# Patient Record
Sex: FEMALE | Race: WHITE | NOT HISPANIC OR LATINO | Employment: FULL TIME | URBAN - METROPOLITAN AREA
[De-identification: names, ages, dates, MRNs, and addresses within clinical notes are randomized per-mention and may not be internally consistent; named-entity substitution may affect disease eponyms.]

---

## 2017-11-22 PROBLEM — N93.0 POSTCOITAL BLEEDING: Status: ACTIVE | Noted: 2017-11-22

## 2017-11-22 PROCEDURE — 88175 CYTOPATH C/V AUTO FLUID REDO: CPT | Performed by: OBSTETRICS & GYNECOLOGY

## 2017-11-22 PROCEDURE — 87624 HPV HI-RISK TYP POOLED RSLT: CPT | Performed by: OBSTETRICS & GYNECOLOGY

## 2021-04-26 PROBLEM — Z91.89 AT HIGH RISK FOR BREAST CANCER: Status: ACTIVE | Noted: 2021-04-26

## 2021-05-21 PROCEDURE — 88305 TISSUE EXAM BY PATHOLOGIST: CPT | Performed by: RADIOLOGY

## 2024-05-07 ENCOUNTER — HOSPITAL ENCOUNTER (EMERGENCY)
Age: 54
Discharge: HOME | End: 2024-05-08
Attending: EMERGENCY MEDICINE

## 2024-05-07 ENCOUNTER — APPOINTMENT (OUTPATIENT)
Dept: CT IMAGING | Age: 54
End: 2024-05-07
Attending: EMERGENCY MEDICINE

## 2024-05-07 ENCOUNTER — APPOINTMENT (OUTPATIENT)
Dept: GENERAL RADIOLOGY | Age: 54
End: 2024-05-07
Attending: EMERGENCY MEDICINE

## 2024-05-07 VITALS
WEIGHT: 205.03 LBS | BODY MASS INDEX: 31.07 KG/M2 | HEART RATE: 107 BPM | RESPIRATION RATE: 20 BRPM | TEMPERATURE: 100.1 F | DIASTOLIC BLOOD PRESSURE: 80 MMHG | OXYGEN SATURATION: 95 % | SYSTOLIC BLOOD PRESSURE: 133 MMHG | HEIGHT: 68 IN

## 2024-05-07 DIAGNOSIS — R50.9 ACUTE FEBRILE ILLNESS: Primary | ICD-10-CM

## 2024-05-07 DIAGNOSIS — F41.1 GENERALIZED ANXIETY DISORDER: ICD-10-CM

## 2024-05-07 DIAGNOSIS — R11.2 NAUSEA AND VOMITING IN ADULT: ICD-10-CM

## 2024-05-07 DIAGNOSIS — R74.01 TRANSAMINITIS: ICD-10-CM

## 2024-05-07 DIAGNOSIS — Z79.899 IMMUNOCOMPROMISED STATE DUE TO DRUG THERAPY  (CMD): ICD-10-CM

## 2024-05-07 DIAGNOSIS — D84.821 IMMUNOCOMPROMISED STATE DUE TO DRUG THERAPY  (CMD): ICD-10-CM

## 2024-05-07 PROBLEM — E78.5 DYSLIPIDEMIA: Status: ACTIVE | Noted: 2022-11-01

## 2024-05-07 PROBLEM — E66.9 OBESITY (BMI 30-39.9): Status: ACTIVE | Noted: 2022-11-01

## 2024-05-07 PROBLEM — R73.03 PREDIABETES: Status: ACTIVE | Noted: 2022-11-01

## 2024-05-07 PROBLEM — N93.0 POSTCOITAL BLEEDING: Status: ACTIVE | Noted: 2017-11-22

## 2024-05-07 PROBLEM — E55.9 VITAMIN D DEFICIENCY: Status: ACTIVE | Noted: 2022-11-05

## 2024-05-07 PROBLEM — Z91.89 AT HIGH RISK FOR BREAST CANCER: Status: ACTIVE | Noted: 2021-04-26

## 2024-05-07 PROBLEM — C56.2 MALIGNANT NEOPLASM OF LEFT OVARY  (CMD): Status: ACTIVE | Noted: 2024-04-12

## 2024-05-07 PROBLEM — R18.0 MALIGNANT ASCITES (CMD): Status: ACTIVE | Noted: 2024-04-12

## 2024-05-07 PROBLEM — C78.6 PERITONEAL CARCINOMATOSIS  (CMD): Status: ACTIVE | Noted: 2024-04-12

## 2024-05-07 LAB
ALBUMIN SERPL-MCNC: 3.2 G/DL (ref 3.6–5.1)
ALBUMIN/GLOB SERPL: 0.6 {RATIO} (ref 1–2.4)
ALP SERPL-CCNC: 246 UNITS/L (ref 45–117)
ALT SERPL-CCNC: 212 UNITS/L
ANION GAP SERPL CALC-SCNC: 13 MMOL/L (ref 7–19)
APPEARANCE UR: CLEAR
APTT PPP: 35 SEC (ref 22–32)
AST SERPL-CCNC: 134 UNITS/L
B PARAPERT DNA SPEC QL NAA+PROBE: NOT DETECTED
B PERT.PT PRMT NPH QL NAA+NON-PROBE: NOT DETECTED
BACTERIA #/AREA URNS HPF: ABNORMAL /HPF
BASOPHILS # BLD: 0.1 K/MCL (ref 0–0.3)
BASOPHILS NFR BLD: 0 %
BILIRUB SERPL-MCNC: 0.3 MG/DL (ref 0.2–1)
BILIRUB UR QL STRIP: NEGATIVE
BUN SERPL-MCNC: 20 MG/DL (ref 6–20)
BUN/CREAT SERPL: 29 (ref 7–25)
C PNEUM DNA NPH QL NAA+NON-PROBE: NOT DETECTED
CALCIUM SERPL-MCNC: 9.3 MG/DL (ref 8.4–10.2)
CHLORIDE SERPL-SCNC: 102 MMOL/L (ref 97–110)
CO2 SERPL-SCNC: 23 MMOL/L (ref 21–32)
COLOR UR: ABNORMAL
CREAT SERPL-MCNC: 0.7 MG/DL (ref 0.51–0.95)
DEPRECATED RDW RBC: 43 FL (ref 39–50)
EGFRCR SERPLBLD CKD-EPI 2021: >90 ML/MIN/{1.73_M2}
EOSINOPHIL # BLD: 0 K/MCL (ref 0–0.5)
EOSINOPHIL NFR BLD: 0 %
ERYTHROCYTE [DISTWIDTH] IN BLOOD: 14.7 % (ref 11–15)
FASTING DURATION TIME PATIENT: ABNORMAL H
FLUAV RNA NPH QL NAA+NON-PROBE: NOT DETECTED
FLUBV RNA NPH QL NAA+NON-PROBE: NOT DETECTED
GLOBULIN SER-MCNC: 5.1 G/DL (ref 2–4)
GLUCOSE SERPL-MCNC: 154 MG/DL (ref 70–99)
GLUCOSE UR STRIP-MCNC: NEGATIVE MG/DL
HADV DNA NPH QL NAA+NON-PROBE: NOT DETECTED
HCOV 229E RNA NPH QL NAA+NON-PROBE: NOT DETECTED
HCOV HKU1 RNA NPH QL NAA+NON-PROBE: NOT DETECTED
HCOV NL63 RNA NPH QL NAA+NON-PROBE: NOT DETECTED
HCOV OC43 RNA NPH QL NAA+NON-PROBE: NOT DETECTED
HCT VFR BLD CALC: 36.1 % (ref 36–46.5)
HGB BLD-MCNC: 11.4 G/DL (ref 12–15.5)
HGB UR QL STRIP: ABNORMAL
HMPV RNA NPH QL NAA+NON-PROBE: NOT DETECTED
HPIV1 RNA NPH QL NAA+NON-PROBE: NOT DETECTED
HPIV2 RNA NPH QL NAA+NON-PROBE: NOT DETECTED
HPIV3 RNA NPH QL NAA+NON-PROBE: NOT DETECTED
HPIV4 RNA NPH QL NAA+NON-PROBE: NOT DETECTED
HYALINE CASTS #/AREA URNS LPF: ABNORMAL /LPF
IMM GRANULOCYTES # BLD AUTO: 0.3 K/MCL (ref 0–0.2)
IMM GRANULOCYTES # BLD: 2 %
INR PPP: 1.1
KETONES UR STRIP-MCNC: NEGATIVE MG/DL
LACTATE BLDV-SCNC: 1 MMOL/L (ref 0–2)
LEUKOCYTE ESTERASE UR QL STRIP: NEGATIVE
LYMPHOCYTES # BLD: 1.4 K/MCL (ref 1–4)
LYMPHOCYTES NFR BLD: 10 %
M PNEUMO DNA NPH QL NAA+NON-PROBE: NOT DETECTED
MAGNESIUM SERPL-MCNC: 1.9 MG/DL (ref 1.7–2.4)
MCH RBC QN AUTO: 26.5 PG (ref 26–34)
MCHC RBC AUTO-ENTMCNC: 31.6 G/DL (ref 32–36.5)
MCV RBC AUTO: 83.8 FL (ref 78–100)
MONOCYTES # BLD: 0.9 K/MCL (ref 0.3–0.9)
MONOCYTES NFR BLD: 6 %
MUCOUS THREADS URNS QL MICRO: PRESENT
NEUTROPHILS # BLD: 12.2 K/MCL (ref 1.8–7.7)
NEUTROPHILS NFR BLD: 82 %
NITRITE UR QL STRIP: NEGATIVE
NRBC BLD MANUAL-RTO: 0 /100 WBC
PH UR STRIP: 6 [PH] (ref 5–7)
PLATELET # BLD AUTO: 176 K/MCL (ref 140–450)
POTASSIUM SERPL-SCNC: 3.7 MMOL/L (ref 3.4–5.1)
PROCALCITONIN SERPL IA-MCNC: 0.3 NG/ML
PROT SERPL-MCNC: 8.3 G/DL (ref 6.4–8.2)
PROT UR STRIP-MCNC: 30 MG/DL
PROTHROMBIN TIME: 11.2 SEC (ref 9.7–11.8)
RBC # BLD: 4.31 MIL/MCL (ref 4–5.2)
RBC #/AREA URNS HPF: ABNORMAL /HPF
RSV RNA NPH QL NAA+NON-PROBE: NOT DETECTED
RV+EV RNA NPH QL NAA+NON-PROBE: NOT DETECTED
SARS-COV-2 RNA RESP QL NAA+PROBE: NOT DETECTED
SODIUM SERPL-SCNC: 134 MMOL/L (ref 135–145)
SP GR UR STRIP: 1.02 (ref 1–1.03)
SQUAMOUS #/AREA URNS HPF: ABNORMAL /HPF
UROBILINOGEN UR STRIP-MCNC: 0.2 MG/DL
WBC # BLD: 14.9 K/MCL (ref 4.2–11)
WBC #/AREA URNS HPF: ABNORMAL /HPF

## 2024-05-07 PROCEDURE — 0202U NFCT DS 22 TRGT SARS-COV-2: CPT | Performed by: EMERGENCY MEDICINE

## 2024-05-07 PROCEDURE — 10002803 HB RX 637: Performed by: EMERGENCY MEDICINE

## 2024-05-07 PROCEDURE — 81001 URINALYSIS AUTO W/SCOPE: CPT | Performed by: EMERGENCY MEDICINE

## 2024-05-07 PROCEDURE — 85730 THROMBOPLASTIN TIME PARTIAL: CPT | Performed by: EMERGENCY MEDICINE

## 2024-05-07 PROCEDURE — 84145 PROCALCITONIN (PCT): CPT | Performed by: EMERGENCY MEDICINE

## 2024-05-07 PROCEDURE — 96365 THER/PROPH/DIAG IV INF INIT: CPT

## 2024-05-07 PROCEDURE — 83605 ASSAY OF LACTIC ACID: CPT | Performed by: EMERGENCY MEDICINE

## 2024-05-07 PROCEDURE — 10002807 HB RX 258: Performed by: EMERGENCY MEDICINE

## 2024-05-07 PROCEDURE — 83735 ASSAY OF MAGNESIUM: CPT | Performed by: EMERGENCY MEDICINE

## 2024-05-07 PROCEDURE — 36415 COLL VENOUS BLD VENIPUNCTURE: CPT

## 2024-05-07 PROCEDURE — 10004651 HB RX, NO CHARGE ITEM: Performed by: EMERGENCY MEDICINE

## 2024-05-07 PROCEDURE — 85025 COMPLETE CBC W/AUTO DIFF WBC: CPT | Performed by: EMERGENCY MEDICINE

## 2024-05-07 PROCEDURE — 85610 PROTHROMBIN TIME: CPT | Performed by: EMERGENCY MEDICINE

## 2024-05-07 PROCEDURE — 99285 EMERGENCY DEPT VISIT HI MDM: CPT

## 2024-05-07 PROCEDURE — 10002800 HB RX 250 W HCPCS: Performed by: EMERGENCY MEDICINE

## 2024-05-07 PROCEDURE — 87040 BLOOD CULTURE FOR BACTERIA: CPT | Performed by: EMERGENCY MEDICINE

## 2024-05-07 PROCEDURE — 93005 ELECTROCARDIOGRAM TRACING: CPT | Performed by: EMERGENCY MEDICINE

## 2024-05-07 PROCEDURE — 80053 COMPREHEN METABOLIC PANEL: CPT | Performed by: EMERGENCY MEDICINE

## 2024-05-07 PROCEDURE — 74177 CT ABD & PELVIS W/CONTRAST: CPT

## 2024-05-07 PROCEDURE — 71046 X-RAY EXAM CHEST 2 VIEWS: CPT

## 2024-05-07 RX ORDER — ALPRAZOLAM 1 MG/1
2 TABLET ORAL ONCE
Qty: 2 TABLET | Refills: 0 | Status: COMPLETED | OUTPATIENT
Start: 2024-05-07 | End: 2024-05-07

## 2024-05-07 RX ORDER — PROPRANOLOL HYDROCHLORIDE 10 MG/1
TABLET ORAL
COMMUNITY
Start: 2023-11-10

## 2024-05-07 RX ORDER — ONDANSETRON HYDROCHLORIDE 8 MG/1
8 TABLET, FILM COATED ORAL
COMMUNITY
Start: 2024-04-12

## 2024-05-07 RX ORDER — DEXAMETHASONE 4 MG/1
TABLET ORAL
COMMUNITY
Start: 2024-04-12

## 2024-05-07 RX ORDER — IBUPROFEN 400 MG/1
400 TABLET ORAL ONCE
Status: COMPLETED | OUTPATIENT
Start: 2024-05-07 | End: 2024-05-07

## 2024-05-07 RX ORDER — ACETAMINOPHEN 500 MG
1000 TABLET ORAL ONCE
Status: COMPLETED | OUTPATIENT
Start: 2024-05-07 | End: 2024-05-07

## 2024-05-07 RX ORDER — BENZONATATE 100 MG/1
100 CAPSULE ORAL
COMMUNITY
Start: 2024-04-02

## 2024-05-07 RX ORDER — LORAZEPAM 0.5 MG/1
TABLET ORAL
COMMUNITY
Start: 2024-04-05

## 2024-05-07 RX ORDER — PROCHLORPERAZINE MALEATE 10 MG
TABLET ORAL
COMMUNITY

## 2024-05-07 RX ADMIN — IBUPROFEN 400 MG: 400 TABLET, FILM COATED ORAL at 22:52

## 2024-05-07 RX ADMIN — ACETAMINOPHEN 1000 MG: 500 TABLET ORAL at 22:00

## 2024-05-07 RX ADMIN — SODIUM CHLORIDE, POTASSIUM CHLORIDE, SODIUM LACTATE AND CALCIUM CHLORIDE 500 ML: 600; 310; 30; 20 INJECTION, SOLUTION INTRAVENOUS at 22:07

## 2024-05-07 RX ADMIN — CEFEPIME 1000 MG: 1 INJECTION, POWDER, FOR SOLUTION INTRAMUSCULAR; INTRAVENOUS at 22:01

## 2024-05-07 RX ADMIN — ALPRAZOLAM 2 MG: 1 TABLET ORAL at 22:51

## 2024-05-07 ASSESSMENT — PAIN SCALES - GENERAL: PAINLEVEL_OUTOF10: 0

## 2024-05-08 LAB
ATRIAL RATE (BPM): 122
P AXIS (DEGREES): 57
PR-INTERVAL (MSEC): 104
QRS-INTERVAL (MSEC): 70
QT-INTERVAL (MSEC): 372
QTC: 530
R AXIS (DEGREES): 44
RAINBOW EXTRA TUBES HOLD SPECIMEN: NORMAL
REPORT TEXT: NORMAL
T AXIS (DEGREES): 43
VENTRICULAR RATE EKG/MIN (BPM): 122

## 2024-05-08 PROCEDURE — 10002805 HB CONTRAST AGENT: Performed by: EMERGENCY MEDICINE

## 2024-05-08 RX ADMIN — IOHEXOL 86 ML: 350 INJECTION, SOLUTION INTRAVENOUS at 00:13

## 2024-05-11 LAB
BACTERIA BLD CULT: NORMAL
BACTERIA BLD CULT: NORMAL

## 2024-05-12 LAB
BACTERIA BLD CULT: NORMAL
BACTERIA BLD CULT: NORMAL

## 2024-08-02 ENCOUNTER — LAB ENCOUNTER (OUTPATIENT)
Dept: LAB | Facility: HOSPITAL | Age: 54
End: 2024-08-02
Attending: OBSTETRICS & GYNECOLOGY
Payer: COMMERCIAL

## 2024-08-02 DIAGNOSIS — Z01.818 PREOP TESTING: ICD-10-CM

## 2024-08-02 LAB
ANTIBODY SCREEN: NEGATIVE
RH BLOOD TYPE: NEGATIVE

## 2024-08-02 PROCEDURE — 36415 COLL VENOUS BLD VENIPUNCTURE: CPT

## 2024-08-02 PROCEDURE — 86850 RBC ANTIBODY SCREEN: CPT

## 2024-08-02 PROCEDURE — 86900 BLOOD TYPING SEROLOGIC ABO: CPT

## 2024-08-02 PROCEDURE — 86901 BLOOD TYPING SEROLOGIC RH(D): CPT

## 2024-08-02 RX ORDER — ACETAMINOPHEN 325 MG/1
325 TABLET ORAL EVERY 6 HOURS PRN
COMMUNITY

## 2024-08-02 RX ORDER — LORAZEPAM 0.5 MG/1
1 TABLET ORAL 4 TIMES DAILY PRN
COMMUNITY
Start: 2024-04-05

## 2024-08-13 ENCOUNTER — ANESTHESIA EVENT (OUTPATIENT)
Dept: SURGERY | Facility: HOSPITAL | Age: 54
End: 2024-08-13
Payer: COMMERCIAL

## 2024-08-13 ENCOUNTER — ANESTHESIA (OUTPATIENT)
Dept: SURGERY | Facility: HOSPITAL | Age: 54
End: 2024-08-13
Payer: COMMERCIAL

## 2024-08-13 ENCOUNTER — HOSPITAL ENCOUNTER (INPATIENT)
Facility: HOSPITAL | Age: 54
LOS: 11 days | Discharge: HOME | End: 2024-08-24
Attending: OBSTETRICS & GYNECOLOGY | Admitting: OBSTETRICS & GYNECOLOGY
Payer: COMMERCIAL

## 2024-08-13 DIAGNOSIS — Z01.818 PREOP TESTING: Primary | ICD-10-CM

## 2024-08-13 DIAGNOSIS — C56.9 MALIGNANT NEOPLASM OF OVARY, UNSPECIFIED LATERALITY (HCC): ICD-10-CM

## 2024-08-13 LAB
ANION GAP SERPL CALC-SCNC: 6 MMOL/L (ref 0–18)
BASOPHILS # BLD AUTO: 0.01 X10(3) UL (ref 0–0.2)
BASOPHILS NFR BLD AUTO: 0.1 %
BUN BLD-MCNC: 12 MG/DL (ref 9–23)
BUN/CREAT SERPL: 16.2 (ref 10–20)
CALCIUM BLD-MCNC: 8.5 MG/DL (ref 8.7–10.4)
CHLORIDE SERPL-SCNC: 110 MMOL/L (ref 98–112)
CO2 SERPL-SCNC: 26 MMOL/L (ref 21–32)
CREAT BLD-MCNC: 0.74 MG/DL
DEPRECATED RDW RBC AUTO: 57.5 FL (ref 35.1–46.3)
EGFRCR SERPLBLD CKD-EPI 2021: 95 ML/MIN/1.73M2 (ref 60–?)
EOSINOPHIL # BLD AUTO: 0 X10(3) UL (ref 0–0.7)
EOSINOPHIL NFR BLD AUTO: 0 %
ERYTHROCYTE [DISTWIDTH] IN BLOOD BY AUTOMATED COUNT: 15.7 % (ref 11–15)
GLUCOSE BLD-MCNC: 166 MG/DL (ref 70–99)
HCT VFR BLD AUTO: 34.8 %
HGB BLD-MCNC: 11.3 G/DL
IMM GRANULOCYTES # BLD AUTO: 0.03 X10(3) UL (ref 0–1)
IMM GRANULOCYTES NFR BLD: 0.3 %
LYMPHOCYTES # BLD AUTO: 0.3 X10(3) UL (ref 1–4)
LYMPHOCYTES NFR BLD AUTO: 2.8 %
MCH RBC QN AUTO: 33.4 PG (ref 26–34)
MCHC RBC AUTO-ENTMCNC: 32.5 G/DL (ref 31–37)
MCV RBC AUTO: 103 FL
MONOCYTES # BLD AUTO: 0.41 X10(3) UL (ref 0.1–1)
MONOCYTES NFR BLD AUTO: 3.8 %
NEUTROPHILS # BLD AUTO: 10.03 X10 (3) UL (ref 1.5–7.7)
NEUTROPHILS # BLD AUTO: 10.03 X10(3) UL (ref 1.5–7.7)
NEUTROPHILS NFR BLD AUTO: 93 %
OSMOLALITY SERPL CALC.SUM OF ELEC: 298 MOSM/KG (ref 275–295)
PLATELET # BLD AUTO: 205 10(3)UL (ref 150–450)
POCT HEMOCUE: 10.6 G/DL (ref 12–16)
POTASSIUM SERPL-SCNC: 4.4 MMOL/L (ref 3.5–5.1)
RBC # BLD AUTO: 3.38 X10(6)UL
RH BLOOD TYPE: NEGATIVE
SODIUM SERPL-SCNC: 142 MMOL/L (ref 136–145)
WBC # BLD AUTO: 10.8 X10(3) UL (ref 4–11)

## 2024-08-13 PROCEDURE — 76942 ECHO GUIDE FOR BIOPSY: CPT | Performed by: ANESTHESIOLOGY

## 2024-08-13 PROCEDURE — 0DBN0ZZ EXCISION OF SIGMOID COLON, OPEN APPROACH: ICD-10-PCS | Performed by: OBSTETRICS & GYNECOLOGY

## 2024-08-13 PROCEDURE — 3E0T3BZ INTRODUCTION OF ANESTHETIC AGENT INTO PERIPHERAL NERVES AND PLEXI, PERCUTANEOUS APPROACH: ICD-10-PCS | Performed by: OBSTETRICS & GYNECOLOGY

## 2024-08-13 PROCEDURE — 85025 COMPLETE CBC W/AUTO DIFF WBC: CPT | Performed by: PHYSICIAN ASSISTANT

## 2024-08-13 PROCEDURE — 80048 BASIC METABOLIC PNL TOTAL CA: CPT | Performed by: PHYSICIAN ASSISTANT

## 2024-08-13 PROCEDURE — 0D5W0ZZ DESTRUCTION OF PERITONEUM, OPEN APPROACH: ICD-10-PCS | Performed by: OBSTETRICS & GYNECOLOGY

## 2024-08-13 PROCEDURE — 07BC0ZZ EXCISION OF PELVIS LYMPHATIC, OPEN APPROACH: ICD-10-PCS | Performed by: OBSTETRICS & GYNECOLOGY

## 2024-08-13 PROCEDURE — 0TN70ZZ RELEASE LEFT URETER, OPEN APPROACH: ICD-10-PCS | Performed by: OBSTETRICS & GYNECOLOGY

## 2024-08-13 PROCEDURE — 88342 IMHCHEM/IMCYTCHM 1ST ANTB: CPT | Performed by: OBSTETRICS & GYNECOLOGY

## 2024-08-13 PROCEDURE — 0TN60ZZ RELEASE RIGHT URETER, OPEN APPROACH: ICD-10-PCS | Performed by: OBSTETRICS & GYNECOLOGY

## 2024-08-13 PROCEDURE — 88341 IMHCHEM/IMCYTCHM EA ADD ANTB: CPT | Performed by: OBSTETRICS & GYNECOLOGY

## 2024-08-13 PROCEDURE — 0D580ZZ DESTRUCTION OF SMALL INTESTINE, OPEN APPROACH: ICD-10-PCS | Performed by: OBSTETRICS & GYNECOLOGY

## 2024-08-13 PROCEDURE — 0UT20ZZ RESECTION OF BILATERAL OVARIES, OPEN APPROACH: ICD-10-PCS | Performed by: OBSTETRICS & GYNECOLOGY

## 2024-08-13 PROCEDURE — 88309 TISSUE EXAM BY PATHOLOGIST: CPT | Performed by: OBSTETRICS & GYNECOLOGY

## 2024-08-13 PROCEDURE — 0UT70ZZ RESECTION OF BILATERAL FALLOPIAN TUBES, OPEN APPROACH: ICD-10-PCS | Performed by: OBSTETRICS & GYNECOLOGY

## 2024-08-13 PROCEDURE — 0DBH0ZZ EXCISION OF CECUM, OPEN APPROACH: ICD-10-PCS | Performed by: OBSTETRICS & GYNECOLOGY

## 2024-08-13 PROCEDURE — 0DB80ZZ EXCISION OF SMALL INTESTINE, OPEN APPROACH: ICD-10-PCS | Performed by: OBSTETRICS & GYNECOLOGY

## 2024-08-13 PROCEDURE — 0DBW0ZZ EXCISION OF PERITONEUM, OPEN APPROACH: ICD-10-PCS | Performed by: OBSTETRICS & GYNECOLOGY

## 2024-08-13 PROCEDURE — 88307 TISSUE EXAM BY PATHOLOGIST: CPT | Performed by: OBSTETRICS & GYNECOLOGY

## 2024-08-13 PROCEDURE — 88305 TISSUE EXAM BY PATHOLOGIST: CPT | Performed by: OBSTETRICS & GYNECOLOGY

## 2024-08-13 PROCEDURE — 0DBU0ZZ EXCISION OF OMENTUM, OPEN APPROACH: ICD-10-PCS | Performed by: OBSTETRICS & GYNECOLOGY

## 2024-08-13 PROCEDURE — 0UB40ZZ EXCISION OF UTERINE SUPPORTING STRUCTURE, OPEN APPROACH: ICD-10-PCS | Performed by: OBSTETRICS & GYNECOLOGY

## 2024-08-13 PROCEDURE — 0DBP0ZZ EXCISION OF RECTUM, OPEN APPROACH: ICD-10-PCS | Performed by: OBSTETRICS & GYNECOLOGY

## 2024-08-13 PROCEDURE — 0UT90ZZ RESECTION OF UTERUS, OPEN APPROACH: ICD-10-PCS | Performed by: OBSTETRICS & GYNECOLOGY

## 2024-08-13 RX ORDER — EPHEDRINE SULFATE 50 MG/ML
INJECTION, SOLUTION INTRAVENOUS AS NEEDED
Status: DISCONTINUED | OUTPATIENT
Start: 2024-08-13 | End: 2024-08-13 | Stop reason: SURG

## 2024-08-13 RX ORDER — MORPHINE SULFATE 4 MG/ML
4 INJECTION, SOLUTION INTRAMUSCULAR; INTRAVENOUS EVERY 10 MIN PRN
Status: DISCONTINUED | OUTPATIENT
Start: 2024-08-13 | End: 2024-08-13 | Stop reason: HOSPADM

## 2024-08-13 RX ORDER — SCOLOPAMINE TRANSDERMAL SYSTEM 1 MG/1
PATCH, EXTENDED RELEASE TRANSDERMAL AS NEEDED
Status: DISCONTINUED | OUTPATIENT
Start: 2024-08-13 | End: 2024-08-13 | Stop reason: SURG

## 2024-08-13 RX ORDER — DIPHENHYDRAMINE HYDROCHLORIDE 50 MG/ML
12.5 INJECTION INTRAMUSCULAR; INTRAVENOUS EVERY 4 HOURS PRN
Status: DISCONTINUED | OUTPATIENT
Start: 2024-08-13 | End: 2024-08-15

## 2024-08-13 RX ORDER — ROCURONIUM BROMIDE 10 MG/ML
INJECTION, SOLUTION INTRAVENOUS AS NEEDED
Status: DISCONTINUED | OUTPATIENT
Start: 2024-08-13 | End: 2024-08-13 | Stop reason: SURG

## 2024-08-13 RX ORDER — ONDANSETRON 2 MG/ML
INJECTION INTRAMUSCULAR; INTRAVENOUS AS NEEDED
Status: DISCONTINUED | OUTPATIENT
Start: 2024-08-13 | End: 2024-08-13 | Stop reason: SURG

## 2024-08-13 RX ORDER — HYDROMORPHONE HYDROCHLORIDE 1 MG/ML
0.4 INJECTION, SOLUTION INTRAMUSCULAR; INTRAVENOUS; SUBCUTANEOUS EVERY 5 MIN PRN
Status: DISCONTINUED | OUTPATIENT
Start: 2024-08-13 | End: 2024-08-13 | Stop reason: HOSPADM

## 2024-08-13 RX ORDER — HYDROMORPHONE HYDROCHLORIDE 1 MG/ML
0.2 INJECTION, SOLUTION INTRAMUSCULAR; INTRAVENOUS; SUBCUTANEOUS EVERY 5 MIN PRN
Status: DISCONTINUED | OUTPATIENT
Start: 2024-08-13 | End: 2024-08-13 | Stop reason: HOSPADM

## 2024-08-13 RX ORDER — SODIUM CHLORIDE, SODIUM LACTATE, POTASSIUM CHLORIDE, CALCIUM CHLORIDE 600; 310; 30; 20 MG/100ML; MG/100ML; MG/100ML; MG/100ML
INJECTION, SOLUTION INTRAVENOUS CONTINUOUS
Status: DISCONTINUED | OUTPATIENT
Start: 2024-08-13 | End: 2024-08-13 | Stop reason: HOSPADM

## 2024-08-13 RX ORDER — MIDAZOLAM HYDROCHLORIDE 1 MG/ML
INJECTION INTRAMUSCULAR; INTRAVENOUS AS NEEDED
Status: DISCONTINUED | OUTPATIENT
Start: 2024-08-13 | End: 2024-08-13 | Stop reason: SURG

## 2024-08-13 RX ORDER — DEXAMETHASONE SODIUM PHOSPHATE 10 MG/ML
INJECTION, SOLUTION INTRAMUSCULAR; INTRAVENOUS
Status: COMPLETED | OUTPATIENT
Start: 2024-08-13 | End: 2024-08-13

## 2024-08-13 RX ORDER — ONDANSETRON 4 MG/1
4 TABLET, FILM COATED ORAL EVERY 8 HOURS PRN
Status: DISCONTINUED | OUTPATIENT
Start: 2024-08-13 | End: 2024-08-24

## 2024-08-13 RX ORDER — ONDANSETRON 2 MG/ML
4 INJECTION INTRAMUSCULAR; INTRAVENOUS EVERY 6 HOURS PRN
Status: DISCONTINUED | OUTPATIENT
Start: 2024-08-13 | End: 2024-08-15

## 2024-08-13 RX ORDER — SODIUM CHLORIDE, SODIUM LACTATE, POTASSIUM CHLORIDE, CALCIUM CHLORIDE 600; 310; 30; 20 MG/100ML; MG/100ML; MG/100ML; MG/100ML
INJECTION, SOLUTION INTRAVENOUS CONTINUOUS
Status: DISCONTINUED | OUTPATIENT
Start: 2024-08-13 | End: 2024-08-15

## 2024-08-13 RX ORDER — MORPHINE SULFATE 10 MG/ML
6 INJECTION, SOLUTION INTRAMUSCULAR; INTRAVENOUS EVERY 10 MIN PRN
Status: DISCONTINUED | OUTPATIENT
Start: 2024-08-13 | End: 2024-08-13 | Stop reason: HOSPADM

## 2024-08-13 RX ORDER — ENOXAPARIN SODIUM 100 MG/ML
40 INJECTION SUBCUTANEOUS DAILY
Status: DISCONTINUED | OUTPATIENT
Start: 2024-08-14 | End: 2024-08-24

## 2024-08-13 RX ORDER — LIDOCAINE HYDROCHLORIDE 10 MG/ML
INJECTION, SOLUTION EPIDURAL; INFILTRATION; INTRACAUDAL; PERINEURAL AS NEEDED
Status: DISCONTINUED | OUTPATIENT
Start: 2024-08-13 | End: 2024-08-13 | Stop reason: SURG

## 2024-08-13 RX ORDER — DIPHENHYDRAMINE HYDROCHLORIDE 50 MG/ML
12.5 INJECTION INTRAMUSCULAR; INTRAVENOUS EVERY 4 HOURS PRN
Status: DISCONTINUED | OUTPATIENT
Start: 2024-08-13 | End: 2024-08-24

## 2024-08-13 RX ORDER — KETOROLAC TROMETHAMINE 15 MG/ML
15 INJECTION, SOLUTION INTRAMUSCULAR; INTRAVENOUS EVERY 6 HOURS PRN
Status: DISCONTINUED | OUTPATIENT
Start: 2024-08-13 | End: 2024-08-15

## 2024-08-13 RX ORDER — SODIUM CHLORIDE 9 MG/ML
INJECTION, SOLUTION INTRAVENOUS CONTINUOUS
Status: DISCONTINUED | OUTPATIENT
Start: 2024-08-13 | End: 2024-08-15

## 2024-08-13 RX ORDER — METRONIDAZOLE 500 MG/100ML
500 INJECTION, SOLUTION INTRAVENOUS ONCE
Status: COMPLETED | OUTPATIENT
Start: 2024-08-13 | End: 2024-08-13

## 2024-08-13 RX ORDER — SODIUM CHLORIDE, SODIUM LACTATE, POTASSIUM CHLORIDE, CALCIUM CHLORIDE 600; 310; 30; 20 MG/100ML; MG/100ML; MG/100ML; MG/100ML
INJECTION, SOLUTION INTRAVENOUS CONTINUOUS
Status: DISCONTINUED | OUTPATIENT
Start: 2024-08-13 | End: 2024-08-16

## 2024-08-13 RX ORDER — SODIUM CHLORIDE 9 MG/ML
INJECTION, SOLUTION INTRAVENOUS CONTINUOUS PRN
Status: DISCONTINUED | OUTPATIENT
Start: 2024-08-13 | End: 2024-08-13 | Stop reason: SURG

## 2024-08-13 RX ORDER — HYDROMORPHONE HYDROCHLORIDE 1 MG/ML
0.6 INJECTION, SOLUTION INTRAMUSCULAR; INTRAVENOUS; SUBCUTANEOUS EVERY 5 MIN PRN
Status: DISCONTINUED | OUTPATIENT
Start: 2024-08-13 | End: 2024-08-13 | Stop reason: HOSPADM

## 2024-08-13 RX ORDER — MORPHINE SULFATE 4 MG/ML
2 INJECTION, SOLUTION INTRAMUSCULAR; INTRAVENOUS EVERY 10 MIN PRN
Status: DISCONTINUED | OUTPATIENT
Start: 2024-08-13 | End: 2024-08-13 | Stop reason: HOSPADM

## 2024-08-13 RX ORDER — NALOXONE HYDROCHLORIDE 0.4 MG/ML
0.08 INJECTION, SOLUTION INTRAMUSCULAR; INTRAVENOUS; SUBCUTANEOUS
Status: DISCONTINUED | OUTPATIENT
Start: 2024-08-13 | End: 2024-08-15

## 2024-08-13 RX ORDER — DEXAMETHASONE SODIUM PHOSPHATE 4 MG/ML
VIAL (ML) INJECTION AS NEEDED
Status: DISCONTINUED | OUTPATIENT
Start: 2024-08-13 | End: 2024-08-13 | Stop reason: SURG

## 2024-08-13 RX ORDER — NALOXONE HYDROCHLORIDE 0.4 MG/ML
0.08 INJECTION, SOLUTION INTRAMUSCULAR; INTRAVENOUS; SUBCUTANEOUS AS NEEDED
Status: DISCONTINUED | OUTPATIENT
Start: 2024-08-13 | End: 2024-08-13 | Stop reason: HOSPADM

## 2024-08-13 RX ORDER — PANTOPRAZOLE SODIUM 40 MG/1
40 TABLET, DELAYED RELEASE ORAL
Status: DISCONTINUED | OUTPATIENT
Start: 2024-08-14 | End: 2024-08-24

## 2024-08-13 RX ORDER — METRONIDAZOLE 500 MG/100ML
500 INJECTION, SOLUTION INTRAVENOUS EVERY 12 HOURS
Status: COMPLETED | OUTPATIENT
Start: 2024-08-13 | End: 2024-08-15

## 2024-08-13 RX ORDER — ROPIVACAINE HYDROCHLORIDE 5 MG/ML
INJECTION, SOLUTION EPIDURAL; INFILTRATION; PERINEURAL
Status: COMPLETED | OUTPATIENT
Start: 2024-08-13 | End: 2024-08-13

## 2024-08-13 RX ORDER — PHENYLEPHRINE HCL 10 MG/ML
VIAL (ML) INJECTION AS NEEDED
Status: DISCONTINUED | OUTPATIENT
Start: 2024-08-13 | End: 2024-08-13 | Stop reason: SURG

## 2024-08-13 RX ORDER — GLYCOPYRROLATE 0.2 MG/ML
INJECTION, SOLUTION INTRAMUSCULAR; INTRAVENOUS AS NEEDED
Status: DISCONTINUED | OUTPATIENT
Start: 2024-08-13 | End: 2024-08-13 | Stop reason: SURG

## 2024-08-13 RX ORDER — ONDANSETRON 2 MG/ML
4 INJECTION INTRAMUSCULAR; INTRAVENOUS EVERY 8 HOURS PRN
Status: DISCONTINUED | OUTPATIENT
Start: 2024-08-13 | End: 2024-08-24

## 2024-08-13 RX ORDER — FAMOTIDINE 20 MG/1
20 TABLET, FILM COATED ORAL ONCE
Status: COMPLETED | OUTPATIENT
Start: 2024-08-13 | End: 2024-08-13

## 2024-08-13 RX ORDER — ACETAMINOPHEN 500 MG
1000 TABLET ORAL ONCE
Status: COMPLETED | OUTPATIENT
Start: 2024-08-13 | End: 2024-08-13

## 2024-08-13 RX ORDER — ACETAMINOPHEN 10 MG/ML
1000 INJECTION, SOLUTION INTRAVENOUS ONCE
Status: COMPLETED | OUTPATIENT
Start: 2024-08-13 | End: 2024-08-13

## 2024-08-13 RX ADMIN — PHENYLEPHRINE HCL 100 MCG: 10 MG/ML VIAL (ML) INJECTION at 12:25:00

## 2024-08-13 RX ADMIN — SODIUM CHLORIDE: 9 INJECTION, SOLUTION INTRAVENOUS at 12:50:00

## 2024-08-13 RX ADMIN — PHENYLEPHRINE HCL 100 MCG: 10 MG/ML VIAL (ML) INJECTION at 09:45:00

## 2024-08-13 RX ADMIN — SODIUM CHLORIDE, SODIUM LACTATE, POTASSIUM CHLORIDE, CALCIUM CHLORIDE: 600; 310; 30; 20 INJECTION, SOLUTION INTRAVENOUS at 12:07:00

## 2024-08-13 RX ADMIN — ROCURONIUM BROMIDE 20 MG: 10 INJECTION, SOLUTION INTRAVENOUS at 10:58:00

## 2024-08-13 RX ADMIN — ROCURONIUM BROMIDE 40 MG: 10 INJECTION, SOLUTION INTRAVENOUS at 07:40:00

## 2024-08-13 RX ADMIN — PHENYLEPHRINE HCL 100 MCG: 10 MG/ML VIAL (ML) INJECTION at 09:39:00

## 2024-08-13 RX ADMIN — ONDANSETRON 4 MG: 2 INJECTION INTRAMUSCULAR; INTRAVENOUS at 07:50:00

## 2024-08-13 RX ADMIN — EPHEDRINE SULFATE 10 MG: 50 INJECTION, SOLUTION INTRAVENOUS at 10:47:00

## 2024-08-13 RX ADMIN — ROCURONIUM BROMIDE 10 MG: 10 INJECTION, SOLUTION INTRAVENOUS at 11:31:00

## 2024-08-13 RX ADMIN — MIDAZOLAM HYDROCHLORIDE 2 MG: 1 INJECTION INTRAMUSCULAR; INTRAVENOUS at 07:34:00

## 2024-08-13 RX ADMIN — PHENYLEPHRINE HCL 100 MCG: 10 MG/ML VIAL (ML) INJECTION at 11:42:00

## 2024-08-13 RX ADMIN — LIDOCAINE HYDROCHLORIDE 50 MG: 10 INJECTION, SOLUTION EPIDURAL; INFILTRATION; INTRACAUDAL; PERINEURAL at 07:40:00

## 2024-08-13 RX ADMIN — ROCURONIUM BROMIDE 10 MG: 10 INJECTION, SOLUTION INTRAVENOUS at 09:19:00

## 2024-08-13 RX ADMIN — SODIUM CHLORIDE, SODIUM LACTATE, POTASSIUM CHLORIDE, CALCIUM CHLORIDE: 600; 310; 30; 20 INJECTION, SOLUTION INTRAVENOUS at 07:34:00

## 2024-08-13 RX ADMIN — ROCURONIUM BROMIDE 10 MG: 10 INJECTION, SOLUTION INTRAVENOUS at 10:24:00

## 2024-08-13 RX ADMIN — PHENYLEPHRINE HCL 100 MCG: 10 MG/ML VIAL (ML) INJECTION at 11:22:00

## 2024-08-13 RX ADMIN — ROCURONIUM BROMIDE 10 MG: 10 INJECTION, SOLUTION INTRAVENOUS at 08:18:00

## 2024-08-13 RX ADMIN — PHENYLEPHRINE HCL 100 MCG: 10 MG/ML VIAL (ML) INJECTION at 08:27:00

## 2024-08-13 RX ADMIN — ONDANSETRON 4 MG: 2 INJECTION INTRAMUSCULAR; INTRAVENOUS at 13:17:00

## 2024-08-13 RX ADMIN — PHENYLEPHRINE HCL 100 MCG: 10 MG/ML VIAL (ML) INJECTION at 12:17:00

## 2024-08-13 RX ADMIN — ROCURONIUM BROMIDE 10 MG: 10 INJECTION, SOLUTION INTRAVENOUS at 09:45:00

## 2024-08-13 RX ADMIN — ROPIVACAINE HYDROCHLORIDE 40 ML: 5 INJECTION, SOLUTION EPIDURAL; INFILTRATION; PERINEURAL at 13:13:00

## 2024-08-13 RX ADMIN — SODIUM CHLORIDE: 9 INJECTION, SOLUTION INTRAVENOUS at 07:47:00

## 2024-08-13 RX ADMIN — SODIUM CHLORIDE: 9 INJECTION, SOLUTION INTRAVENOUS at 12:07:00

## 2024-08-13 RX ADMIN — GLYCOPYRROLATE 0.1 MG: 0.2 INJECTION, SOLUTION INTRAMUSCULAR; INTRAVENOUS at 07:50:00

## 2024-08-13 RX ADMIN — EPHEDRINE SULFATE 10 MG: 50 INJECTION, SOLUTION INTRAVENOUS at 08:26:00

## 2024-08-13 RX ADMIN — DEXAMETHASONE SODIUM PHOSPHATE 10 MG: 10 INJECTION, SOLUTION INTRAMUSCULAR; INTRAVENOUS at 13:13:00

## 2024-08-13 RX ADMIN — DEXAMETHASONE SODIUM PHOSPHATE 4 MG: 4 MG/ML VIAL (ML) INJECTION at 07:50:00

## 2024-08-13 RX ADMIN — METRONIDAZOLE 500 MG: 500 INJECTION, SOLUTION INTRAVENOUS at 07:54:00

## 2024-08-13 RX ADMIN — ROCURONIUM BROMIDE 20 MG: 10 INJECTION, SOLUTION INTRAVENOUS at 08:46:00

## 2024-08-13 RX ADMIN — SCOLOPAMINE TRANSDERMAL SYSTEM 1 PATCH: 1 PATCH, EXTENDED RELEASE TRANSDERMAL at 07:31:00

## 2024-08-13 RX ADMIN — PHENYLEPHRINE HCL 100 MCG: 10 MG/ML VIAL (ML) INJECTION at 11:31:00

## 2024-08-13 RX ADMIN — SODIUM CHLORIDE: 9 INJECTION, SOLUTION INTRAVENOUS at 09:45:00

## 2024-08-13 NOTE — ANESTHESIA PROCEDURE NOTES
Peripheral Block    Date/Time: 8/13/2024 1:13 PM    Performed by: Jaret Ferguson MD  Authorized by: Jaret Ferguson MD      General Information and Staff    Start Time:  8/13/2024 1:13 PM  End Time:  8/13/2024 1:20 PM  Anesthesiologist:  Jaret Ferguson MD  Performed by:  Anesthesiologist  Patient Location:  OR    Block Placement: Post Induction  Site Identification: real time ultrasound guided, surface landmarks and image stored and retrievable      Reason for Block: at surgeon's request and post-op pain management    Preanesthetic Checklist: 2 patient identifers, IV checked, risks and benefits discussed, monitors and equipment checked, pre-op evaluation, timeout performed, anesthesia consent, sterile technique used, no prohibitive neurological deficits and no local skin infection at insertion site      Procedure Details    Patient Position:   Block Type:  TAP  Laterality:  Bilateral  Injection Technique:  Single-shot    Needle    Needle Type:  Echogenic  Needle Gauge:  22 G  Needle Length:  90 mm  Needle Localization:  Ultrasound guidance  Reason for Ultrasound Use: appropriate spread of the medication was noted in real time and no ultrasound evidence of intravascular and/or intraneural injection            Assessment    Injection Assessment:  Good spread noted, incremental injection, local visualized surrounding nerve on ultrasound, low pressure, negative aspiration for heme and negative resistance  Heart Rate Change: No        Medications  8/13/2024 1:13 PM  ropivacaine (NAROPIN) injection 0.5% - Infiltration   40 mL - 8/13/2024 1:13:00 PM  dexamethasone (DECADRON) PF injection 10 mg/ml - Injection   10 mg - 8/13/2024 1:13:00 PM    Additional Comments    Sterily, 40 ml total of local anesthetic for both sides

## 2024-08-13 NOTE — ANESTHESIA PROCEDURE NOTES
Peripheral IV  Date/Time: 8/13/2024 7:47 AM  Inserted by: Tootie Peace CRNA    Placement  Needle size: 18 G  Laterality: right  Location: hand  Local anesthetic: none  Site prep: alcohol  Technique: anatomical landmarks  Attempts: 1

## 2024-08-13 NOTE — BRIEF OP NOTE
Pre-Operative Diagnosis: Adenocarcinoma     Post-Operative Diagnosis: Adenocarcinoma      Procedure Performed:   Exploratory laparotomy, total abdominal hysterectomy, bilateral salpingo-oophorectomy, bilateral ureterolysis, bilateral pelvic lymph node dissection, tumor debulking, Argon Beam coagulation of peritoneal nodules, mobilization of splenic flexure,repair of transverse colon mesentery, infracolic, supracolic and splenocolic ligament resection with omentectomy, rectosigmoid colon resection with end to end anastomosis    Surgeons and Role:  Panel 1:     * Binh Choudhary MD - Primary  Panel 2:     * Binh Choudhary MD - Primary    Assistant(s):  Surgical Assistant.: Moreno Posey CSA  PA: Rehana Ortega PA     Surgical Findings: tumor involving sigmoid colon, see full op report     Specimen: sent to pathology     Estimated Blood Loss: Blood Output: 100 mL (8/13/2024 12:30 PM)      Dictation Number:  TBD    KOREY Roberto  8/13/2024  1:10 PM

## 2024-08-13 NOTE — H&P
[facility]   INTERNAL MEDICINE HISTORY & PHYSICAL      CHIEF COMPLAINT   Gynecologic cancer s/p hysterectomy    HISTORY OF THE PRESENT ILLNESS    Yue Malin - 54 year old female presenting with above CC.    History obtained from patient w/ additional history from review of outside records in care everywhere.  Abd pain & bloating - had CT in march which showed ascites & abnormal L ovary. Paracentesis c/f carcinoma. Subsequent diagnostics w high-grade adenocarcinoma c/w gynecologic primary.   Sees Dr. Serrano - [facility] h/o. Started Carbo/Taxol/Avastin on 24, S/p C 4 on 24,   She saw Dr. Choudhary who recommended surgery. She thus opted for operative management. She presents today for planned procedure. Operative course progressed as expected, long surgery requiring bowel resection. Medicine consulted for post-op med management. She notes pain but ivp dilaudid helping  She denies HA, blurry vision, chest pain, difficulty breathing. She notes good sensation, no specific numbness, tingling, or weakness.    REVIEW OF SYSTEMS   Remainder of 12-point ROS negative unless discussed in HPI.     PATIENT HISTORIES  PMHx:  She has a past medical history of Anxiety state, Cancer (HCC), DEPRESSION, Depression, Esophageal reflux, HPV in female, Hypertriglyceridemia, OCD (obsessive compulsive disorder), Personal history of antineoplastic chemotherapy, PONV (postoperative nausea and vomiting), PTSD (post-traumatic stress disorder), SEASONAL ALLERGIES, Visual impairment, Vitamin B 12 deficiency, and Vitamin D deficiency.  PSHx:  She has a past surgical history that includes ; other surgical history ( &); other surgical history ( ); implant left (); implant left (); implant right (); and implant right ().   FHx:  Her family history includes Breast Cancer (age of onset: 60) in her mother; Cancer in her mother; Diabetes in her father; Heart Disorder in her father; Other in her father.    SHx: She reports that she has never smoked. She has never used smokeless tobacco. She reports that she does not currently use alcohol. She reports that she does not use drugs.    Allergies: She is allergic to hydrocodone and adhesive tape.     Current Outpatient Medications   Medication Instructions    acetaminophen (TYLENOL) 325 mg, Oral, Every 6 hours PRN    LORazepam (ATIVAN) 1 mg, Oral, 4 times daily PRN       PHYSICAL EXAMINATION   Vitals: /65 (BP Location: Right arm)   Pulse 67   Temp 98.2 °F (36.8 °C) (Oral)   Resp 18   Ht 5' 7\" (1.702 m)   Wt 173 lb 6.4 oz (78.7 kg)   LMP 12/22/2020   SpO2 100%   BMI 27.16 kg/m²   Gen: NAD, well nourished, NGT  Eyes: PERRLA, normal conjunctivae  ENMT: Moist mucous membranes, trachea midline, no pharyngeal erythema, no thyromegaly  CV: RRR, no M/R/G, no peripheral edema  Resp: CTAB, non-labored respirations, symmetric expansion  GI: Soft, NT, ND, + BS, no rebound, no guarding  MSK:  No C/C/E, normal active/passive ROM in C-spine, 5/5 strength in all extremities  Skin: No rashes, visible skin w/o worrisome lesions   Neuro: CN 2-12 grossly intact, sensation intact   Psych: A&Ox3, appropriate mood, conversant w/ linear thought process     DATA REVIEW: LABORATORY VALUES & DIAGNOSTICS  Recent Labs   Lab 08/13/24  1726 08/14/24  0509    143   K 4.4 4.5    110   CO2 26.0 27.0   BUN 12 13   CREATSERUM 0.74 0.73   ANIONGAP 6 6   * 121*   CA 8.5* 8.8   EGFRCR 95 97     Recent Labs   Lab 08/13/24  1726 08/14/24  0508   WBC 10.8 8.7   HGB 11.3* 10.2*   HCT 34.8* 31.9*   .0 192.0   .0* 102.2*   MOPERCENT 3.8 9.7   EOPERCENT 0.0 0.0   BAPERCENT 0.1 0.1     ASSESSMENT & PLAN   Yue Malin - 54 year old female w adenocarcinoma admitted 8/13/2024 for post op care after planned CHELSIE-BSO, colon resection for her gynecologic adenoca w Dr. Choudhary    Gynecologic adenocarcinoma w peritoneal carcinomatosis  S/p chemo  Now s/p CHELSIE-BSO, rectosigmoid  colon resection w end-end anastomosis due to tumor involvement  - Post-op care per Dr. Choudhary's team  - NGT  - Trend labs  - Dilaudid PCA for now  - IVF until tolerating PO  - Holguin per Gyne/onc  - Up, OOB, IS use, ambulate  - CLD, ADAT    Anxiety (ACOSTA)  - Stable, continue PTA ativan PRN    ACP: Full code  Ppx: DVT: Enox  Dispo  EDoD:  TBD. Suspect 3-5 days  F/u:   - PCP:  Diana Black MD  - Onc  - Gyne/onc    Available via epic secure chat or perfect serve 7A - 7P.    Steven Hou MD   Internal Medicine - [place] [facility]ist  [facility]

## 2024-08-13 NOTE — PROGRESS NOTES
Admitted to room 440 from PACU. PCA dilaudid for pain management. Zofran was given for nausea. NGT to LIS. NPO. Swabs given for oral care. Holguin in place. Abd incision clean/dry, binder in place. Remote tele monitoring. SCDs applied. IVF infusing. Orders for PT/OT to eval. Family at bedside with patient.

## 2024-08-13 NOTE — ANESTHESIA PREPROCEDURE EVALUATION
Anesthesia PreOp Note    HPI:     Yue Malin is a 54 year old female who presents for preoperative consultation requested by: Binh Choudhary MD    Date of Surgery: 2024    Procedure(s):  Exploratory laparotomy, total abdominal hysterectomy, bilateral salpingo-oophorectomy, staging, tumor debulking, possible bowel resection  LAPAROTOMY SALPINGO OOPHORECTOMY  COLON RESECTION LEFT  Indication: Adenocarcinoma    Relevant Problems   No relevant active problems       NPO:  Last Liquid Consumption Date: 24  Last Liquid Consumption Time: 2200  Last Solid Consumption Date: 24  Last Solid Consumption Time: 0800  Last Liquid Consumption Date: 24          History Review:  Patient Active Problem List    Diagnosis Date Noted    At high risk for breast cancer 2021    Postcoital bleeding 2017    High risk HPV infection 10/22/2012       Past Medical History:    Anxiety state    Cancer (HCC)    ovary    DEPRESSION    Depression    Esophageal reflux    HPV in female    Hypertriglyceridemia    OCD (obsessive compulsive disorder)    Personal history of antineoplastic chemotherapy    PONV (postoperative nausea and vomiting)    PTSD (post-traumatic stress disorder)    SEASONAL ALLERGIES    Visual impairment    glasses    Vitamin B 12 deficiency    Vitamin D deficiency       Past Surgical History:   Procedure Laterality Date          x3    Implant left  1999    Implant left  2009    implants replaced    Implant right      Implant right  2009    implants replaced    Other surgical history   &    breast implants, deflation    Other surgical history  2007     carpal tunnel R hand       Medications Prior to Admission   Medication Sig Dispense Refill Last Dose    LORazepam 0.5 MG Oral Tab Take 2 tablets (1 mg total) by mouth 4 (four) times daily as needed for Anxiety.   2024 at 0500    acetaminophen 325 MG Oral Tab Take 1 tablet (325 mg total) by mouth every 6 (six) hours as needed  for Pain.        Current Facility-Administered Medications Ordered in Epic   Medication Dose Route Frequency Provider Last Rate Last Admin    lactated ringers infusion   Intravenous Continuous Binh Choudhary MD        ceFAZolin (Ancef) 2g in 10mL IV syringe premix  2 g Intravenous Once Binh Choudhary MD        metRONIDAZOLE in sodium chloride 0.79% (Flagyl) 5 mg/mL IVPB premix 500 mg  500 mg Intravenous Once Binh Choudhary MD         No current [facility]-ordered outpatient medications on file.       Allergies   Allergen Reactions    Hydrocodone NAUSEA AND VOMITING     agitation    Adhesive Tape RASH       Family History   Problem Relation Age of Onset    Diabetes Father     Heart Disorder Father     Other (Other) Father         rheumatoid arthritis    Cancer Mother         breast 56y/o    Breast Cancer Mother 60        60     Social History     Socioeconomic History    Marital status:    Tobacco Use    Smoking status: Never    Smokeless tobacco: Never   Vaping Use    Vaping status: Never Used   Substance and Sexual Activity    Alcohol use: Not Currently     Comment: rare    Drug use: No    Sexual activity: Not Currently     Partners: Male     Comment: 1/5/2021       Available pre-op labs reviewed.             Vital Signs:  Body mass index is 27.16 kg/m².   height is 1.702 m (5' 7\") and weight is 78.7 kg (173 lb 6.4 oz). Her oral temperature is 97.7 °F (36.5 °C). Her blood pressure is 156/91 (abnormal) and her pulse is 90. Her respiration is 16 and oxygen saturation is 98%.   Vitals:    08/02/24 1128 08/13/24 0549   BP:  (!) 156/91   Pulse:  90   Resp:  16   Temp:  97.7 °F (36.5 °C)   TempSrc:  Oral   SpO2:  98%   Weight: 81.6 kg (180 lb) 78.7 kg (173 lb 6.4 oz)   Height: 1.702 m (5' 7\")         Anesthesia Evaluation     Patient summary reviewed and Nursing notes reviewed    History of anesthetic complications   Airway   Mallampati: II  TM distance: >3 FB  Neck ROM: full  Dental - Dentition appears grossly  intact     Pulmonary - negative ROS and normal exam   Cardiovascular - negative ROS and normal exam    Neuro/Psych    (+)  anxiety/panic attacks,  depression      GI/Hepatic/Renal    (+) GERD    Endo/Other      Comments: AdenoCA  Abdominal  - normal exam     Other findings: Subclav port            Anesthesia Plan:   ASA:  2  Plan:   General  Airway:  ETT  Post-op Pain Management: IV analgesics  Informed Consent Plan and Risks Discussed With:  Patient  Use of Blood Products Discussed With:  Patient  Blood Product Use Consented    Discussed plan with:  Surgeon      I have informed Yue Malin and/or legal guardian or family member of the nature of the anesthetic plan, benefits, risks including possible dental damage if relevant, major complications, and any alternative forms of anesthetic management.   All of the patient's questions were answered to the best of my ability. The patient desires the anesthetic management as planned.  SIMEON ZULUAGA MD  8/13/2024 7:03 AM  Present on Admission:  **None**

## 2024-08-13 NOTE — ANESTHESIA PROCEDURE NOTES
Airway  Date/Time: 8/13/2024 7:43 AM  Urgency: Elective    Airway not difficult    General Information and Staff    Patient location during procedure: OR  Anesthesiologist: Jaret Ferguson MD  Resident/CRNA: Tootie Peace CRNA  Performed: CRNA   Performed by: Tootie Peace CRNA  Authorized by: Jaret Ferguson MD      Indications and Patient Condition  Indications for airway management: anesthesia  Sedation level: deep  Preoxygenated: yes  Patient position: sniffing  Mask difficulty assessment: 1 - vent by mask    Final Airway Details  Final airway type: endotracheal airway      Successful airway: ETT  Cuffed: yes   Successful intubation technique: direct laryngoscopy  Facilitating devices/methods: intubating stylet and cricoid pressure  Endotracheal tube insertion site: oral  Blade: Misael  Blade size: #3  ETT size (mm): 7.0    Cormack-Lehane Classification: grade IIA - partial view of glottis  Placement verified by: capnometry   Measured from: lips  ETT to lips (cm): 21  Number of attempts at approach: 1  Number of other approaches attempted: 0    Additional Comments  Preoxygenated. Atraumatic intubation on first attempt, dentition and soft tissues appear in preop condition. OG inserted without resistance, clear drainage noted, placed to free drain. Gauze bite block placed between molars, tongue free from pressure.

## 2024-08-13 NOTE — H&P
[facility]: GYNECOLOGIC ONCOLOGY H&P     MEDICAL RECORD #: [id number] DATE OF SERVICE: 2024             REASON FOR VISIT:   No chief complaint on file.  Ovarian cancer     HISTORY OF PRESENT ILLNESS:   Yue Malin is a 54 year old female who has had a lower abdominal pain, abdominal bloating, and diarrhea for a few weeks. She had CT Abd/pelvis 3/22/24 that showed large amount of ascites, mildly prominent left ovary, underdistention of stomach, indeterminate hypodensity in left lobe of liver.       She underwent paracentesis showing carcinoma and IR biopsy of omental mass showing high-grade adenocarcinoma consistent with gynecologic primary on on 3/28/24.     CEA: 0.7  : 721.5     Pelvic US 24 showing 7 cm uterus, 7.5 mm endometrium,  and 3.9 cm left adnexal mass.     Her mother had breast cancer in her 50s. She does not believe she had genetic testing done. She has not had any cancer previously.         Went through menopause 2 years ago, denies PMB.  Patient was referred by Dr. Serrano.     INTERIM HISTORY:  Started Carbo/Taxol/Avastin on 24, S/p C 4 on 24, Avastin held for 4th cycle  -PET   -         Denies new or worsening symptoms. Endorses anxiety.     PAST MEDICAL HISTORY:       Past Medical History:   Diagnosis Date    DEPRESSION      HPV in female      Hypertriglyceridemia      SEASONAL ALLERGIES      Vitamin B 12 deficiency      Vitamin D deficiency           SURGICAL HISTORY:         Past Surgical History:   Procedure Laterality Date             x3    IMPLANT LEFT   1999    IMPLANT LEFT   2009     implants replaced    IMPLANT RIGHT   1999    IMPLANT RIGHT   2009     implants replaced    OTHER SURGICAL HISTORY    &     breast implants, deflation    OTHER SURGICAL HISTORY   2007      carpal tunnel R hand         ALLERGIES:     Vicodin [Hydrocodon*    NAUSEA AND VOMITING    Comment:aggitation     MEDICATIONS:  baclofen 10 MG Oral Tab, Take 1 tablet (10  mg total) by mouth 3 (three) times daily as needed., Disp: 60 tablet, Rfl: 0  azithromycin (ZITHROMAX Z-LIMA) 250 MG Oral Tab, Take 1 tablet (250 mg total) by mouth daily. Take Z-lima as directed. Take 2 tablets on day one. Then take 1 tablet daily for 4 days., Disp: 6 tablet, Rfl: 0  prochlorperazine (COMPAZINE) 10 mg tablet, Take 1 tablet (10 mg total) by mouth every 6 (six) hours as needed for Nausea. Every 6 hours prn nausea or as instructed by physician, Disp: 30 tablet, Rfl: 3  ondansetron (ZOFRAN) 8 MG tablet, Take 1 tablet (8 mg total) by mouth every 8 (eight) hours as needed for Nausea. Every 8 hours prn nausea or as instructed by physician, Disp: 20 tablet, Rfl: 3  dexAMETHasone (DECADRON) 4 MG tablet, Take 2 tabs in the AM with food for 3 days following chemotherapy, per chemo calendar, then as directed by physician., Disp: 30 tablet, Rfl: 4  LORazepam 0.5 MG Oral Tab, , Disp: , Rfl:   benzonatate 100 MG Oral Cap, Take 1 capsule (100 mg total) by mouth 3 (three) times daily as needed for cough., Disp: 60 capsule, Rfl: 0     sodium chloride 0.9% infusion, 500 mL, Intravenous, Continuous, Naun Diaz MD           GYNECOLOGIC HISTORY:  No LMP recorded. Patient is premenopausal.  Patient reports that she is not currently sexually active and has had partner(s) who are male.     LAST PAP:   2021 NIL, HPV neg  Reports h/o abnormal pap many years ago, denies CKC/LEEP procedures     PATHOLOGY:         Lab Results   Component Value Date     FINALDX   03/28/2024       Omental mass, left; biopsy:  - Positive for high-grade adenocarcinoma, most consistent with gynecologic primary  - Please see comment                TUMOR MARKERS:           Lab Results   Component Value Date      45.3 (H) 07/24/2024            Lab Results   Component Value Date     CEA 0.7 04/04/2024      No results found for: \"\"        LAST SCREENING MAMMOGRAM:  2022 No mammographic evidence of malignancy      RADIOLOGY:  DATE OF  SERVICE: 07.23.2024  WHOLE BODY PET/CT TUMOR IMAGING    CLINICAL INDICATION: Malignant neoplasm of left ovary (HCC)    COMPARISON: 4/9/2024    TECHNIQUE:  PET-CT imaging was performed after IV administration of 16.1 mCi of F-18 FDG, with an  uptake time of 90 minutes.  Tomographic images were obtained from the orbits through the thighs,  with the patient in the supine position.  The patient's blood glucose level at the time of the exam  was 139 mg/dL.      The CT portion of the exam was performed for attenuation correction purposes only (not for  diagnostic interpretation). Because of the low-radiation-dose CT protocol, the necessity of CT  imaging at functional reserve lung capacity, and lack of administration of oral or intravenous  contrast medium, the CT portion of this study is of less than optimal diagnostic quality. The CT  protocol used for this PET/CT study is designed for attenuation correction and anatomic localization  of PET abnormalities. This  CT is not designed to produce, and cannot replace,  state-of-the-art diagnostic CT scans with specific imaging protocols for different body parts and  indications.    The standardized uptake values (SUV) are normalized to patient body weight and indicate the highest  activity concentration (SUV max) in a given disease site.    Automated exposure control and ALARA manual techniques for patient specific dose reduction were  followed while maintaining the necessary diagnostic image quality.    FINDINGS:    HEAD/NECK: No hypermetabolic lymph nodes are found in the neck.    CHEST: Prominent mediastinal, pericardiophrenic, and left internal mammary lymph nodes are seen with  improved now mild metabolic uptake and SUV max of 2.8 on image 211 series 4, previously with SUV max  of 5.3. No hypermetabolic pulmonary nodules are found. Right chest wall port catheter is seen  terminating in the superior cavoatrial junction. Bilateral breast implants are noted. The  previously  seen pleural effusions have resolved.    ABDOMEN AND PELVIS:    Decreased in size left adnexal soft tissue lesion is seen measuring 2.9 x 2.5 cm on image 219 series  3 with SUV max of 4.1, previously measuring 4.3 x 3.6 cm with SUV max of 12.8. Redemonstrated  hypermetabolic soft tissue thickening is seen in the cul-de-sac with SUV max of 4.7, previously with  SUV max of 10.7. Redemonstrated mildly hypermetabolic soft tissue thickening is seen at the right  hepatic capsule on image 128 series 3 with SUV max of 4, previously with SUV max of 9.8. Improved  hypermetabolic peritoneal soft tissue nodularity is seen in the left upper abdomen with SUV max of  4.1 on image 151 series 4, previously with SUV max of 9.7. The adrenal glands are normal in size and  FDG uptake. Small liver cyst is noted. The previously seen ascites has resolved.    SKELETON: No suspicious focal areas of abnormal FDG uptake are detected in the visualized portions  of the skeleton.    Impression   IMPRESSION:  Overall, this exam demonstrates treatment response but persistent metastatic disease.    1. Left adnexal lesion has improved in size and metabolic uptake.  2. Redemonstrated peritoneal carcinomatosis, improved in size and metabolic uptake.  3. Prominent mediastinal, pericardiophrenic, and left internal mammary lymph nodes with improved  metabolic uptake, now mild.  4. Resolved previously seen pleural effusions and ascites.            OBSTETRIC HISTORY:   OB History     T3    L3    SAB0  IAB0  Ectopic0  Multiple0  Live Births3      SOCIAL HISTORY:  Patient lives at [place], with self.     Ambulatory Status:  independent  Performance Status:  0 - Fully active, able to carry on all pre-disease performance without restriction.     Social History    Socioeconomic History      Marital status:       Spouse name: Not on file      Number of children: Not on file      Years of education: Not on file      Highest  education level: Not on file    Occupational History      Not on file    Tobacco Use      Smoking status: Never      Smokeless tobacco: Never    Vaping Use      Vaping status: Never Used    Substance and Sexual Activity      Alcohol use: Yes        Alcohol/week: 0.0 standard drinks of alcohol        Comment: rare      Drug use: No      Sexual activity: Not Currently        Partners: Male    Other Topics      Concerns:        Not on file    Social History Narrative      Not on file     Social Determinants of Health  Financial Resource Strain: Not on file  Food Insecurity: Not on file  Transportation Needs: Not on file  Physical Activity: Not on file  Stress: Not on file  Social Connections: Not on file  Intimate Partner Violence: Not on file  Housing Stability: Not on file     FAMILY HISTORY:        Family History   Problem Relation Age of Onset    Cancer Mother           breast 56y/o    Breast Cancer Mother 60         60    Diabetes Father      Heart Disorder Father      Other (Other) Father           rheumatoid arthritis    Breast Cancer Sister      Ovarian Cancer Cousin      Breast Cancer Paternal Aunt           REVIEW OF SYSTEMS:  General ROS: negative  Ophthalmic ROS: negative  ENT ROS: negative  Cardiovascular ROS: no chest pain or dyspnea on exertion  Respiratory ROS: positive for - cough for a couple weeks  Gastrointestinal ROS: positive for - diarrhea, bloating for 3-4 weeks  Genito-Urinary ROS: positive for - urinary frequency/urgency  Musculoskeletal ROS: negative  Neurological ROS: no TIA or stroke symptoms  Psychological ROS: negative  Endocrine ROS: negative  Hematological and Lymphatic ROS: negative     PHYSICAL EXAMINATION:    /76   Ht 5' 7.52\" (1.715 m)   Wt 179 lb (81.2 kg)   BMI 27.61 kg/m²   Body mass index is 27.61 kg/m².     GENERAL: alert and oriented, cooperative, in no acute distress  HEENT: extra ocular movement intact  THYROID: normal to inspection and palpation  LYMPH NODES:  Cervical, supraclavicular, and axillary nodes normal.  LUNGS: clear to auscultation bilaterally  HEART: regular rate and rhythm  ABDOMEN:  non-distended, soft, non tender  EXTREMITIES: extremities normal, atraumatic, no cyanosis or edema  NEUROLOGIC: motor grossly intact  VULVA: normal appearing vulva with no masses, tenderness or lesions   URETHRA: normal without discharge or scarring  VAGINA: normal mucosa without prolapse or lesions  CERVIX: no lesions and nulliparous appearance  UTERUS: normal single, nontender  LEFT ADNEXA:  4 cm mobile, non-tender mass  RIGHT ADNEXA:  non palpable  RECTOVAGINAL: deferred  BACK: symmetric, no curvature. ROM normal. No CVA tenderness.     ASSESSMENT:  Diagnoses and all orders for this visit:     Adenocarcinoma (HCC)     Family history of breast cancer           54 year old female with  lower abdominal pain, abdominal bloating, and diarrhea for a few weeks. Workup shows Stage 3 ovarian cancer      CT Abd/pelvis 3/22/24 that showed large amount of ascites, mildly prominent left ovary, underdistention of stomach, indeterminate hypodensity in left lobe of liver.       S/p paracentesis showing carcinoma and IR biopsy of omental mass showing high-grade adenocarcinoma consistent with gynecologic primary on on 3/28/24.     Pelvic US 4/4/24 showing 7 cm uterus, 7.5 mm endometrium,  and 3.9 cm left adnexal mass.     Pre-treatment PET scan performed 4/9/24 - left adnexal mass, peritoneal carcinomatosis, hypermetabolic lymph nodes in the chest, b/l pleural effusions and ascites    Started Carbo/Taxol/Avastin on 4/26/24, S/p C 4 on 6/28/24, Avastin held for 4th cycle      5/8/24 CT A/P for N/V showing probable sigmoid colonic involvement.      7/23/24 PET scan showing treatment response but persistent metastatic disease.      CEA: 0.7  : 721.5 -> 45.3     The index of suspicion for malignancy is confirmed     Comorbidities:  c/s x 3, mother and sister with breast CA, sister BRCA neg       PLAN:    Patient given options of alternative treatment/surgical intervention including chemotherapy (neoadjuvant NAC) vs. Interval debulking surgery (IDS). We dicussed that typical treatment for Stage 3 ovarian cancer includes both chemotherapy and surgical intervention. Given her locally advanced and diffuse intraperitoneal disease we recommended she undergo IDS after 4 cycles of NAC.We discussed option of open (xlap) vs. MSI (robotic). Given multiquadrant disease, SOC with Xlap, younger and healthier patient who is able to tolerate larger incision, I recommend IDS via Xlap      Holding chemo till completion of IDS     Previously referred for genetic testing     Surgical intervention planned: Xlap hyst/bso, staging, tumor debulking, possible bowel resection       Patient counseled on Risk/Benefits/Alternatives/Indications risk.  Risk discussed includes but not limited to anesthesia, bleeding, death, infection, injury to adjacent organs (including GI, , Vascular and Neurologic Structures), need for further operation, need for further therapy, transfusion, Venous Thrombolytic Disease, wound breakdown dehiscence, lymphedema, possible inability to stage, possible dissemination of disease. Discussed based on CT in May there is a higher risk of needing a bowel resection.     Medical Clearance: obtained       The patient verbalized good understanding of this.  I will keep you informed of her progress.  Thank you for allowing me to participate in the care of this patient.     KOREY Roberto

## 2024-08-14 LAB
ANION GAP SERPL CALC-SCNC: 6 MMOL/L (ref 0–18)
BASOPHILS # BLD AUTO: 0.01 X10(3) UL (ref 0–0.2)
BASOPHILS NFR BLD AUTO: 0.1 %
BUN BLD-MCNC: 13 MG/DL (ref 9–23)
BUN/CREAT SERPL: 17.8 (ref 10–20)
CALCIUM BLD-MCNC: 8.8 MG/DL (ref 8.7–10.4)
CHLORIDE SERPL-SCNC: 110 MMOL/L (ref 98–112)
CO2 SERPL-SCNC: 27 MMOL/L (ref 21–32)
CREAT BLD-MCNC: 0.73 MG/DL
DEPRECATED RDW RBC AUTO: 57 FL (ref 35.1–46.3)
EGFRCR SERPLBLD CKD-EPI 2021: 97 ML/MIN/1.73M2 (ref 60–?)
EOSINOPHIL # BLD AUTO: 0 X10(3) UL (ref 0–0.7)
EOSINOPHIL NFR BLD AUTO: 0 %
ERYTHROCYTE [DISTWIDTH] IN BLOOD BY AUTOMATED COUNT: 15.6 % (ref 11–15)
GLUCOSE BLD-MCNC: 121 MG/DL (ref 70–99)
HCT VFR BLD AUTO: 31.9 %
HGB BLD-MCNC: 10.2 G/DL
IMM GRANULOCYTES # BLD AUTO: 0.03 X10(3) UL (ref 0–1)
IMM GRANULOCYTES NFR BLD: 0.3 %
LYMPHOCYTES # BLD AUTO: 1.18 X10(3) UL (ref 1–4)
LYMPHOCYTES NFR BLD AUTO: 13.5 %
MCH RBC QN AUTO: 32.7 PG (ref 26–34)
MCHC RBC AUTO-ENTMCNC: 32 G/DL (ref 31–37)
MCV RBC AUTO: 102.2 FL
MONOCYTES # BLD AUTO: 0.85 X10(3) UL (ref 0.1–1)
MONOCYTES NFR BLD AUTO: 9.7 %
NEUTROPHILS # BLD AUTO: 6.66 X10 (3) UL (ref 1.5–7.7)
NEUTROPHILS # BLD AUTO: 6.66 X10(3) UL (ref 1.5–7.7)
NEUTROPHILS NFR BLD AUTO: 76.4 %
OSMOLALITY SERPL CALC.SUM OF ELEC: 297 MOSM/KG (ref 275–295)
PLATELET # BLD AUTO: 192 10(3)UL (ref 150–450)
POTASSIUM SERPL-SCNC: 4.5 MMOL/L (ref 3.5–5.1)
RBC # BLD AUTO: 3.12 X10(6)UL
SODIUM SERPL-SCNC: 143 MMOL/L (ref 136–145)
WBC # BLD AUTO: 8.7 X10(3) UL (ref 4–11)

## 2024-08-14 PROCEDURE — 97116 GAIT TRAINING THERAPY: CPT

## 2024-08-14 PROCEDURE — 97161 PT EVAL LOW COMPLEX 20 MIN: CPT

## 2024-08-14 PROCEDURE — 80048 BASIC METABOLIC PNL TOTAL CA: CPT | Performed by: PHYSICIAN ASSISTANT

## 2024-08-14 PROCEDURE — 97530 THERAPEUTIC ACTIVITIES: CPT

## 2024-08-14 PROCEDURE — 97166 OT EVAL MOD COMPLEX 45 MIN: CPT

## 2024-08-14 PROCEDURE — 85025 COMPLETE CBC W/AUTO DIFF WBC: CPT | Performed by: PHYSICIAN ASSISTANT

## 2024-08-14 RX ORDER — DIAZEPAM 10 MG/2ML
5 INJECTION, SOLUTION INTRAMUSCULAR; INTRAVENOUS EVERY 6 HOURS PRN
Status: DISCONTINUED | OUTPATIENT
Start: 2024-08-14 | End: 2024-08-24

## 2024-08-14 NOTE — PROGRESS NOTES
[facility] POST-OPERATIVE VISIT     MEDICAL RECORD #: [id number] DATE OF SERVICE: 8/14/2024             Reason for visit:  Post-operative day one    HISTORY OF PRESENT ILLNESS:  54 year old female with a history of abdominal distension, bloating, who was found to have ascites and an enlarged left ovary. The patient underwent biopsy of the omentum which was consistent with gynecologic primary consistent with ovarian cancer. Patient's initial CA-125 was over 700. Patient was started on neoadjuvant chemotherapy and has now received 4 cycles of chemotherapy. Last one approximately 5 weeks ago. Patient had good response. Her repeat CA-125 was down to 45. CT scan also showed improvement in left adnexal lesion and improved peritoneal carcinomatosis.   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       GI//GYNE Complaint:  Other complaints: Pain well controlled on PCA. Denies N/V. Walking with PT. Belching, no flatus/BM. Holguin in place.    Interim History:  Underwent surgery    Patient's medications, allergies, past medical, surgical, social and family histories were reviewed and updated as appropriate.    Pathology/Lab Results:  Recent Results (from the past 720 hour(s))   ABORH (Blood Type)    Collection Time: 08/02/24 12:33 PM   Result Value Ref Range    ABO BLOOD TYPE O     RH BLOOD TYPE Negative    Antibody Screen    Collection Time: 08/02/24 12:33 PM   Result Value Ref Range    Antibody Screen Negative    ABORH Confirmation    Collection  Time: 08/13/24  7:00 AM   Result Value Ref Range    ABO BLOOD TYPE O     RH BLOOD TYPE Negative    POCT Hemocue    Collection Time: 08/13/24 11:24 AM   Result Value Ref Range    Hemocue 10.6 (A) 12.0 - 16.0 g/dl   Basic Metabolic Panel (8)    Collection Time: 08/13/24  5:26 PM   Result Value Ref Range    Glucose 166 (H) 70 - 99 mg/dL    Sodium 142 136 - 145 mmol/L    Potassium 4.4 3.5 - 5.1 mmol/L    Chloride 110 98 - 112 mmol/L    CO2 26.0 21.0 - 32.0 mmol/L    Anion Gap 6 0 - 18 mmol/L    BUN 12 9 - 23 mg/dL    Creatinine 0.74 0.55 - 1.02 mg/dL    BUN/CREA Ratio 16.2 10.0 - 20.0    Calcium, Total 8.5 (L) 8.7 - 10.4 mg/dL    Calculated Osmolality 298 (H) 275 - 295 mOsm/kg    eGFR-Cr 95 >=60 mL/min/1.73m2   CBC With Differential With Platelet    Collection Time: 08/13/24  5:26 PM   Result Value Ref Range    WBC 10.8 4.0 - 11.0 x10(3) uL    RBC 3.38 (L) 3.80 - 5.30 x10(6)uL    HGB 11.3 (L) 12.0 - 16.0 g/dL    HCT 34.8 (L) 35.0 - 48.0 %    .0 (H) 80.0 - 100.0 fL    MCH 33.4 26.0 - 34.0 pg    MCHC 32.5 31.0 - 37.0 g/dL    RDW-SD 57.5 (H) 35.1 - 46.3 fL    RDW 15.7 (H) 11.0 - 15.0 %    .0 150.0 - 450.0 10(3)uL    Neutrophil Absolute Prelim 10.03 (H) 1.50 - 7.70 x10 (3) uL    Neutrophil Absolute 10.03 (H) 1.50 - 7.70 x10(3) uL    Lymphocyte Absolute 0.30 (L) 1.00 - 4.00 x10(3) uL    Monocyte Absolute 0.41 0.10 - 1.00 x10(3) uL    Eosinophil Absolute 0.00 0.00 - 0.70 x10(3) uL    Basophil Absolute 0.01 0.00 - 0.20 x10(3) uL    Immature Granulocyte Absolute 0.03 0.00 - 1.00 x10(3) uL    Neutrophil % 93.0 %    Lymphocyte % 2.8 %    Monocyte % 3.8 %    Eosinophil % 0.0 %    Basophil % 0.1 %    Immature Granulocyte % 0.3 %   CBC With Differential With Platelet    Collection Time: 08/14/24  5:08 AM   Result Value Ref Range    WBC 8.7 4.0 - 11.0 x10(3) uL    RBC 3.12 (L) 3.80 - 5.30 x10(6)uL    HGB 10.2 (L) 12.0 - 16.0 g/dL    HCT 31.9 (L) 35.0 - 48.0 %    .2 (H) 80.0 - 100.0 fL    MCH 32.7 26.0 - 34.0 pg     MCHC 32.0 31.0 - 37.0 g/dL    RDW-SD 57.0 (H) 35.1 - 46.3 fL    RDW 15.6 (H) 11.0 - 15.0 %    .0 150.0 - 450.0 10(3)uL    Neutrophil Absolute Prelim 6.66 1.50 - 7.70 x10 (3) uL    Neutrophil Absolute 6.66 1.50 - 7.70 x10(3) uL    Lymphocyte Absolute 1.18 1.00 - 4.00 x10(3) uL    Monocyte Absolute 0.85 0.10 - 1.00 x10(3) uL    Eosinophil Absolute 0.00 0.00 - 0.70 x10(3) uL    Basophil Absolute 0.01 0.00 - 0.20 x10(3) uL    Immature Granulocyte Absolute 0.03 0.00 - 1.00 x10(3) uL    Neutrophil % 76.4 %    Lymphocyte % 13.5 %    Monocyte % 9.7 %    Eosinophil % 0.0 %    Basophil % 0.1 %    Immature Granulocyte % 0.3 %   Basic Metabolic Panel (8)    Collection Time: 08/14/24  5:09 AM   Result Value Ref Range    Glucose 121 (H) 70 - 99 mg/dL    Sodium 143 136 - 145 mmol/L    Potassium 4.5 3.5 - 5.1 mmol/L    Chloride 110 98 - 112 mmol/L    CO2 27.0 21.0 - 32.0 mmol/L    Anion Gap 6 0 - 18 mmol/L    BUN 13 9 - 23 mg/dL    Creatinine 0.73 0.55 - 1.02 mg/dL    BUN/CREA Ratio 17.8 10.0 - 20.0    Calcium, Total 8.8 8.7 - 10.4 mg/dL    Calculated Osmolality 297 (H) 275 - 295 mOsm/kg    eGFR-Cr 97 >=60 mL/min/1.73m2       Labs reviewed - 8/14/24    Physical examination:    /60 (BP Location: Right arm)   Pulse 69   Temp 97.4 °F (36.3 °C) (Oral)   Resp 18   Ht 5' 7\" (1.702 m)   Wt 173 lb 6.4 oz (78.7 kg)   LMP 12/22/2020   SpO2 100%   BMI 27.16 kg/m²     GENERAL: alert and oriented, cooperative, in no acute distress  ABDOMEN:  soft, tender, non-distended  INCISION/SURGICAL WOUNDS: dressing intact and in place  PELVIC: Pelvic exam: examination not indicated.    ASSESSMENT:  Dx: ovarian cancer    S/p Exploratory laparotomy, total abdominal hysterectomy, bilateral salpingo-oophorectomy, bilateral ureterolysis, bilateral pelvic lymph node dissection, tumor debulking of various peritoneal surfaces, argon beam coagulation of peritoneal tumor nodules, infracolic-supracolic omentectomy, and resection of  gastrosplenic ligament with tumor attached to tip of spleen, mobilization of splenic flexure, repair of transverse colon mesentery, rectosigmoid colon resection with end-to-end anastomosis on 8/13/24.       Good UOP    B/L LUCHO with low-moderate output    WBC and Cr WNL    Hgb with anticipated drop    Vitals normal on 2L O2    PLAN:    LMWH    Continue yao, likely discontinue in AM pending output    Continue IVF    Pain control - PCA, Toradol PRN  -plan to discontinue PCA in AM and transition to PRN dilaudid + Toradol     Discontinue NGT     Continue strict NPO, ok for a few ice chips    Recheck labs in AM    PT/OT/SW    The patient verbalized good understanding of this.  I will keep you informed of her progress.  Thank you for allowing me to participate in the care of this patient.    KOREY Roberto  08/14/24

## 2024-08-14 NOTE — PLAN OF CARE
Yue is A&Ox4. 2L O2 NC. Tele monitor in place. Holguin in place. NGT to LIS. Midline dressing C/D/I, L and R LUCHO drain in place. Vitals and I&O's monitored q4h. NPO. IVF infusing. Dilaudid PCA in place. Call light within reach and daughter at bedside throughout night.     Problem: Patient Centered Care  Goal: Patient preferences are identified and integrated in the patient's plan of care  Description: Interventions:  - What would you like us to know as we care for you? From home with sons   - Provide timely, complete, and accurate information to patient/family  - Incorporate patient and family knowledge, values, beliefs, and cultural backgrounds into the planning and delivery of care  - Encourage patient/family to participate in care and decision-making at the level they choose  - Honor patient and family perspectives and choices  8/14/2024 0619 by Ting Lee, RN  Outcome: Progressing  8/14/2024 0619 by Ting Lee RN  Outcome: Progressing     Problem: Patient/Family Goals  Goal: Patient/Family Long Term Goal  Description: Patient's Long Term Goal:     Interventions:  - See additional Care Plan goals for specific interventions  8/14/2024 0619 by Ting Lee, RN  Outcome: Progressing  8/14/2024 0619 by Ting Lee RN  Outcome: Progressing  Goal: Patient/Family Short Term Goal  Description: Patient's Short Term Goal:     Interventions:   - See additional Care Plan goals for specific interventions  8/14/2024 0619 by Ting Lee, RN  Outcome: Progressing  8/14/2024 0619 by Ting Lee RN  Outcome: Progressing     Problem: PAIN - ADULT  Goal: Verbalizes/displays adequate comfort level or patient's stated pain goal  Description: INTERVENTIONS:  - Encourage pt to monitor pain and request assistance  - Assess pain using appropriate pain scale  - Administer analgesics based on type and severity of pain and evaluate response  - Implement non-pharmacological measures as appropriate and evaluate response  -  Consider cultural and social influences on pain and pain management  - Manage/alleviate anxiety  - Utilize distraction and/or relaxation techniques  - Monitor for opioid side effects  - Notify MD/LIP if interventions unsuccessful or patient reports new pain  - Anticipate increased pain with activity and pre-medicate as appropriate  Outcome: Progressing     Problem: SAFETY ADULT - FALL  Goal: Free from fall injury  Description: INTERVENTIONS:  - Assess pt frequently for physical needs  - Identify cognitive and physical deficits and behaviors that affect risk of falls.  - Baxter fall precautions as indicated by assessment.  - Educate pt/family on patient safety including physical limitations  - Instruct pt to call for assistance with activity based on assessment  - Modify environment to reduce risk of injury  - Provide assistive devices as appropriate  - Consider OT/PT consult to assist with strengthening/mobility  - Encourage toileting schedule  Outcome: Progressing     Problem: GASTROINTESTINAL - ADULT  Goal: Minimal or absence of nausea and vomiting  Description: INTERVENTIONS:  - Maintain adequate hydration with IV or PO as ordered and tolerated  - Nasogastric tube to low intermittent suction as ordered  - Evaluate effectiveness of ordered antiemetic medications  - Provide nonpharmacologic comfort measures as appropriate  - Advance diet as tolerated, if ordered  - Obtain nutritional consult as needed  - Evaluate fluid balance  Outcome: Progressing  Goal: Maintains or returns to baseline bowel function  Description: INTERVENTIONS:  - Assess bowel function  - Maintain adequate hydration with IV or PO as ordered and tolerated  - Evaluate effectiveness of GI medications  - Encourage mobilization and activity  - Obtain nutritional consult as needed  - Establish a toileting routine/schedule  - Consider collaborating with pharmacy to review patient's medication profile  Outcome: Progressing     Problem: GENITOURINARY  - ADULT  Goal: Absence of urinary retention  Description: INTERVENTIONS:  - Assess patient’s ability to void and empty bladder  - Monitor intake/output and perform bladder scan as needed  - Follow urinary retention protocol/standard of care  - Consider collaborating with pharmacy to review patient's medication profile  - Implement strategies to promote bladder emptying  Outcome: Progressing     Problem: SKIN/TISSUE INTEGRITY - ADULT  Goal: Incision(s), wounds(s) or drain site(s) healing without S/S of infection  Description: INTERVENTIONS:  - Assess and document risk factors for pressure ulcer development  - Assess and document skin integrity  - Assess and document dressing/incision, wound bed, drain sites and surrounding tissue  - Implement wound care per orders  - Initiate isolation precautions as appropriate  - Initiate Pressure Ulcer prevention bundle as indicated  Outcome: Progressing     Problem: MUSCULOSKELETAL - ADULT  Goal: Return mobility to safest level of function  Description: INTERVENTIONS:  - Assess patient stability and activity tolerance for standing, transferring and ambulating w/ or w/o assistive devices  - Assist with transfers and ambulation using safe patient handling equipment as needed  - Ensure adequate protection for wounds/incisions during mobilization  - Obtain PT/OT consults as needed  - Advance activity as appropriate  - Communicate ordered activity level and limitations with patient/family  Outcome: Progressing

## 2024-08-14 NOTE — PAYOR COMM NOTE
--------------  ADMISSION REVIEW     Payor: [employer] [facility]  Subscriber #:  [id number]  Authorization Number: [id number]    Admit date: 24  Admit time:  5:41 AM       REVIEW DOCUMENTATION:  ED Provider Notes    No notes of this type exist for this encounter.          H&P    CHIEF COMPLAINT   Gynecologic cancer s/p hysterectomy     HISTORY OF THE PRESENT ILLNESS    Yue Malin - 54 year old female presenting with above CC.     History obtained from patient w/ additional history from review of outside records in care everywhere.  Abd pain & bloating - had CT in march which showed ascites & abnormal L ovary. Paracentesis c/f carcinoma. Subsequent diagnostics w high-grade adenocarcinoma c/w gynecologic primary.   Sees Dr. Maggie Mackey h/o. Started Carbo/Taxol/Avastin on 24, S/p C 4 on 24,   She saw Dr. Choudhary who recommended surgery. She thus opted for operative management. She presents today for planned procedure. Operative course progressed as expected, long surgery requiring bowel resection. Medicine consulted for post-op med management. She notes pain but ivp dilaudid helping  She denies HA, blurry vision, chest pain, difficulty breathing. She notes good sensation, no specific numbness, tingling, or weakness.     REVIEW OF SYSTEMS   Remainder of 12-point ROS negative unless discussed in HPI.      PATIENT HISTORIES  PMHx:      She has a past medical history of Anxiety state, Cancer (HCC), DEPRESSION, Depression, Esophageal reflux, HPV in female, Hypertriglyceridemia, OCD (obsessive compulsive disorder), Personal history of antineoplastic chemotherapy, PONV (postoperative nausea and vomiting), PTSD (post-traumatic stress disorder), SEASONAL ALLERGIES, Visual impairment, Vitamin B 12 deficiency, and Vitamin D deficiency.  PSHx:      She has a past surgical history that includes ; other surgical history ( &); other surgical history ( ); implant left (); implant left ();  implant right (1999); and implant right (2009).   FHx:         Her family history includes Breast Cancer (age of onset: 60) in her mother; Cancer in her mother; Diabetes in her father; Heart Disorder in her father; Other in her father.   SHx:         She reports that she has never smoked. She has never used smokeless tobacco. She reports that she does not currently use alcohol. She reports that she does not use drugs.    Allergies:  She is allergic to hydrocodone and adhesive tape.           Current Outpatient Medications   Medication Instructions    acetaminophen (TYLENOL) 325 mg, Oral, Every 6 hours PRN    LORazepam (ATIVAN) 1 mg, Oral, 4 times daily PRN       PHYSICAL EXAMINATION   Vitals:       /65 (BP Location: Right arm)   Pulse 67   Temp 98.2 °F (36.8 °C) (Oral)   Resp 18   Ht 5' 7\" (1.702 m)   Wt 173 lb 6.4 oz (78.7 kg)   LMP 12/22/2020   SpO2 100%   BMI 27.16 kg/m²   Gen:NAD, well nourished, NGT  Eyes:PERRLA, normal conjunctivae  ENMT:Moist mucous membranes, trachea midline, no pharyngeal erythema, no thyromegaly  CV:RRR, no M/R/G, no peripheral edema  Resp:CTAB, non-labored respirations, symmetric expansion  GI:Soft, NT, ND, + BS, no rebound, no guarding  MSK:        No C/C/E, normal active/passive ROM in C-spine, 5/5 strength in all extremities  Skin:         No rashes, visible skin w/o worrisome lesions   Neuro:      CN 2-12 grossly intact, sensation intact   Psych:      A&Ox3, appropriate mood, conversant w/ linear thought process      DATA REVIEW: LABORATORY VALUES & DIAGNOSTICS       Recent Labs   Lab 08/13/24  1726 08/14/24  0509    143   K 4.4 4.5    110   CO2 26.0 27.0   BUN 12 13   CREATSERUM 0.74 0.73   ANIONGAP 6 6   * 121*   CA 8.5* 8.8   EGFRCR 95 97           Recent Labs   Lab 08/13/24  1726 08/14/24  0508   WBC 10.8 8.7   HGB 11.3* 10.2*   HCT 34.8* 31.9*   .0 192.0   .0* 102.2*   MOPERCENT 3.8 9.7   EOPERCENT 0.0 0.0   BAPERCENT 0.1 0.1       ASSESSMENT & PLAN   Neilzenia Yue - 54 year old female w adenocarcinoma admitted 8/13/2024 for post op care after planned CHELSIE-BSO, colon resection for her gynecologic adenoca w Dr. Choudhary     Gynecologic adenocarcinoma w peritoneal carcinomatosis  S/p chemo  Now s/p CHELSIE-BSO, rectosigmoid colon resection w end-end anastomosis due to tumor involvement  - Post-op care per Dr. Choudhary's team  - NGT  - Trend labs  - Dilaudid PCA for now  - IVF until tolerating PO  - Holguin per Gyne/onc  - Up, OOB, IS use, ambulate  - CLD, ADAT     Anxiety (ACOSTA)  - Stable, continue PTA ativan PRN     ACP: Full code  Ppx: DVT: Enox  Dispo  EDoD:  TBD. Suspect 3-5 days  F/u:   - PCP:  Diana Black MD  - Onc  - Gyne/onc       8/13 Operative report        OPERATIVE REPORT        PREOPERATIVE DIAGNOSIS:    1.       Ovarian cancer stage III.  2.       Status post neoadjuvant chemotherapy x4 cycles with good response.  POSTOPERATIVE DIAGNOSIS:    1.       Ovarian cancer stage III.  2.       Status post neoadjuvant chemotherapy x4 cycles with good response.  3.       Pending final pathology.  PROCEDURE:  Exploratory laparotomy, total abdominal hysterectomy, bilateral salpingo-oophorectomy, bilateral ureterolysis, bilateral pelvic lymph node dissection, tumor debulking of various peritoneal surfaces, argon beam coagulation of peritoneal tumor nodules, infracolic-supracolic omentectomy, and resection of gastrosplenic ligament with tumor attached to tip of spleen, mobilization of splenic flexure, repair of transverse colon mesentery, rectosigmoid colon resection with end-to-end anastomosis.     ASSISTANT:  Rehana Ortega PA-C, and Moreno oPsey CSA.     INTRAVENOUS FLUIDS:  3 L.     URINE OUTPUT:  250 mL.     ESTIMATED BLOOD LOSS:  500 mL.      DRAINS:  Holguin catheter, NG tube, and #10 LUCHO in the right and left lower quadrant.     COMPLICATIONS:  None.     CONDITION:  Stable, to recovery room.      INDICATIONS:  This is a 54-year-old female  with abdominal distension, bloating, who was found to have ascites and an enlarged left ovary.  The patient underwent biopsy of the omentum which was consistent with gynecologic primary consistent with ovarian cancer.  Patient's initial CA-125 was over 700.  Patient was started on neoadjuvant chemotherapy and has now received 4 cycles of chemotherapy.  Last one approximately 5 weeks ago.  Patient had good response.  Her repeat CA-125 was down to 45.  CT scan also showed improvement in left adnexal lesion and improved peritoneal carcinomatosis.  Patient was counseled on options.  She wanted to proceed with interval debulking surgery.  She was counseled on higher than average risk of needing bowel resection based on her initial presentation with involvement of the bowel.  She was counseled on possible need for colostomy.  She was counseled on risks, benefits, alternatives, and indications.  Informed consent was obtained.  She underwent preoperative medical clearance and was cleared for surgery.       FINDINGS:  Upon opening the abdomen, there was no ascites.  There was some diffuse peritoneal disease along the right diaphragm which was scarred onto the dome of the liver.  There were some old nodules in this area.  One of this was removed for biopsy purposes.  The remainder of these were cauterized using the argon beam.  There was no other significant disease on her right side.  The majority of her omentum was scarred-in near the left upper quadrant.  There was a block of tumor that was involving her gastrocolic ligament portion of the infracolic omentum and majority of her gastrosplenic ligament tumor was involving the splenic flexure, peritoneum, and the tip of the spleen without any obvious involvement in the spleen.  This was very difficult spot to resect the tumor.  The left diaphragm had no obvious disease.  Cecum has small nodule on it that appeared to be treated from the chemotherapy but was resected.  The  small bowel had approximately 50 nodules.  There were all 5 mm or less.  Most of these were either excised or coagulated using argon beam and along the left paracolic space above the sigmoid colon.  There was a tumor nodule that was approximately 1 x 1 cm along the peritoneum, and this was excised in its entirety.  Paraortic lesion had no obvious disease.  Appendix appeared normal.  In the pelvis, there was scarring from her previous .  There was a 3 x 1 cm segment of tumor along the bladder reflection over the vesical peritoneum that showed residual tumor.  The uterus otherwise was normal.  However, there was disease along in the posterior aspect of the uterus where the left ovary appeared to be epicenter of her disease.  It was mildly enlarged and attached to the posterior aspect of the uterus and was also attached to the rectosigmoid colon and left pelvic sidewall.  The ureter was in close proximity.  The right ovary had some mild adhesions but did not have any obvious disease.  There was some pelvic lymphadenopathy along the external and internal iliac region on the left side and less so on the right side.  The sigmoid colon from below the pelvic brim had 1 area where the entire anterior wall of the sigmoid colon was scarred down and constricted from old disease.  There was other areas of superficial disease along the rectosigmoid colon and then there was lastly another area of significant disease just above the peritoneal reflection of the right rectosigmoid colon.  This entire area was removed.  At the end of the procedure, all visible disease have been removed.  As far as we could tell, leaving her with an R0 resection.  Some of the disease was associated with scarring and was unclear if there was any viable disease remaining in those areas.     OPERATIVE TECHNIQUE:  Patient was taken to the operating room.  She was given general endotracheal tube intubation anesthesia.  She was prepped and draped in  usual sterile fashion.  Holguin catheter was inserted.  A midline vertical incision was made from approximately 4 cm above the umbilicus down to the pubic symphysis, carried down to the underlying fascia which was opened in the midline.  Rectus muscles were .  Peritoneal cavity was entered sharply.  Midline incision was extended superiorly and inferiorly with good visualization off the bladder and bowel.  The abdomen and pelvis were inspected.  Findings were as noted above.  It was clear that the patient would require significant amount of effort in the left upper quadrant and as we inspected the pelvis, there appeared to be significant disease in this area as well.  At this time, I ran the small bowel in its entirety.  I saw that there was no area of obvious obstruction or areas of disease that would require resection.  At this time, an extra-large Dre retractor was placed and a Torri retractor was placed.  The bowel was positioned out of the pelvis.  The uterus was grasped with 2 Kocher clamps.  The peritoneum between the round ligament and infundibulopelvic ligament on both sides was divided.  Retroperitoneal spaces were opened.  Ureters were identified bilaterally.  On the left side, there was significant scarring in this area.  With some difficulty, I was able to isolate the left ureter.  We placed both ureters on the vessel loops.  The infundibulopelvic ligament on both sides were then skeletonized, sealed and doubly ligated using 0 Vicryl suture.  The round ligaments were sealed and cut.  Ureterolysis was then carried out to separate the ureter off the left pelvic sidewall.  The left ovary was then  off peritoneal attachments, attachments of sigmoid colon at the posterior aspect of the uterus.  Bowel adhesions on the right side were similarly taken down.  This helped free up the ovaries.  The anterior leaf of the broad ligament was then taken down.  There was noted to be tumor along the  vesical peritoneum.  We backfilled the bladder with approximately 150 mL of saline and were able to resect the peritoneum along with the uterus leaving the bladder intact.  The tumor was left along the anterior serosa of the lower uterine segment.  Good planes were found and were able to dissect the bladder off the pubocervical fascia in its entirety.  The bladder was now drained back to gravity.  The posterior peritoneum was then taken down.  Ureters were further moved out of harm's way with additional ureterolysis.  The uterine vessel was skeletonized, sealed and cut.  Descending branches were similarly sealed and cut.  The cardinal and uterosacral ligaments were clamped, cut and suture ligated.  The rectosigmoid colon was densely adhered to the posterior aspect of the uterus.  With aid of an EEA size, I was able to delineate the anatomy and developed rectovaginal space.  The rectum dropped off posteriorly with tumor on it.  We were able to complete the hysterectomy by coming across with right-angle Zeppelin clamps.  The vagina was closed with series of figure-of-eight 0 Vicryl sutures.  At this time, the pelvic sidewalls were opened.  Some pelvic lymphadenopathy was seen as described on the findings section.  These lymph nodes were removed more on the left side than the right side.  At this time, the pelvis showed residual disease in the rectosigmoid colon which would require formal resection.  Prior to this, I wanted to ensure that her upper abdomen had fully resectable disease.  Therefore, we packed the pelvis.  I opened the peritoneum along the white line of Toldt.  There was noted to be tumor along the left paracolic space right above the pelvic brim.  This tumor was excised.  I ran the small bowel in its entirety.  Multiple peritoneal nodules were excised along with the nodule along the cecum.  There was no ongoing bleeding.  Coagulator was not used to vaporize multiple other nodules.  At this time, we spent  significant amount of time and effort to address the tumor in the omentum and left upper quadrant.  Given the extent of disease, I had to extend the incision by several centimeters cephalad.  Visualization was still difficult; therefore, we removed the Superior and Dre retractor.  A Bookwalter was installed, and this gave us adequate exposure.  The descending colon was further mobilized up to the splenic flexure.  We then came across the transverse colon from near the hepatic flexure.  There was portion of the omentum that was adhered to the surface of the gallbladder.  This was  without any injury to the gallbladder.  The posterior membrane along the transverse colon was divided.  Portion of the infracolic omentectomy was then carried out.  We then entered the lesser sac and came across the greater curvature of the stomach and removed the gastrocolic ligament.  There was still a significant block of tumor that was attached at the tip of the spleen at the splenic flexure point.  Significant time and effort was taken to separate this tumor off the tip of the spleen and off the splenic flexure which was in very close proximity to all the structures.  Ultimately, I was able to separate this off and resect the entire omentum with these ligaments all en bloc.  We reinspected the colon.  There was no evidence of any injury to the colon; however, the mesentery of the transverse colon however was attenuated and portions of it had defect due to the tumor being attached to this area.  This mesenteric defect was closed with a running 3-0 Vicryl suture.  There was no evidence of any compromise of the blood supply, transverse colon.  The tip of the spleen had some mild oozing.  There was a vessel that was feeding the tumor here that was cauterized and also ligated using 3-0 Vicryl suture x2.  This area was irrigated.  Good hemostasis was seen.  Argon beam coagulator was then used to clean up some of the areas where  there was scar tissue on the splenic flexure, colon, and also near the base of the spleen along the peritoneum.  I placed some Fibrillar in this area, and this area was reinspected prior to closure and showed good hemostasis.  Attention was now turned to the right upper quadrant.  The liver was mobilized off adhesions of the diaphragm.  Some of the peritoneal nodules in this area were resected.  The argon beam coagulator was used to vaporize the remaining nodules in this area.  At this time, no other obvious disease was seen in the upper abdomen.  The splenic flexure had been fully mobilized, and there was no evidence of any injury to the colon.  The Bookwalter was removed, and [place] retractor was placed again.  We packed the bowel out of the way.  The sigmoid colon was identified.  We went approximately 2 cm above the area where the disease on the sigmoid colon.  A small opening was created in the mesentery and Endo DAKOTAH 60 was fired.  The peritoneum lateral to the rectosigmoid colon was opened.  The mesocolon was taken down using LigaSure device.  EEA sizer was placed from below and the distal portion of the rectosigmoid colon was skeletonized.  There was tumor that was attached to the uterosacral ligaments in this area as well and this was all  off where the colon was completely freed.  While the tumor was above the peritoneal reflection, we found an area below this that was suitable for distal resection.  After the mesocolon had been taken down to this level, [place] Contour stapler was then used and fired.  The sigmoid rectosigmoid colon was resected in its entirety.  I inspected this retrorectal space and the pelvis and there was no evidence of any residual disease.  There was satisfactory length and good health of the colon for a reanastomosis that was tension free.  The proximal portion of the sigmoid colon was prepared for anastomosis.  The staple line was opened.  The pursestring suture was then  placed, and the 29 mm anvil was placed within the wall of the colon and reinforced with 0 Prolene suture as well.  The antimesenteric side was lined up.  The EEA stapler was brought in from below, and we went just above the staple line and the trocar was brought out and attached to the anvil, and the tissue was brought together in standard fashion.  When there was no evidence of any tension, the stapler was then fired and opened to very nice donut specimens were seen.  They were completely intact.  There noted to be some slight thinning of the anterior wall of the distal segment where this area had been skeletonized.  We placed a bowel clamp proximal to the anastomosis and filled the pelvis with irrigation.  Using a proctoscope, air was injected and excellent distention occurred with no evidence of any air bubbles.  This was tested several times.  At this time, I changed by gown and gloves and reinforced the area that had slight thinning and also the anterior aspect of the reanastomosis with interrupted 3-0 silk sutures that came together very nicely.  This anastomosis laid in a nice fashion with no undue tension.  There was no ongoing bleeding.  We placed Floseal along with some Fibrillar in the pelvis.  A #10 LUCHO was brought in from the right side and the left side, and the right side in the pelvis and the left side was placed in the area by the splenic flexure.  There was no evidence of ongoing bleeding from this area.  The wound was reinspected.  All sponges and instruments were removed.  Procedure at this time was deemed complete.  A new closing tray was used.  The fascia was closed with loop #1 PDS in a mass closure fashion, starting from the superior and inferior edges and meeting in the midline.  Subcutaneous tissue was irrigated and brought together using a 2-0 plain gut suture.  Skin was closed with staples.  LUCHO drain was secured with 2-0 silk suture.  A TAP chicho was performed by Anesthesia.  I was present  and scrubbed for the entirety of the procedure.  Sponge, instrument, and needle counts were reported as correct x2.  Patient was stable throughout the course of the procedure.  She was extubated and taken to the recovery room in satisfactory condition.  Despite multiple trays being used, counts were done upfront and were all correct.  The patient was stable throughout the course of extensive oncologic procedure and was  taken to the recovery room in satisfactory condition.             MEDICATIONS ADMINISTERED IN LAST 1 DAY:  acetaminophen (Ofirmev) 10 mg/mL infusion premix 1,000 mg       Date Action Dose Route User    8/13/2024 1402 New Bag 1,000 mg Intravenous Eleanor Reyna RN          ceFAZolin (Ancef) 2g in 10mL IV syringe premix       Date Action Dose Route User    8/13/2024 1149 Given 2 g Intravenous Tootie Peace CRNA          cefTRIAXone (Rocephin) 1 g in sodium chloride 0.9% 100 mL IVPB-ADDV       Date Action Dose Route User    8/13/2024 2143 New Bag 1 g Intravenous Armando Hdz RN          dexAMETHasone PF (Decadron) 10 MG/ML injection       Date Action Dose Route User    8/13/2024 1313 Given 10 mg Injection Jaret Ferguson MD          enoxaparin (Lovenox) 40 MG/0.4ML SUBQ injection 40 mg       Date Action Dose Route User    8/14/2024 0840 Given 40 mg Subcutaneous (Right Lower Abdomen) Clare Guzmán RN          fentaNYL (Sublimaze) 50 mcg/mL injection 25 mcg       Date Action Dose Route User    8/13/2024 1441 Given 25 mcg Intravenous Eleanor Reyna RN    8/13/2024 1407 Given 25 mcg Intravenous Eleanor Reyna RN    8/13/2024 1357 Given 25 mcg Intravenous Eleanor Reyna RN    8/13/2024 1352 Given 25 mcg Intravenous Eleanor Reyna RN          fentaNYL (Sublimaze) 50 mcg/mL injection       Date Action Dose Route User    8/13/2024 1258 Given 25 mcg Intravenous Tootie Peace CRNA    8/13/2024 1245 Given 25 mcg Intravenous Tootie Peace CRNA           HYDROmorphone in sodium chloride 0.9% (Dilaudid) 20 mg/100mL PCA premix       Date Action Dose Route User    8/13/2024 1411 New Bag 20 mg Intravenous Eleanor Reyna RN          ketorolac (Toradol) 15 MG/ML injection 15 mg       Date Action Dose Route User    8/14/2024 0454 Given 15 mg Intravenous Ting Lee RN    8/13/2024 1443 Given 15 mg Intravenous Eleanor Reyna RN          lactated ringers infusion       Date Action Dose Route User    8/13/2024 1207 New Bag (none) Intravenous Dave Isaacs MD          lactated ringers infusion       Date Action Dose Route User    8/14/2024 0523 New Bag (none) Intravenous Ting Lee RN    8/13/2024 1557 Restarted (none) Intravenous Terra Kolb RN          metRONIDAZOLE in sodium chloride 0.79% (Flagyl) 5 mg/mL IVPB premix 500 mg       Date Action Dose Route User    8/14/2024 0838 New Bag 500 mg Intravenous Clare Guzmán RN    8/13/2024 2244 New Bag 500 mg Intravenous Ting Lee RN          ondansetron (Zofran) 4 MG/2ML injection       Date Action Dose Route User    8/13/2024 1317 Given 4 mg Intravenous Tootie Peace CRNA          ondansetron (Zofran) 4 MG/2ML injection 4 mg       Date Action Dose Route User    8/13/2024 1555 Given 4 mg Intravenous Terra Kolb RN          pantoprazole (Protonix) 40 mg in sodium chloride 0.9% PF 10 mL IV push       Date Action Dose Route User    8/14/2024 0523 Given 40 mg Intravenous Ting Lee RN          phenylephrine (Arnaldo-Synephrine) 10 MG/ML injection       Date Action Dose Route User    8/13/2024 1225 Given 100 mcg Intravenous Tootie Peace CRNA    8/13/2024 1217 Given 100 mcg Intravenous Tootie Peace CRNA          ropivacaine (Naropin) 0.5% injection       Date Action Dose Route User    8/13/2024 1313 Given 40 mL Infiltration Jaret Ferguson MD          sodium chloride 0.9% infusion       Date Action Dose Route User    8/13/2024 1206 New Bag (none)  Intravenous Dave Isaacs MD          sugammadex (Bridion) 200 MG/2ML injection       Date Action Dose Route User    8/13/2024 1258 Given 160 mg Intravenous Tootie Peace CRNA            Vitals (last day)       Date/Time Temp Pulse Resp BP SpO2 Weight O2 Device O2 Flow Rate (L/min) [facility]    08/14/24 1000 98.1 °F (36.7 °C) 69 18 114/69 -- -- Nasal cannula 2 L/min BP    08/14/24 0900 -- 63 18 112/69 -- -- -- -- DF    08/14/24 0825 98.7 °F (37.1 °C) 62 18 112/69 100 % -- Nasal cannula 2 L/min BP    08/14/24 0421 98.2 °F (36.8 °C) 67 18 118/65 100 % -- Nasal cannula 2 L/min CB    08/14/24 0040 98.1 °F (36.7 °C) 50 18 108/75 96 % -- Nasal cannula 2 L/min CB    08/13/24 2013 97.9 °F (36.6 °C) 67 18 113/65 96 % -- None (Room air) -- CB    08/13/24 1930 -- 65 -- -- -- -- -- -- KD    08/13/24 1547 98.4 °F (36.9 °C) 66 16 117/66 100 % -- Nasal cannula 2 L/min WM    08/13/24 1518 98 °F (36.7 °C) 62 21 122/71 100 % -- Nasal cannula 2 L/min FL    08/13/24 1508 -- 60 20 119/74 100 % -- Nasal cannula 2 L/min FL    08/13/24 1458 -- 77 17 119/74 100 % -- Nasal cannula 2 L/min FL    08/13/24 1448 -- 61 17 112/64 100 % -- Nasal cannula 2 L/min FL    08/13/24 1438 -- 65 22 113/66 100 % -- Nasal cannula 2 L/min FL    08/13/24 1428 -- 64 18 118/74 98 % -- Nasal cannula 2 L/min FL    08/13/24 1418 -- 70 13 116/76 100 % -- Nasal cannula 2 L/min FL    08/13/24 1408 -- 75 13 121/81 100 % -- Nasal cannula 2 L/min FL    08/13/24 1358 -- 74 16 121/73 100 % -- Nasal cannula 2 L/min FL    08/13/24 1348 -- 79 14 120/74 94 % -- Nasal cannula 2 L/min FL    08/13/24 1338 -- 74 23 109/66 99 % -- Nasal cannula 2 L/min FL    08/13/24 1328 -- -- -- -- 92 % -- Nasal cannula 2 L/min FL    08/13/24 1328 97.7 °F (36.5 °C) 82 16 108/64 -- -- -- -- CK    08/13/24 0549 97.7 °F (36.5 °C) 90 16 156/91 98 % 173 lb 6.4 oz (78.7 kg) None (Room air) -- EM          CIWA Scores (since admission)       None

## 2024-08-14 NOTE — OPERATIVE REPORT
[facility]    PATIENT'S NAME: JAI MEDINA   ATTENDING PHYSICIAN: Binh Choudhary MD   OPERATING PHYSICIAN: Binh Choudhary MD   PATIENT ACCOUNT#:   [id number]    LOCATION:  [address] [place] [address]  MEDICAL RECORD #:   [id number]       YOB: 1970  ADMISSION DATE:       08/13/2024      OPERATION DATE:  08/13/2024    OPERATIVE REPORT      PREOPERATIVE DIAGNOSIS:    1.   Ovarian cancer stage III.  2.   Status post neoadjuvant chemotherapy x4 cycles with good response.  POSTOPERATIVE DIAGNOSIS:    1.   Ovarian cancer stage III.  2.   Status post neoadjuvant chemotherapy x4 cycles with good response.  3.   Pending final pathology.  PROCEDURE:  Exploratory laparotomy, total abdominal hysterectomy, bilateral salpingo-oophorectomy, bilateral ureterolysis, bilateral pelvic lymph node dissection, tumor debulking of various peritoneal surfaces, argon beam coagulation of peritoneal tumor nodules, infracolic-supracolic omentectomy, and resection of gastrosplenic ligament with tumor attached to tip of spleen, mobilization of splenic flexure, repair of transverse colon mesentery, rectosigmoid colon resection with end-to-end anastomosis.    ASSISTANT:  Rehana Ortega PA-C, and Moreno Posey CSA.    INTRAVENOUS FLUIDS:  3 L.    URINE OUTPUT:  250 mL.    ESTIMATED BLOOD LOSS:  500 mL.     DRAINS:  Holguin catheter, NG tube, and #10 LUCHO in the right and left lower quadrant.    COMPLICATIONS:  None.    CONDITION:  Stable, to recovery room.     INDICATIONS:  This is a 54-year-old female with abdominal distension, bloating, who was found to have ascites and an enlarged left ovary.  The patient underwent biopsy of the omentum which was consistent with gynecologic primary consistent with ovarian cancer.  Patient's initial CA-125 was over 700.  Patient was started on neoadjuvant chemotherapy and has now received 4 cycles of chemotherapy.  Last one approximately 5 weeks ago.  Patient had good response.  Her repeat CA-125 was  down to 45.  CT scan also showed improvement in left adnexal lesion and improved peritoneal carcinomatosis.  Patient was counseled on options.  She wanted to proceed with interval debulking surgery.  She was counseled on higher than average risk of needing bowel resection based on her initial presentation with involvement of the bowel.  She was counseled on possible need for colostomy.  She was counseled on risks, benefits, alternatives, and indications.  Informed consent was obtained.  She underwent preoperative medical clearance and was cleared for surgery.      FINDINGS:  Upon opening the abdomen, there was no ascites.  There was some diffuse peritoneal disease along the right diaphragm which was scarred onto the dome of the liver.  There were some old nodules in this area.  One of this was removed for biopsy purposes.  The remainder of these were cauterized using the argon beam.  There was no other significant disease on her right side.  The majority of her omentum was scarred-in near the left upper quadrant.  There was a block of tumor that was involving her gastrocolic ligament portion of the infracolic omentum and majority of her gastrosplenic ligament tumor was involving the splenic flexure, peritoneum, and the tip of the spleen without any obvious involvement in the spleen.  This was very difficult spot to resect the tumor.  The left diaphragm had no obvious disease.  Cecum has small nodule on it that appeared to be treated from the chemotherapy but was resected.  The small bowel had approximately 50 nodules.  There were all 5 mm or less.  Most of these were either excised or coagulated using argon beam and along the left paracolic space above the sigmoid colon.  There was a tumor nodule that was approximately 1 x 1 cm along the peritoneum, and this was excised in its entirety.  Paraortic lesion had no obvious disease.  Appendix appeared normal.  In the pelvis, there was scarring from her previous  .  There was a 3 x 1 cm segment of tumor along the bladder reflection over the vesical peritoneum that showed residual tumor.  The uterus otherwise was normal.  However, there was disease along in the posterior aspect of the uterus where the left ovary appeared to be epicenter of her disease.  It was mildly enlarged and attached to the posterior aspect of the uterus and was also attached to the rectosigmoid colon and left pelvic sidewall.  The ureter was in close proximity.  The right ovary had some mild adhesions but did not have any obvious disease.  There was some pelvic lymphadenopathy along the external and internal iliac region on the left side and less so on the right side.  The sigmoid colon from below the pelvic brim had 1 area where the entire anterior wall of the sigmoid colon was scarred down and constricted from old disease.  There was other areas of superficial disease along the rectosigmoid colon and then there was lastly another area of significant disease just above the peritoneal reflection of the right rectosigmoid colon.  This entire area was removed.  At the end of the procedure, all visible disease have been removed.  As far as we could tell, leaving her with an R0 resection.  Some of the disease was associated with scarring and was unclear if there was any viable disease remaining in those areas.    OPERATIVE TECHNIQUE:  Patient was taken to the operating room.  She was given general endotracheal tube intubation anesthesia.  She was prepped and draped in usual sterile fashion.  Holguin catheter was inserted.  A midline vertical incision was made from approximately 4 cm above the umbilicus down to the pubic symphysis, carried down to the underlying fascia which was opened in the midline.  Rectus muscles were .  Peritoneal cavity was entered sharply.  Midline incision was extended superiorly and inferiorly with good visualization off the bladder and bowel.  The abdomen and pelvis  were inspected.  Findings were as noted above.  It was clear that the patient would require significant amount of effort in the left upper quadrant and as we inspected the pelvis, there appeared to be significant disease in this area as well.  At this time, I ran the small bowel in its entirety.  I saw that there was no area of obvious obstruction or areas of disease that would require resection.  At this time, an extra-large Dre retractor was placed and a Torri retractor was placed.  The bowel was positioned out of the pelvis.  The uterus was grasped with 2 Kocher clamps.  The peritoneum between the round ligament and infundibulopelvic ligament on both sides was divided.  Retroperitoneal spaces were opened.  Ureters were identified bilaterally.  On the left side, there was significant scarring in this area.  With some difficulty, I was able to isolate the left ureter.  We placed both ureters on the vessel loops.  The infundibulopelvic ligament on both sides were then skeletonized, sealed and doubly ligated using 0 Vicryl suture.  The round ligaments were sealed and cut.  Ureterolysis was then carried out to separate the ureter off the left pelvic sidewall.  The left ovary was then  off peritoneal attachments, attachments of sigmoid colon at the posterior aspect of the uterus.  Bowel adhesions on the right side were similarly taken down.  This helped free up the ovaries.  The anterior leaf of the broad ligament was then taken down.  There was noted to be tumor along the vesical peritoneum.  We backfilled the bladder with approximately 150 mL of saline and were able to resect the peritoneum along with the uterus leaving the bladder intact.  The tumor was left along the anterior serosa of the lower uterine segment.  Good planes were found and were able to dissect the bladder off the pubocervical fascia in its entirety.  The bladder was now drained back to gravity.  The posterior peritoneum was then taken  down.  Ureters were further moved out of harm's way with additional ureterolysis.  The uterine vessel was skeletonized, sealed and cut.  Descending branches were similarly sealed and cut.  The cardinal and uterosacral ligaments were clamped, cut and suture ligated.  The rectosigmoid colon was densely adhered to the posterior aspect of the uterus.  With aid of an EEA size, I was able to delineate the anatomy and developed rectovaginal space.  The rectum dropped off posteriorly with tumor on it.  We were able to complete the hysterectomy by coming across with right-angle Zeppelin clamps.  The vagina was closed with series of figure-of-eight 0 Vicryl sutures.  At this time, the pelvic sidewalls were opened.  Some pelvic lymphadenopathy was seen as described on the findings section.  These lymph nodes were removed more on the left side than the right side.  At this time, the pelvis showed residual disease in the rectosigmoid colon which would require formal resection.  Prior to this, I wanted to ensure that her upper abdomen had fully resectable disease.  Therefore, we packed the pelvis.  I opened the peritoneum along the white line of T[place]t.  There was noted to be tumor along the left paracolic space right above the pelvic brim.  This tumor was excised.  I ran the small bowel in its entirety.  Multiple peritoneal nodules were excised along with the nodule along the cecum.  There was no ongoing bleeding.  Coagulator was not used to vaporize multiple other nodules.  At this time, we spent significant amount of time and effort to address the tumor in the omentum and left upper quadrant.  Given the extent of disease, I had to extend the incision by several centimeters cephalad.  Visualization was still difficult; therefore, we removed the [place] and Dre retractor.  A Bookwalter was installed, and this gave us adequate exposure.  The descending colon was further mobilized up to the splenic flexure.  We then came across  the transverse colon from near the hepatic flexure.  There was portion of the omentum that was adhered to the surface of the gallbladder.  This was  without any injury to the gallbladder.  The posterior membrane along the transverse colon was divided.  Portion of the infracolic omentectomy was then carried out.  We then entered the lesser sac and came across the greater curvature of the stomach and removed the gastrocolic ligament.  There was still a significant block of tumor that was attached at the tip of the spleen at the splenic flexure point.  Significant time and effort was taken to separate this tumor off the tip of the spleen and off the splenic flexure which was in very close proximity to all the structures.  Ultimately, I was able to separate this off and resect the entire omentum with these ligaments all en bloc.  We reinspected the colon.  There was no evidence of any injury to the colon; however, the mesentery of the transverse colon however was attenuated and portions of it had defect due to the tumor being attached to this area.  This mesenteric defect was closed with a running 3-0 Vicryl suture.  There was no evidence of any compromise of the blood supply, transverse colon.  The tip of the spleen had some mild oozing.  There was a vessel that was feeding the tumor here that was cauterized and also ligated using 3-0 Vicryl suture x2.  This area was irrigated.  Good hemostasis was seen.  Argon beam coagulator was then used to clean up some of the areas where there was scar tissue on the splenic flexure, colon, and also near the base of the spleen along the peritoneum.  I placed some Fibrillar in this area, and this area was reinspected prior to closure and showed good hemostasis.  Attention was now turned to the right upper quadrant.  The liver was mobilized off adhesions of the diaphragm.  Some of the peritoneal nodules in this area were resected.  The argon beam coagulator was used to  vaporize the remaining nodules in this area.  At this time, no other obvious disease was seen in the upper abdomen.  The splenic flexure had been fully mobilized, and there was no evidence of any injury to the colon.  The Bookwalter was removed, and [place] retractor was placed again.  We packed the bowel out of the way.  The sigmoid colon was identified.  We went approximately 2 cm above the area where the disease on the sigmoid colon.  A small opening was created in the mesentery and Endo DAKOTAH 60 was fired.  The peritoneum lateral to the rectosigmoid colon was opened.  The mesocolon was taken down using LigaSure device.  EEA sizer was placed from below and the distal portion of the rectosigmoid colon was skeletonized.  There was tumor that was attached to the uterosacral ligaments in this area as well and this was all  off where the colon was completely freed.  While the tumor was above the peritoneal reflection, we found an area below this that was suitable for distal resection.  After the mesocolon had been taken down to this level, [place] Contour stapler was then used and fired.  The sigmoid rectosigmoid colon was resected in its entirety.  I inspected this retrorectal space and the pelvis and there was no evidence of any residual disease.  There was satisfactory length and good health of the colon for a reanastomosis that was tension free.  The proximal portion of the sigmoid colon was prepared for anastomosis.  The staple line was opened.  The pursestring suture was then placed, and the 29 mm anvil was placed within the wall of the colon and reinforced with 0 Prolene suture as well.  The antimesenteric side was lined up.  The EEA stapler was brought in from below, and we went just above the staple line and the trocar was brought out and attached to the anvil, and the tissue was brought together in standard fashion.  When there was no evidence of any tension, the stapler was then fired and opened to  very nice donut specimens were seen.  They were completely intact.  There noted to be some slight thinning of the anterior wall of the distal segment where this area had been skeletonized.  We placed a bowel clamp proximal to the anastomosis and filled the pelvis with irrigation.  Using a proctoscope, air was injected and excellent distention occurred with no evidence of any air bubbles.  This was tested several times.  At this time, I changed by gown and gloves and reinforced the area that had slight thinning and also the anterior aspect of the reanastomosis with interrupted 3-0 silk sutures that came together very nicely.  This anastomosis laid in a nice fashion with no undue tension.  There was no ongoing bleeding.  We placed Floseal along with some Fibrillar in the pelvis.  A #10 LUCHO was brought in from the right side and the left side, and the right side in the pelvis and the left side was placed in the area by the splenic flexure.  There was no evidence of ongoing bleeding from this area.  The wound was reinspected.  All sponges and instruments were removed.  Procedure at this time was deemed complete.  A new closing tray was used.  The fascia was closed with loop #1 PDS in a mass closure fashion, starting from the superior and inferior edges and meeting in the midline.  Subcutaneous tissue was irrigated and brought together using a 2-0 plain gut suture.  Skin was closed with staples.  LUCHO drain was secured with 2-0 silk suture.  A TAP chicho was performed by Anesthesia.  I was present and scrubbed for the entirety of the procedure.  Sponge, instrument, and needle counts were reported as correct x2.  Patient was stable throughout the course of the procedure.  She was extubated and taken to the recovery room in satisfactory condition.  Despite multiple trays being used, counts were done upfront and were all correct.  The patient was stable throughout the course of extensive oncologic procedure and was  taken to the  recovery room in satisfactory condition.    Dictated By Binh Choudhary MD  d: 08/13/2024 13:57:44  t: 08/14/2024 00:51:55  Job [id number]  /    cc: Binh Choudhary MD

## 2024-08-14 NOTE — PROGRESS NOTES
[facility]   INTERNAL MEDICINE PROGRESS NOTE    CHIEF COMPLAINT   Ovarian cancer s/p hysterectomy     SUBJECTIVE  24 HR EVENTS   NGT discomfort. Belching a lot  No gas or BM from rectum  Pain controlled on dilaudid PCA    INPATIENT MEDICATIONS  Scheduled Medications:  pantoprazole, 40 mg, QAM AC   Or  pantoprazole, 40 mg, QAM AC  enoxaparin, 40 mg, Daily  cefTRIAXone, 1 g, Q24H  metRONIDAZOLE, 500 mg, Q12H     Drips:   lactated ringers  lactated ringers, Last Rate: 100 mL/hr at 08/14/24 0523  sodium chloride  HYDROmorphone in sodium chloride 0.9%       PRN Medications:   phenol, , Q2H PRN  ondansetron, 4 mg, Q8H PRN   Or  ondansetron, 4 mg, Q8H PRN  diphenhydrAMINE, 12.5 mg, Q4H PRN  HYDROmorphone, 0.4 mg, Q30 Min PRN  naloxone, 0.08 mg, Q5 Min PRN  diphenhydrAMINE, 12.5 mg, Q4H PRN  ondansetron, 4 mg, Q6H PRN  ketorolac, 15 mg, Q6H PRN       PHYSICAL EXAMINATION   Vitals: /60 (BP Location: Right arm)   Pulse 69   Temp 97.4 °F (36.3 °C) (Oral)   Resp 18   Ht 5' 7\" (1.702 m)   Wt 173 lb 6.4 oz (78.7 kg)   LMP 12/22/2020   SpO2 100%   BMI 27.16 kg/m²   Gen: NAD, well nourished, NGT  Eyes: PERRLA, normal conjunctivae  ENMT: Moist mucous membranes, trachea midline  CV: RRR, no M/R/G, no peripheral edema  Resp: CTAB, non-labored respirations, symmetric expansion  GI: Soft, NT, ND, + BS, inc dressed, abd binder in place  MSK:  No C/C/E, normal active/passive ROM in C-spine  Skin: No rashes, visible skin w/o worrisome lesions   Neuro: CN 2-12 grossly intact, sensation intact   Psych: A&Ox3, appropriate mood, conversant w/ linear thought process     DATA REVIEW: LABORATORY VALUES & DIAGNOSTICS  Recent Labs   Lab 08/13/24  1726 08/14/24  0509    143   K 4.4 4.5    110   CO2 26.0 27.0   BUN 12 13   CREATSERUM 0.74 0.73   ANIONGAP 6 6   * 121*   CA 8.5* 8.8   EGFRCR 95 97     Recent Labs   Lab 08/13/24  1726 08/14/24  0508   WBC 10.8 8.7   HGB 11.3* 10.2*   HCT 34.8* 31.9*   PLT  205.0 192.0   .0* 102.2*   MOPERCENT 3.8 9.7   EOPERCENT 0.0 0.0   BAPERCENT 0.1 0.1     ASSESSMENT & PLAN   Yue Malin - 54 year old female w adenocarcinoma admitted 8/13/2024 for post op care after planned CHELSIE-BSO, colon resection for her gynecologic adenoca w Dr. Choudhary    Gynecologic adenocarcinoma w peritoneal carcinomatosis  S/p chemo  Now s/p CHELSIE-BSO, rectosigmoid colon resection w end-end anastomosis due to tumor involvement  - Post-op care per Dr. Choudhary's team  - NGT  - Trend labs  - Dilaudid PCA for now  - IVF until tolerating PO  - Holguin per Gyne/onc  - Up, OOB, IS use, ambulate  - CLD, ADAT  - Add IV valium for NGT discomfort/anxiety    Anemia, post-op - 2/2 expected blood loss w/ dilutional component from IVF   - No active external bleeding noted  - Daily CBC    Anxiety (ACOSTA)  - Stable, continue PTA ativan PRN as able  - Add IV valium for NGT discomfort/anxiety    ACP: Full code  Ppx: DVT: Enox  Dispo  EDoD:  TBD. Suspect 3-5 days  F/u:   - PCP:  Diana Black MD  - Onc  - Gyne/onc    Available via epic secure chat or perfect serve 7A - 7P.    Steven Hou MD   Internal Medicine - Hospitalist  [facility]

## 2024-08-14 NOTE — OCCUPATIONAL THERAPY NOTE
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  Prior to admission, patient's baseline is independent with I/ADLs, including driving, functional mobility, and transfers without a device, except when on chemo when daughter and son-in-law were providing assist as needed. Patient does not own any assistive devices or DME. Patient is currently functioning near baseline with ADLs, functional mobility, and transfers.  Patient is requiring up to minimal assist for ADLs as a result of the following impairments: decreased functional strength, decreased functional reach, decreased endurance, pain, decreased muscular endurance, and increased O2 needs from baseline. Occupational Therapy will continue to follow for duration of hospitalization.    Patient will benefit from continued skilled OT Services at discharge to promote prior level of function and safety with additional support and return home with home health OT    PLAN  OT Treatment Plan: Balance activities;Energy conservation/work simplification techniques;ADL training;Functional transfer training;Endurance training;Patient/Family education;Patient/Family training;Equipment eval/education;Compensatory technique education  OT Device Recommendations: Long-handled sponge; Shower chair; Transfer tub bench    OCCUPATIONAL THERAPY MEDICAL/SOCIAL HISTORY   Problem List  Active Problems:    Ovarian  cancer (HCC)    HOME SITUATION  Type of Home: House ([facility])  Home Layout: One level (w/ basement for laundry)  Lives With: Alone (daughter and CHRISTOPHE live nearby; able to help as needed)  Toilet and Equipment: Standard height toilet  Shower/Tub and Equipment: Tub-shower combo  Drives: Yes  Patient Regularly Uses: None  Stairs in Home: 5 CHELITA w/ railing; 14 steps to basement (laundry) with railing    SUBJECTIVE  \"I just walked five minutes ago.\"    OCCUPATIONAL THERAPY EXAMINATION    OBJECTIVE  Precautions: Abdominal protective strategies; Drain(s); NG suction  Fall Risk: Standard fall risk    PAIN ASSESSMENT  Ratin  Location: abdomen  Management Techniques: Activity promotion; Body mechanics; Repositioning    COGNITION  Overall Cognitive Status:  WFL - within functional limits  Following Commands:  follows multistep commands consistently    RANGE OF MOTION   Upper extremity ROM is within functional limits     STRENGTH ASSESSMENT  Upper extremity strength is within functional limits     COORDINATION  Gross Motor: WFL   Fine Motor: WFL     ACTIVITIES OF DAILY LIVING ASSESSMENT  AM-PAC ‘6-Clicks’ Inpatient Daily Activity Short Form  How much help from another person does the patient currently need…  -   Putting on and taking off regular lower body clothing?: A Little  -   Bathing (including washing, rinsing, drying)?: A Little  -   Toileting, which includes using toilet, bedpan or urinal? : A Little  -   Putting on and taking off regular upper body clothing?: A Little  -   Taking care of personal grooming such as brushing teeth?: A Little  -   Eating meals?: A Little    AM-PAC Score:  Score: 18  Approx Degree of Impairment: 46.65%  Standardized Score (AM-PAC Scale): 38.66  CMS Modifier (G-Code): CK    BED MOBILITY  Patient was in chair upon entering room.    FUNCTIONAL TRANSFER ASSESSMENT  Sit to Stand from Chair: stand-by assist w/ RW  Chair Transfer: stand-by assist w/ RW  Comments:  Patient benefited from cues  for scooting forward and for safe hand placement.     FUNCTIONAL MOBILITY  stand-by assist for in-room fx mobility using RW  Comments:   No LOB. Patient unable to tolerate ambulation to bathroom at this time d/t fatigue, pain, and decreased ax tolerance (reports of just ambulating with PT).     ACTIVITIES OF DAILY LIVING  Eating: setup assist (per obs)  Grooming: setup assist seated (per obs)  UB Dressing: setup assist (per obs)  LB Dressing: min assist  Toileting: min assist (per obs)  Comments:   Patient was educated on abdominal protective strategies within the context of ADLs, including getting dressed while in supported seating and utilizing figure-4 technique. Patient was able to correctly perform figure-4 while seated with BLE. Patient also educated on limiting twisting when performing malia cares, such as gathering toilet paper beforehand. Patient benefited from education on utilizing a shower chair or tub-transfer bench when bathing, and utilizing figure-4 while seated and bathing or a long-handled sponge.    EDUCATION PROVIDED  Patient: Role of Occupational Therapy; Plan of Care; Discharge Recommendations; Adaptive Equipment Recommendations; DME Recommendations; Functional Transfer Techniques; Fall Prevention; Posture/Positioning; Energy Conservation; Compensatory ADL Techniques; Proper Body Mechanics; Abdominal Protective Strategies  Patient's Response to Education: Verbalized Understanding; Returned Demonstration  Family/Caregiver: Role of Occupational Therapy; Plan of Care  Family/Caregiver's Response to Education: Verbalized Understanding    The patient's Approx Degree of Impairment: 46.65% has been calculated based on documentation in the [facility] '6 clicks' Inpatient Daily Activity Short Form.  Research supports that patients with this level of impairment may benefit from HH. Final disposition will be made by interdisciplinary medical team.     Patient End of Session: Up in chair;Needs met;Call light  within reach;RN aware of session/findings;All patient questions and concerns addressed;Family present    OT Goals  Patients self stated goal is: none stated     Patient will complete functional transfer with MOD I  Comment:     Patient will complete toileting with MOD I  Comment:     Patient will complete LB dressing with MOD I  Comment:    Patient will complete item retrieval with MOD I  Comment:          Goals  on: 24  Frequency: 3-5x /wk    Patient Evaluation Complexity Level:   Occupational Profile/Medical History MODERATE - Expanded review of history including review of medical or therapy record   Specific performance deficits impacting engagement in ADL/IADL MODERATE  3 - 5 performance deficits   Client Assessment/Performance Deficits MODERATE - Comorbidities and min to mod modifications of tasks    Clinical Decision Making MODERATE - Analysis of occupational profile, detailed assessments, several treatment options    Overall Complexity MODERATE     OT Session Time:   Self-Care Home Management: 4 minutes  Therapeutic Activity: 10 minutes    Suraj Ma  [facility]  OT Student  ___________________________________________    I provided clinical instruction and supervision for the duration of this session and agree with the above documentation.    Justine Mena OTR/L  [facility]  #[id number]

## 2024-08-14 NOTE — PHYSICAL THERAPY NOTE
PHYSICAL THERAPY EVALUATION - INPATIENT     Room Number: 440/440-A  Evaluation Date: 8/14/2024  Type of Evaluation: Initial   Physician Order: PT Eval and Treat    Presenting Problem: Ovarian cancer, exp lap lymph disection  Co-Morbidities : Ovarian CA, high risk breast CA  Reason for Therapy: Mobility Dysfunction and Discharge Planning    PHYSICAL THERAPY ASSESSMENT   Patient is a 54 year old female admitted 8/13/2024 for Ovarian cancer,  LAPAROTOMY SALPINGO OOPHORECTOMY; COLON RESECTION LEFT .  Prior to admission, patient's baseline is lives with alone but daughter and family can help as needed indep with all mobility and ADL's.  Patient is currently functioning below baseline with bed mobility, transfers, gait, stair negotiation, maintaining seated position, standing prolonged periods, and performing household tasks.  Patient is requiring supervision as a result of the following impairments: decreased functional strength, decreased endurance/aerobic capacity, pain, impaired standing balance, decreased muscular endurance, and medical status.  Physical Therapy will continue to follow for duration of hospitalization.    Patient will benefit from continued skilled PT Services at discharge to promote prior level of function and safety with additional support and return home with home health PT.    PLAN  PT Treatment Plan: Bed mobility;Body mechanics;Endurance;Energy conservation;Patient education;Family education;Gait training;Strengthening;Transfer training;Balance training;Stoop training  Rehab Potential : Good  Frequency (Obs): Daily    PHYSICAL THERAPY MEDICAL/SOCIAL HISTORY   History related to current admission: Exploratory laparotomy, total abdominal hysterectomy, bilateral salpingo-oophorectomy, bilateral ureterolysis, bilateral pelvic lymph node dissection, tumor debulking, Argon Beam coagulation of peritoneal nodules, mobilization of splenic flexure,repair of transverse colon mesentery, infracolic,  supracolic and splenocolic ligament resection with omentectomy, rectosigmoid colon resection with end to end anastomosis; LAPAROTOMY SALPINGO OOPHORECTOMY; COLON RESECTION LEFT      Problem List  Active Problems:    Ovarian cancer (HCC)      HOME SITUATION  Home Situation  Type of Home: House  Home Layout: Able to live on main level;Multi-level  Stairs to Enter : 5  Railing: Yes  Lives With: Family;Daughter (daughter and family can help as needed)  Patient Owned Equipment: None     Prior Level of [place]: Pt lives alone  indep with all mobility and ADL's. Daughter and family plans to help during recovery as needed.     SUBJECTIVE  I feel sore with movement but I can walk a little bit and sit up in the chair a bit and visit with my daughter.     PHYSICAL THERAPY EXAMINATION   OBJECTIVE  Precautions: Bed/chair alarm;Abdominal protective strategies;LUCHO tube;NG suction  Fall Risk: Standard fall risk    WEIGHT BEARING RESTRICTION  Weight Bearing Restriction: None                PAIN ASSESSMENT  Ratin  Location: abd pain with movement in incisional region  Management Techniques: Activity promotion;Body mechanics;Breathing techniques;Relaxation;Repositioning    COGNITION  Overall Cognitive Status:  WFL - within functional limits    RANGE OF MOTION AND STRENGTH ASSESSMENT  Upper extremity ROM and strength are within functional limits   Lower extremity ROM is within functional limits   Lower extremity strength is within functional limits     BALANCE  Static Sitting: Fair +  Dynamic Sitting: Fair  Static Standing: Fair -  Dynamic Standing: Fair -    ACTIVITY TOLERANCE  Pulse: 63  Heart Rate Source: Monitor  Resp: 18  BP: 112/69  BP Location: Right arm  BP Method: Automatic  Patient Position: Sitting    O2 WALK       AM-PAC '6-Clicks' INPATIENT SHORT FORM - BASIC MOBILITY  How much difficulty does the patient currently have...  Patient Difficulty: Turning over in bed (including adjusting bedclothes, sheets and  blankets)?: A Little   Patient Difficulty: Sitting down on and standing up from a chair with arms (e.g., wheelchair, bedside commode, etc.): A Little   Patient Difficulty: Moving from lying on back to sitting on the side of the bed?: A Little   How much help from another person does the patient currently need...   Help from Another: Moving to and from a bed to a chair (including a wheelchair)?: A Little   Help from Another: Need to walk in hospital room?: A Little   Help from Another: Climbing 3-5 steps with a railing?: A Lot     AM-PAC Score:  Raw Score: 17   Approx Degree of Impairment: 50.57%   Standardized Score (AM-PAC Scale): 42.13   CMS Modifier (G-Code): CK    FUNCTIONAL ABILITY STATUS  Functional Mobility/Gait Assessment  Gait Assistance: Minimum assistance  Distance (ft): 30  Assistive Device: Rolling walker  Pattern: Shuffle (decreased step length antlagic gait decreased step length)  Rolling: minimal assist  Supine to Sit: minimal assist  Sit to Supine: minimal assist  Sit to Stand: minimal assist    Exercise/Education Provided:  Bed mobility  Body mechanics  Energy conservation  Functional activity tolerated  Gait training  Posture  Strengthening  Lower therapeutic exercise:  Ankle pumps  Heel raises  LAQ  Transfer training    Skilled Therapy Provided: pt ed with bed mobility with log roll for abdominal sparring with min A for supine to sit. Pt ed with transfers with RW with min A. Pt ed with gait progression 30' with RW with antalgic gait and limited step length with CGA. Pt ed with therex in chair as tolerable x 10.     The patient's Approx Degree of Impairment: 50.57% has been calculated based on documentation in the [facility] '6 clicks' Inpatient Basic Mobility Short Form.  Research supports that patients with this level of impairment may benefit from [facility] PT with family assist.  Final disposition will be made by interdisciplinary medical team.    Patient End of Session: Up in chair;With  staff;Needs  met;RN aware of session/findings;Call light within reach;All patient questions and concerns addressed;Family present    CURRENT GOALS  Goals to be met by: 8/25/2024  Patient Goal Patient's self-stated goal is: Return home    Goal #1 Patient is able to demonstrate supine - sit EOB @ level: independent     Goal #1   Current Status    Goal #2 Patient is able to demonstrate transfers Sit to/from Stand at assistance level: modified independent with walker - rolling     Goal #2  Current Status    Goal #3 Patient is able to ambulate 300 feet with assist device: none at assistance level: modified independent   Goal #3   Current Status    Goal #4 Patient will negotiate 5 stairs/one curb w/ assistive device and supervision   Goal #4   Current Status    Goal #5 Patient to demonstrate independence with home activity/exercise instructions provided to patient in preparation for discharge.   Goal #5   Current Status    Goal #6    Goal #6  Current Status      Patient Evaluation Complexity Level:  History Low - no personal factors and/or co-morbidities   Examination of body systems Low -  addressing 1-2 elements   Clinical Presentation Low- Stable   Clinical Decision Making  Low Complexity     Gait Training: 15 minutes

## 2024-08-15 ENCOUNTER — APPOINTMENT (OUTPATIENT)
Dept: GENERAL RADIOLOGY | Facility: HOSPITAL | Age: 54
End: 2024-08-15
Attending: INTERNAL MEDICINE
Payer: COMMERCIAL

## 2024-08-15 LAB
ANION GAP SERPL CALC-SCNC: 6 MMOL/L (ref 0–18)
BASOPHILS # BLD AUTO: 0.03 X10(3) UL (ref 0–0.2)
BASOPHILS NFR BLD AUTO: 0.4 %
BUN BLD-MCNC: 15 MG/DL (ref 9–23)
BUN/CREAT SERPL: 21.4 (ref 10–20)
CALCIUM BLD-MCNC: 8.7 MG/DL (ref 8.7–10.4)
CHLORIDE SERPL-SCNC: 110 MMOL/L (ref 98–112)
CO2 SERPL-SCNC: 28 MMOL/L (ref 21–32)
CREAT BLD-MCNC: 0.7 MG/DL
DEPRECATED RDW RBC AUTO: 57.8 FL (ref 35.1–46.3)
EGFRCR SERPLBLD CKD-EPI 2021: 102 ML/MIN/1.73M2 (ref 60–?)
EOSINOPHIL # BLD AUTO: 0.01 X10(3) UL (ref 0–0.7)
EOSINOPHIL NFR BLD AUTO: 0.1 %
ERYTHROCYTE [DISTWIDTH] IN BLOOD BY AUTOMATED COUNT: 15.7 % (ref 11–15)
GLUCOSE BLD-MCNC: 91 MG/DL (ref 70–99)
HCT VFR BLD AUTO: 28.9 %
HGB BLD-MCNC: 9.3 G/DL
IMM GRANULOCYTES # BLD AUTO: 0.02 X10(3) UL (ref 0–1)
IMM GRANULOCYTES NFR BLD: 0.2 %
LYMPHOCYTES # BLD AUTO: 2.44 X10(3) UL (ref 1–4)
LYMPHOCYTES NFR BLD AUTO: 29.8 %
MCH RBC QN AUTO: 33.5 PG (ref 26–34)
MCHC RBC AUTO-ENTMCNC: 32.2 G/DL (ref 31–37)
MCV RBC AUTO: 104 FL
MONOCYTES # BLD AUTO: 0.8 X10(3) UL (ref 0.1–1)
MONOCYTES NFR BLD AUTO: 9.8 %
NEUTROPHILS # BLD AUTO: 4.9 X10 (3) UL (ref 1.5–7.7)
NEUTROPHILS # BLD AUTO: 4.9 X10(3) UL (ref 1.5–7.7)
NEUTROPHILS NFR BLD AUTO: 59.7 %
OSMOLALITY SERPL CALC.SUM OF ELEC: 298 MOSM/KG (ref 275–295)
PLATELET # BLD AUTO: 176 10(3)UL (ref 150–450)
POTASSIUM SERPL-SCNC: 3.8 MMOL/L (ref 3.5–5.1)
RBC # BLD AUTO: 2.78 X10(6)UL
SODIUM SERPL-SCNC: 144 MMOL/L (ref 136–145)
WBC # BLD AUTO: 8.2 X10(3) UL (ref 4–11)

## 2024-08-15 PROCEDURE — 97530 THERAPEUTIC ACTIVITIES: CPT

## 2024-08-15 PROCEDURE — 85025 COMPLETE CBC W/AUTO DIFF WBC: CPT | Performed by: PHYSICIAN ASSISTANT

## 2024-08-15 PROCEDURE — 97116 GAIT TRAINING THERAPY: CPT

## 2024-08-15 PROCEDURE — 97110 THERAPEUTIC EXERCISES: CPT

## 2024-08-15 PROCEDURE — 71045 X-RAY EXAM CHEST 1 VIEW: CPT | Performed by: INTERNAL MEDICINE

## 2024-08-15 PROCEDURE — 80048 BASIC METABOLIC PNL TOTAL CA: CPT | Performed by: PHYSICIAN ASSISTANT

## 2024-08-15 RX ORDER — HYDROMORPHONE HYDROCHLORIDE 1 MG/ML
0.4 INJECTION, SOLUTION INTRAMUSCULAR; INTRAVENOUS; SUBCUTANEOUS EVERY 2 HOUR PRN
Status: DISCONTINUED | OUTPATIENT
Start: 2024-08-15 | End: 2024-08-24

## 2024-08-15 RX ORDER — HYDROMORPHONE HYDROCHLORIDE 1 MG/ML
0.8 INJECTION, SOLUTION INTRAMUSCULAR; INTRAVENOUS; SUBCUTANEOUS EVERY 2 HOUR PRN
Status: DISCONTINUED | OUTPATIENT
Start: 2024-08-15 | End: 2024-08-24

## 2024-08-15 RX ORDER — HYDROMORPHONE HYDROCHLORIDE 1 MG/ML
0.2 INJECTION, SOLUTION INTRAMUSCULAR; INTRAVENOUS; SUBCUTANEOUS EVERY 2 HOUR PRN
Status: DISCONTINUED | OUTPATIENT
Start: 2024-08-15 | End: 2024-08-24

## 2024-08-15 RX ORDER — KETOROLAC TROMETHAMINE 15 MG/ML
15 INJECTION, SOLUTION INTRAMUSCULAR; INTRAVENOUS EVERY 6 HOURS PRN
Status: DISPENSED | OUTPATIENT
Start: 2024-08-15 | End: 2024-08-17

## 2024-08-15 RX ORDER — LORAZEPAM 0.5 MG/1
1 TABLET ORAL ONCE
Status: COMPLETED | OUTPATIENT
Start: 2024-08-15 | End: 2024-08-15

## 2024-08-15 NOTE — DIETARY NOTE
BRIEF DIETITIAN NOTE      Patient Status 08/15/24: Pt screened for MST of 2, for wt loss (>14# wt loss) without decrease in po, and RN consult per protocol. Pt found to be at no nutrition risk at this time. Pt admitted for planned hysterectomy. Pt s/POD#2 extensive surgery including total hysterectomy, tumor debulking of peritoneal surfaces, omentectomy, rectosigmoidectomy and anastomosis. Visited pt today, family in the room. Pt currently NPO. Pt denied N/V, stated her abdominal pain is manageable, stated last BM 8/13 PTA. Pt denied decrease in po PTA,  dnied decrease in appetite 2/2 to chemo. Pt reported good PO intakes PTA and consuming her regular eating pattern. Pt  Weight relatively stable, pt denied significant wt loss, stated #, #. Unclear why MST was noted as 2 at admission; RD documented corrected MST today. Additionally, pt appears well nourished per visual NFPE. Labs reviewed. Pt currently NPO and per discussion with RN, no plans to advance diet today yet per MD. Consideration for ONS to help maximize nutrient intakes is recommended when diet is advanced. Will follow per protocol. Please consult RD if earlier nutrition intervention is needed.     Recent Labs     08/13/24  1726 08/14/24  0509 08/15/24  0540   * 121* 91   BUN 12 13 15   CREATSERUM 0.74 0.73 0.70   CA 8.5* 8.8 8.7    143 144   K 4.4 4.5 3.8    110 110   CO2 26.0 27.0 28.0   OSMOCALC 298* 297* 298*     Diet: NPO  Percent Meals Eaten (last 6 days)       None             Patient Weight(s) for the past 336 hrs:   Weight   08/13/24 0549 78.7 kg (173 lb 6.4 oz)   08/02/24 1128 81.6 kg (180 lb)        Wt Readings from Last 6 Encounters:   08/13/24 78.7 kg (173 lb 6.4 oz)   01/05/21 86.2 kg (190 lb 1.6 oz)   11/22/17 85.7 kg (189 lb)   12/08/15 75.5 kg (166 lb 6.4 oz)   10/14/14 77 kg (169 lb 11.2 oz)   06/28/13 77.1 kg (170 lb)        Medical Food Supplements-RD added NPO and consider when diet is advanced . Will  monitor  for diet advancement.    F/u per protocol or as appropriate.       Fay eSrrato RD, LDN  Clinical Dietitian  Office: [phone]

## 2024-08-15 NOTE — PROGRESS NOTES
[facility] POST-OPERATIVE VIS[facility]     MEDICAL RECORD #: [id number] DATE OF SERVICE: 8/15/2024             Reason for visit:  Post-operative day two    HISTORY OF PRESENT ILLNESS:  54 year old female with a history of abdominal distension, bloating, who was found to have ascites and an enlarged left ovary. The patient underwent biopsy of the omentum which was consistent with gynecologic primary consistent with ovarian cancer. Patient's initial CA-125 was over 700. Patient was started on neoadjuvant chemotherapy and has now received 4 cycles of chemotherapy. Last one approximately 5 weeks ago. Patient had good response. Her repeat CA-125 was down to 45. CT scan also showed improvement in left adnexal lesion and improved peritoneal carcinomatosis.   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       GI//GYNE Complaint:  Other complaints: Pain well controlled on PCA. Denies vomiting, one episode of nausea last night resolved with medication, attributes it to \"hunger pain\". Walking with PT. Belching, no flatus/BM. Holguin in place.    Interim History:  Underwent surgery    Patient's medications, allergies, past medical, surgical, social and family histories were reviewed and updated as appropriate.    Pathology/Lab Results:  Recent Results (from the past 720 hour(s))   ABORH (Blood Type)    Collection Time: 08/02/24 12:33 PM   Result Value Ref Range    ABO BLOOD TYPE O     RH BLOOD TYPE Negative    Antibody Screen    Collection Time: 08/02/24  12:33 PM   Result Value Ref Range    Antibody Screen Negative    ABORH Confirmation    Collection Time: 08/13/24  7:00 AM   Result Value Ref Range    ABO BLOOD TYPE O     RH BLOOD TYPE Negative    POCT Hemocue    Collection Time: 08/13/24 11:24 AM   Result Value Ref Range    Hemocue 10.6 (A) 12.0 - 16.0 g/dl   Basic Metabolic Panel (8)    Collection Time: 08/13/24  5:26 PM   Result Value Ref Range    Glucose 166 (H) 70 - 99 mg/dL    Sodium 142 136 - 145 mmol/L    Potassium 4.4 3.5 - 5.1 mmol/L    Chloride 110 98 - 112 mmol/L    CO2 26.0 21.0 - 32.0 mmol/L    Anion Gap 6 0 - 18 mmol/L    BUN 12 9 - 23 mg/dL    Creatinine 0.74 0.55 - 1.02 mg/dL    BUN/CREA Ratio 16.2 10.0 - 20.0    Calcium, Total 8.5 (L) 8.7 - 10.4 mg/dL    Calculated Osmolality 298 (H) 275 - 295 mOsm/kg    eGFR-Cr 95 >=60 mL/min/1.73m2   CBC With Differential With Platelet    Collection Time: 08/13/24  5:26 PM   Result Value Ref Range    WBC 10.8 4.0 - 11.0 x10(3) uL    RBC 3.38 (L) 3.80 - 5.30 x10(6)uL    HGB 11.3 (L) 12.0 - 16.0 g/dL    HCT 34.8 (L) 35.0 - 48.0 %    .0 (H) 80.0 - 100.0 fL    MCH 33.4 26.0 - 34.0 pg    MCHC 32.5 31.0 - 37.0 g/dL    RDW-SD 57.5 (H) 35.1 - 46.3 fL    RDW 15.7 (H) 11.0 - 15.0 %    .0 150.0 - 450.0 10(3)uL    Neutrophil Absolute Prelim 10.03 (H) 1.50 - 7.70 x10 (3) uL    Neutrophil Absolute 10.03 (H) 1.50 - 7.70 x10(3) uL    Lymphocyte Absolute 0.30 (L) 1.00 - 4.00 x10(3) uL    Monocyte Absolute 0.41 0.10 - 1.00 x10(3) uL    Eosinophil Absolute 0.00 0.00 - 0.70 x10(3) uL    Basophil Absolute 0.01 0.00 - 0.20 x10(3) uL    Immature Granulocyte Absolute 0.03 0.00 - 1.00 x10(3) uL    Neutrophil % 93.0 %    Lymphocyte % 2.8 %    Monocyte % 3.8 %    Eosinophil % 0.0 %    Basophil % 0.1 %    Immature Granulocyte % 0.3 %   CBC With Differential With Platelet    Collection Time: 08/14/24  5:08 AM   Result Value Ref Range    WBC 8.7 4.0 - 11.0 x10(3) uL    RBC 3.12 (L) 3.80 - 5.30 x10(6)uL    HGB 10.2 (L) 12.0 -  16.0 g/dL    HCT 31.9 (L) 35.0 - 48.0 %    .2 (H) 80.0 - 100.0 fL    MCH 32.7 26.0 - 34.0 pg    MCHC 32.0 31.0 - 37.0 g/dL    RDW-SD 57.0 (H) 35.1 - 46.3 fL    RDW 15.6 (H) 11.0 - 15.0 %    .0 150.0 - 450.0 10(3)uL    Neutrophil Absolute Prelim 6.66 1.50 - 7.70 x10 (3) uL    Neutrophil Absolute 6.66 1.50 - 7.70 x10(3) uL    Lymphocyte Absolute 1.18 1.00 - 4.00 x10(3) uL    Monocyte Absolute 0.85 0.10 - 1.00 x10(3) uL    Eosinophil Absolute 0.00 0.00 - 0.70 x10(3) uL    Basophil Absolute 0.01 0.00 - 0.20 x10(3) uL    Immature Granulocyte Absolute 0.03 0.00 - 1.00 x10(3) uL    Neutrophil % 76.4 %    Lymphocyte % 13.5 %    Monocyte % 9.7 %    Eosinophil % 0.0 %    Basophil % 0.1 %    Immature Granulocyte % 0.3 %   Basic Metabolic Panel (8)    Collection Time: 08/14/24  5:09 AM   Result Value Ref Range    Glucose 121 (H) 70 - 99 mg/dL    Sodium 143 136 - 145 mmol/L    Potassium 4.5 3.5 - 5.1 mmol/L    Chloride 110 98 - 112 mmol/L    CO2 27.0 21.0 - 32.0 mmol/L    Anion Gap 6 0 - 18 mmol/L    BUN 13 9 - 23 mg/dL    Creatinine 0.73 0.55 - 1.02 mg/dL    BUN/CREA Ratio 17.8 10.0 - 20.0    Calcium, Total 8.8 8.7 - 10.4 mg/dL    Calculated Osmolality 297 (H) 275 - 295 mOsm/kg    eGFR-Cr 97 >=60 mL/min/1.73m2   Basic Metabolic Panel (8)    Collection Time: 08/15/24  5:40 AM   Result Value Ref Range    Glucose 91 70 - 99 mg/dL    Sodium 144 136 - 145 mmol/L    Potassium 3.8 3.5 - 5.1 mmol/L    Chloride 110 98 - 112 mmol/L    CO2 28.0 21.0 - 32.0 mmol/L    Anion Gap 6 0 - 18 mmol/L    BUN 15 9 - 23 mg/dL    Creatinine 0.70 0.55 - 1.02 mg/dL    BUN/CREA Ratio 21.4 (H) 10.0 - 20.0    Calcium, Total 8.7 8.7 - 10.4 mg/dL    Calculated Osmolality 298 (H) 275 - 295 mOsm/kg    eGFR-Cr 102 >=60 mL/min/1.73m2   CBC With Differential With Platelet    Collection Time: 08/15/24  5:40 AM   Result Value Ref Range    WBC 8.2 4.0 - 11.0 x10(3) uL    RBC 2.78 (L) 3.80 - 5.30 x10(6)uL    HGB 9.3 (L) 12.0 - 16.0 g/dL    HCT 28.9 (L)  35.0 - 48.0 %    .0 (H) 80.0 - 100.0 fL    MCH 33.5 26.0 - 34.0 pg    MCHC 32.2 31.0 - 37.0 g/dL    RDW-SD 57.8 (H) 35.1 - 46.3 fL    RDW 15.7 (H) 11.0 - 15.0 %    .0 150.0 - 450.0 10(3)uL    Neutrophil Absolute Prelim 4.90 1.50 - 7.70 x10 (3) uL    Neutrophil Absolute 4.90 1.50 - 7.70 x10(3) uL    Lymphocyte Absolute 2.44 1.00 - 4.00 x10(3) uL    Monocyte Absolute 0.80 0.10 - 1.00 x10(3) uL    Eosinophil Absolute 0.01 0.00 - 0.70 x10(3) uL    Basophil Absolute 0.03 0.00 - 0.20 x10(3) uL    Immature Granulocyte Absolute 0.02 0.00 - 1.00 x10(3) uL    Neutrophil % 59.7 %    Lymphocyte % 29.8 %    Monocyte % 9.8 %    Eosinophil % 0.1 %    Basophil % 0.4 %    Immature Granulocyte % 0.2 %       Labs reviewed - 8/14/24    Physical examination:    /58 (BP Location: Right arm)   Pulse 65   Temp 97.8 °F (36.6 °C) (Oral)   Resp 18   Ht 5' 7\" (1.702 m)   Wt 173 lb 6.4 oz (78.7 kg)   LMP 12/22/2020   SpO2 98%   BMI 27.16 kg/m²     GENERAL: alert and oriented, cooperative, in no acute distress  ABDOMEN:  soft, expected tenderness, non-distended  INCISION/SURGICAL WOUNDS: dressing intact and in place  PELVIC: Pelvic exam: examination not indicated.    ASSESSMENT:  Dx: ovarian cancer    S/p Exploratory laparotomy, total abdominal hysterectomy, bilateral salpingo-oophorectomy, bilateral ureterolysis, bilateral pelvic lymph node dissection, tumor debulking of various peritoneal surfaces, argon beam coagulation of peritoneal tumor nodules, infracolic-supracolic omentectomy, and resection of gastrosplenic ligament with tumor attached to tip of spleen, mobilization of splenic flexure, repair of transverse colon mesentery, rectosigmoid colon resection with end-to-end anastomosis on 8/13/24.       Good UOP    B/L LUCHO with low-moderate output    WBC and Cr WNL    Hgb 9.3, no sign of active bleeding    Vitals normal    PLAN:    LMWH    Discontinue yao    Continue IVF    Pain control - dilaudid, Toradol  PRN    Continue NPO, ok for a few ice chips    Recheck labs in AM    PT/OT/SW    The patient verbalized good understanding of this.  I will keep you informed of her progress.  Thank you for allowing me to participate in the care of this patient.    KOREY Roberto  08/15/24

## 2024-08-15 NOTE — OCCUPATIONAL THERAPY NOTE
OCCUPATIONAL THERAPY TREATMENT NOTE - INPATIENT        Room Number: 440/440-A  Presenting Problem: gynecologic adenocarcinoma w/ peritoneal carcinomatosis s/p chemo and CHELSIE-BSO, rectosigmoid colon resection d/t tumor involvement on 8/13    Problem List  Active Problems:    Ovarian cancer (HCC)      OCCUPATIONAL THERAPY ASSESSMENT   Patient demonstrates good  progress toward goals. Pt currently unable to identify OT concerns in anticipation of discharge.    Patient is requiring supervision as a result of the following impairments: decreased endurance and recent abdominal surgery .    Patient continues to function below baseline with adls and functional mobility due to post op restrictions.        PLAN  OT Treatment Plan:  (discharge from OT)  OT Device Recommendations: -- (rw)    SUBJECTIVE  \"It feels like when I had my C sections years ago\"    OBJECTIVE  Precautions: Abdominal protective strategies;Drain(s);NG suction    PAIN ASSESSMENT  Rating: -- (pt offering no complaints)  Location: abdomen  Management Techniques: Activity promotion; Body mechanics; Repositioning    O2 SATURATIONS  Activity on room air    ACTIVITIES OF DAILY LIVING ASSESSMENT  AM-PAC ‘6-Clicks’ Inpatient Daily Activity Short Form  How much help from another person does the patient currently need…  -   Putting on and taking off regular lower body clothing?: A Little  -   Bathing (including washing, rinsing, drying)?: A Little  -   Toileting, which includes using toilet, bedpan or urinal? : A Little  -   Putting on and taking off regular upper body clothing?: A Little  -   Taking care of personal grooming such as brushing teeth?: None  -   Eating meals?: None    AM-PAC Score:  Score: 20  Approx Degree of Impairment: 38.32%  Standardized Score (AM-PAC Scale): 42.03  CMS Modifier (G-Code): CJ    FUNCTIONAL ADL ASSESSMENT  Eating: independent  Grooming: setup assist  UB Dressing: setup assist  LB Dressing: setup assist  Toileting: setup  assist      Skilled Therapy Provided: RN contacted prior to start of care. Treatment coordinated w/ PT. Pt received in bed alert and oriented x 4;family at bedside. Abdominal sparing and posture principles reinforced. Importance of oob activity and ambulation stressed w/ pt reporting that she had just gotten into bed after sitting in the chair. Pt reporting that she has been up ambulating in the room and tending to personal care needs. Pt currently unable to identify OT needs in anticipation of discharge. Pt's main concern at this time is managing stairs to enter her home. Pt reported that she is planning to get a shower chair and walker for home. Pt presenting w/ sound judgement and good insight into limitations following surgery. Anticipate pt will be able to return home w/ support of family    At end of session pt remaining in bed w/ all needs in reach;family at bedside. RN aware of pt's status and discharge from OT. No further contact planned      EDUCATION PROVIDED  Patient: Role of Occupational Therapy; Abdominal Protective Strategies; Compensatory ADL Techniques; Energy Conservation; DME Recommendations  Patient's Response to Education: Verbalized Understanding  Family/Caregiver: Role of Occupational Therapy; Plan of Care  Family/Caregiver's Response to Education: Verbalized Understanding    The patient's Approx Degree of Impairment: 38.32% has been calculated based on documentation in the [facility] '6 clicks' Inpatient Daily Activity Short Form.  Research supports that patients with this level of impairment may benefit from return to home w/ HH.  Final disposition will be made by interdisciplinary medical team.    Patient End of Session: In bed;Needs met;Call light within reach;RN aware of session/findings;All patient questions and concerns addressed;Family present    OT Goals:   Patient will complete functional transfer with MOD I  Comment: Pt ambulating in the room w/ rw and supervision    Patient will  complete toileting with MOD I  Comment: Pt currently tending to personal care needs w/o assist    Patient will complete LB dressing with MOD I  Comment:Pt able to complete task w/ crossed leg technique    Patient will complete item retrieval with MOD I  Comment:na          Goals  on: 24  Frequency: 3-5x /wk    Therapeutic Activity: 15 minutes

## 2024-08-15 NOTE — PHYSICAL THERAPY NOTE
PHYSICAL THERAPY TREATMENT NOTE - INPATIENT     Room Number: 440/440-A       Presenting Problem: Ovarian cancer, exp lap lymph disection  Co-Morbidities : Hx of HPV, anxiety, depression    Problem List  Active Problems:    Ovarian cancer (HCC)      PHYSICAL THERAPY ASSESSMENT   Patient demonstrates limited progress this session, goals  updated to reflect patient performance.      Patient is requiring minimal assist as a result of the following impairments: decreased functional strength, decreased endurance/aerobic capacity, pain, impaired standing balance, decreased muscular endurance, and medical status.     Patient continues to function below baseline with bed mobility, transfers, gait, stair negotiation, maintaining seated position, standing prolonged periods, and performing household tasks.  Next session anticipate patient to progress bed mobility, transfers, gait, stair negotiation, maintaining seated position, standing prolonged periods, and performing household tasks.  Physical Therapy will continue to follow patient for duration of hospitalization.    Patient continues to benefit from continued skilled PT services: at discharge to promote prior level of function and safety with additional support and return home with home health PT.    PLAN  PT Treatment Plan: Bed mobility;Body mechanics;Endurance;Energy conservation;Patient education;Gait training;Strengthening;Transfer training;Balance training  Frequency (Obs): Daily    SUBJECTIVE  I feel better with out the NG tube but the stomach is still real sore with walking.     OBJECTIVE  Precautions: Abdominal protective strategies;Drain(s);NG suction    WEIGHT BEARING RESTRICTION                PAIN ASSESSMENT   Ratin  Location: abdominal pain with movement  Management Techniques: Activity promotion;Body mechanics;Repositioning;Relaxation;Breathing techniques    BALANCE  Static Sitting: Fair +  Dynamic Sitting: Fair  Static Standing: Fair -  Dynamic Standing:  Fair -    ACTIVITY TOLERANCE  Pulse: 66  Heart Rate Source: Monitor  Resp: 18                 O2 WALK  Oxygen Therapy  SPO2% Ambulation on Room Air: 98    AM-PAC '6-Clicks' INPATIENT SHORT FORM - BASIC MOBILITY  How much difficulty does the patient currently have...  Patient Difficulty: Turning over in bed (including adjusting bedclothes, sheets and blankets)?: A Little   Patient Difficulty: Sitting down on and standing up from a chair with arms (e.g., wheelchair, bedside commode, etc.): A Little   Patient Difficulty: Moving from lying on back to sitting on the side of the bed?: A Little   How much help from another person does the patient currently need...   Help from Another: Moving to and from a bed to a chair (including a wheelchair)?: A Little   Help from Another: Need to walk in hospital room?: A Little   Help from Another: Climbing 3-5 steps with a railing?: A Lot     AM-PAC Score:  Raw Score: 17   Approx Degree of Impairment: 50.57%   Standardized Score (AM-PAC Scale): 42.13   CMS Modifier (G-Code): CK    FUNCTIONAL ABILITY STATUS  Functional Mobility/Gait Assessment  Gait Assistance: Contact guard assist  Distance (ft): 30' x 2  Assistive Device: Rolling walker  Pattern: Shuffle (antalgic gait decreased step length)  Rolling: minimal assist  Supine to Sit: minimal assist  Sit to Supine: contact guard assist  Sit to Stand: contact guard assist    Skilled Therapy Provided: Pt ed with transfers and gait progression with CGA with RW amb 30' x 2 with RW with shuffling antalgic gait. Pt tolerating chair and toilet transfers with min A with RW and gait with CGA with RW. Pt ed with LE therex for HEP in chair x 10 reps each. Pt is on track for [facility] PT with family assist as medical progress allows. Pain is primary limiting factor and post surgical weakness.     The patient's Approx Degree of Impairment: 50.57% has been calculated based on documentation in the [facility] '6 clicks' Inpatient Daily Activity Short Form.   Research supports that patients with this level of impairment may benefit from [facility] PT with family assist.  Final disposition will be made by interdisciplinary medical team.    THERAPEUTIC EXERCISES  Lower Extremity Ankle pumps  Heel slides  LAQ     Position Sitting & Standing       Patient End of Session: Up in chair;With  staff;Needs met;Call light within reach;RN aware of session/findings;All patient questions and concerns addressed;Alarm set;Family present    CURRENT GOALS   CURRENT GOALS  Goals to be met by: 8/25/2024  Patient Goal Patient's self-stated goal is: Return home    Goal #1 Patient is able to demonstrate supine - sit EOB @ level: independent      Goal #1   Current Status  Min A log roll supine to sit   Goal #2 Patient is able to demonstrate transfers Sit to/from Stand at assistance level: modified independent with walker - rolling      Goal #2  Current Status  Min A sit to stand with RW   Goal #3 Patient is able to ambulate 300 feet with assist device: none at assistance level: modified independent   Goal #3   Current Status  CGA with amb 30' x 2 with RW   Goal #4 Patient will negotiate 5 stairs/one curb w/ assistive device and supervision   Goal #4   Current Status  Ongoing   Goal #5 Patient to demonstrate independence with home activity/exercise instructions provided to patient in preparation for discharge.   Goal #5   Current Status  Ongoing   Goal #6     Goal #6  Current Status       Gait Training: 15 minutes  Therapeutic Exercise: 9 minutes

## 2024-08-15 NOTE — CM/SW NOTE
Anticipated therapy need: Home with Home Healthcare    CM requested department  (DSC) to initiate [facility] referral for HHC. F2F entered. SW/CM will continue to follow.     Massiel House RN, BSN  Nurse   [phone]

## 2024-08-15 NOTE — PROGRESS NOTES
[facility]   INTERNAL MEDICINE PROGRESS NOTE    CHIEF COMPLAINT   Ovarian cancer s/p hysterectomy     SUBJECTIVE  24 HR EVENTS   NGT & yao out.  Nausea without vomiting last night. No gas or BM from rectum. belching    Dilaudid PCA stopped. Switch to IV dilaudid PRN & toradol    INPATIENT MEDICATIONS  Scheduled Medications:  pantoprazole, 40 mg, QAM AC   Or  pantoprazole, 40 mg, QAM AC  enoxaparin, 40 mg, Daily     Drips:   lactated ringers, Last Rate: 100 mL/hr at 08/14/24 1616       PRN Medications:   HYDROmorphone, 0.2 mg, Q2H PRN   Or  HYDROmorphone, 0.4 mg, Q2H PRN   Or  HYDROmorphone, 0.8 mg, Q2H PRN  ketorolac, 15 mg, Q6H PRN  phenol, , Q2H PRN  diazepam, 5 mg, Q6H PRN  ondansetron, 4 mg, Q8H PRN   Or  ondansetron, 4 mg, Q8H PRN  diphenhydrAMINE, 12.5 mg, Q4H PRN       PHYSICAL EXAMINATION   Vitals: /58 (BP Location: Right arm)   Pulse 66   Temp 97.8 °F (36.6 °C) (Oral)   Resp 18   Ht 5' 7\" (1.702 m)   Wt 173 lb 6.4 oz (78.7 kg)   LMP 12/22/2020   SpO2 98%   BMI 27.16 kg/m²   Gen: NAD, well nourished, NGT  Eyes: PERRLA, normal conjunctivae  ENMT: Moist mucous membranes, trachea midline  CV: RRR, no M/R/G, no peripheral edema  Resp: CTAB, non-labored respirations, symmetric expansion  GI: Soft, NT, ND, + BS, inc dressed, abd binder in place  MSK:  No C/C/E, normal active/passive ROM in C-spine  Skin: No rashes, visible skin w/o worrisome lesions   Neuro: CN 2-12 grossly intact, sensation intact   Psych: A&Ox3, appropriate mood, conversant w/ linear thought process     DATA REVIEW: LABORATORY VALUES & DIAGNOSTICS  Recent Labs   Lab 08/13/24  1726 08/14/24  0509 08/15/24  0540    143 144   K 4.4 4.5 3.8    110 110   CO2 26.0 27.0 28.0   BUN 12 13 15   CREATSERUM 0.74 0.73 0.70   ANIONGAP 6 6 6   * 121* 91   CA 8.5* 8.8 8.7   EGFRCR 95 97 102     Recent Labs   Lab 08/13/24  1726 08/14/24  0508 08/15/24  0540   WBC 10.8 8.7 8.2   HGB 11.3* 10.2* 9.3*   HCT 34.8* 31.9*  28.9*   .0 192.0 176.0   .0* 102.2* 104.0*   MOPERCENT 3.8 9.7 9.8   EOPERCENT 0.0 0.0 0.1   BAPERCENT 0.1 0.1 0.4     ASSESSMENT & PLAN   Yue Malin - 54 year old female w adenocarcinoma admitted 8/13/2024 for post op care after planned CHELSIE-BSO, colon resection for her gynecologic adenoca w Dr. Choudhary    Gynecologic adenocarcinoma w peritoneal carcinomatosis  S/p chemo  Now s/p CHELISE-BSO, rectosigmoid colon resection w end-end anastomosis due to tumor involvement  - Post-op care per Dr. Choudhary's team. NGT & yao removed.  - Trend labs  - Dilaudid PCA stopped. Switch to IV dilaudid PRN & toradol. Avoid narcotics if not needed  - IVF until tolerating PO  - Up, OOB, IS use, ambulate  - Add IV valium for NGT discomfort/anxiety    Anemia, post-op - 2/2 expected blood loss w/ dilutional component from IVF   - No active external bleeding noted  - Daily CBC    Anxiety (ACOSTA)  - Stable, continue PTA ativan PRN as able  - Add IV valium for NGT discomfort/anxiety    ACP: Full code  Ppx: DVT: Enox  Dispo  EDoD:  TBD. Suspect 2-3 days  F/u:   - PCP:  Diana Black MD  - Onc  - Gyne/onc    Available via epic secure chat or perfect serve 7A - 7P.    Steven Hou MD   Internal Medicine - [facility]  [facility]

## 2024-08-15 NOTE — CM/SW NOTE
Department  notified of request for HHC, [facility]in referrals started. Assigned CM/SW to follow up with pt/family on further discharge planning.     Lisa Hope, DSC

## 2024-08-15 NOTE — PLAN OF CARE
Patient alert, oriented, up walking to bathroom and sitting in chair, yao cath removed, pt voiding in toilet, continue IV fluids as pt is NPO, belching but not passing gas, pain well managed with toradol given as needed, family at bedside for emotional support,   Problem: Patient Centered Care  Goal: Patient preferences are identified and integrated in the patient's plan of care  Description: Interventions:  - What would you like us to know as we care for you?   - Provide timely, complete, and accurate information to patient/family  - Incorporate patient and family knowledge, values, beliefs, and cultural backgrounds into the planning and delivery of care  - Encourage patient/family to participate in care and decision-making at the level they choose  - Honor patient and family perspectives and choices  Outcome: Progressing     Problem: Patient/Family Goals  Goal: Patient/Family Long Term Goal  Description: Patient's Long Term Goal:     Interventions:  - See additional Care Plan goals for specific interventions  Outcome: Progressing  Goal: Patient/Family Short Term Goal  Description: Patient's Short Term Goal:     Interventions:   - See additional Care Plan goals for specific interventions  Outcome: Progressing     Problem: PAIN - ADULT  Goal: Verbalizes/displays adequate comfort level or patient's stated pain goal  Description: INTERVENTIONS:  - Encourage pt to monitor pain and request assistance  - Assess pain using appropriate pain scale  - Administer analgesics based on type and severity of pain and evaluate response  - Implement non-pharmacological measures as appropriate and evaluate response  - Consider cultural and social influences on pain and pain management  - Manage/alleviate anxiety  - Utilize distraction and/or relaxation techniques  - Monitor for opioid side effects  - Notify MD/LIP if interventions unsuccessful or patient reports new pain  - Anticipate increased pain with activity and pre-medicate  as appropriate  Outcome: Progressing

## 2024-08-15 NOTE — PLAN OF CARE
Pt is alert and oriented on RA. Holguin in place. No BM over night. IVF and abx running as ordered. Pain managed with Dilaudid PCA. A dose of Zofran given to help with GERD symptoms. Pt is NPO. Pt is up self after setup. Call light is within reach and safety measures are in place.    Problem: PAIN - ADULT  Goal: Verbalizes/displays adequate comfort level or patient's stated pain goal  Description: INTERVENTIONS:  - Encourage pt to monitor pain and request assistance  - Assess pain using appropriate pain scale  - Administer analgesics based on type and severity of pain and evaluate response  - Implement non-pharmacological measures as appropriate and evaluate response  - Consider cultural and social influences on pain and pain management  - Manage/alleviate anxiety  - Utilize distraction and/or relaxation techniques  - Monitor for opioid side effects  - Notify MD/LIP if interventions unsuccessful or patient reports new pain  - Anticipate increased pain with activity and pre-medicate as appropriate  Outcome: Progressing     Problem: SAFETY ADULT - FALL  Goal: Free from fall injury  Description: INTERVENTIONS:  - Assess pt frequently for physical needs  - Identify cognitive and physical deficits and behaviors that affect risk of falls.  - [place] fall precautions as indicated by assessment.  - Educate pt/family on patient safety including physical limitations  - Instruct pt to call for assistance with activity based on assessment  - Modify environment to reduce risk of injury  - Provide assistive devices as appropriate  - Consider OT/PT consult to assist with strengthening/mobility  - Encourage toileting schedule  Outcome: Progressing     Problem: GASTROINTESTINAL - ADULT  Goal: Minimal or absence of nausea and vomiting  Description: INTERVENTIONS:  - Maintain adequate hydration with IV or PO as ordered and tolerated  - Nasogastric tube to low intermittent suction as ordered  - Evaluate effectiveness of ordered  antiemetic medications  - Provide nonpharmacologic comfort measures as appropriate  - Advance diet as tolerated, if ordered  - Obtain nutritional consult as needed  - Evaluate fluid balance  Outcome: Progressing  Goal: Maintains or returns to baseline bowel function  Description: INTERVENTIONS:  - Assess bowel function  - Maintain adequate hydration with IV or PO as ordered and tolerated  - Evaluate effectiveness of GI medications  - Encourage mobilization and activity  - Obtain nutritional consult as needed  - Establish a toileting routine/schedule  - Consider collaborating with pharmacy to review patient's medication profile  Outcome: Progressing     Problem: GENITOURINARY - ADULT  Goal: Absence of urinary retention  Description: INTERVENTIONS:  - Assess patient’s ability to void and empty bladder  - Monitor intake/output and perform bladder scan as needed  - Follow urinary retention protocol/standard of care  - Consider collaborating with pharmacy to review patient's medication profile  - Implement strategies to promote bladder emptying  Outcome: Progressing     Problem: SKIN/TISSUE INTEGRITY - ADULT  Goal: Incision(s), wounds(s) or drain site(s) healing without S/S of infection  Description: INTERVENTIONS:  - Assess and document risk factors for pressure ulcer development  - Assess and document skin integrity  - Assess and document dressing/incision, wound bed, drain sites and surrounding tissue  - Implement wound care per orders  - Initiate isolation precautions as appropriate  - Initiate Pressure Ulcer prevention bundle as indicated  Outcome: Progressing     Problem: MUSCULOSKELETAL - ADULT  Goal: Return mobility to safest level of function  Description: INTERVENTIONS:  - Assess patient stability and activity tolerance for standing, transferring and ambulating w/ or w/o assistive devices  - Assist with transfers and ambulation using safe patient handling equipment as needed  - Ensure adequate protection for  wounds/incisions during mobilization  - Obtain PT/OT consults as needed  - Advance activity as appropriate  - Communicate ordered activity level and limitations with patient/family  Outcome: Progressing

## 2024-08-15 NOTE — CM/SW NOTE
Pt discussed with nursing this am. Pt is standby assist, self care.  Per Eva, home health may not be needed on dc.  Will follow up with pt and Eva tomorrow 8/16 to confirm dc needs.    Massiel House RN, BSN  Nurse   [phone]

## 2024-08-16 LAB
ANION GAP SERPL CALC-SCNC: 6 MMOL/L (ref 0–18)
BASOPHILS # BLD AUTO: 0.01 X10(3) UL (ref 0–0.2)
BASOPHILS NFR BLD AUTO: 0.2 %
BUN BLD-MCNC: 15 MG/DL (ref 9–23)
BUN/CREAT SERPL: 25.9 (ref 10–20)
CALCIUM BLD-MCNC: 8.8 MG/DL (ref 8.7–10.4)
CHLORIDE SERPL-SCNC: 113 MMOL/L (ref 98–112)
CO2 SERPL-SCNC: 28 MMOL/L (ref 21–32)
CREAT BLD-MCNC: 0.58 MG/DL
DEPRECATED RDW RBC AUTO: 58.1 FL (ref 35.1–46.3)
EGFRCR SERPLBLD CKD-EPI 2021: 107 ML/MIN/1.73M2 (ref 60–?)
EOSINOPHIL # BLD AUTO: 0.04 X10(3) UL (ref 0–0.7)
EOSINOPHIL NFR BLD AUTO: 0.9 %
ERYTHROCYTE [DISTWIDTH] IN BLOOD BY AUTOMATED COUNT: 15.3 % (ref 11–15)
GLUCOSE BLD-MCNC: 79 MG/DL (ref 70–99)
HCT VFR BLD AUTO: 26.7 %
HGB BLD-MCNC: 8.2 G/DL
IMM GRANULOCYTES # BLD AUTO: 0.01 X10(3) UL (ref 0–1)
IMM GRANULOCYTES NFR BLD: 0.2 %
LYMPHOCYTES # BLD AUTO: 1.38 X10(3) UL (ref 1–4)
LYMPHOCYTES NFR BLD AUTO: 31.8 %
MCH RBC QN AUTO: 33.2 PG (ref 26–34)
MCHC RBC AUTO-ENTMCNC: 30.7 G/DL (ref 31–37)
MCV RBC AUTO: 108.1 FL
MONOCYTES # BLD AUTO: 0.43 X10(3) UL (ref 0.1–1)
MONOCYTES NFR BLD AUTO: 9.9 %
NEUTROPHILS # BLD AUTO: 2.47 X10 (3) UL (ref 1.5–7.7)
NEUTROPHILS # BLD AUTO: 2.47 X10(3) UL (ref 1.5–7.7)
NEUTROPHILS NFR BLD AUTO: 57 %
OSMOLALITY SERPL CALC.SUM OF ELEC: 304 MOSM/KG (ref 275–295)
PLATELET # BLD AUTO: 170 10(3)UL (ref 150–450)
POTASSIUM SERPL-SCNC: 3.8 MMOL/L (ref 3.5–5.1)
RBC # BLD AUTO: 2.47 X10(6)UL
SODIUM SERPL-SCNC: 147 MMOL/L (ref 136–145)
WBC # BLD AUTO: 4.3 X10(3) UL (ref 4–11)

## 2024-08-16 PROCEDURE — 85025 COMPLETE CBC W/AUTO DIFF WBC: CPT | Performed by: PHYSICIAN ASSISTANT

## 2024-08-16 PROCEDURE — 80048 BASIC METABOLIC PNL TOTAL CA: CPT | Performed by: PHYSICIAN ASSISTANT

## 2024-08-16 RX ORDER — LORAZEPAM 0.5 MG/1
1 TABLET ORAL EVERY 6 HOURS PRN
Status: DISCONTINUED | OUTPATIENT
Start: 2024-08-16 | End: 2024-08-24

## 2024-08-16 RX ORDER — DEXTROSE MONOHYDRATE 50 MG/ML
INJECTION, SOLUTION INTRAVENOUS CONTINUOUS
Status: DISCONTINUED | OUTPATIENT
Start: 2024-08-16 | End: 2024-08-17

## 2024-08-16 NOTE — PROGRESS NOTES
[facility] POST-OPERATIVE VISIT     MEDICAL RECORD #: [id number] DATE OF SERVICE: 8/16/2024             Reason for visit:  Post-operative day three    HISTORY OF PRESENT ILLNESS:  54 year old female with a history of abdominal distension, bloating, who was found to have ascites and an enlarged left ovary. The patient underwent biopsy of the omentum which was consistent with gynecologic primary consistent with ovarian cancer. Patient's initial CA-125 was over 700. Patient was started on neoadjuvant chemotherapy and has now received 4 cycles of chemotherapy. Last one approximately 5 weeks ago. Patient had good response. Her repeat CA-125 was down to 45. CT scan also showed improvement in left adnexal lesion and improved peritoneal carcinomatosis.   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       GI//GYNE Complaint:  Other complaints: Pain well controlled on PRN toradol. Denies vomiting/nausea. Walking around room. Belching, no flatus/BM. Urinating freely. Endorsed light vaginal spotting    Interim History:  Underwent surgery    Patient's medications, allergies, past medical, surgical, social and family histories were reviewed and updated as appropriate.    Pathology/Lab Results:  Recent Results (from the past 720 hour(s))   ABORH (Blood Type)    Collection Time: 08/02/24 12:33 PM   Result Value Ref Range    ABO BLOOD TYPE O     RH BLOOD TYPE Negative    Antibody Screen    Collection Time: 08/02/24 12:33 PM   Result Value Ref Range     Antibody Screen Negative    ABORH Confirmation    Collection Time: 08/13/24  7:00 AM   Result Value Ref Range    ABO BLOOD TYPE O     RH BLOOD TYPE Negative    Specimen to Pathology Tissue    Collection Time: 08/13/24  9:09 AM   Result Value Ref Range    Case Report       Surgical Pathology                                Case: [id number]                                  Authorizing Provider:  Binh Choudhary MD         Collected:           08/13/2024 09:09 AM          Ordering Location:     [facility]          Received:            08/13/2024 01:52 PM                                 Operating Room                                                               Pathologist:           Paul Wade MD                                                             Specimens:   A) - Uterus and cervix, bilateral tubes and ovaries, 1. uterus, cervix, bilateral                   tubes and ovaries, anterior bladder, peritoneal tumor                                               B) - Lymph node pelvic left, 2. Left pelvic lymph nodes                                             C) - Lymph node pelvic right, 3. Right pelvic lymph nodes                                           D) - Tissue, 4. left paracolic tumor                                                                 E) - Tissue, 5. Tumor attached to splenic flexure                                                   F) - Tissue, 6. nodule attached to small bowel                                                      G) - Tissue, 7. nodule attached to cecum                                                            H) - Tissue, 8. nodule attached to right diaphragm                                                  I) - Tissue, 9. infracolic, supracolic, splenocolic omentum                                         J) - Colon, 10. Rectal sigmoid colon with tumor                                            Final Diagnosis:         A. Uterus, cervix, bilateral fallopian tubes,  ovaries, anterior bladder, peritoneal tumor; total abdominal hysterectomy, bilateral salpingo-oophorectomy, bilateral ureterolysis, bilateral pelvic lymph node dissection, tumor debulking:   Left ovary with high-grade serous carcinoma, with focal clear cell change, 1.2 cm in greatest dimension.  Carcinoma is seen involving the endometrium (focal, superficial) uterine serosa, right parametrium, left fallopian tube (surface), left para-adnexal soft tissue, and right ovary (focal).  Lymphovascular invasion is identified.  Ectocervix with no significant pathologic changes identified.  Endocervix with nabothian cysts; no significant pathologic changes identified.  Right fallopian tube with no significant pathologic changes identified.  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  ypT3c, N0.  See comment.    B. Left pelvic lymph node; biopsy:  Five lymph nodes negative for metastatic carcinoma (0/5).    C. Right pelvic lymph nodes; biopsy:  Two lymph nodes negative for metastatic carcinoma (0/2).    D. Left paracolic tumor; biopsy:  Fibroconnective tissue involved by high-grade serous carcinoma.    E. Splenic flexure tumor; biopsy:   Fibroadipose tissue involved by high-grade serous carcinoma.    F. Small bowel nodule:  Nodular fibrotic tissue with chronic inflammation and calcifications.  No definitive viable carcinoma identified.    G. Cecal nodule:  Nodular fibrotic tissue with chronic inflammation.  No definitive viable carcinoma identified.    H. Right diaphragm nodule:  Fibroadipose tissue with extensive hemorrhage and acute inflammation.  No definitive viable carcinoma identified.    I. Infra colic, supracolic, splenocolic omentum; excision:   Omental fibroadipose tissue with extensive involvement by high-grade serous carcinoma.  Largest tumor nodule measures 3.8 cm.  See comment.    J. Rectosigmoid colon tumor; excision:   Segment of colon, 10.5 in length, demonstrating high-grade serous carcinoma  extensively involving the pericolonic soft tissue and serosa.  Tumor is present less than 1 mm from the mesorectal/radial margin.  One lymph node, negative for metastatic carcinoma (0/1).  Mucosa at surgical margins is viable and free of carcinoma.    Comment:  The patient has a history of adenocarcinoma of gynecological origin diagnosed on previous omental mass biopsy and ascites fluid, both in March 2024, per notes, status post neoadjuvant chemotherapy x4 cycles with good response.     Immunohistochemical stains performed on the carcinoma present in the omentum (block I3) demonstrate that the tumor cells are positive for pankeratin (AE1/AE3), CK7, PAX8, WT1, and ER and negative for CK20 and p53, consistent with a high-grade serous carcinoma.    For  purposes, this case was reviewed by a second pathologist who concurred with the diagnosis.    The immunohistochemical stains interpreted in this case demonstrated appropriate reactivity of the positive controls. These stains were performed on formalin-fixed, paraffin-embedded tissue sections with each antibody analysis being performed on a separate slide        Embedded Images      Synoptic Report       OVARY or FALLOPIAN TUBE or PRIMARY PERITONEUM   OVARY OR FALLOPIAN TUBE OR PRIMARY PERITONEUM - All Specimens   8th Edition - Protocol posted: 3/22/2023      SPECIMEN      Procedure:    Total hysterectomy and bilateral salpingo-oophorectomy       Hysterectomy Type:    Abdominal       Specimen Integrity:            Right Ovary Integrity:    Capsule intact       Specimen Integrity:            Left Ovary Integrity:    Capsule intact         Uterus Integrity:    Intact       TUMOR      Tumor Site:    Left ovary       Tumor Size:    Greatest Dimension (Centimeters): 1.2 cm      Histologic Type:    High grade serous carcinoma       Histologic Grade:    High grade       Ovarian Surface Involvement:    Not identified       Fallopian Tube Surface Involvement:     Present, left       Implants:    Present: Left paracolic, splenic flexure       Other Tissue / Organ Involvement:    Right ovary       Other Tissue / Organ Involvement:    Left fallopian tube       Other Tissue / Organ Involvement:    Uterine corpus       Other Tissue / Organ Involvement:    Abdominal peritoneum       Other Tissue / Organ Involvement:    Omentum       Other Tissue / Organ Involvement:    Rectosigmoid colon       Largest Extrapelvic Peritoneal Focus:    Macroscopic (greater than 2 cm): 3.8 cm       Peritoneal / Ascitic Fluid Involvement:    Not submitted / unknown       Chemotherapy Response Score (CRS):    CRS2 (moderate response)       REGIONAL LYMPH NODES      Regional Lymph Node Status:            :    All regional lymph nodes negative for tumor cells         Number of Lymph Nodes Examined:    8           Faiza Site(s) Examined:    Right pelvic           Faiza Site(s) Examined:    Left pelvic           Faiza Site(s) Examined:    Pericolonic       pTNM CLASSIFICATION (AJCC 8th Edition)      Reporting of pT, pN, and (when applicable) pM categories is based on information available to the pathologist at the time the report is issued. As per the AJCC (Chapter 1, 8th Ed.) it is the managing physician’s responsibility to establish the final pathologic stage based upon all pertinent information, including but potentially not limited to this pathology report.      Modified Classification:    y       pT Category:    pT3c       pN Category:    pN0       FIGO STAGE      FIGO Stage:    IIIC       Clinical Information       Adenocarcinoma.        Gross Description       Specimen A is submitted in formalin labeled “Horvat, uterus, cervix, bilateral tubes and ovaries, anterior bladder, peritoneal tumor” and consists of a 98 gram total hysterectomy including uterus with cervix, bilateral fallopian tubes and ovaries and a flap of tissue attached to the lower uterine segment (possible bladder flap). The uterus and  cervix measure 7.6 cm in length, 4.0 cm from cornu to cornu, 3.4 cm from anterior to posterior. The uterus is covered with pink-tan, smooth serosa. The ectocervix is covered with pink-tan smooth serosa. The ectocervix is covered with pink-tan, smooth mucosa and measures 2.5 x 2.4 cm in transverse dimension. The cervical os is stenosed and measures less than 0.1 cm in diameter. The anterior aspect is inked blue, the posterior aspect is inked black. The cervix and uterus are bivalved to reveal a pink-tan endocervix with a 2.5 cm endocervical canal. The endometrium is pink-tan and smooth with a 4.5 x 2.5 cm endometrial cavity. The uterus is sectioned to reveal a single posterior intramural fibroid (0.4 cm in greatest dimension). The remaining myometrium is pink-tan and trabeculated with no lesions or masses identified within the endomyometrium. The anterior bladder flap presents possible tumor nodules (1.2 cm in greatest dimension).   The left fallopian tube measures 5.5 cm in length x 0.6 cm in diameter. The left ovary measures 2.5 x 1.5 x 1.2 cm. The left fallopian tube, ovary and para adnexal tissue are covered with pink-gray adhesions and possible nodules.  The right fallopian tube measures 5.0 cm in length x 0.4 cm in diameter and is grossly unremarkable. The right ovary is yellowish pink and lobulated, measures 2.0 x 1.5 x 0.8 cm and is grossly unremarkable.    Representative sections are submitted as follows: A1 - anterior cervix, longitudinal section; A2 - posterior cervix, longitudinal section; A3-A7 - full thickness anterior endomyometrium, from lower to upper, including bladder flap and possible tumor nodules; A8-A9 - full thickness posterior endomyometrium including fibroid; A10 - left parametrial soft tissue; A11 - right parametrial soft tissue; A12 - sections of left fallopian tube; A13 - left ovary, bisected, entirely; A14-A15 - left para adnexal tissue with possible nodules; A16 - sections of right  fallopian tube; A17 - right ovary, bisected, entirely.     Specimen B is submitted in formalin labeled “Horvat, left pelvic lymph node” and consists of one piece of yellow-tan, fatty tissue measuring 3.8 x 3.1 x 0.8 cm. Dissection of the tissue reveals five lymph node candidates (0.4 to 1.5 cm in greatest dimension). The two larger candidates are submitted in cassette B1. The remaining candidates are submitted in cassette B2.     Specimen C is submitted in formalin labeled “Horvat, right pelvic lymph node” and consists of two pieces of yellow-tan, fatty tissue measuring in aggregate 2.8 x 2.3 x 0.7 cm. Dissection of the tissue reveals two lymph node candidates (0.8 and 1.3 cm in greatest dimension). Both candidates are submitted entirely in cassette C1.     Specimen D is submitted in formalin labeled “Horvat, left paracolic tumor” and consists of one pink-gray soft tissue nodule measuring 2.0 x 1.9 x 0.8 cm. The nodule is bisected and submitted entirely in cassette D1.     Specimen E is submitted in formalin labeled “Horvat, tumor attached to splenic flexure” and consists of one pink-gray soft tissue nodule measuring 1.8 x 1.5 x 0.7 cm. The specimen is submitted entirely in cassette E1.    Specimen F is submitted in formalin labeled “Horvat, nodule attached to small bowel” and consists of multiple pieces of pink-gray soft tissue measuring in aggregate 2.4 x 1.9 x 0.4 cm. The specimen is submitted entirely in cassette F1.    Specimen G is submitted in formalin labeled “Horvat, nodule attached to cecum” and consists of two pink-gray soft tissue nodules measuring in aggregate 0.7 x 0.6 x 0.3 cm. The specimen is submitted entirely in cassette G1.    Specimen H is submitted in formalin labeled “Horvat, nodule attached to right diaphragm” and consists of one piece of red-tan, soft tissue measuring 1.2 x 0.5 x 0.3 cm. The specimen is submitted entirely in cassette H1.    Specimen I is submitted in formalin labeled “Horvat,  infra colic, supra colic, splenocolic omentum” and consists of one piece of yellow-tan omentum measuring 21.5 x 13.7 x 3.6 cm. The specimen is sectioned to reveal multiple pink-gray nodules embedded within the omentum (0.2 to 3.8 cm in greatest dimension). Representative sections are submitted as follows: I1 - largest nodule; I2-I4 - other nodules.     Specimen J is submitted in formalin labeled “Horvat, rectosigmoid colon with tumor” and consists of an unoriented segment of colon with mesentery and overlying adhesions measuring 10.5 cm in length x 2.6 cm in diameter. The specimen is received with two stapled margins. The proximal margin is inked blue, the distal margin is inked green, the mesorectal/radial margin is inked black. The specimen is opened longitudinally to reveal a pink-tan, folded mucosa with no lesions, masses or perforations grossly identified. No definitive masses, lesions or nodules are identified within the omentum or mesorectum.   Dissection of the pericolonic tissue reveals one lymph node candidate (0.8 cm in greatest dimension).   Representative sections are submitted as follows: J1 - proximal en face margin; J2 - distal en face margin; J3 - random mesorectal/radial margin, perpendicular section; J4-J8 - random sections of colon wall with pericolonic tissue; J9 - one lymph node candidate; J10-J12 - dissected pericolonic tissue. (jq)    Paul Wade M.D./[facility] [place]        Interpretation Malignant (A)     POCT Hemocue    Collection Time: 08/13/24 11:24 AM   Result Value Ref Range    Hemocue 10.6 (A) 12.0 - 16.0 g/dl   Basic Metabolic Panel (8)    Collection Time: 08/13/24  5:26 PM   Result Value Ref Range    Glucose 166 (H) 70 - 99 mg/dL    Sodium 142 136 - 145 mmol/L    Potassium 4.4 3.5 - 5.1 mmol/L    Chloride 110 98 - 112 mmol/L    CO2 26.0 21.0 - 32.0 mmol/L    Anion Gap 6 0 - 18 mmol/L    BUN 12 9 - 23 mg/dL    Creatinine 0.74 0.55 - 1.02 mg/dL    BUN/CREA Ratio 16.2 10.0 - 20.0    Calcium, Total  8.5 (L) 8.7 - 10.4 mg/dL    Calculated Osmolality 298 (H) 275 - 295 mOsm/kg    eGFR-Cr 95 >=60 mL/min/1.73m2   CBC With Differential With Platelet    Collection Time: 08/13/24  5:26 PM   Result Value Ref Range    WBC 10.8 4.0 - 11.0 x10(3) uL    RBC 3.38 (L) 3.80 - 5.30 x10(6)uL    HGB 11.3 (L) 12.0 - 16.0 g/dL    HCT 34.8 (L) 35.0 - 48.0 %    .0 (H) 80.0 - 100.0 fL    MCH 33.4 26.0 - 34.0 pg    MCHC 32.5 31.0 - 37.0 g/dL    RDW-SD 57.5 (H) 35.1 - 46.3 fL    RDW 15.7 (H) 11.0 - 15.0 %    .0 150.0 - 450.0 10(3)uL    Neutrophil Absolute Prelim 10.03 (H) 1.50 - 7.70 x10 (3) uL    Neutrophil Absolute 10.03 (H) 1.50 - 7.70 x10(3) uL    Lymphocyte Absolute 0.30 (L) 1.00 - 4.00 x10(3) uL    Monocyte Absolute 0.41 0.10 - 1.00 x10(3) uL    Eosinophil Absolute 0.00 0.00 - 0.70 x10(3) uL    Basophil Absolute 0.01 0.00 - 0.20 x10(3) uL    Immature Granulocyte Absolute 0.03 0.00 - 1.00 x10(3) uL    Neutrophil % 93.0 %    Lymphocyte % 2.8 %    Monocyte % 3.8 %    Eosinophil % 0.0 %    Basophil % 0.1 %    Immature Granulocyte % 0.3 %   CBC With Differential With Platelet    Collection Time: 08/14/24  5:08 AM   Result Value Ref Range    WBC 8.7 4.0 - 11.0 x10(3) uL    RBC 3.12 (L) 3.80 - 5.30 x10(6)uL    HGB 10.2 (L) 12.0 - 16.0 g/dL    HCT 31.9 (L) 35.0 - 48.0 %    .2 (H) 80.0 - 100.0 fL    MCH 32.7 26.0 - 34.0 pg    MCHC 32.0 31.0 - 37.0 g/dL    RDW-SD 57.0 (H) 35.1 - 46.3 fL    RDW 15.6 (H) 11.0 - 15.0 %    .0 150.0 - 450.0 10(3)uL    Neutrophil Absolute Prelim 6.66 1.50 - 7.70 x10 (3) uL    Neutrophil Absolute 6.66 1.50 - 7.70 x10(3) uL    Lymphocyte Absolute 1.18 1.00 - 4.00 x10(3) uL    Monocyte Absolute 0.85 0.10 - 1.00 x10(3) uL    Eosinophil Absolute 0.00 0.00 - 0.70 x10(3) uL    Basophil Absolute 0.01 0.00 - 0.20 x10(3) uL    Immature Granulocyte Absolute 0.03 0.00 - 1.00 x10(3) uL    Neutrophil % 76.4 %    Lymphocyte % 13.5 %    Monocyte % 9.7 %    Eosinophil % 0.0 %    Basophil % 0.1 %     Immature Granulocyte % 0.3 %   Basic Metabolic Panel (8)    Collection Time: 08/14/24  5:09 AM   Result Value Ref Range    Glucose 121 (H) 70 - 99 mg/dL    Sodium 143 136 - 145 mmol/L    Potassium 4.5 3.5 - 5.1 mmol/L    Chloride 110 98 - 112 mmol/L    CO2 27.0 21.0 - 32.0 mmol/L    Anion Gap 6 0 - 18 mmol/L    BUN 13 9 - 23 mg/dL    Creatinine 0.73 0.55 - 1.02 mg/dL    BUN/CREA Ratio 17.8 10.0 - 20.0    Calcium, Total 8.8 8.7 - 10.4 mg/dL    Calculated Osmolality 297 (H) 275 - 295 mOsm/kg    eGFR-Cr 97 >=60 mL/min/1.73m2   Basic Metabolic Panel (8)    Collection Time: 08/15/24  5:40 AM   Result Value Ref Range    Glucose 91 70 - 99 mg/dL    Sodium 144 136 - 145 mmol/L    Potassium 3.8 3.5 - 5.1 mmol/L    Chloride 110 98 - 112 mmol/L    CO2 28.0 21.0 - 32.0 mmol/L    Anion Gap 6 0 - 18 mmol/L    BUN 15 9 - 23 mg/dL    Creatinine 0.70 0.55 - 1.02 mg/dL    BUN/CREA Ratio 21.4 (H) 10.0 - 20.0    Calcium, Total 8.7 8.7 - 10.4 mg/dL    Calculated Osmolality 298 (H) 275 - 295 mOsm/kg    eGFR-Cr 102 >=60 mL/min/1.73m2   CBC With Differential With Platelet    Collection Time: 08/15/24  5:40 AM   Result Value Ref Range    WBC 8.2 4.0 - 11.0 x10(3) uL    RBC 2.78 (L) 3.80 - 5.30 x10(6)uL    HGB 9.3 (L) 12.0 - 16.0 g/dL    HCT 28.9 (L) 35.0 - 48.0 %    .0 (H) 80.0 - 100.0 fL    MCH 33.5 26.0 - 34.0 pg    MCHC 32.2 31.0 - 37.0 g/dL    RDW-SD 57.8 (H) 35.1 - 46.3 fL    RDW 15.7 (H) 11.0 - 15.0 %    .0 150.0 - 450.0 10(3)uL    Neutrophil Absolute Prelim 4.90 1.50 - 7.70 x10 (3) uL    Neutrophil Absolute 4.90 1.50 - 7.70 x10(3) uL    Lymphocyte Absolute 2.44 1.00 - 4.00 x10(3) uL    Monocyte Absolute 0.80 0.10 - 1.00 x10(3) uL    Eosinophil Absolute 0.01 0.00 - 0.70 x10(3) uL    Basophil Absolute 0.03 0.00 - 0.20 x10(3) uL    Immature Granulocyte Absolute 0.02 0.00 - 1.00 x10(3) uL    Neutrophil % 59.7 %    Lymphocyte % 29.8 %    Monocyte % 9.8 %    Eosinophil % 0.1 %    Basophil % 0.4 %    Immature Granulocyte % 0.2  %   Basic Metabolic Panel (8)    Collection Time: 08/16/24  4:39 AM   Result Value Ref Range    Glucose 79 70 - 99 mg/dL    Sodium 147 (H) 136 - 145 mmol/L    Potassium 3.8 3.5 - 5.1 mmol/L    Chloride 113 (H) 98 - 112 mmol/L    CO2 28.0 21.0 - 32.0 mmol/L    Anion Gap 6 0 - 18 mmol/L    BUN 15 9 - 23 mg/dL    Creatinine 0.58 0.55 - 1.02 mg/dL    BUN/CREA Ratio 25.9 (H) 10.0 - 20.0    Calcium, Total 8.8 8.7 - 10.4 mg/dL    Calculated Osmolality 304 (H) 275 - 295 mOsm/kg    eGFR-Cr 107 >=60 mL/min/1.73m2   CBC With Differential With Platelet    Collection Time: 08/16/24  4:39 AM   Result Value Ref Range    WBC 4.3 4.0 - 11.0 x10(3) uL    RBC 2.47 (L) 3.80 - 5.30 x10(6)uL    HGB 8.2 (L) 12.0 - 16.0 g/dL    HCT 26.7 (L) 35.0 - 48.0 %    .1 (H) 80.0 - 100.0 fL    MCH 33.2 26.0 - 34.0 pg    MCHC 30.7 (L) 31.0 - 37.0 g/dL    RDW-SD 58.1 (H) 35.1 - 46.3 fL    RDW 15.3 (H) 11.0 - 15.0 %    .0 150.0 - 450.0 10(3)uL    Neutrophil Absolute Prelim 2.47 1.50 - 7.70 x10 (3) uL    Neutrophil Absolute 2.47 1.50 - 7.70 x10(3) uL    Lymphocyte Absolute 1.38 1.00 - 4.00 x10(3) uL    Monocyte Absolute 0.43 0.10 - 1.00 x10(3) uL    Eosinophil Absolute 0.04 0.00 - 0.70 x10(3) uL    Basophil Absolute 0.01 0.00 - 0.20 x10(3) uL    Immature Granulocyte Absolute 0.01 0.00 - 1.00 x10(3) uL    Neutrophil % 57.0 %    Lymphocyte % 31.8 %    Monocyte % 9.9 %    Eosinophil % 0.9 %    Basophil % 0.2 %    Immature Granulocyte % 0.2 %       Labs reviewed - 8/14/24    Physical examination:    /69 (BP Location: Right arm)   Pulse 57   Temp 97.7 °F (36.5 °C) (Oral)   Resp 18   Ht 5' 7\" (1.702 m)   Wt 173 lb 6.4 oz (78.7 kg)   LMP 12/22/2020   SpO2 100%   BMI 27.16 kg/m²     GENERAL: alert and oriented, cooperative, in no acute distress  ABDOMEN:  soft, expected tenderness, non-distended  INCISION/SURGICAL WOUNDS: dressing intact and in place  PELVIC: Pelvic exam: examination not indicated.    ASSESSMENT:  Dx: ovarian  cancer    S/p Exploratory laparotomy, total abdominal hysterectomy, bilateral salpingo-oophorectomy, bilateral ureterolysis, bilateral pelvic lymph node dissection, tumor debulking of various peritoneal surfaces, argon beam coagulation of peritoneal tumor nodules, infracolic-supracolic omentectomy, and resection of gastrosplenic ligament with tumor attached to tip of spleen, mobilization of splenic flexure, repair of transverse colon mesentery, rectosigmoid colon resection with end-to-end anastomosis on 8/13/24.         B/L LUCHO with low-moderate output    WBC and Cr WNL    Hgb 8.2, no sign of active bleeding    Vitals normal    PLAN:    LMWH    Record UOP    Continue IVF    Pain control - dilaudid, Toradol PRN    Continue NPO, ok for a few ice chips, plan for CLD in AM    Encouraged ambulation    Recheck labs in AM    PT/OT/SW    The patient verbalized good understanding of this.  I will keep you informed of her progress.  Thank you for allowing me to participate in the care of this patient.    KOREY Roberto  08/16/24

## 2024-08-16 NOTE — CM/SW NOTE
08/16/24 1500   CM/SW Referral Data   Referral Source Physician   Reason for Referral Discharge planning   Informant Patient   Medical Hx   Does patient have an established PCP? Yes   Patient Info   Patient's Current Mental Status at Time of Assessment Alert;Oriented   Patient's Home Environment House   Patient lives with Alone   Patient Status Prior to Admission   Independent with ADLs and Mobility Yes   Discharge Needs   Anticipated D/C needs Home health care;To be determined     Anticipated therapy need: Home with Home Healthcare    CM called pt on room phone. Discussed therapy recommendation for HH on dc.   Pt requesting CM/SW to follow up with her on dc day for her decision to have it or not.    HH ref pending in aidin.    Massiel House RN, BSN  Nurse   [phone]

## 2024-08-16 NOTE — PHYSICAL THERAPY NOTE
PHYSICAL THERAPY TREATMENT NOTE - INPATIENT     Room Number: 440/440-A       Presenting Problem: Ovarian cancer, exp lap lymph disection  Co-Morbidities : Hx of HPV, anxiety, depression    Problem List  Active Problems:    Ovarian cancer (HCC)      PHYSICAL THERAPY ASSESSMENT   Patient demonstrates excellent progress this session, goals   met .      Patient is functioning at MOD I level    Patient would be DC'd from therapy at this level of care     PLAN  DC PT    SUBJECTIVE  \"I just want to go home\"    OBJECTIVE  Precautions: Abdominal protective strategies;Drain(s);NG suction        PAIN ASSESSMENT   Ratin  Location: n/a      BALANCE  Static Sitting: Fair +  Dynamic Sitting: Fair  Static Standing: Fair -  Dynamic Standing: Fair -    ACTIVITY TOLERANCE  Tolerated activity well without adverse response.          AM-PAC '6-Clicks' INPATIENT SHORT FORM - BASIC MOBILITY  How much difficulty does the patient currently have...  Patient Difficulty: Turning over in bed (including adjusting bedclothes, sheets and blankets)?: None   Patient Difficulty: Sitting down on and standing up from a chair with arms (e.g., wheelchair, bedside commode, etc.): None   Patient Difficulty: Moving from lying on back to sitting on the side of the bed?: None   How much help from another person does the patient currently need...   Help from Another: Moving to and from a bed to a chair (including a wheelchair)?: None   Help from Another: Need to walk in hospital room?: None   Help from Another: Climbing 3-5 steps with a railing?: None     AM-PAC Score:  Raw Score: 24   Approx Degree of Impairment: 0%   Standardized Score (AM-PAC Scale): 61.14   CMS Modifier (G-Code): CH    FUNCTIONAL ABILITY STATUS  Functional Mobility/Gait Assessment  Gait Assistance: Modified independent  Distance (ft): 150, 250  Assistive Device: Rolling walker;None  Pattern:  (slow, guarded)  Stairs: Stairs  How Many Stairs: 4  Device: 1 Rail  Assist: Modified  independent  Pattern: Ascend and Descend  Ascend and Descend : Reciprocal    Sit to Stand: modified independent    Skilled Therapy Provided: Pt greeted in chair. +IV. Agreeable to participate in therapy. Denies pain. Reports walking through stay longer distances and tolerating well. No issues with bed mobility. Pt educated in abdominal splinting with pillow for cough. Ambulates with RW MOD I and without AD MOD I in session. Negotiates 4 steps MOD I with 1 rail. Left sitting in chair at end of session with all needs within reach. Educated on use of RW for energy conservation and when more fatigued.     The patient's Approx Degree of Impairment: 0% has been calculated based on documentation in the [facility] '6 clicks' Inpatient Daily Activity Short Form.  Research supports that patients with this level of impairment may benefit from HOME. However pt would benefit from  services. She will also need RW for home (RN aware).   Final disposition will be made by interdisciplinary medical team.      Patient End of Session: Up in chair;Needs met;Call light within reach;RN aware of session/findings;All patient questions and concerns addressed    CURRENT GOALS   Goals to be met by: 8/25/2024  Patient Goal Patient's self-stated goal is: Return home    Goal #1 Patient is able to demonstrate supine - sit EOB @ level: independent      Goal #1   Current Status  MET PER PATIENT REPORT   Goal #2 Patient is able to demonstrate transfers Sit to/from Stand at assistance level: modified independent with walker - rolling      Goal #2  Current Status  MET    Goal #3 Patient is able to ambulate 300 feet with assist device: none at assistance level: modified independent   Goal #3   Current Status NEARLY MET, 250 FT WITH NO AD   Goal #4 Patient will negotiate 5 stairs/one curb w/ assistive device and supervision   Goal #4   Current Status  NEARLY MET 4 STEPS WITH 1 RAIL   Goal #5 Patient to demonstrate independence with home activity/exercise  instructions provided to patient in preparation for discharge.   Goal #5   Current Status  MET   Goal #6     Goal #6  Current Status       Gait Training: 15 minutes

## 2024-08-16 NOTE — PAYOR COMM NOTE
--------------  CONTINUED STAY REVIEW    Payor: [employer]  Subscriber #:  [id number]  Authorization Number: [id number]    Admit date: 8/13/24  Admit time:  5:41 AM    REVIEW DOCUMENTATION:      8/14 IM       CHIEF COMPLAINT   Ovarian cancer s/p hysterectomy      SUBJECTIVE  24 HR EVENTS   NGT discomfort. Belching a lot  No gas or BM from rectum  Pain controlled on dilaudid PCA     INPATIENT MEDICATIONS  Scheduled Medications:    Scheduled Meds   pantoprazole, 40 mg, QAM AC   Or  pantoprazole, 40 mg, QAM AC  enoxaparin, 40 mg, Daily  cefTRIAXone, 1 g, Q24H  metRONIDAZOLE, 500 mg, Q12H        Drips:     IV Meds   lactated ringers  lactated ringers, Last Rate: 100 mL/hr at 08/14/24 0523  sodium chloride  HYDROmorphone in sodium chloride 0.9%         PRN Medications:     PRN Meds   phenol, , Q2H PRN  ondansetron, 4 mg, Q8H PRN   Or  ondansetron, 4 mg, Q8H PRN  diphenhydrAMINE, 12.5 mg, Q4H PRN  HYDROmorphone, 0.4 mg, Q30 Min PRN  naloxone, 0.08 mg, Q5 Min PRN  diphenhydrAMINE, 12.5 mg, Q4H PRN  ondansetron, 4 mg, Q6H PRN  ketorolac, 15 mg, Q6H PRN          PHYSICAL EXAMINATION   Vitals:       /60 (BP Location: Right arm)   Pulse 69   Temp 97.4 °F (36.3 °C) (Oral)   Resp 18   Ht 5' 7\" (1.702 m)   Wt 173 lb 6.4 oz (78.7 kg)   LMP 12/22/2020   SpO2 100%   BMI 27.16 kg/m²   Gen:NAD, well nourished, NGT  Eyes:PERRLA, normal conjunctivae  ENMT:Moist mucous membranes, trachea midline  CV:RRR, no M/R/G, no peripheral edema  Resp:CTAB, non-labored respirations, symmetric expansion  GI:Soft, NT, ND, + BS, inc dressed, abd binder in place  MSK:        No C/C/E, normal active/passive ROM in C-spine  Skin:         No rashes, visible skin w/o worrisome lesions   Neuro:      CN 2-12 grossly intact, sensation intact   Psych:      A&Ox3, appropriate mood, conversant w/ linear thought process      DATA REVIEW: LABORATORY VALUES & DIAGNOSTICS       Recent Labs   Lab 08/13/24  1726 08/14/24  0509    143   K 4.4 4.5     110   CO2 26.0 27.0   BUN 12 13   CREATSERUM 0.74 0.73   ANIONGAP 6 6   * 121*   CA 8.5* 8.8   EGFRCR 95 97           Recent Labs   Lab 08/13/24  1726 08/14/24  0508   WBC 10.8 8.7   HGB 11.3* 10.2*   HCT 34.8* 31.9*   .0 192.0   .0* 102.2*   MOPERCENT 3.8 9.7   EOPERCENT 0.0 0.0   BAPERCENT 0.1 0.1      ASSESSMENT & PLAN   Yue Malin - 54 year old female w adenocarcinoma admitted 8/13/2024 for post op care after planned CHELSIE-BSO, colon resection for her gynecologic adenoca w Dr. Choudhary     Gynecologic adenocarcinoma w peritoneal carcinomatosis  S/p chemo  Now s/p CHELSIE-BSO, rectosigmoid colon resection w end-end anastomosis due to tumor involvement  - Post-op care per Dr. Choudhary's team  - NGT  - Trend labs  - Dilaudid PCA for now  - IVF until tolerating PO  - Yao per Gyne/onc  - Up, OOB, IS use, ambulate  - CLD, ADAT  - Add IV valium for NGT discomfort/anxiety     Anemia, post-op - 2/2 expected blood loss w/ dilutional component from IVF   - No active external bleeding noted  - Daily CBC     Anxiety (ACOSTA)  - Stable, continue PTA ativan PRN as able  - Add IV valium for NGT discomfort/anxiety     ACP: Full code  Ppx: DVT: Enox  Dispo  EDoD:  TBD. Suspect 3-5 days  F/u:   - PCP:  Diana Black MD  - Onc  - Gyne/onc            8/15 IM    CHIEF COMPLAINT   Ovarian cancer s/p hysterectomy      SUBJECTIVE  24 HR EVENTS   NGT & yao out.  Nausea without vomiting last night. No gas or BM from rectum. belching     Dilaudid PCA stopped. Switch to IV dilaudid PRN & toradol     INPATIENT MEDICATIONS  Scheduled Medications:    Scheduled Meds   pantoprazole, 40 mg, QAM AC   Or  pantoprazole, 40 mg, QAM AC  enoxaparin, 40 mg, Daily        Drips:     IV Meds   lactated ringers, Last Rate: 100 mL/hr at 08/14/24 1616         PRN Medications:     PRN Meds   HYDROmorphone, 0.2 mg, Q2H PRN   Or  HYDROmorphone, 0.4 mg, Q2H PRN   Or  HYDROmorphone, 0.8 mg, Q2H PRN  ketorolac, 15 mg, Q6H PRN  phenol, , Q2H  PRN  diazepam, 5 mg, Q6H PRN  ondansetron, 4 mg, Q8H PRN   Or  ondansetron, 4 mg, Q8H PRN  diphenhydrAMINE, 12.5 mg, Q4H PRN          PHYSICAL EXAMINATION   Vitals:       /58 (BP Location: Right arm)   Pulse 66   Temp 97.8 °F (36.6 °C) (Oral)   Resp 18   Ht 5' 7\" (1.702 m)   Wt 173 lb 6.4 oz (78.7 kg)   LMP 12/22/2020   SpO2 98%   BMI 27.16 kg/m²   Gen:NAD, well nourished, NGT  Eyes:PERRLA, normal conjunctivae  ENMT:Moist mucous membranes, trachea midline  CV:RRR, no M/R/G, no peripheral edema  Resp:CTAB, non-labored respirations, symmetric expansion  GI:Soft, NT, ND, + BS, inc dressed, abd binder in place  MSK:        No C/C/E, normal active/passive ROM in C-spine  Skin:         No rashes, visible skin w/o worrisome lesions   Neuro:      CN 2-12 grossly intact, sensation intact   Psych:      A&Ox3, appropriate mood, conversant w/ linear thought process      DATA REVIEW: LABORATORY VALUES & DIAGNOSTICS        Recent Labs   Lab 08/13/24  1726 08/14/24  0509 08/15/24  0540    143 144   K 4.4 4.5 3.8    110 110   CO2 26.0 27.0 28.0   BUN 12 13 15   CREATSERUM 0.74 0.73 0.70   ANIONGAP 6 6 6   * 121* 91   CA 8.5* 8.8 8.7   EGFRCR 95 97 102            Recent Labs   Lab 08/13/24  1726 08/14/24  0508 08/15/24  0540   WBC 10.8 8.7 8.2   HGB 11.3* 10.2* 9.3*   HCT 34.8* 31.9* 28.9*   .0 192.0 176.0   .0* 102.2* 104.0*   MOPERCENT 3.8 9.7 9.8   EOPERCENT 0.0 0.0 0.1   BAPERCENT 0.1 0.1 0.4      ASSESSMENT & PLAN   Yue Malin - 54 year old female w adenocarcinoma admitted 8/13/2024 for post op care after planned CHELSIE-BSO, colon resection for her gynecologic adenoca w Dr. Choudhary     Gynecologic adenocarcinoma w peritoneal carcinomatosis  S/p chemo  Now s/p CHELSIE-BSO, rectosigmoid colon resection w end-end anastomosis due to tumor involvement  - Post-op care per Dr. Choudhary's team. NGT & yao removed.  - Trend labs  - Dilaudid PCA stopped. Switch to IV dilaudid PRN & toradol. Avoid  narcotics if not needed  - IVF until tolerating PO  - Up, OOB, IS use, ambulate  - Add IV valium for NGT discomfort/anxiety     Anemia, post-op - 2/2 expected blood loss w/ dilutional component from IVF   - No active external bleeding noted  - Daily CBC     Anxiety (ACOSTA)  - Stable, continue PTA ativan PRN as able  - Add IV valium for NGT discomfort/anxiety     ACP: Full code  Ppx: DVT: Enox  Dispo  EDoD:  TBD. Suspect 2-3 days  F/u:   - PCP:  Diana Black MD  - Onc  - Gyne/onc       MEDICATIONS ADMINISTERED IN LAST 1 DAY:  enoxaparin (Lovenox) 40 MG/0.4ML SUBQ injection 40 mg       Date Action Dose Route User    8/16/2024 1000 Given 40 mg Subcutaneous (Right Upper Arm) Patricia Rosales RN          ketorolac (Toradol) 15 MG/ML injection 15 mg       Date Action Dose Route User    8/15/2024 1655 Given 15 mg Intravenous Patricia Rosales RN          lactated ringers infusion       Date Action Dose Route User    8/16/2024 1105 New Bag (none) Intravenous Lakeshia Rojas RN    8/16/2024 0130 New Bag (none) Intravenous Goldberg, Rachel, RN    8/15/2024 1554 New Bag (none) Intravenous Wendi Lopez RN          LORazepam (Ativan) tab 1 mg       Date Action Dose Route User    8/15/2024 2041 Given 1 mg Oral Goldberg, Rachel, RN          pantoprazole (Protonix) DR tab 40 mg       Date Action Dose Route User    8/16/2024 0433 Given 40 mg Oral Goldberg, Rachel, RN            metRONIDAZOLE in sodium chloride 0.79% (Flagyl) 5 mg/mL IVPB premix 500 mg  Dose: 500 mg  Freq: Every 12 hours Route: IV  Last Dose: 500 mg (08/15/24 0845)  Start: 08/13/24 2000 End: 08/15/24 0945   Order specific questions:          2244 MF-New Bag      0838 KW-New Bag     1320 KW-Stopped     2121 MW-New Bag      0845 BG-New Bag         metRONIDAZOLE in sodium chloride 0.79% (Flagyl) 5 mg/mL IVPB premix 500 mg  Dose: 500 mg  Freq: Once Route: IV  Start: 08/13/24 0715 End: 08/13/24 0854   Order specific questions:          0754 CK-Given             pantoprazole (Protonix) 40 mg in sodium chloride 0.9% PF 10 mL IV push  Dose: 40 mg  Freq: Every morning before breakfast Route: IV  Start: 08/14/24 0700   Admin Instructions:   Dilute with 10 ml NS; IV push over 2 minutes        0523 MF-Given      0540 MW-Given                             Medications 08/10/24 08/11/24 08/12/24 08/13/24 08/14/24 08/15/24 08/16/24   HYDROmorphone in sodium chloride 0.9% (Dilaudid) 20 mg/100mL PCA premix  Freq: Continuous Route: IV  Start: 08/13/24 1345 End: 08/15/24 0728   Admin Instructions:   (Conc = 0.2 mg/mL) Administer via CADD Pump.   Order specific questions:          1411 CO/AT-New Bag     1544 WM/CO-Handoff      1945 MW/KW-Handoff      0728-D/C'd  0748 BG/MW-Handoff         lactated ringers infusion  Rate: 100 mL/hr  Freq: Continuous Route: IV  Start: 08/13/24 1545       1557 WM-Restarted [C]      0523 MF-New Bag     1616 KW-New Bag      1554 LUCHO-New Bag      0130 RG-New Bag     1105 CW-New Bag        lactated ringers infusion  Rate: 100 mL/hr  Freq: Continuous Route: IV  Start: 08/13/24 1230 End: 08/13/24 1540       (1230 WM)-Not Given     1540-D/C'd         lactated ringers infusion  Rate: 20 mL/hr  Freq: Continuous Route: IV  Start: 08/13/24 0545 End: 08/15/24 0810   Admin Instructions:   TKO  If patient has renal failure, use 0.9% NaCl infusion.       0734 CK-New Bag     1207 JK-New Bag       0810-D/C'd       sodium chloride 0.9% infusion  Rate: 10 mL/hr  Freq: Continuous Route: IV  Start: 08/13/24 1345 End: 08/15/24 0728       (1345 CL)-Not Given [C]       0728-D/C'd           fentaNYL (Sublimaze) 50 mcg/mL injection 25 mcg  Dose: 25 mcg  Freq: Every 5 min PRN Route: IV  PRN Comment: for pain score 1-6, up to maximum dose of 100 mcg  Start: 08/13/24 1217 End: 08/13/24 1540   Admin Instructions:   For PACU/Phase II Use ONLY.  If no relief after a maximum of 100mcg, proceed to Dilaudid       1352 CO-Given     1357 CO-Given     1407 CO-Given     1441 CO-Given     1540-D/C'd                        ketorolac (Toradol) 15 MG/ML injection 15 mg  Dose: 15 mg  Freq: Every 6 hours PRN Route: IV  PRN Reason: mild pain  Start: 08/15/24 0728 End: 08/17/24 0727         1003 BG-Given     1655 BG-Given         ketorolac (Toradol) 15 MG/ML injection 15 mg  Dose: 15 mg  Freq: Every 6 hours PRN Route: IV  PRN Reason: moderate pain  Start: 08/13/24 1334 End: 08/15/24 0728       1443 MT-Given      0454 MF-Given      0728-D/C'd             ondansetron (Zofran) 4 MG/2ML injection 4 mg  Dose: 4 mg  Freq: Every 6 hours PRN Route: IV  PRN Reasons: Nausea,vomiting  Start: 08/13/24 1334 End: 08/15/24 0728       1555 WM-Given       0144 MW-Given     0728-D/C'd            Vitals (last day)       Date/Time Temp Pulse Resp BP SpO2 Weight O2 Device O2 Flow Rate (L/min) Who    08/16/24 0427 97.7 °F (36.5 °C) 57 18 119/69 100 % -- None (Room air) -- RG    08/15/24 1943 98.2 °F (36.8 °C) 73 16 133/71 98 % -- None (Room air) -- CB    08/15/24 1503 97 °F (36.1 °C) 70 18 127/55 100 % -- None (Room air) -- DR    08/15/24 0900 -- 66 18 -- -- -- -- -- DF    08/15/24 0719 97.8 °F (36.6 °C) 65 18 118/58 98 % -- None (Room air) -- DR    08/15/24 0521 98.1 °F (36.7 °C) 66 18 109/58 96 % -- None (Room air) -- CB    08/15/24 0010 98.7 °F (37.1 °C) 66 18 104/59 98 % -- None (Room air) -- CB

## 2024-08-16 NOTE — PROGRESS NOTES
[facility]   INTERNAL MEDICINE PROGRESS NOTE    CHIEF COMPLAINT   Ovarian cancer s/p hysterectomy     SUBJECTIVE  24 HR EVENTS   NGT & yao out.  Anxiety this AM  Sodium elevated    Nausea without vomiting last night. No gas or BM from rectum. belching    Dilaudid PCA stopped. Switch to IV dilaudid PRN & toradol    INPATIENT MEDICATIONS  Scheduled Medications:  pantoprazole, 40 mg, QAM AC   Or  pantoprazole, 40 mg, QAM AC  enoxaparin, 40 mg, Daily     Drips:   lactated ringers, Last Rate: 100 mL/hr at 08/16/24 0130       PRN Medications:   LORazepam, 1 mg, Q6H PRN  HYDROmorphone, 0.2 mg, Q2H PRN   Or  HYDROmorphone, 0.4 mg, Q2H PRN   Or  HYDROmorphone, 0.8 mg, Q2H PRN  ketorolac, 15 mg, Q6H PRN  phenol, , Q2H PRN  diazepam, 5 mg, Q6H PRN  ondansetron, 4 mg, Q8H PRN   Or  ondansetron, 4 mg, Q8H PRN  diphenhydrAMINE, 12.5 mg, Q4H PRN       PHYSICAL EXAMINATION   Vitals: /69 (BP Location: Right arm)   Pulse 57   Temp 97.7 °F (36.5 °C) (Oral)   Resp 18   Ht 5' 7\" (1.702 m)   Wt 173 lb 6.4 oz (78.7 kg)   LMP 12/22/2020   SpO2 100%   BMI 27.16 kg/m²   Gen: NAD, well nourished, NGT  Eyes: PERRLA, normal conjunctivae  ENMT: Moist mucous membranes, trachea midline  CV: RRR, no M/R/G, no peripheral edema  Resp: CTAB, non-labored respirations, symmetric expansion  GI: Soft, NT, ND, + BS, inc dressed, abd binder in place  MSK:  No C/C/E, normal active/passive ROM in C-spine  Skin: No rashes, visible skin w/o worrisome lesions   Neuro: CN 2-12 grossly intact, sensation intact   Psych: A&Ox3, appropriate mood, conversant w/ linear thought process     DATA REVIEW: LABORATORY VALUES & DIAGNOSTICS  Recent Labs   Lab 08/13/24  1726 08/14/24  0509 08/15/24  0540 08/16/24  0439    143 144 147*   K 4.4 4.5 3.8 3.8    110 110 113*   CO2 26.0 27.0 28.0 28.0   BUN 12 13 15 15   CREATSERUM 0.74 0.73 0.70 0.58   ANIONGAP 6 6 6 6   * 121* 91 79   CA 8.5* 8.8 8.7 8.8   EGFRCR 95 97 102 107      Recent Labs   Lab 08/13/24  1726 08/14/24  0508 08/15/24  0540 08/16/24  0439   WBC 10.8 8.7 8.2 4.3   HGB 11.3* 10.2* 9.3* 8.2*   HCT 34.8* 31.9* 28.9* 26.7*   .0 192.0 176.0 170.0   .0* 102.2* 104.0* 108.1*   MOPERCENT 3.8 9.7 9.8 9.9   EOPERCENT 0.0 0.0 0.1 0.9   BAPERCENT 0.1 0.1 0.4 0.2     ASSESSMENT & PLAN   Yue Malin - 54 year old female w adenocarcinoma admitted 8/13/2024 for post op care after planned CHELSIE-BSO, colon resection for her gynecologic adenoca w Dr. Choudhary    Gynecologic adenocarcinoma w peritoneal carcinomatosis  S/p chemo  Now s/p CHELSIE-BSO, rectosigmoid colon resection w end-end anastomosis due to tumor involvement  - Post-op care per Dr. Choudhary's team. NGT & yao removed.  - Trend labs  - Dilaudid PCA stopped. Switch to IV dilaudid PRN & toradol. Avoid narcotics if not needed  - IVF until tolerating PO  - Up, OOB, IS use, ambulate  - Add IV valium for NGT discomfort/anxiety    Hypernatremia  - 2/2 NPO & IVF  - Switch IVF to D5  [  ]  trend BMP    Anemia, post-op - 2/2 expected blood loss w/ dilutional component from IVF   - No active external bleeding noted  - Daily CBC    Anxiety (ACOSTA)  - Stable, continue PTA ativan PRN as able  - Add IV valium for NGT discomfort/anxiety    ACP: Full code  Ppx: DVT: Enox  Dispo  EDoD:  TBD. Suspect 2-3 days  F/u:   - PCP:  Diana Black MD  - Onc  - Gyne/onc    Available via epic secure chat or perfect serve 7A - 7P.    Steven Hou MD   Internal Medicine - Hospitalist  [facility]

## 2024-08-16 NOTE — PLAN OF CARE
Patient alert, oriented, up walking in hallway, no report of passing gas at this time, continue NPO with small amount of ice chips, IV fluids changed to D5% at 83 ml/hr, no c/o pain or nausea, family at bedside.  Problem: Patient Centered Care  Goal: Patient preferences are identified and integrated in the patient's plan of care  Description: Interventions:  - What would you like us to know as we care for you?   - Provide timely, complete, and accurate information to patient/family  - Incorporate patient and family knowledge, values, beliefs, and cultural backgrounds into the planning and delivery of care  - Encourage patient/family to participate in care and decision-making at the level they choose  - Honor patient and family perspectives and choices  Outcome: Progressing     Problem: Patient/Family Goals  Goal: Patient/Family Long Term Goal  Description: Patient's Long Term Goal:     Interventions:  - See additional Care Plan goals for specific interventions  Outcome: Progressing  Goal: Patient/Family Short Term Goal  Description: Patient's Short Term Goal:    Interventions:   - See additional Care Plan goals for specific interventions  Outcome: Progressing     Problem: PAIN - ADULT  Goal: Verbalizes/displays adequate comfort level or patient's stated pain goal  Description: INTERVENTIONS:  - Encourage pt to monitor pain and request assistance  - Assess pain using appropriate pain scale  - Administer analgesics based on type and severity of pain and evaluate response  - Implement non-pharmacological measures as appropriate and evaluate response  - Consider cultural and social influences on pain and pain management  - Manage/alleviate anxiety  - Utilize distraction and/or relaxation techniques  - Monitor for opioid side effects  - Notify MD/LIP if interventions unsuccessful or patient reports new pain  - Anticipate increased pain with activity and pre-medicate as appropriate  Outcome: Progressing

## 2024-08-16 NOTE — PLAN OF CARE
Yue is AxO4. No acute events overnight. Abdominal incision site is clean/dry/intact. x2 LUCHO drains in place with serosanguinous output.  Pt c/o having a small amount of vaginal bleeding.  C/o mild pain, declined pain intervention. NPO. Tolerating ice chips. Denies nausea/vomiting overnight. Denies passing gas. + Belching. Voiding freely. Ambulates, 1assist/standby. Ativan provided for anxiety. IVF infusing. Lovenox for DVT prophylaxis. Remote tele in place. Daughter at bedside, updated on plan of care. Going to discharge home pending medical clearance. Appropriate safety measures maintained.         Problem: Patient Centered Care  Goal: Patient preferences are identified and integrated in the patient's plan of care  Description: Interventions:  - What would you like us to know as we care for you?   - Provide timely, complete, and accurate information to patient/family  - Incorporate patient and family knowledge, values, beliefs, and cultural backgrounds into the planning and delivery of care  - Encourage patient/family to participate in care and decision-making at the level they choose  - Honor patient and family perspectives and choices  Outcome: Progressing     Problem: Patient/Family Goals  Goal: Patient/Family Long Term Goal  Description: Patient's Long Term Goal:     Interventions:  -   - See additional Care Plan goals for specific interventions  Outcome: Progressing  Goal: Patient/Family Short Term Goal  Description: Patient's Short Term Goal:     Interventions:   -   - See additional Care Plan goals for specific interventions  Outcome: Progressing     Problem: PAIN - ADULT  Goal: Verbalizes/displays adequate comfort level or patient's stated pain goal  Description: INTERVENTIONS:  - Encourage pt to monitor pain and request assistance  - Assess pain using appropriate pain scale  - Administer analgesics based on type and severity of pain and evaluate response  - Implement non-pharmacological measures as  appropriate and evaluate response  - Consider cultural and social influences on pain and pain management  - Manage/alleviate anxiety  - Utilize distraction and/or relaxation techniques  - Monitor for opioid side effects  - Notify MD/LIP if interventions unsuccessful or patient reports new pain  - Anticipate increased pain with activity and pre-medicate as appropriate  Outcome: Progressing     Problem: SAFETY ADULT - FALL  Goal: Free from fall injury  Description: INTERVENTIONS:  - Assess pt frequently for physical needs  - Identify cognitive and physical deficits and behaviors that affect risk of falls.  - [place] fall precautions as indicated by assessment.  - Educate pt/family on patient safety including physical limitations  - Instruct pt to call for assistance with activity based on assessment  - Modify environment to reduce risk of injury  - Provide assistive devices as appropriate  - Consider OT/PT consult to assist with strengthening/mobility  - Encourage toileting schedule  Outcome: Progressing     Problem: GASTROINTESTINAL - ADULT  Goal: Minimal or absence of nausea and vomiting  Description: INTERVENTIONS:  - Maintain adequate hydration with IV or PO as ordered and tolerated  - Nasogastric tube to low intermittent suction as ordered  - Evaluate effectiveness of ordered antiemetic medications  - Provide nonpharmacologic comfort measures as appropriate  - Advance diet as tolerated, if ordered  - Obtain nutritional consult as needed  - Evaluate fluid balance  Outcome: Progressing  Goal: Maintains or returns to baseline bowel function  Description: INTERVENTIONS:  - Assess bowel function  - Maintain adequate hydration with IV or PO as ordered and tolerated  - Evaluate effectiveness of GI medications  - Encourage mobilization and activity  - Obtain nutritional consult as needed  - Establish a toileting routine/schedule  - Consider collaborating with pharmacy to review patient's medication profile  Outcome:  Progressing     Problem: GENITOURINARY - ADULT  Goal: Absence of urinary retention  Description: INTERVENTIONS:  - Assess patient’s ability to void and empty bladder  - Monitor intake/output and perform bladder scan as needed  - Follow urinary retention protocol/standard of care  - Consider collaborating with pharmacy to review patient's medication profile  - Implement strategies to promote bladder emptying  Outcome: Progressing     Problem: SKIN/TISSUE INTEGRITY - ADULT  Goal: Incision(s), wounds(s) or drain site(s) healing without S/S of infection  Description: INTERVENTIONS:  - Assess and document risk factors for pressure ulcer development  - Assess and document skin integrity  - Assess and document dressing/incision, wound bed, drain sites and surrounding tissue  - Implement wound care per orders  - Initiate isolation precautions as appropriate  - Initiate Pressure Ulcer prevention bundle as indicated  Outcome: Progressing     Problem: MUSCULOSKELETAL - ADULT  Goal: Return mobility to safest level of function  Description: INTERVENTIONS:  - Assess patient stability and activity tolerance for standing, transferring and ambulating w/ or w/o assistive devices  - Assist with transfers and ambulation using safe patient handling equipment as needed  - Ensure adequate protection for wounds/incisions during mobilization  - Obtain PT/OT consults as needed  - Advance activity as appropriate  - Communicate ordered activity level and limitations with patient/family  Outcome: Progressing

## 2024-08-17 LAB
ANION GAP SERPL CALC-SCNC: 7 MMOL/L (ref 0–18)
BASOPHILS # BLD AUTO: 0.01 X10(3) UL (ref 0–0.2)
BASOPHILS NFR BLD AUTO: 0.2 %
BUN BLD-MCNC: 12 MG/DL (ref 9–23)
BUN/CREAT SERPL: 21.1 (ref 10–20)
CALCIUM BLD-MCNC: 8.9 MG/DL (ref 8.7–10.4)
CHLORIDE SERPL-SCNC: 108 MMOL/L (ref 98–112)
CO2 SERPL-SCNC: 26 MMOL/L (ref 21–32)
CREAT BLD-MCNC: 0.57 MG/DL
DEPRECATED RDW RBC AUTO: 53.1 FL (ref 35.1–46.3)
EGFRCR SERPLBLD CKD-EPI 2021: 107 ML/MIN/1.73M2 (ref 60–?)
EOSINOPHIL # BLD AUTO: 0.07 X10(3) UL (ref 0–0.7)
EOSINOPHIL NFR BLD AUTO: 1.7 %
ERYTHROCYTE [DISTWIDTH] IN BLOOD BY AUTOMATED COUNT: 14.6 % (ref 11–15)
GLUCOSE BLD-MCNC: 102 MG/DL (ref 70–99)
HCT VFR BLD AUTO: 26.3 %
HGB BLD-MCNC: 8.6 G/DL
IMM GRANULOCYTES # BLD AUTO: 0.02 X10(3) UL (ref 0–1)
IMM GRANULOCYTES NFR BLD: 0.5 %
LYMPHOCYTES # BLD AUTO: 1.54 X10(3) UL (ref 1–4)
LYMPHOCYTES NFR BLD AUTO: 37.8 %
MCH RBC QN AUTO: 33.5 PG (ref 26–34)
MCHC RBC AUTO-ENTMCNC: 32.7 G/DL (ref 31–37)
MCV RBC AUTO: 102.3 FL
MONOCYTES # BLD AUTO: 0.41 X10(3) UL (ref 0.1–1)
MONOCYTES NFR BLD AUTO: 10.1 %
NEUTROPHILS # BLD AUTO: 2.02 X10 (3) UL (ref 1.5–7.7)
NEUTROPHILS # BLD AUTO: 2.02 X10(3) UL (ref 1.5–7.7)
NEUTROPHILS NFR BLD AUTO: 49.7 %
OSMOLALITY SERPL CALC.SUM OF ELEC: 292 MOSM/KG (ref 275–295)
PLATELET # BLD AUTO: 194 10(3)UL (ref 150–450)
POTASSIUM SERPL-SCNC: 3.3 MMOL/L (ref 3.5–5.1)
RBC # BLD AUTO: 2.57 X10(6)UL
SODIUM SERPL-SCNC: 141 MMOL/L (ref 136–145)
WBC # BLD AUTO: 4.1 X10(3) UL (ref 4–11)

## 2024-08-17 PROCEDURE — 80048 BASIC METABOLIC PNL TOTAL CA: CPT | Performed by: PHYSICIAN ASSISTANT

## 2024-08-17 PROCEDURE — 85025 COMPLETE CBC W/AUTO DIFF WBC: CPT | Performed by: PHYSICIAN ASSISTANT

## 2024-08-17 RX ORDER — SODIUM CHLORIDE 9 MG/ML
INJECTION, SOLUTION INTRAVENOUS CONTINUOUS
Status: DISCONTINUED | OUTPATIENT
Start: 2024-08-17 | End: 2024-08-23

## 2024-08-17 NOTE — PLAN OF CARE
Problem: Patient Centered Care  Goal: Patient preferences are identified and integrated in the patient's plan of care  Description: Interventions:  - What would you like us to know as we care for you?   - Provide timely, complete, and accurate information to patient/family  - Incorporate patient and family knowledge, values, beliefs, and cultural backgrounds into the planning and delivery of care  - Encourage patient/family to participate in care and decision-making at the level they choose  - Honor patient and family perspectives and choices  Outcome: Progressing     Problem: Patient/Family Goals  Goal: Patient/Family Long Term Goal  Description: Patient's Long Term Goal:     Interventions  -   - See additional Care Plan goals for specific interventions  Outcome: Progressing  Goal: Patient/Family Short Term Goal  Description: Patient's Short Term Goal:     Interventions:   -   - See additional Care Plan goals for specific interventions  Outcome: Progressing     Problem: PAIN - ADULT  Goal: Verbalizes/displays adequate comfort level or patient's stated pain goal  Description: INTERVENTIONS:  - Encourage pt to monitor pain and request assistance  - Assess pain using appropriate pain scale  - Administer analgesics based on type and severity of pain and evaluate response  - Implement non-pharmacological measures as appropriate and evaluate response  - Consider cultural and social influences on pain and pain management  - Manage/alleviate anxiety  - Utilize distraction and/or relaxation techniques  - Monitor for opioid side effects  - Notify MD/LIP if interventions unsuccessful or patient reports new pain  - Anticipate increased pain with activity and pre-medicate as appropriate  Outcome: Progressing     Problem: SAFETY ADULT - FALL  Goal: Free from fall injury  Description: INTERVENTIONS:  - Assess pt frequently for physical needs  - Identify cognitive and physical deficits and behaviors that affect risk of falls.  -  [place] fall precautions as indicated by assessment.  - Educate pt/family on patient safety including physical limitations  - Instruct pt to call for assistance with activity based on assessment  - Modify environment to reduce risk of injury  - Provide assistive devices as appropriate  - Consider OT/PT consult to assist with strengthening/mobility  - Encourage toileting schedule  Outcome: Progressing     Problem: GASTROINTESTINAL - ADULT  Goal: Minimal or absence of nausea and vomiting  Description: INTERVENTIONS:  - Maintain adequate hydration with IV or PO as ordered and tolerated  - Nasogastric tube to low intermittent suction as ordered  - Evaluate effectiveness of ordered antiemetic medications  - Provide nonpharmacologic comfort measures as appropriate  - Advance diet as tolerated, if ordered  - Obtain nutritional consult as needed  - Evaluate fluid balance  Outcome: Progressing  Goal: Maintains or returns to baseline bowel function  Description: INTERVENTIONS:  - Assess bowel function  - Maintain adequate hydration with IV or PO as ordered and tolerated  - Evaluate effectiveness of GI medications  - Encourage mobilization and activity  - Obtain nutritional consult as needed  - Establish a toileting routine/schedule  - Consider collaborating with pharmacy to review patient's medication profile  Outcome: Progressing     Problem: GENITOURINARY - ADULT  Goal: Absence of urinary retention  Description: INTERVENTIONS:  - Assess patient’s ability to void and empty bladder  - Monitor intake/output and perform bladder scan as needed  - Follow urinary retention protocol/standard of care  - Consider collaborating with pharmacy to review patient's medication profile  - Implement strategies to promote bladder emptying  Outcome: Progressing     Problem: SKIN/TISSUE INTEGRITY - ADULT  Goal: Incision(s), wounds(s) or drain site(s) healing without S/S of infection  Description: INTERVENTIONS:  - Assess and document risk  factors for pressure ulcer development  - Assess and document skin integrity  - Assess and document dressing/incision, wound bed, drain sites and surrounding tissue  - Implement wound care per orders  - Initiate isolation precautions as appropriate  - Initiate Pressure Ulcer prevention bundle as indicated  Outcome: Progressing     Problem: MUSCULOSKELETAL - ADULT  Goal: Return mobility to safest level of function  Description: INTERVENTIONS:  - Assess patient stability and activity tolerance for standing, transferring and ambulating w/ or w/o assistive devices  - Assist with transfers and ambulation using safe patient handling equipment as needed  - Ensure adequate protection for wounds/incisions during mobilization  - Obtain PT/OT consults as needed  - Advance activity as appropriate  - Communicate ordered activity level and limitations with patient/family  Outcome: Progressing   No acute changes. Vital signs stable. Denies pain or nausea. NPO. Bilateral LUCHO drains in place. IV fluids infusing. Voids freely. Ambulates independently. Plan of care reviewed.

## 2024-08-17 NOTE — PROGRESS NOTES
[facility] [place] GYNECOLOGIC ONCOLOGY FOLLOW-UP    MEDICAL RECORD #: [id number]   DATE OF SERVICE: 8/17/2024             Reason for visit:  Postoperative visit  POD #4    Procedure:    54 year old female with a history of ovarian cancer- s/p  Exploratory laparotomy, total abdominal hysterectomy, bilateral salpingo-oophorectomy, bilateral ureterolysis, bilateral pelvic lymph node dissection, tumor debulking of various peritoneal surfaces, argon beam coagulation of peritoneal tumor nodules, infracolic-supracolic omentectomy, and resection of gastrosplenic ligament with tumor attached to tip of spleen, mobilization of splenic flexure, repair of transverse colon mesentery, rectosigmoid colon resection with end-to-end anastomosis on 8/13/24    LABS:  Lab Results   Component Value Date    WBC 4.1 08/17/2024    HGB 8.6 08/17/2024    HCT 26.3 08/17/2024    .0 08/17/2024    CREATSERUM 0.57 08/17/2024    BUN 12 08/17/2024     08/17/2024    K 3.3 08/17/2024     08/17/2024    CO2 26.0 08/17/2024     08/17/2024    CA 8.9 08/17/2024      Medication:    LORazepam    dextrose    HYDROmorphone **OR** HYDROmorphone **OR** HYDROmorphone    phenol    diazepam    ondansetron **OR** ondansetron    pantoprazole **OR** pantoprazole    enoxaparin    diphenhydrAMINE     PATHOLOGY:  Surgical pathology in process      Patient's medications, allergies, past medical, surgical, social and family histories were reviewed and updated as appropriate.    ROS: Generally doing well. Denies nausea or vomiting, feels hungry, denies flatus or BM yet. Feels rumbling. Abdomen sore but pain well managed, not currently using pain meds. Ambulating around the unit. Inc site otherwise unremarkable.     PHYSICAL EXAMINATION:  /67   Pulse 56   Temp 98.1 °F (36.7 °C) (Oral)   Resp 18   Ht 5' 7\" (1.702 m)   Wt 173 lb 6.4 oz (78.7 kg)   LMP 12/22/2020   SpO2 98%   BMI 27.16 kg/m²   Body mass index is 27.16 kg/m².    GENERAL: alert and  oriented, cooperative, in no acute distress  LUNGS:  unlabored  HEART: regular rate and rhythm  ABDOMEN: soft, non-tender. Bowel sounds normal. No masses,  no organomegaly  EXTREMITIES: extremities normal, atraumatic, no cyanosis or edema  INC: clean, dry, intact midline site, dried blood middle of site, not actively bleeding    ASSESSMENT:     Ovarian cancer    S/p Exploratory laparotomy, total abdominal hysterectomy, bilateral salpingo-oophorectomy, bilateral ureterolysis, bilateral pelvic lymph node dissection, tumor debulking of various peritoneal surfaces, argon beam coagulation of peritoneal tumor nodules, infracolic-supracolic omentectomy, and resection of gastrosplenic ligament with tumor attached to tip of spleen, mobilization of splenic flexure, repair of transverse colon mesentery, rectosigmoid colon resection with end-to-end anastomosis on 8/13/24     POD #4    Generally doing well    AVSS    Labs reviewed- Wbc, hgb, cr stable    UOP marginal    Awaiting flatus and BM, denies n/v    PLAN:    Advance to clear liquid diet    Continue IV fluids    Monitor I&O    Pain management as needed    LMWH    Continue to ambulate    Arleen Villalobos DNP, ELISEO  08/17/24

## 2024-08-17 NOTE — PROGRESS NOTES
[facility] Hospitalist Progress Note     CC: Hospital Follow up    PCP: iDana Black MD       Assessment/Plan:     Active Problems:    Ovarian cancer (HCC)    Yue Malin - 54 year old female w adenocarcinoma admitted 8/13/2024 for post op care after planned CHELSIE-BSO, colon resection for her gynecologic adenoca w Dr. Choudhary     Gynecologic adenocarcinoma w peritoneal carcinomatosis  S/p chemo  Now s/p CHELSIE-BSO, rectosigmoid colon resection w end-end anastomosis due to tumor involvement  - Post-op care per Dr. Choudhary's team. NGT & yao removed.  - Trend labs  - s/p dilaudid PCA, continue IV dilaudid PRN and IV toradol, avoid narcotics if able  - IVF until tolerating PO  - Up, OOB, IS use, ambulate  - Add IV valium for NGT discomfort/anxiety     Hypernatremia- resolved  - 2/2 NPO & IVF  - Switch IVF to D5  - switch back to NS     Anemia, post-op - 2/2 expected blood loss w/ dilutional component from IVF   - No active external bleeding noted  - Daily CBC     Anxiety (ACOSTA)  - Stable, continue PTA ativan PRN as able  - Add IV valium for NGT discomfort/anxiety     ACP: Full code  Ppx: DVT: Enox  Dispo  EDoD:  TBD. Possibly Tuesday  F/u:   - PCP:  Diana Black MD  - Onc  - Gyne/onc     Available via epic secure chat or perfect serve 7A - 7P.     Kim Jean MD  Internal Medicine - Hospitalist  [facility]     Subjective:     Feels ok, no flatus yet, +belching, feels some stomach grumbling. Tolerating clears.     OBJECTIVE:    Blood pressure 124/74, pulse 66, temperature 98.6 °F (37 °C), temperature source Oral, resp. rate 18, height 5' 7\" (1.702 m), weight 173 lb 6.4 oz (78.7 kg), last menstrual period 12/22/2020, SpO2 100%, not currently breastfeeding.    Temp:  [98.1 °F (36.7 °C)-98.6 °F (37 °C)] 98.6 °F (37 °C)  Pulse:  [56-66] 66  Resp:  [18] 18  BP: ()/(60-74) 124/74  SpO2:  [98 %-100 %] 100 %      Intake/Output:    Intake/Output Summary (Last 24 hours) at 8/17/2024 1218  Last data filed at 8/17/2024  0918  Gross per 24 hour   Intake --   Output 1025 ml   Net -1025 ml       Last 3 Weights   08/13/24 0549 173 lb 6.4 oz (78.7 kg)   08/02/24 1128 180 lb (81.6 kg)   01/05/21 0831 190 lb 1.6 oz (86.2 kg)   11/22/17 0808 189 lb (85.7 kg)     Exam   GEN: female in NAD  HEENT: EOMI  Pulm: CTAB, no crackles or wheezes  CV: RRR, no murmurs  ABD: Soft, non-tender, non-distended, +BS  SKIN: warm, dry  EXT: no edema    Data Review:       Labs:     Recent Labs   Lab 08/15/24  0540 08/16/24  0439 08/17/24  0559   RBC 2.78* 2.47* 2.57*   HGB 9.3* 8.2* 8.6*   HCT 28.9* 26.7* 26.3*   .0* 108.1* 102.3*   MCH 33.5 33.2 33.5   MCHC 32.2 30.7* 32.7   RDW 15.7* 15.3* 14.6   NEPRELIM 4.90 2.47 2.02   WBC 8.2 4.3 4.1   .0 170.0 194.0         Recent Labs   Lab 08/15/24  0540 08/16/24  0439 08/17/24  0559   GLU 91 79 102*   BUN 15 15 12   CREATSERUM 0.70 0.58 0.57   EGFRCR 102 107 107   CA 8.7 8.8 8.9    147* 141   K 3.8 3.8 3.3*    113* 108   CO2 28.0 28.0 26.0       No results for input(s): \"ALT\", \"AST\", \"ALB\", \"AMYLASE\", \"LIPASE\", \"LDH\" in the last 168 hours.    Invalid input(s): \"ALPHOS\", \"TBIL\", \"DBIL\", \"TPROT\"      Imaging:  XR CHEST AP PORTABLE  (CPT=71045)    Result Date: 8/15/2024  CONCLUSION:   Patchy left lower lobe airspace opacity may be secondary to atelectasis or pneumonia.  Right port with tip in distal SVC.  Preliminary report was submitted by the [facility] teleradiologist and there are no significant discrepancies.  elm-[email or web address]    Dictated by (CST): Jmaal Menendez MD on 8/15/2024 at 12:35 PM     Finalized by (CST): Jamal Menendez MD on 8/15/2024 at 12:38 PM             Meds:     INPATIENT MEDICATIONS    Scheduled Medications:      pantoprazole, 40 mg, QAM AC   Or  pantoprazole, 40 mg, QAM AC  enoxaparin, 40 mg, Daily            Drips:  dextrose, Last Rate: 83 mL/hr at 08/16/24 1430        PRN Medications  LORazepam, 1 mg, Q6H PRN  HYDROmorphone, 0.2 mg, Q2H PRN   Or  HYDROmorphone, 0.4 mg, Q2H PRN    Or  HYDROmorphone, 0.8 mg, Q2H PRN  phenol, , Q2H PRN  diazepam, 5 mg, Q6H PRN  ondansetron, 4 mg, Q8H PRN   Or  ondansetron, 4 mg, Q8H PRN  diphenhydrAMINE, 12.5 mg, Q4H PRN

## 2024-08-17 NOTE — PLAN OF CARE
Yue is alert/oriented. Vitals stable on room air. Incisions clean/dry/intact with dressing and abdominal binder in place. LUCHO drains intact with serosanguinous output R>L. Advanced to clear liquid diet- tolerating. Ambulating frequently in halls today independently. IVF infusing, switched to 0.9. Mild pain -denied medication. Lovenox for DVT prophylaxis. Voiding. No gas or BM yet. Bed low, locked, all safety measures in place.    Problem: Patient Centered Care  Goal: Patient preferences are identified and integrated in the patient's plan of care  Description: Interventions:  - What would you like us to know as we care for you?   - Provide timely, complete, and accurate information to patient/family  - Incorporate patient and family knowledge, values, beliefs, and cultural backgrounds into the planning and delivery of care  - Encourage patient/family to participate in care and decision-making at the level they choose  - Honor patient and family perspectives and choices  Outcome: Progressing     Problem: Patient/Family Goals  Goal: Patient/Family Long Term Goal  Description: Patient's Long Term Goal:     Interventions:  -   - See additional Care Plan goals for specific interventions  Outcome: Progressing  Goal: Patient/Family Short Term Goal  Description: Patient's Short Term Goal:     Interventions:   -   - See additional Care Plan goals for specific interventions  Outcome: Progressing     Problem: PAIN - ADULT  Goal: Verbalizes/displays adequate comfort level or patient's stated pain goal  Description: INTERVENTIONS:  - Encourage pt to monitor pain and request assistance  - Assess pain using appropriate pain scale  - Administer analgesics based on type and severity of pain and evaluate response  - Implement non-pharmacological measures as appropriate and evaluate response  - Consider cultural and social influences on pain and pain management  - Manage/alleviate anxiety  - Utilize distraction and/or relaxation  techniques  - Monitor for opioid side effects  - Notify MD/LIP if interventions unsuccessful or patient reports new pain  - Anticipate increased pain with activity and pre-medicate as appropriate  Outcome: Progressing     Problem: SAFETY ADULT - FALL  Goal: Free from fall injury  Description: INTERVENTIONS:  - Assess pt frequently for physical needs  - Identify cognitive and physical deficits and behaviors that affect risk of falls.  - [place] fall precautions as indicated by assessment.  - Educate pt/family on patient safety including physical limitations  - Instruct pt to call for assistance with activity based on assessment  - Modify environment to reduce risk of injury  - Provide assistive devices as appropriate  - Consider OT/PT consult to assist with strengthening/mobility  - Encourage toileting schedule  Outcome: Progressing     Problem: GASTROINTESTINAL - ADULT  Goal: Minimal or absence of nausea and vomiting  Description: INTERVENTIONS:  - Maintain adequate hydration with IV or PO as ordered and tolerated  - Nasogastric tube to low intermittent suction as ordered  - Evaluate effectiveness of ordered antiemetic medications  - Provide nonpharmacologic comfort measures as appropriate  - Advance diet as tolerated, if ordered  - Obtain nutritional consult as needed  - Evaluate fluid balance  Outcome: Progressing  Goal: Maintains or returns to baseline bowel function  Description: INTERVENTIONS:  - Assess bowel function  - Maintain adequate hydration with IV or PO as ordered and tolerated  - Evaluate effectiveness of GI medications  - Encourage mobilization and activity  - Obtain nutritional consult as needed  - Establish a toileting routine/schedule  - Consider collaborating with pharmacy to review patient's medication profile  Outcome: Progressing     Problem: GENITOURINARY - ADULT  Goal: Absence of urinary retention  Description: INTERVENTIONS:  - Assess patient’s ability to void and empty bladder  - Monitor  intake/output and perform bladder scan as needed  - Follow urinary retention protocol/standard of care  - Consider collaborating with pharmacy to review patient's medication profile  - Implement strategies to promote bladder emptying  Outcome: Progressing     Problem: SKIN/TISSUE INTEGRITY - ADULT  Goal: Incision(s), wounds(s) or drain site(s) healing without S/S of infection  Description: INTERVENTIONS:  - Assess and document risk factors for pressure ulcer development  - Assess and document skin integrity  - Assess and document dressing/incision, wound bed, drain sites and surrounding tissue  - Implement wound care per orders  - Initiate isolation precautions as appropriate  - Initiate Pressure Ulcer prevention bundle as indicated  Outcome: Progressing     Problem: MUSCULOSKELETAL - ADULT  Goal: Return mobility to safest level of function  Description: INTERVENTIONS:  - Assess patient stability and activity tolerance for standing, transferring and ambulating w/ or w/o assistive devices  - Assist with transfers and ambulation using safe patient handling equipment as needed  - Ensure adequate protection for wounds/incisions during mobilization  - Obtain PT/OT consults as needed  - Advance activity as appropriate  - Communicate ordered activity level and limitations with patient/family  Outcome: Progressing

## 2024-08-18 LAB
ANION GAP SERPL CALC-SCNC: 7 MMOL/L (ref 0–18)
BASOPHILS # BLD AUTO: 0.02 X10(3) UL (ref 0–0.2)
BASOPHILS NFR BLD AUTO: 0.7 %
BUN BLD-MCNC: 6 MG/DL (ref 9–23)
BUN/CREAT SERPL: 11.8 (ref 10–20)
CALCIUM BLD-MCNC: 8.5 MG/DL (ref 8.7–10.4)
CHLORIDE SERPL-SCNC: 108 MMOL/L (ref 98–112)
CO2 SERPL-SCNC: 27 MMOL/L (ref 21–32)
CREAT BLD-MCNC: 0.51 MG/DL
DEPRECATED RDW RBC AUTO: 53.7 FL (ref 35.1–46.3)
EGFRCR SERPLBLD CKD-EPI 2021: 110 ML/MIN/1.73M2 (ref 60–?)
EOSINOPHIL # BLD AUTO: 0.06 X10(3) UL (ref 0–0.7)
EOSINOPHIL NFR BLD AUTO: 2 %
ERYTHROCYTE [DISTWIDTH] IN BLOOD BY AUTOMATED COUNT: 14.6 % (ref 11–15)
GLUCOSE BLD-MCNC: 93 MG/DL (ref 70–99)
HCT VFR BLD AUTO: 25.4 %
HGB BLD-MCNC: 8.1 G/DL
IMM GRANULOCYTES # BLD AUTO: 0.01 X10(3) UL (ref 0–1)
IMM GRANULOCYTES NFR BLD: 0.3 %
LYMPHOCYTES # BLD AUTO: 0.97 X10(3) UL (ref 1–4)
LYMPHOCYTES NFR BLD AUTO: 31.7 %
MCH RBC QN AUTO: 33.2 PG (ref 26–34)
MCHC RBC AUTO-ENTMCNC: 31.9 G/DL (ref 31–37)
MCV RBC AUTO: 104.1 FL
MONOCYTES # BLD AUTO: 0.35 X10(3) UL (ref 0.1–1)
MONOCYTES NFR BLD AUTO: 11.4 %
NEUTROPHILS # BLD AUTO: 1.65 X10 (3) UL (ref 1.5–7.7)
NEUTROPHILS # BLD AUTO: 1.65 X10(3) UL (ref 1.5–7.7)
NEUTROPHILS NFR BLD AUTO: 53.9 %
OSMOLALITY SERPL CALC.SUM OF ELEC: 291 MOSM/KG (ref 275–295)
PLATELET # BLD AUTO: 190 10(3)UL (ref 150–450)
POTASSIUM SERPL-SCNC: 3.5 MMOL/L (ref 3.5–5.1)
RBC # BLD AUTO: 2.44 X10(6)UL
SODIUM SERPL-SCNC: 142 MMOL/L (ref 136–145)
WBC # BLD AUTO: 3.1 X10(3) UL (ref 4–11)

## 2024-08-18 PROCEDURE — 80048 BASIC METABOLIC PNL TOTAL CA: CPT | Performed by: PHYSICIAN ASSISTANT

## 2024-08-18 PROCEDURE — 85025 COMPLETE CBC W/AUTO DIFF WBC: CPT | Performed by: PHYSICIAN ASSISTANT

## 2024-08-18 NOTE — PLAN OF CARE
Problem: Patient Centered Care  Goal: Patient preferences are identified and integrated in the patient's plan of care  Description: Interventions:  - Provide timely, complete, and accurate information to patient/family  - Incorporate patient and family knowledge, values, beliefs, and cultural backgrounds into the planning and delivery of care  - Encourage patient/family to participate in care and decision-making at the level they choose  - Honor patient and family perspectives and choices  Outcome: Progressing   Patient post up day # 5 Abdominal incision covered with surgical dressing, old drainage noted. Right and left J P drain in place, draining serous fluid. Patient denies pain, IV fluid infusing. Clear liquid diet patient tolerating liquids, ensure clear supplement added, patient denies passing flatus. Patient has been frequently ambulating in halls and in the room, tolerating activity well. Fall precautions in place.

## 2024-08-18 NOTE — PROGRESS NOTES
[facility] Hospitalist Progress Note     CC: Hospital Follow up    PCP: Diana Black MD       Assessment/Plan:     Active Problems:    Ovarian cancer (HCC)    Yue Malin - 54 year old female w adenocarcinoma admitted 8/13/2024 for post op care after planned CHELSIE-BSO, colon resection for her gynecologic adenoca w Dr. Choudhary     Gynecologic adenocarcinoma w peritoneal carcinomatosis  S/p chemo  Now s/p CHELSIE-BSO, rectosigmoid colon resection w end-end anastomosis due to tumor involvement  - Post-op care per Dr. Choudhary's team. NGT & yao removed.  - Trend labs  - s/p dilaudid PCA, continue IV dilaudid PRN and IV toradol, avoid narcotics if able  - IVF until tolerating PO  - Up, OOB, IS use, ambulate  - s/p NGT removal     Hypernatremia- resolved  - 2/2 NPO & IVF  - Switch IVF to D5  - switch back to NS     Anemia, post-op - 2/2 expected blood loss w/ dilutional component from IVF   - No active external bleeding noted  - Daily CBC     Anxiety (ACOSTA)  - Stable, continue PTA ativan PRN as able  - Add IV valium for NGT discomfort/anxiety     ACP: Full code  Ppx: DVT: Enox  Dispo  EDoD:  TBD. Possibly Tuesday  F/u:   - PCP:  Diana Black MD  - Onc  - Gyne/onc     Kim Jean MD  Internal Medicine - Hospitalist  [facility]     Subjective:     No nausea, still no flatus. Does feel stomach grumbly,     OBJECTIVE:    Blood pressure 131/70, pulse 86, temperature 97.9 °F (36.6 °C), temperature source Oral, resp. rate 18, height 5' 7\" (1.702 m), weight 173 lb 6.4 oz (78.7 kg), last menstrual period 12/22/2020, SpO2 99%, not currently breastfeeding.    Temp:  [97.9 °F (36.6 °C)-98.6 °F (37 °C)] 97.9 °F (36.6 °C)  Pulse:  [66-86] 86  Resp:  [18] 18  BP: (108-131)/(68-74) 131/70  SpO2:  [97 %-100 %] 99 %      Intake/Output:    Intake/Output Summary (Last 24 hours) at 8/18/2024 1143  Last data filed at 8/18/2024 1041  Gross per 24 hour   Intake 1040 ml   Output 2810 ml   Net -1770 ml       Last 3 Weights   08/13/24 0549  173 lb 6.4 oz (78.7 kg)   08/02/24 1128 180 lb (81.6 kg)   01/05/21 0831 190 lb 1.6 oz (86.2 kg)   11/22/17 0808 189 lb (85.7 kg)     Exam   GEN: female in NAD  HEENT: EOMI  Pulm: CTAB, no crackles or wheezes  CV: RRR, no murmurs  ABD: Soft, non-tender, non-distended, +BS, hypoactive   SKIN: warm, dry  EXT: no edema    Data Review:       Labs:     Recent Labs   Lab 08/16/24  0439 08/17/24  0559 08/18/24  0626   RBC 2.47* 2.57* 2.44*   HGB 8.2* 8.6* 8.1*   HCT 26.7* 26.3* 25.4*   .1* 102.3* 104.1*   MCH 33.2 33.5 33.2   MCHC 30.7* 32.7 31.9   RDW 15.3* 14.6 14.6   NEPRELIM 2.47 2.02 1.65   WBC 4.3 4.1 3.1*   .0 194.0 190.0         Recent Labs   Lab 08/16/24  0439 08/17/24  0559 08/18/24  0626   GLU 79 102* 93   BUN 15 12 6*   CREATSERUM 0.58 0.57 0.51*   EGFRCR 107 107 110   CA 8.8 8.9 8.5*   * 141 142   K 3.8 3.3* 3.5   * 108 108   CO2 28.0 26.0 27.0       No results for input(s): \"ALT\", \"AST\", \"ALB\", \"AMYLASE\", \"LIPASE\", \"LDH\" in the last 168 hours.    Invalid input(s): \"ALPHOS\", \"TBIL\", \"DBIL\", \"TPROT\"      Imaging:  No results found.      Meds:     INPATIENT MEDICATIONS    Scheduled Medications:      pantoprazole, 40 mg, QAM AC   Or  pantoprazole, 40 mg, QAM AC  enoxaparin, 40 mg, Daily            Drips:  sodium chloride, Last Rate: 83 mL/hr at 08/17/24 1230        PRN Medications  LORazepam, 1 mg, Q6H PRN  HYDROmorphone, 0.2 mg, Q2H PRN   Or  HYDROmorphone, 0.4 mg, Q2H PRN   Or  HYDROmorphone, 0.8 mg, Q2H PRN  phenol, , Q2H PRN  diazepam, 5 mg, Q6H PRN  ondansetron, 4 mg, Q8H PRN   Or  ondansetron, 4 mg, Q8H PRN  diphenhydrAMINE, 12.5 mg, Q4H PRN

## 2024-08-18 NOTE — PLAN OF CARE
Problem: Patient Centered Care  Goal: Patient preferences are identified and integrated in the patient's plan of care  Description: Interventions:  - What would you like us to know as we care for you?   - Provide timely, complete, and accurate information to patient/family  - Incorporate patient and family knowledge, values, beliefs, and cultural backgrounds into the planning and delivery of care  - Encourage patient/family to participate in care and decision-making at the level they choose  - Honor patient and family perspectives and choices  Outcome: Progressing     Problem: Patient/Family Goals  Goal: Patient/Family Long Term Goal  Description: Patient's Long Term Goal:     Interventions:  -   - See additional Care Plan goals for specific interventions  Outcome: Progressing  Goal: Patient/Family Short Term Goal  Description: Patient's Short Term Goal:     Interventions:   -   - See additional Care Plan goals for specific interventions  Outcome: Progressing     Problem: PAIN - ADULT  Goal: Verbalizes/displays adequate comfort level or patient's stated pain goal  Description: INTERVENTIONS:  - Encourage pt to monitor pain and request assistance  - Assess pain using appropriate pain scale  - Administer analgesics based on type and severity of pain and evaluate response  - Implement non-pharmacological measures as appropriate and evaluate response  - Consider cultural and social influences on pain and pain management  - Manage/alleviate anxiety  - Utilize distraction and/or relaxation techniques  - Monitor for opioid side effects  - Notify MD/LIP if interventions unsuccessful or patient reports new pain  - Anticipate increased pain with activity and pre-medicate as appropriate  Outcome: Progressing     Problem: SAFETY ADULT - FALL  Goal: Free from fall injury  Description: INTERVENTIONS:  - Assess pt frequently for physical needs  - Identify cognitive and physical deficits and behaviors that affect risk of  falls.  - [place] fall precautions as indicated by assessment.  - Educate pt/family on patient safety including physical limitations  - Instruct pt to call for assistance with activity based on assessment  - Modify environment to reduce risk of injury  - Provide assistive devices as appropriate  - Consider OT/PT consult to assist with strengthening/mobility  - Encourage toileting schedule  Outcome: Progressing     Problem: GASTROINTESTINAL - ADULT  Goal: Minimal or absence of nausea and vomiting  Description: INTERVENTIONS:  - Maintain adequate hydration with IV or PO as ordered and tolerated  - Nasogastric tube to low intermittent suction as ordered  - Evaluate effectiveness of ordered antiemetic medications  - Provide nonpharmacologic comfort measures as appropriate  - Advance diet as tolerated, if ordered  - Obtain nutritional consult as needed  - Evaluate fluid balance  Outcome: Progressing  Goal: Maintains or returns to baseline bowel function  Description: INTERVENTIONS:  - Assess bowel function  - Maintain adequate hydration with IV or PO as ordered and tolerated  - Evaluate effectiveness of GI medications  - Encourage mobilization and activity  - Obtain nutritional consult as needed  - Establish a toileting routine/schedule  - Consider collaborating with pharmacy to review patient's medication profile  Outcome: Progressing     Problem: GENITOURINARY - ADULT  Goal: Absence of urinary retention  Description: INTERVENTIONS:  - Assess patient’s ability to void and empty bladder  - Monitor intake/output and perform bladder scan as needed  - Follow urinary retention protocol/standard of care  - Consider collaborating with pharmacy to review patient's medication profile  - Implement strategies to promote bladder emptying  Outcome: Progressing     Problem: SKIN/TISSUE INTEGRITY - ADULT  Goal: Incision(s), wounds(s) or drain site(s) healing without S/S of infection  Description: INTERVENTIONS:  - Assess and  document risk factors for pressure ulcer development  - Assess and document skin integrity  - Assess and document dressing/incision, wound bed, drain sites and surrounding tissue  - Implement wound care per orders  - Initiate isolation precautions as appropriate  - Initiate Pressure Ulcer prevention bundle as indicated  Outcome: Progressing     Problem: MUSCULOSKELETAL - ADULT  Goal: Return mobility to safest level of function  Description: INTERVENTIONS:  - Assess patient stability and activity tolerance for standing, transferring and ambulating w/ or w/o assistive devices  - Assist with transfers and ambulation using safe patient handling equipment as needed  - Ensure adequate protection for wounds/incisions during mobilization  - Obtain PT/OT consults as needed  - Advance activity as appropriate  - Communicate ordered activity level and limitations with patient/family  Outcome: Progressing   Patient is doing well. Vital signs stable. Denies pain or nausea. Belching, no flatus. Voids freely. IV fluids infusing. Tolerating clear liquid diet. Surgical dressing clean, dry and intact. Right LUCHO drain has more output than left LUCHO. Ambulates independently. Plan of care reviewed.

## 2024-08-18 NOTE — PROGRESS NOTES
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, mobilization of splenic flexure, repair of transverse colon mesentery, rectosigmoid colon resection with end-to-end anastomosis on 8/13/24       Pathology/Lab Results:  Recent Results (from the past 720 hour(s))   ABORH (Blood Type)    Collection Time: 08/02/24 12:33 PM   Result Value Ref Range    ABO BLOOD TYPE O     RH BLOOD TYPE Negative    Antibody Screen    Collection Time: 08/02/24 12:33 PM   Result Value Ref Range    Antibody Screen Negative    ABORH Confirmation    Collection Time: 08/13/24  7:00 AM   Result Value Ref Range    ABO BLOOD TYPE O     RH BLOOD TYPE Negative    Specimen to Pathology Tissue    Collection Time: 08/13/24  9:09 AM   Result Value Ref Range    Case Report       Surgical Pathology                                Case: [id number]                                  Authorizing Provider:  Binh Choudhary MD         Collected:           08/13/2024 09:09 AM          Ordering Location:     Health system          Received:            08/13/2024 01:52 PM                                 Operating Room                                                               Pathologist:           Paul Wade MD                                                             Specimens:   A) - Uterus and cervix, bilateral tubes and ovaries, 1. uterus, cervix, bilateral                   tubes and ovaries, anterior  bladder, peritoneal tumor                                               B) - Lymph node pelvic left, 2. Left pelvic lymph nodes                                             C) - Lymph node pelvic right, 3. Right pelvic lymph nodes                                           D) - Tissue, 4. left paracolic tumor                                                                 E) - Tissue, 5. Tumor attached to splenic flexure                                                   F) - Tissue, 6. nodule attached to small bowel                                                      G) - Tissue, 7. nodule attached to cecum                                                            H) - Tissue, 8. nodule attached to right diaphragm                                                  I) - Tissue, 9. infracolic, supracolic, splenocolic omentum                                         J) - Colon, 10. Rectal sigmoid colon with tumor                                            Final Diagnosis:         A. Uterus, cervix, bilateral fallopian tubes, ovaries, anterior bladder, peritoneal tumor; total abdominal hysterectomy, bilateral salpingo-oophorectomy, bilateral ureterolysis, bilateral pelvic lymph node dissection, tumor debulking:   Left ovary with high-grade serous carcinoma, with focal clear cell change, 1.2 cm in greatest dimension.  Carcinoma is seen involving the endometrium (focal, superficial) uterine serosa, right parametrium, left fallopian tube (surface), left para-adnexal soft tissue, and right ovary (focal).  Lymphovascular invasion is identified.  Ectocervix with no significant pathologic changes identified.  Endocervix with nabothian cysts; no significant pathologic changes identified.  Right fallopian tube with no significant pathologic changes identified.  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  ypT3c, N0.  See comment.    B. Left pelvic lymph node; biopsy:  Five lymph nodes negative for metastatic carcinoma  (0/5).    C. Right pelvic lymph nodes; biopsy:  Two lymph nodes negative for metastatic carcinoma (0/2).    D. Left paracolic tumor; biopsy:  Fibroconnective tissue involved by high-grade serous carcinoma.    E. Splenic flexure tumor; biopsy:   Fibroadipose tissue involved by high-grade serous carcinoma.    F. Small bowel nodule:  Nodular fibrotic tissue with chronic inflammation and calcifications.  No definitive viable carcinoma identified.    G. Cecal nodule:  Nodular fibrotic tissue with chronic inflammation.  No definitive viable carcinoma identified.    H. Right diaphragm nodule:  Fibroadipose tissue with extensive hemorrhage and acute inflammation.  No definitive viable carcinoma identified.    I. Infra colic, supracolic, splenocolic omentum; excision:   Omental fibroadipose tissue with extensive involvement by high-grade serous carcinoma.  Largest tumor nodule measures 3.8 cm.  See comment.    J. Rectosigmoid colon tumor; excision:   Segment of colon, 10.5 in length, demonstrating high-grade serous carcinoma extensively involving the pericolonic soft tissue and serosa.  Tumor is present less than 1 mm from the mesorectal/radial margin.  One lymph node, negative for metastatic carcinoma (0/1).  Mucosa at surgical margins is viable and free of carcinoma.    Comment:  The patient has a history of adenocarcinoma of gynecological origin diagnosed on previous omental mass biopsy and ascites fluid, both in March 2024, per notes, status post neoadjuvant chemotherapy x4 cycles with good response.     Immunohistochemical stains performed on the carcinoma present in the omentum (block I3) demonstrate that the tumor cells are positive for pankeratin (AE1/AE3), CK7, PAX8, WT1, and ER and negative for CK20 and p53, consistent with a high-grade serous carcinoma.    For  purposes, this case was reviewed by a second pathologist who concurred with the diagnosis.    The immunohistochemical stains interpreted  in this case demonstrated appropriate reactivity of the positive controls. These stains were performed on formalin-fixed, paraffin-embedded tissue sections with each antibody analysis being performed on a separate slide        Embedded Images      Synoptic Report       OVARY or FALLOPIAN TUBE or PRIMARY PERITONEUM   OVARY OR FALLOPIAN TUBE OR PRIMARY PERITONEUM - All Specimens   8th Edition - Protocol posted: 3/22/2023      SPECIMEN      Procedure:    Total hysterectomy and bilateral salpingo-oophorectomy       Hysterectomy Type:    Abdominal       Specimen Integrity:            Right Ovary Integrity:    Capsule intact       Specimen Integrity:            Left Ovary Integrity:    Capsule intact         Uterus Integrity:    Intact       TUMOR      Tumor Site:    Left ovary       Tumor Size:    Greatest Dimension (Centimeters): 1.2 cm      Histologic Type:    High grade serous carcinoma       Histologic Grade:    High grade       Ovarian Surface Involvement:    Not identified       Fallopian Tube Surface Involvement:    Present, left       Implants:    Present: Left paracolic, splenic flexure       Other Tissue / Organ Involvement:    Right ovary       Other Tissue / Organ Involvement:    Left fallopian tube       Other Tissue / Organ Involvement:    Uterine corpus       Other Tissue / Organ Involvement:    Abdominal peritoneum       Other Tissue / Organ Involvement:    Omentum       Other Tissue / Organ Involvement:    Rectosigmoid colon       Largest Extrapelvic Peritoneal Focus:    Macroscopic (greater than 2 cm): 3.8 cm       Peritoneal / Ascitic Fluid Involvement:    Not submitted / unknown       Chemotherapy Response Score (CRS):    CRS2 (moderate response)       REGIONAL LYMPH NODES      Regional Lymph Node Status:            :    All regional lymph nodes negative for tumor cells         Number of Lymph Nodes Examined:    8           Faiza Site(s) Examined:    Right pelvic           Faiza Site(s) Examined:     Left pelvic           Faiza Site(s) Examined:    Pericolonic       pTNM CLASSIFICATION (AJCC 8th Edition)      Reporting of pT, pN, and (when applicable) pM categories is based on information available to the pathologist at the time the report is issued. As per the AJCC (Chapter 1, 8th Ed.) it is the managing physician’s responsibility to establish the final pathologic stage based upon all pertinent information, including but potentially not limited to this pathology report.      Modified Classification:    y       pT Category:    pT3c       pN Category:    pN0       FIGO STAGE      FIGO Stage:    IIIC       Clinical Information       Adenocarcinoma.        Gross Description       Specimen A is submitted in formalin labeled “Horvat, uterus, cervix, bilateral tubes and ovaries, anterior bladder, peritoneal tumor” and consists of a 98 gram total hysterectomy including uterus with cervix, bilateral fallopian tubes and ovaries and a flap of tissue attached to the lower uterine segment (possible bladder flap). The uterus and cervix measure 7.6 cm in length, 4.0 cm from cornu to cornu, 3.4 cm from anterior to posterior. The uterus is covered with pink-tan, smooth serosa. The ectocervix is covered with pink-tan smooth serosa. The ectocervix is covered with pink-tan, smooth mucosa and measures 2.5 x 2.4 cm in transverse dimension. The cervical os is stenosed and measures less than 0.1 cm in diameter. The anterior aspect is inked blue, the posterior aspect is inked black. The cervix and uterus are bivalved to reveal a pink-tan endocervix with a 2.5 cm endocervical canal. The endometrium is pink-tan and smooth with a 4.5 x 2.5 cm endometrial cavity. The uterus is sectioned to reveal a single posterior intramural fibroid (0.4 cm in greatest dimension). The remaining myometrium is pink-tan and trabeculated with no lesions or masses identified within the endomyometrium. The anterior bladder flap presents possible tumor nodules  (1.2 cm in greatest dimension).   The left fallopian tube measures 5.5 cm in length x 0.6 cm in diameter. The left ovary measures 2.5 x 1.5 x 1.2 cm. The left fallopian tube, ovary and para adnexal tissue are covered with pink-gray adhesions and possible nodules.  The right fallopian tube measures 5.0 cm in length x 0.4 cm in diameter and is grossly unremarkable. The right ovary is yellowish pink and lobulated, measures 2.0 x 1.5 x 0.8 cm and is grossly unremarkable.    Representative sections are submitted as follows: A1 - anterior cervix, longitudinal section; A2 - posterior cervix, longitudinal section; A3-A7 - full thickness anterior endomyometrium, from lower to upper, including bladder flap and possible tumor nodules; A8-A9 - full thickness posterior endomyometrium including fibroid; A10 - left parametrial soft tissue; A11 - right parametrial soft tissue; A12 - sections of left fallopian tube; A13 - left ovary, bisected, entirely; A14-A15 - left para adnexal tissue with possible nodules; A16 - sections of right fallopian tube; A17 - right ovary, bisected, entirely.     Specimen B is submitted in formalin labeled “Horvat, left pelvic lymph node” and consists of one piece of yellow-tan, fatty tissue measuring 3.8 x 3.1 x 0.8 cm. Dissection of the tissue reveals five lymph node candidates (0.4 to 1.5 cm in greatest dimension). The two larger candidates are submitted in cassette B1. The remaining candidates are submitted in cassette B2.     Specimen C is submitted in formalin labeled “Horvat, right pelvic lymph node” and consists of two pieces of yellow-tan, fatty tissue measuring in aggregate 2.8 x 2.3 x 0.7 cm. Dissection of the tissue reveals two lymph node candidates (0.8 and 1.3 cm in greatest dimension). Both candidates are submitted entirely in cassette C1.     Specimen D is submitted in formalin labeled “Horvat, left paracolic tumor” and consists of one pink-gray soft tissue nodule measuring 2.0 x 1.9 x  0.8 cm. The nodule is bisected and submitted entirely in cassette D1.     Specimen E is submitted in formalin labeled “Horvat, tumor attached to splenic flexure” and consists of one pink-gray soft tissue nodule measuring 1.8 x 1.5 x 0.7 cm. The specimen is submitted entirely in cassette E1.    Specimen F is submitted in formalin labeled “Horvat, nodule attached to small bowel” and consists of multiple pieces of pink-gray soft tissue measuring in aggregate 2.4 x 1.9 x 0.4 cm. The specimen is submitted entirely in cassette F1.    Specimen G is submitted in formalin labeled “Horvat, nodule attached to cecum” and consists of two pink-gray soft tissue nodules measuring in aggregate 0.7 x 0.6 x 0.3 cm. The specimen is submitted entirely in cassette G1.    Specimen H is submitted in formalin labeled “Horvat, nodule attached to right diaphragm” and consists of one piece of red-tan, soft tissue measuring 1.2 x 0.5 x 0.3 cm. The specimen is submitted entirely in cassette H1.    Specimen I is submitted in formalin labeled “Horvat, infra colic, supra colic, splenocolic omentum” and consists of one piece of yellow-tan omentum measuring 21.5 x 13.7 x 3.6 cm. The specimen is sectioned to reveal multiple pink-gray nodules embedded within the omentum (0.2 to 3.8 cm in greatest dimension). Representative sections are submitted as follows: I1 - largest nodule; I2-I4 - other nodules.     Specimen J is submitted in formalin labeled “Horvat, rectosigmoid colon with tumor” and consists of an unoriented segment of colon with mesentery and overlying adhesions measuring 10.5 cm in length x 2.6 cm in diameter. The specimen is received with two stapled margins. The proximal margin is inked blue, the distal margin is inked green, the mesorectal/radial margin is inked black. The specimen is opened longitudinally to reveal a pink-tan, folded mucosa with no lesions, masses or perforations grossly identified. No definitive masses, lesions or  nodules are identified within the omentum or mesorectum.   Dissection of the pericolonic tissue reveals one lymph node candidate (0.8 cm in greatest dimension).   Representative sections are submitted as follows: J1 - proximal en face margin; J2 - distal en face margin; J3 - random mesorectal/radial margin, perpendicular section; J4-J8 - random sections of colon wall with pericolonic tissue; J9 - one lymph node candidate; J10-J12 - dissected pericolonic tissue. (jq)    Paul Wade M.D./[facility] [place]        Interpretation Malignant (A)     POCT Hemocue    Collection Time: 08/13/24 11:24 AM   Result Value Ref Range    Hemocue 10.6 (A) 12.0 - 16.0 g/dl   Basic Metabolic Panel (8)    Collection Time: 08/13/24  5:26 PM   Result Value Ref Range    Glucose 166 (H) 70 - 99 mg/dL    Sodium 142 136 - 145 mmol/L    Potassium 4.4 3.5 - 5.1 mmol/L    Chloride 110 98 - 112 mmol/L    CO2 26.0 21.0 - 32.0 mmol/L    Anion Gap 6 0 - 18 mmol/L    BUN 12 9 - 23 mg/dL    Creatinine 0.74 0.55 - 1.02 mg/dL    BUN/CREA Ratio 16.2 10.0 - 20.0    Calcium, Total 8.5 (L) 8.7 - 10.4 mg/dL    Calculated Osmolality 298 (H) 275 - 295 mOsm/kg    eGFR-Cr 95 >=60 mL/min/1.73m2   CBC With Differential With Platelet    Collection Time: 08/13/24  5:26 PM   Result Value Ref Range    WBC 10.8 4.0 - 11.0 x10(3) uL    RBC 3.38 (L) 3.80 - 5.30 x10(6)uL    HGB 11.3 (L) 12.0 - 16.0 g/dL    HCT 34.8 (L) 35.0 - 48.0 %    .0 (H) 80.0 - 100.0 fL    MCH 33.4 26.0 - 34.0 pg    MCHC 32.5 31.0 - 37.0 g/dL    RDW-SD 57.5 (H) 35.1 - 46.3 fL    RDW 15.7 (H) 11.0 - 15.0 %    .0 150.0 - 450.0 10(3)uL    Neutrophil Absolute Prelim 10.03 (H) 1.50 - 7.70 x10 (3) uL    Neutrophil Absolute 10.03 (H) 1.50 - 7.70 x10(3) uL    Lymphocyte Absolute 0.30 (L) 1.00 - 4.00 x10(3) uL    Monocyte Absolute 0.41 0.10 - 1.00 x10(3) uL    Eosinophil Absolute 0.00 0.00 - 0.70 x10(3) uL    Basophil Absolute 0.01 0.00 - 0.20 x10(3) uL    Immature Granulocyte Absolute 0.03 0.00 - 1.00 x10(3)  uL    Neutrophil % 93.0 %    Lymphocyte % 2.8 %    Monocyte % 3.8 %    Eosinophil % 0.0 %    Basophil % 0.1 %    Immature Granulocyte % 0.3 %   CBC With Differential With Platelet    Collection Time: 08/14/24  5:08 AM   Result Value Ref Range    WBC 8.7 4.0 - 11.0 x10(3) uL    RBC 3.12 (L) 3.80 - 5.30 x10(6)uL    HGB 10.2 (L) 12.0 - 16.0 g/dL    HCT 31.9 (L) 35.0 - 48.0 %    .2 (H) 80.0 - 100.0 fL    MCH 32.7 26.0 - 34.0 pg    MCHC 32.0 31.0 - 37.0 g/dL    RDW-SD 57.0 (H) 35.1 - 46.3 fL    RDW 15.6 (H) 11.0 - 15.0 %    .0 150.0 - 450.0 10(3)uL    Neutrophil Absolute Prelim 6.66 1.50 - 7.70 x10 (3) uL    Neutrophil Absolute 6.66 1.50 - 7.70 x10(3) uL    Lymphocyte Absolute 1.18 1.00 - 4.00 x10(3) uL    Monocyte Absolute 0.85 0.10 - 1.00 x10(3) uL    Eosinophil Absolute 0.00 0.00 - 0.70 x10(3) uL    Basophil Absolute 0.01 0.00 - 0.20 x10(3) uL    Immature Granulocyte Absolute 0.03 0.00 - 1.00 x10(3) uL    Neutrophil % 76.4 %    Lymphocyte % 13.5 %    Monocyte % 9.7 %    Eosinophil % 0.0 %    Basophil % 0.1 %    Immature Granulocyte % 0.3 %   Basic Metabolic Panel (8)    Collection Time: 08/14/24  5:09 AM   Result Value Ref Range    Glucose 121 (H) 70 - 99 mg/dL    Sodium 143 136 - 145 mmol/L    Potassium 4.5 3.5 - 5.1 mmol/L    Chloride 110 98 - 112 mmol/L    CO2 27.0 21.0 - 32.0 mmol/L    Anion Gap 6 0 - 18 mmol/L    BUN 13 9 - 23 mg/dL    Creatinine 0.73 0.55 - 1.02 mg/dL    BUN/CREA Ratio 17.8 10.0 - 20.0    Calcium, Total 8.8 8.7 - 10.4 mg/dL    Calculated Osmolality 297 (H) 275 - 295 mOsm/kg    eGFR-Cr 97 >=60 mL/min/1.73m2   Basic Metabolic Panel (8)    Collection Time: 08/15/24  5:40 AM   Result Value Ref Range    Glucose 91 70 - 99 mg/dL    Sodium 144 136 - 145 mmol/L    Potassium 3.8 3.5 - 5.1 mmol/L    Chloride 110 98 - 112 mmol/L    CO2 28.0 21.0 - 32.0 mmol/L    Anion Gap 6 0 - 18 mmol/L    BUN 15 9 - 23 mg/dL    Creatinine 0.70 0.55 - 1.02 mg/dL    BUN/CREA Ratio 21.4 (H) 10.0 - 20.0     Calcium, Total 8.7 8.7 - 10.4 mg/dL    Calculated Osmolality 298 (H) 275 - 295 mOsm/kg    eGFR-Cr 102 >=60 mL/min/1.73m2   CBC With Differential With Platelet    Collection Time: 08/15/24  5:40 AM   Result Value Ref Range    WBC 8.2 4.0 - 11.0 x10(3) uL    RBC 2.78 (L) 3.80 - 5.30 x10(6)uL    HGB 9.3 (L) 12.0 - 16.0 g/dL    HCT 28.9 (L) 35.0 - 48.0 %    .0 (H) 80.0 - 100.0 fL    MCH 33.5 26.0 - 34.0 pg    MCHC 32.2 31.0 - 37.0 g/dL    RDW-SD 57.8 (H) 35.1 - 46.3 fL    RDW 15.7 (H) 11.0 - 15.0 %    .0 150.0 - 450.0 10(3)uL    Neutrophil Absolute Prelim 4.90 1.50 - 7.70 x10 (3) uL    Neutrophil Absolute 4.90 1.50 - 7.70 x10(3) uL    Lymphocyte Absolute 2.44 1.00 - 4.00 x10(3) uL    Monocyte Absolute 0.80 0.10 - 1.00 x10(3) uL    Eosinophil Absolute 0.01 0.00 - 0.70 x10(3) uL    Basophil Absolute 0.03 0.00 - 0.20 x10(3) uL    Immature Granulocyte Absolute 0.02 0.00 - 1.00 x10(3) uL    Neutrophil % 59.7 %    Lymphocyte % 29.8 %    Monocyte % 9.8 %    Eosinophil % 0.1 %    Basophil % 0.4 %    Immature Granulocyte % 0.2 %   Basic Metabolic Panel (8)    Collection Time: 08/16/24  4:39 AM   Result Value Ref Range    Glucose 79 70 - 99 mg/dL    Sodium 147 (H) 136 - 145 mmol/L    Potassium 3.8 3.5 - 5.1 mmol/L    Chloride 113 (H) 98 - 112 mmol/L    CO2 28.0 21.0 - 32.0 mmol/L    Anion Gap 6 0 - 18 mmol/L    BUN 15 9 - 23 mg/dL    Creatinine 0.58 0.55 - 1.02 mg/dL    BUN/CREA Ratio 25.9 (H) 10.0 - 20.0    Calcium, Total 8.8 8.7 - 10.4 mg/dL    Calculated Osmolality 304 (H) 275 - 295 mOsm/kg    eGFR-Cr 107 >=60 mL/min/1.73m2   CBC With Differential With Platelet    Collection Time: 08/16/24  4:39 AM   Result Value Ref Range    WBC 4.3 4.0 - 11.0 x10(3) uL    RBC 2.47 (L) 3.80 - 5.30 x10(6)uL    HGB 8.2 (L) 12.0 - 16.0 g/dL    HCT 26.7 (L) 35.0 - 48.0 %    .1 (H) 80.0 - 100.0 fL    MCH 33.2 26.0 - 34.0 pg    MCHC 30.7 (L) 31.0 - 37.0 g/dL    RDW-SD 58.1 (H) 35.1 - 46.3 fL    RDW 15.3 (H) 11.0 - 15.0 %    PLT  170.0 150.0 - 450.0 10(3)uL    Neutrophil Absolute Prelim 2.47 1.50 - 7.70 x10 (3) uL    Neutrophil Absolute 2.47 1.50 - 7.70 x10(3) uL    Lymphocyte Absolute 1.38 1.00 - 4.00 x10(3) uL    Monocyte Absolute 0.43 0.10 - 1.00 x10(3) uL    Eosinophil Absolute 0.04 0.00 - 0.70 x10(3) uL    Basophil Absolute 0.01 0.00 - 0.20 x10(3) uL    Immature Granulocyte Absolute 0.01 0.00 - 1.00 x10(3) uL    Neutrophil % 57.0 %    Lymphocyte % 31.8 %    Monocyte % 9.9 %    Eosinophil % 0.9 %    Basophil % 0.2 %    Immature Granulocyte % 0.2 %   Basic Metabolic Panel (8)    Collection Time: 08/17/24  5:59 AM   Result Value Ref Range    Glucose 102 (H) 70 - 99 mg/dL    Sodium 141 136 - 145 mmol/L    Potassium 3.3 (L) 3.5 - 5.1 mmol/L    Chloride 108 98 - 112 mmol/L    CO2 26.0 21.0 - 32.0 mmol/L    Anion Gap 7 0 - 18 mmol/L    BUN 12 9 - 23 mg/dL    Creatinine 0.57 0.55 - 1.02 mg/dL    BUN/CREA Ratio 21.1 (H) 10.0 - 20.0    Calcium, Total 8.9 8.7 - 10.4 mg/dL    Calculated Osmolality 292 275 - 295 mOsm/kg    eGFR-Cr 107 >=60 mL/min/1.73m2   CBC With Differential With Platelet    Collection Time: 08/17/24  5:59 AM   Result Value Ref Range    WBC 4.1 4.0 - 11.0 x10(3) uL    RBC 2.57 (L) 3.80 - 5.30 x10(6)uL    HGB 8.6 (L) 12.0 - 16.0 g/dL    HCT 26.3 (L) 35.0 - 48.0 %    .3 (H) 80.0 - 100.0 fL    MCH 33.5 26.0 - 34.0 pg    MCHC 32.7 31.0 - 37.0 g/dL    RDW-SD 53.1 (H) 35.1 - 46.3 fL    RDW 14.6 11.0 - 15.0 %    .0 150.0 - 450.0 10(3)uL    Neutrophil Absolute Prelim 2.02 1.50 - 7.70 x10 (3) uL    Neutrophil Absolute 2.02 1.50 - 7.70 x10(3) uL    Lymphocyte Absolute 1.54 1.00 - 4.00 x10(3) uL    Monocyte Absolute 0.41 0.10 - 1.00 x10(3) uL    Eosinophil Absolute 0.07 0.00 - 0.70 x10(3) uL    Basophil Absolute 0.01 0.00 - 0.20 x10(3) uL    Immature Granulocyte Absolute 0.02 0.00 - 1.00 x10(3) uL    Neutrophil % 49.7 %    Lymphocyte % 37.8 %    Monocyte % 10.1 %    Eosinophil % 1.7 %    Basophil % 0.2 %    Immature Granulocyte  % 0.5 %   CBC With Differential With Platelet    Collection Time: 08/18/24  6:26 AM   Result Value Ref Range    WBC 3.1 (L) 4.0 - 11.0 x10(3) uL    RBC 2.44 (L) 3.80 - 5.30 x10(6)uL    HGB 8.1 (L) 12.0 - 16.0 g/dL    HCT 25.4 (L) 35.0 - 48.0 %    .1 (H) 80.0 - 100.0 fL    MCH 33.2 26.0 - 34.0 pg    MCHC 31.9 31.0 - 37.0 g/dL    RDW-SD 53.7 (H) 35.1 - 46.3 fL    RDW 14.6 11.0 - 15.0 %    .0 150.0 - 450.0 10(3)uL    Neutrophil Absolute Prelim 1.65 1.50 - 7.70 x10 (3) uL    Neutrophil Absolute 1.65 1.50 - 7.70 x10(3) uL    Lymphocyte Absolute 0.97 (L) 1.00 - 4.00 x10(3) uL    Monocyte Absolute 0.35 0.10 - 1.00 x10(3) uL    Eosinophil Absolute 0.06 0.00 - 0.70 x10(3) uL    Basophil Absolute 0.02 0.00 - 0.20 x10(3) uL    Immature Granulocyte Absolute 0.01 0.00 - 1.00 x10(3) uL    Neutrophil % 53.9 %    Lymphocyte % 31.7 %    Monocyte % 11.4 %    Eosinophil % 2.0 %    Basophil % 0.7 %    Immature Granulocyte % 0.3 %       Medications:    sodium chloride    LORazepam    HYDROmorphone **OR** HYDROmorphone **OR** HYDROmorphone    phenol    diazepam    ondansetron **OR** ondansetron    pantoprazole **OR** pantoprazole    enoxaparin    diphenhydrAMINE     ROS: Generally still doing well. Reports rumbling in the abdomen, but not flatus or BM yet. Denies any nausea or vomiting. Appetite is present. Tolerating clear liquid diet. Ambulating. Pain well managed. Inc sites unremarkable, no LUCHO complaints    Drains:  LUCHO right 245 ml serosang x 24 hours  LUCHO left 120 ml serosang x 24 hours    Physical examination:    /68 (BP Location: Right arm)   Pulse 86   Temp 98.2 °F (36.8 °C) (Oral)   Resp 18   Ht 5' 7\" (1.702 m)   Wt 173 lb 6.4 oz (78.7 kg)   LMP 12/22/2020   SpO2 97%   BMI 27.16 kg/m²     GENERAL: alert and oriented, cooperative, in no acute distress  ABDOMEN: soft, non-tender. Bowel sounds normal. No masses,  no organomegaly  INCISION/SURGICAL WOUNDS: clean, dry and intact midline incision  site  PELVIC: Pelvic exam: examination not indicated.    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, mobilization of splenic flexure, repair of transverse colon mesentery, rectosigmoid colon resection with end-to-end anastomosis on 8/13/24      POD #5     Generally doing well     AVSS     Labs reviewed- Wbc (3.1), hgb (8.1), cr (0.5) stable     UOP improved, 700 over night     Awaiting flatus and BM, denies n/v    Tolerating clear liquid diet     PLAN:    If + flatus plan to advance to full liquid diet    Consult nutrition for clear ensure supplement     Continue IV fluids     Monitor I&O     Pain management as needed     LMWH     Continue to ambulate      Arleen Villalobos DNP, ELISEO  08/18/24

## 2024-08-19 LAB
ANION GAP SERPL CALC-SCNC: 8 MMOL/L (ref 0–18)
BASOPHILS # BLD AUTO: 0.01 X10(3) UL (ref 0–0.2)
BASOPHILS NFR BLD AUTO: 0.3 %
BUN BLD-MCNC: <5 MG/DL (ref 9–23)
CALCIUM BLD-MCNC: 8.6 MG/DL (ref 8.7–10.4)
CHLORIDE SERPL-SCNC: 111 MMOL/L (ref 98–112)
CO2 SERPL-SCNC: 26 MMOL/L (ref 21–32)
CREAT BLD-MCNC: 0.51 MG/DL
DEPRECATED RDW RBC AUTO: 53.7 FL (ref 35.1–46.3)
EGFRCR SERPLBLD CKD-EPI 2021: 110 ML/MIN/1.73M2 (ref 60–?)
EOSINOPHIL # BLD AUTO: 0.06 X10(3) UL (ref 0–0.7)
EOSINOPHIL NFR BLD AUTO: 2 %
ERYTHROCYTE [DISTWIDTH] IN BLOOD BY AUTOMATED COUNT: 14.5 % (ref 11–15)
GLUCOSE BLD-MCNC: 89 MG/DL (ref 70–99)
HCT VFR BLD AUTO: 25.7 %
HGB BLD-MCNC: 8.1 G/DL
IMM GRANULOCYTES # BLD AUTO: 0.01 X10(3) UL (ref 0–1)
IMM GRANULOCYTES NFR BLD: 0.3 %
LYMPHOCYTES # BLD AUTO: 1.08 X10(3) UL (ref 1–4)
LYMPHOCYTES NFR BLD AUTO: 35.8 %
MCH RBC QN AUTO: 33.1 PG (ref 26–34)
MCHC RBC AUTO-ENTMCNC: 31.5 G/DL (ref 31–37)
MCV RBC AUTO: 104.9 FL
MONOCYTES # BLD AUTO: 0.37 X10(3) UL (ref 0.1–1)
MONOCYTES NFR BLD AUTO: 12.3 %
NEUTROPHILS # BLD AUTO: 1.49 X10 (3) UL (ref 1.5–7.7)
NEUTROPHILS # BLD AUTO: 1.49 X10(3) UL (ref 1.5–7.7)
NEUTROPHILS NFR BLD AUTO: 49.3 %
PLATELET # BLD AUTO: 214 10(3)UL (ref 150–450)
POTASSIUM SERPL-SCNC: 3.4 MMOL/L (ref 3.5–5.1)
RBC # BLD AUTO: 2.45 X10(6)UL
SODIUM SERPL-SCNC: 145 MMOL/L (ref 136–145)
WBC # BLD AUTO: 3 X10(3) UL (ref 4–11)

## 2024-08-19 PROCEDURE — 85025 COMPLETE CBC W/AUTO DIFF WBC: CPT | Performed by: PHYSICIAN ASSISTANT

## 2024-08-19 PROCEDURE — 80048 BASIC METABOLIC PNL TOTAL CA: CPT | Performed by: PHYSICIAN ASSISTANT

## 2024-08-19 RX ORDER — SIMETHICONE 80 MG
80 TABLET,CHEWABLE ORAL 3 TIMES DAILY PRN
Status: DISCONTINUED | OUTPATIENT
Start: 2024-08-19 | End: 2024-08-24

## 2024-08-19 NOTE — PLAN OF CARE
Problem: Patient Centered Care  Goal: Patient preferences are identified and integrated in the patient's plan of care  Description: Interventions:  - What would you like us to know as we care for you?       - Provide timely, complete, and accurate information to patient/family  - Incorporate patient and family knowledge, values, beliefs, and cultural backgrounds into the planning and delivery of care  - Encourage patient/family to participate in care and decision-making at the level they choose  - Honor patient and family perspectives and choices  Outcome: Progressing     Problem: Patient/Family Goals  Goal: Patient/Family Long Term Goal  Description: Patient's Long Term Goal:         Interventions:  -   - See additional Care Plan goals for specific interventions  Outcome: Progressing  Goal: Patient/Family Short Term Goal  Description: Patient's Short Term Goal:     Interventions:   -   - See additional Care Plan goals for specific interventions  Outcome: Progressing     Problem: PAIN - ADULT  Goal: Verbalizes/displays adequate comfort level or patient's stated pain goal  Description: INTERVENTIONS:  - Encourage pt to monitor pain and request assistance  - Assess pain using appropriate pain scale  - Administer analgesics based on type and severity of pain and evaluate response  - Implement non-pharmacological measures as appropriate and evaluate response  - Consider cultural and social influences on pain and pain management  - Manage/alleviate anxiety  - Utilize distraction and/or relaxation techniques  - Monitor for opioid side effects  - Notify MD/LIP if interventions unsuccessful or patient reports new pain  - Anticipate increased pain with activity and pre-medicate as appropriate  Outcome: Progressing     Problem: SAFETY ADULT - FALL  Goal: Free from fall injury  Description: INTERVENTIONS:  - Assess pt frequently for physical needs  - Identify cognitive and physical deficits and behaviors that affect risk of  falls.  - [place] fall precautions as indicated by assessment.  - Educate pt/family on patient safety including physical limitations  - Instruct pt to call for assistance with activity based on assessment  - Modify environment to reduce risk of injury  - Provide assistive devices as appropriate  - Consider OT/PT consult to assist with strengthening/mobility  - Encourage toileting schedule  Outcome: Progressing     Problem: GASTROINTESTINAL - ADULT  Goal: Minimal or absence of nausea and vomiting  Description: INTERVENTIONS:  - Maintain adequate hydration with IV or PO as ordered and tolerated  - Nasogastric tube to low intermittent suction as ordered  - Evaluate effectiveness of ordered antiemetic medications  - Provide nonpharmacologic comfort measures as appropriate  - Advance diet as tolerated, if ordered  - Obtain nutritional consult as needed  - Evaluate fluid balance  Outcome: Progressing  Goal: Maintains or returns to baseline bowel function  Description: INTERVENTIONS:  - Assess bowel function  - Maintain adequate hydration with IV or PO as ordered and tolerated  - Evaluate effectiveness of GI medications  - Encourage mobilization and activity  - Obtain nutritional consult as needed  - Establish a toileting routine/schedule  - Consider collaborating with pharmacy to review patient's medication profile  Outcome: Progressing     Problem: GENITOURINARY - ADULT  Goal: Absence of urinary retention  Description: INTERVENTIONS:  - Assess patient’s ability to void and empty bladder  - Monitor intake/output and perform bladder scan as needed  - Follow urinary retention protocol/standard of care  - Consider collaborating with pharmacy to review patient's medication profile  - Implement strategies to promote bladder emptying  Outcome: Progressing     Problem: SKIN/TISSUE INTEGRITY - ADULT  Goal: Incision(s), wounds(s) or drain site(s) healing without S/S of infection  Description: INTERVENTIONS:  - Assess and  document risk factors for pressure ulcer development  - Assess and document skin integrity  - Assess and document dressing/incision, wound bed, drain sites and surrounding tissue  - Implement wound care per orders  - Initiate isolation precautions as appropriate  - Initiate Pressure Ulcer prevention bundle as indicated  Outcome: Progressing     Problem: MUSCULOSKELETAL - ADULT  Goal: Return mobility to safest level of function  Description: INTERVENTIONS:  - Assess patient stability and activity tolerance for standing, transferring and ambulating w/ or w/o assistive devices  - Assist with transfers and ambulation using safe patient handling equipment as needed  - Ensure adequate protection for wounds/incisions during mobilization  - Obtain PT/OT consults as needed  - Advance activity as appropriate  - Communicate ordered activity level and limitations with patient/family  Outcome: Progressing   Patient has not pass flatus yet but belching. Voids freely. Bilateral LUCHO drain intact. Tolerating clear liquid diet. Left LUCHO drain has leaking on the site, dressing changed. Surgical dressing clean, dry and intact. IV fluids infusing. Ambulates independently. Plan of care reviewed.

## 2024-08-19 NOTE — PAYOR COMM NOTE
--------------  CONTINUED STAY REVIEW    Payor: [employer]  Subscriber #:  [id number]  Authorization Number: [id number]    Admit date: 8/13/24  Admit time:  5:41 AM    REVIEW DOCUMENTATION:          8/16     INTERNAL MEDICINE PROGRESS NOTE     CHIEF COMPLAINT   Ovarian cancer s/p hysterectomy      SUBJECTIVE  24 HR EVENTS   NGT & yao out.  Anxiety this AM  Sodium elevated     Nausea without vomiting last night. No gas or BM from rectum. belching     Dilaudid PCA stopped. Switch to IV dilaudid PRN & toradol     INPATIENT MEDICATIONS  Scheduled Medications:    Scheduled Meds   pantoprazole, 40 mg, QAM AC   Or  pantoprazole, 40 mg, QAM AC  enoxaparin, 40 mg, Daily        Drips:     IV Meds   lactated ringers, Last Rate: 100 mL/hr at 08/16/24 0130         PRN Medications:     PRN Meds   LORazepam, 1 mg, Q6H PRN  HYDROmorphone, 0.2 mg, Q2H PRN   Or  HYDROmorphone, 0.4 mg, Q2H PRN   Or  HYDROmorphone, 0.8 mg, Q2H PRN  ketorolac, 15 mg, Q6H PRN  phenol, , Q2H PRN  diazepam, 5 mg, Q6H PRN  ondansetron, 4 mg, Q8H PRN   Or  ondansetron, 4 mg, Q8H PRN  diphenhydrAMINE, 12.5 mg, Q4H PRN          PHYSICAL EXAMINATION   Vitals:       /69 (BP Location: Right arm)   Pulse 57   Temp 97.7 °F (36.5 °C) (Oral)   Resp 18   Ht 5' 7\" (1.702 m)   Wt 173 lb 6.4 oz (78.7 kg)   LMP 12/22/2020   SpO2 100%   BMI 27.16 kg/m²   Gen:NAD, well nourished, NGT  Eyes:PERRLA, normal conjunctivae  ENMT:Moist mucous membranes, trachea midline  CV:RRR, no M/R/G, no peripheral edema  Resp:CTAB, non-labored respirations, symmetric expansion  GI:Soft, NT, ND, + BS, inc dressed, abd binder in place  MSK:        No C/C/E, normal active/passive ROM in C-spine  Skin:         No rashes, visible skin w/o worrisome lesions   Neuro:      CN 2-12 grossly intact, sensation intact   Psych:      A&Ox3, appropriate mood, conversant w/ linear thought process      DATA REVIEW: LABORATORY VALUES & DIAGNOSTICS         Recent Labs   Lab 08/13/24  6199  08/14/24  0509 08/15/24  0540 08/16/24  0439    143 144 147*   K 4.4 4.5 3.8 3.8    110 110 113*   CO2 26.0 27.0 28.0 28.0   BUN 12 13 15 15   CREATSERUM 0.74 0.73 0.70 0.58   ANIONGAP 6 6 6 6   * 121* 91 79   CA 8.5* 8.8 8.7 8.8   EGFRCR 95 97 102 107             Recent Labs   Lab 08/13/24  1726 08/14/24  0508 08/15/24  0540 08/16/24  0439   WBC 10.8 8.7 8.2 4.3   HGB 11.3* 10.2* 9.3* 8.2*   HCT 34.8* 31.9* 28.9* 26.7*   .0 192.0 176.0 170.0   .0* 102.2* 104.0* 108.1*   MOPERCENT 3.8 9.7 9.8 9.9   EOPERCENT 0.0 0.0 0.1 0.9   BAPERCENT 0.1 0.1 0.4 0.2      ASSESSMENT & PLAN   Yue Malin - 54 year old female w adenocarcinoma admitted 8/13/2024 for post op care after planned CHELSIE-BSO, colon resection for her gynecologic adenoca w Dr. Choudhary     Gynecologic adenocarcinoma w peritoneal carcinomatosis  S/p chemo  Now s/p CHELSIE-BSO, rectosigmoid colon resection w end-end anastomosis due to tumor involvement  - Post-op care per Dr. Choudhary's team. NGT & yao removed.  - Trend labs  - Dilaudid PCA stopped. Switch to IV dilaudid PRN & toradol. Avoid narcotics if not needed  - IVF until tolerating PO  - Up, OOB, IS use, ambulate  - Add IV valium for NGT discomfort/anxiety     Hypernatremia  - 2/2 NPO & IVF  - Switch IVF to D5  [  ]  trend BMP     Anemia, post-op - 2/2 expected blood loss w/ dilutional component from IVF   - No active external bleeding noted  - Daily CBC     Anxiety (ACOSTA)  - Stable, continue PTA ativan PRN as able  - Add IV valium for NGT discomfort/anxiety     ACP: Full code  Ppx: DVT: Enox  Dispo  EDoD:  TBD. Suspect 2-3 days  F/u:   - PCP:  Diana Black MD  - Onc  - Gyne/onc           8/17 IM    Active Problems:    Ovarian cancer (HCC)     Yue Malin - 54 year old female w adenocarcinoma admitted 8/13/2024 for post op care after planned CHELSIE-BSO, colon resection for her gynecologic adenoca w Dr. Choudhary     Gynecologic adenocarcinoma w peritoneal carcinomatosis  S/p  chemo  Now s/p CHELSIE-BSO, rectosigmoid colon resection w end-end anastomosis due to tumor involvement  - Post-op care per Dr. Choudhary's team. NGT & yao removed.  - Trend labs  - s/p dilaudid PCA, continue IV dilaudid PRN and IV toradol, avoid narcotics if able  - IVF until tolerating PO  - Up, OOB, IS use, ambulate  - Add IV valium for NGT discomfort/anxiety     Hypernatremia- resolved  - 2/2 NPO & IVF  - Switch IVF to D5  - switch back to NS     Anemia, post-op - 2/2 expected blood loss w/ dilutional component from IVF   - No active external bleeding noted  - Daily CBC     Anxiety (ACOSTA)  - Stable, continue PTA ativan PRN as able  - Add IV valium for NGT discomfort/anxiety     ACP: Full code  Ppx: DVT: Enox  Dispo  EDoD:  TBD. Possibly Tuesday  F/u:   - PCP:  Diana Black MD  - Onc  - Gyne/onc     Available via epic secure chat or perfect serve 7A - 7P.     Kim Jean MD  [facility]      Subjective:      Feels ok, no flatus yet, +belching, feels some stomach grumbling. Tolerating clears.      OBJECTIVE:     Blood pressure 124/74, pulse 66, temperature 98.6 °F (37 °C), temperature source Oral, resp. rate 18, height 5' 7\" (1.702 m), weight 173 lb 6.4 oz (78.7 kg), last menstrual period 12/22/2020, SpO2 100%, not currently breastfeeding.     Temp:  [98.1 °F (36.7 °C)-98.6 °F (37 °C)] 98.6 °F (37 °C)  Pulse:  [56-66] 66  Resp:  [18] 18  BP: ()/(60-74) 124/74  SpO2:  [98 %-100 %] 100 %        Intake/Output:     Intake/Output Summary (Last 24 hours) at 8/17/2024 1218  Last data filed at 8/17/2024 0918      Gross per 24 hour   Intake --   Output 1025 ml   Net -1025 ml              Last 3 Weights   08/13/24 0549 173 lb 6.4 oz (78.7 kg)   08/02/24 1128 180 lb (81.6 kg)   01/05/21 0831 190 lb 1.6 oz (86.2 kg)   11/22/17 0808 189 lb (85.7 kg)      Exam   GEN: female in NAD  HEENT: EOMI  Pulm: CTAB, no crackles or wheezes  CV: RRR, no murmurs  ABD: Soft, non-tender,  non-distended, +BS  SKIN: warm, dry  EXT: no edema     Data Review:       Labs:            Recent Labs   Lab 08/15/24  0540 08/16/24  0439 08/17/24  0559   RBC 2.78* 2.47* 2.57*   HGB 9.3* 8.2* 8.6*   HCT 28.9* 26.7* 26.3*   .0* 108.1* 102.3*   MCH 33.5 33.2 33.5   MCHC 32.2 30.7* 32.7   RDW 15.7* 15.3* 14.6   NEPRELIM 4.90 2.47 2.02   WBC 8.2 4.3 4.1   .0 170.0 194.0                  Recent Labs   Lab 08/15/24  0540 08/16/24  0439 08/17/24  0559   GLU 91 79 102*   BUN 15 15 12   CREATSERUM 0.70 0.58 0.57   EGFRCR 102 107 107   CA 8.7 8.8 8.9    147* 141   K 3.8 3.8 3.3*    113* 108   CO2 28.0 28.0 26.0         No results for input(s): \"ALT\", \"AST\", \"ALB\", \"AMYLASE\", \"LIPASE\", \"LDH\" in the last 168 hours.     Invalid input(s): \"ALPHOS\", \"TBIL\", \"DBIL\", \"TPROT\"        Imaging:  XR CHEST AP PORTABLE  (CPT=71045)     Result Date: 8/15/2024  CONCLUSION:          Patchy left lower lobe airspace opacity may be secondary to atelectasis or pneumonia.  Right port with tip in distal SVC.  Preliminary report was submitted by the [employer] teleradiologist and there are no significant discrepancies.  elm-[email or web address]    Dictated by (CST): Jamal Menendez MD on 8/15/2024 at 12:35 PM     Finalized by (CST): Jamal Menendez MD on 8/15/2024 at 12:38 PM               Meds:      INPATIENT MEDICATIONS     Scheduled Medications:                                            Scheduled Meds   pantoprazole, 40 mg, QAM AC   Or  pantoprazole, 40 mg, QAM AC  enoxaparin, 40 mg, Daily                                                               Drips:    IV Meds   dextrose, Last Rate: 83 mL/hr at 08/16/24 1430            PRN Medications    PRN Meds       8/18 IM     Active Problems:    Ovarian cancer (HCC)     Yue Malin - 54 year old female w adenocarcinoma admitted 8/13/2024 for post op care after planned CHELSIE-BSO, colon resection for her gynecologic adenoca w Dr. Choudhary     Gynecologic adenocarcinoma w peritoneal carcinomatosis  S/p  chemo  Now s/p CHELSIE-BSO, rectosigmoid colon resection w end-end anastomosis due to tumor involvement  - Post-op care per Dr. Choudhary's team. NGT & yao removed.  - Trend labs  - s/p dilaudid PCA, continue IV dilaudid PRN and IV toradol, avoid narcotics if able  - IVF until tolerating PO  - Up, OOB, IS use, ambulate  - s/p NGT removal     Hypernatremia- resolved  - 2/2 NPO & IVF  - Switch IVF to D5  - switch back to NS     Anemia, post-op - 2/2 expected blood loss w/ dilutional component from IVF   - No active external bleeding noted  - Daily CBC     Anxiety (ACOSTA)  - Stable, continue PTA ativan PRN as able  - Add IV valium for NGT discomfort/anxiety     ACP: Full code  Ppx: DVT: Enox  Dispo  EDoD:  TBD. Possibly Tuesday  F/u:   - PCP:  Diana Black MD  - Onc  - Gyne/onc     Kim Jean MD  [facility]  [place]      Subjective:      No nausea, still no flatus. Does feel stomach grumbly,      OBJECTIVE:     Blood pressure 131/70, pulse 86, temperature 97.9 °F (36.6 °C), temperature source Oral, resp. rate 18, height 5' 7\" (1.702 m), weight 173 lb 6.4 oz (78.7 kg), last menstrual period 12/22/2020, SpO2 99%, not currently breastfeeding.     Temp:  [97.9 °F (36.6 °C)-98.6 °F (37 °C)] 97.9 °F (36.6 °C)  Pulse:  [66-86] 86  Resp:  [18] 18  BP: (108-131)/(68-74) 131/70  SpO2:  [97 %-100 %] 99 %        Intake/Output:     Intake/Output Summary (Last 24 hours) at 8/18/2024 1143  Last data filed at 8/18/2024 1041      Gross per 24 hour   Intake 1040 ml   Output 2810 ml   Net -1770 ml              Last 3 Weights   08/13/24 0549 173 lb 6.4 oz (78.7 kg)   08/02/24 1128 180 lb (81.6 kg)   01/05/21 0831 190 lb 1.6 oz (86.2 kg)   11/22/17 0808 189 lb (85.7 kg)      Exam   GEN: female in NAD  HEENT: EOMI  Pulm: CTAB, no crackles or wheezes  CV: RRR, no murmurs  ABD: Soft, non-tender, non-distended, +BS, hypoactive   SKIN: warm, dry  EXT: no edema     Data Review:       Labs:            Recent  Labs   Lab 08/16/24  0439 08/17/24  0559 08/18/24  0626   RBC 2.47* 2.57* 2.44*   HGB 8.2* 8.6* 8.1*   HCT 26.7* 26.3* 25.4*   .1* 102.3* 104.1*   MCH 33.2 33.5 33.2   MCHC 30.7* 32.7 31.9   RDW 15.3* 14.6 14.6   NEPRELIM 2.47 2.02 1.65   WBC 4.3 4.1 3.1*   .0 194.0 190.0                  Recent Labs   Lab 08/16/24  0439 08/17/24  0559 08/18/24  0626   GLU 79 102* 93   BUN 15 12 6*   CREATSERUM 0.58 0.57 0.51*   EGFRCR 107 107 110   CA 8.8 8.9 8.5*   * 141 142   K 3.8 3.3* 3.5   * 108 108   CO2 28.0 26.0 27.0         No results for input(s): \"ALT\", \"AST\", \"ALB\", \"AMYLASE\", \"LIPASE\", \"LDH\" in the last 168 hours.     Invalid input(s): \"ALPHOS\", \"TBIL\", \"DBIL\", \"TPROT\"        Imaging:  No results found.        Meds:      INPATIENT MEDICATIONS     Scheduled Medications:                                            Scheduled Meds   pantoprazole, 40 mg, QAM AC   Or  pantoprazole, 40 mg, QAM AC  enoxaparin, 40 mg, Daily                                                               Drips:    IV Meds   sodium chloride, Last Rate: 83 mL/hr at 08/17/24 1230            PRN Medications    PRN Meds   LORazepam, 1 mg, Q6H PRN  HYDROmorphone, 0.2 mg, Q2H PRN   Or  HYDROmorphone, 0.4 mg, Q2H PRN   Or  HYDROmorphone, 0.8 mg, Q2H PRN  phenol, , Q2H PRN  diazepam, 5 mg, Q6H PRN  ondansetron, 4 mg, Q8H PRN   Or  ondansetron, 4 mg, Q8H PRN  diphenhydrAMINE, 12.5 mg, Q4H PRN                              MEDICATIONS ADMINISTERED IN LAST 1 DAY:  enoxaparin (Lovenox) 40 MG/0.4ML SUBQ injection 40 mg       Date Action Dose Route User    8/19/2024 0845 Given 40 mg Subcutaneous (Right Lower Abdomen) Tomeka Dempsey RN          LORazepam (Ativan) tab 1 mg       Date Action Dose Route User    8/18/2024 2051 Given 1 mg Oral Tyra Marino RN          pantoprazole (Protonix) DR tab 40 mg       Date Action Dose Route User    8/19/2024 0527 Given 40 mg Oral Tyra Marino RN          sodium chloride 0.9% infusion       Date  Action Dose Route User    8/19/2024 1100 New Bag (none) Intravenous Tomeka Dempsey, CORBY            lactated ringers infusion  Rate: 100 mL/hr  Freq: Continuous Route: IV  Start: 08/13/24 1545 End: 08/16/24 1425    1557 WM-Restarted [C]      0523 MF-New Bag     1616 KW-New Bag      1554 LUCHO-New Bag      0130 RG-New Bag     1105 CW-New Bag     1425-D/C'd            sodium chloride 0.9% infusion  Rate: 83 mL/hr  Freq: Continuous Route: IV  Start: 08/17/24 1230        1230 SE-New Bag      1153 AJ-New Bag      1100 AJ-New Bag                  Medications 08/13/24 08/14/24 08/15/24 08/16/24 08/17/24 08/18/24 08/19/24   dextrose 5% infusion  Rate: 83 mL/hr  Freq: Continuous Route: IV  Start: 08/16/24 1430 End: 08/17/24 1220       1430 BG-New Bag      1220-D/C'd  (1225 SE)-Not Given          HYDROmorphone in sodium chloride 0.9% (Dilaudid) 20 mg/100mL PCA premix  Freq: Continuous Route: IV  Start: 08/13/24 1345 End: 08/15/24 0728   Admin Instructions:   (Conc = 0.2 mg/mL) Administer via CADD Pump.   Order specific questions:       1411 WY/AT-New Bag     1544 WM/WY-Handoff      1945 MW/KW-Handoff      0728-D/C'd  0748 BG/MW-Handoff            lactated ringers infusion  Rate: 100 mL/hr  Freq: Continuous Route: IV  Start: 08/13/24 1545 End: 08/16/24 1425    1557 WM-Restarted [C]      0523 MF-New Bag     1616 KW-New Bag      1554 LUCHO-New Bag      0130 RG-New Bag     1105 CW-New Bag     1425-D/C'd             Vitals (last day)       Date/Time Temp Pulse Resp BP SpO2 Weight O2 Device O2 Flow Rate (L/min) [facility]    08/19/24 0526 98 °F (36.7 °C) -- 19 114/76 96 % -- None (Room air) -- VT    08/18/24 2049 97.4 °F (36.3 °C) -- -- -- -- -- None (Room air) -- VT    08/18/24 2049 -- -- 20 126/79 100 % -- -- --     08/18/24 2000 -- 68 -- -- -- -- -- -- KD    08/18/24 1024 97.9 °F (36.6 °C) -- 18 131/70 99 % -- None (Room air) --     08/18/24 0414 98.2 °F (36.8 °C) 86 18 108/68 97 % -- None (Room air) -- CB         08/18/24 2049 -- -- 20  126/79 100 % -- -- -- GH   08/18/24 2000 -- 68 -- -- -- -- -- -- KD   08/18/24 1024 97.9 °F (36.6 °C) -- 18 131/70 99 % -- None (Room air) -- GH   08/18/24 0414 98.2 °F (36.8 °C) 86 18 108/68 97 % -- None (Room air) -- CB   08/17/24 2022 98 °F (36.7 °C) 70 18 116/69 100 % -- None (Room air) -- CB   08/17/24 1900 -- 68 -- -- -- -- -- -- KD   08/17/24 1144 98.6 °F (37 °C) 66 18 124/74 100 % -- -- -- EP   08/17/24 1144 -- -- -- -- -- -- None (Room air) -- SE               08/17/24 0553 -- -- -- 128/67 -- -- -- -- VT   08/17/24 0301 98.1 °F (36.7 °C) 56 18 99/60 98 % -- None (Room air) -- CB   08/16/24 2134 98.3 °F (36.8 °C) 60 18 117/64 99 % -- None (Room air) -- CB   08/16/24 1900 -- 66 -- -- -- -- -- -- MO   08/16/24 1215 98 °F (36.7 °C) 70 18 121/67 100 % -- None (Room air) -- SS   08/16/24 0427 97.7 °F (36.5 °C) 57 18 119/69 100 % -- None (Room air) -- RG   08/15/24 1943 98.2 °F (36.8 °C) 73 16 133/71 98 % -- None (Room air) -- CB   08/15/24 1503 97 °F (36.1 °C) 70 18 127/55 100 % -- None (Room air) --

## 2024-08-19 NOTE — PROGRESS NOTES
[facility]   POST-OPERATIVE VISIT     MEDICAL RECORD #: [id number]   DATE OF SERVICE: 8/19/2024             Reason for visit:  Post-operative Visit  POD#6    Procedure:    54 year old female with a history of ovarian cancer- s/p  Exploratory laparotomy, total abdominal hysterectomy, bilateral salpingo-oophorectomy, bilateral ureterolysis, bilateral pelvic lymph node dissection, tumor debulking of various peritoneal surfaces, argon beam coagulation of peritoneal tumor nodules, infracolic-supracolic omentectomy, and resection of gastrosplenic ligament with tumor attached to tip of spleen, mobilization of splenic flexure, repair of transverse colon mesentery, rectosigmoid colon resection with end-to-end anastomosis on 8/13/24       Pathology/Lab Results:  Recent Results (from the past 720 hour(s))   ABORH (Blood Type)    Collection Time: 08/02/24 12:33 PM   Result Value Ref Range    ABO BLOOD TYPE O     RH BLOOD TYPE Negative    Antibody Screen    Collection Time: 08/02/24 12:33 PM   Result Value Ref Range    Antibody Screen Negative    ABORH Confirmation    Collection Time: 08/13/24  7:00 AM   Result Value Ref Range    ABO BLOOD TYPE O     RH BLOOD TYPE Negative    Specimen to Pathology Tissue    Collection Time: 08/13/24  9:09 AM   Result Value Ref Range    Case Report       Surgical Pathology                                Case: [id number]                                  Authorizing Provider:  Binh Choudhary MD         Collected:           08/13/2024 09:09 AM          Ordering Location:     [facility]          Received:            08/13/2024 01:52 PM                                 Operating Room                                                               Pathologist:           Paul Wade MD                                                             Specimens:   A) - Uterus and cervix, bilateral tubes and ovaries, 1. uterus, cervix, bilateral                   tubes and ovaries,  anterior bladder, peritoneal tumor                                               B) - Lymph node pelvic left, 2. Left pelvic lymph nodes                                             C) - Lymph node pelvic right, 3. Right pelvic lymph nodes                                           D) - Tissue, 4. left paracolic tumor                                                                 E) - Tissue, 5. Tumor attached to splenic flexure                                                   F) - Tissue, 6. nodule attached to small bowel                                                      G) - Tissue, 7. nodule attached to cecum                                                            H) - Tissue, 8. nodule attached to right diaphragm                                                  I) - Tissue, 9. infracolic, supracolic, splenocolic omentum                                         J) - Colon, 10. Rectal sigmoid colon with tumor                                            Final Diagnosis:         A. Uterus, cervix, bilateral fallopian tubes, ovaries, anterior bladder, peritoneal tumor; total abdominal hysterectomy, bilateral salpingo-oophorectomy, bilateral ureterolysis, bilateral pelvic lymph node dissection, tumor debulking:   Left ovary with high-grade serous carcinoma, with focal clear cell change, 1.2 cm in greatest dimension.  Carcinoma is seen involving the endometrium (focal, superficial) uterine serosa, right parametrium, left fallopian tube (surface), left para-adnexal soft tissue, and right ovary (focal).  Lymphovascular invasion is identified.  Ectocervix with no significant pathologic changes identified.  Endocervix with nabothian cysts; no significant pathologic changes identified.  Right fallopian tube with no significant pathologic changes identified.  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  ypT3c, N0.  See comment.    B. Left pelvic lymph node; biopsy:  Five lymph nodes negative for metastatic  carcinoma (0/5).    C. Right pelvic lymph nodes; biopsy:  Two lymph nodes negative for metastatic carcinoma (0/2).    D. Left paracolic tumor; biopsy:  Fibroconnective tissue involved by high-grade serous carcinoma.    E. Splenic flexure tumor; biopsy:   Fibroadipose tissue involved by high-grade serous carcinoma.    F. Small bowel nodule:  Nodular fibrotic tissue with chronic inflammation and calcifications.  No definitive viable carcinoma identified.    G. Cecal nodule:  Nodular fibrotic tissue with chronic inflammation.  No definitive viable carcinoma identified.    H. Right diaphragm nodule:  Fibroadipose tissue with extensive hemorrhage and acute inflammation.  No definitive viable carcinoma identified.    I. Infra colic, supracolic, splenocolic omentum; excision:   Omental fibroadipose tissue with extensive involvement by high-grade serous carcinoma.  Largest tumor nodule measures 3.8 cm.  See comment.    J. Rectosigmoid colon tumor; excision:   Segment of colon, 10.5 in length, demonstrating high-grade serous carcinoma extensively involving the pericolonic soft tissue and serosa.  Tumor is present less than 1 mm from the mesorectal/radial margin.  One lymph node, negative for metastatic carcinoma (0/1).  Mucosa at surgical margins is viable and free of carcinoma.    Comment:  The patient has a history of adenocarcinoma of gynecological origin diagnosed on previous omental mass biopsy and ascites fluid, both in March 2024, per notes, status post neoadjuvant chemotherapy x4 cycles with good response.     Immunohistochemical stains performed on the carcinoma present in the omentum (block I3) demonstrate that the tumor cells are positive for pankeratin (AE1/AE3), CK7, PAX8, WT1, and ER and negative for CK20 and p53, consistent with a high-grade serous carcinoma.    For  purposes, this case was reviewed by a second pathologist who concurred with the diagnosis.    The immunohistochemical stains  interpreted in this case demonstrated appropriate reactivity of the positive controls. These stains were performed on formalin-fixed, paraffin-embedded tissue sections with each antibody analysis being performed on a separate slide        Embedded Images      Synoptic Report       OVARY or FALLOPIAN TUBE or PRIMARY PERITONEUM   OVARY OR FALLOPIAN TUBE OR PRIMARY PERITONEUM - All Specimens   8th Edition - Protocol posted: 3/22/2023      SPECIMEN      Procedure:    Total hysterectomy and bilateral salpingo-oophorectomy       Hysterectomy Type:    Abdominal       Specimen Integrity:            Right Ovary Integrity:    Capsule intact       Specimen Integrity:            Left Ovary Integrity:    Capsule intact         Uterus Integrity:    Intact       TUMOR      Tumor Site:    Left ovary       Tumor Size:    Greatest Dimension (Centimeters): 1.2 cm      Histologic Type:    High grade serous carcinoma       Histologic Grade:    High grade       Ovarian Surface Involvement:    Not identified       Fallopian Tube Surface Involvement:    Present, left       Implants:    Present: Left paracolic, splenic flexure       Other Tissue / Organ Involvement:    Right ovary       Other Tissue / Organ Involvement:    Left fallopian tube       Other Tissue / Organ Involvement:    Uterine corpus       Other Tissue / Organ Involvement:    Abdominal peritoneum       Other Tissue / Organ Involvement:    Omentum       Other Tissue / Organ Involvement:    Rectosigmoid colon       Largest Extrapelvic Peritoneal Focus:    Macroscopic (greater than 2 cm): 3.8 cm       Peritoneal / Ascitic Fluid Involvement:    Not submitted / unknown       Chemotherapy Response Score (CRS):    CRS2 (moderate response)       REGIONAL LYMPH NODES      Regional Lymph Node Status:            :    All regional lymph nodes negative for tumor cells         Number of Lymph Nodes Examined:    8           Faiza Site(s) Examined:    Right pelvic           Faiza Site(s)  Examined:    Left pelvic           Faiza Site(s) Examined:    Pericolonic       pTNM CLASSIFICATION (AJCC 8th Edition)      Reporting of pT, pN, and (when applicable) pM categories is based on information available to the pathologist at the time the report is issued. As per the AJCC (Chapter 1, 8th Ed.) it is the managing physician’s responsibility to establish the final pathologic stage based upon all pertinent information, including but potentially not limited to this pathology report.      Modified Classification:    y       pT Category:    pT3c       pN Category:    pN0       FIGO STAGE      FIGO Stage:    IIIC       Clinical Information       Adenocarcinoma.        Gross Description       Specimen A is submitted in formalin labeled “Horvat, uterus, cervix, bilateral tubes and ovaries, anterior bladder, peritoneal tumor” and consists of a 98 gram total hysterectomy including uterus with cervix, bilateral fallopian tubes and ovaries and a flap of tissue attached to the lower uterine segment (possible bladder flap). The uterus and cervix measure 7.6 cm in length, 4.0 cm from cornu to cornu, 3.4 cm from anterior to posterior. The uterus is covered with pink-tan, smooth serosa. The ectocervix is covered with pink-tan smooth serosa. The ectocervix is covered with pink-tan, smooth mucosa and measures 2.5 x 2.4 cm in transverse dimension. The cervical os is stenosed and measures less than 0.1 cm in diameter. The anterior aspect is inked blue, the posterior aspect is inked black. The cervix and uterus are bivalved to reveal a pink-tan endocervix with a 2.5 cm endocervical canal. The endometrium is pink-tan and smooth with a 4.5 x 2.5 cm endometrial cavity. The uterus is sectioned to reveal a single posterior intramural fibroid (0.4 cm in greatest dimension). The remaining myometrium is pink-tan and trabeculated with no lesions or masses identified within the endomyometrium. The anterior bladder flap presents possible  tumor nodules (1.2 cm in greatest dimension).   The left fallopian tube measures 5.5 cm in length x 0.6 cm in diameter. The left ovary measures 2.5 x 1.5 x 1.2 cm. The left fallopian tube, ovary and para adnexal tissue are covered with pink-gray adhesions and possible nodules.  The right fallopian tube measures 5.0 cm in length x 0.4 cm in diameter and is grossly unremarkable. The right ovary is yellowish pink and lobulated, measures 2.0 x 1.5 x 0.8 cm and is grossly unremarkable.    Representative sections are submitted as follows: A1 - anterior cervix, longitudinal section; A2 - posterior cervix, longitudinal section; A3-A7 - full thickness anterior endomyometrium, from lower to upper, including bladder flap and possible tumor nodules; A8-A9 - full thickness posterior endomyometrium including fibroid; A10 - left parametrial soft tissue; A11 - right parametrial soft tissue; A12 - sections of left fallopian tube; A13 - left ovary, bisected, entirely; A14-A15 - left para adnexal tissue with possible nodules; A16 - sections of right fallopian tube; A17 - right ovary, bisected, entirely.     Specimen B is submitted in formalin labeled “Horvat, left pelvic lymph node” and consists of one piece of yellow-tan, fatty tissue measuring 3.8 x 3.1 x 0.8 cm. Dissection of the tissue reveals five lymph node candidates (0.4 to 1.5 cm in greatest dimension). The two larger candidates are submitted in cassette B1. The remaining candidates are submitted in cassette B2.     Specimen C is submitted in formalin labeled “Horvat, right pelvic lymph node” and consists of two pieces of yellow-tan, fatty tissue measuring in aggregate 2.8 x 2.3 x 0.7 cm. Dissection of the tissue reveals two lymph node candidates (0.8 and 1.3 cm in greatest dimension). Both candidates are submitted entirely in cassette C1.     Specimen D is submitted in formalin labeled “Horvat, left paracolic tumor” and consists of one pink-gray soft tissue nodule measuring  2.0 x 1.9 x 0.8 cm. The nodule is bisected and submitted entirely in cassette D1.     Specimen E is submitted in formalin labeled “Horvat, tumor attached to splenic flexure” and consists of one pink-gray soft tissue nodule measuring 1.8 x 1.5 x 0.7 cm. The specimen is submitted entirely in cassette E1.    Specimen F is submitted in formalin labeled “Horvat, nodule attached to small bowel” and consists of multiple pieces of pink-gray soft tissue measuring in aggregate 2.4 x 1.9 x 0.4 cm. The specimen is submitted entirely in cassette F1.    Specimen G is submitted in formalin labeled “Horvat, nodule attached to cecum” and consists of two pink-gray soft tissue nodules measuring in aggregate 0.7 x 0.6 x 0.3 cm. The specimen is submitted entirely in cassette G1.    Specimen H is submitted in formalin labeled “Horvat, nodule attached to right diaphragm” and consists of one piece of red-tan, soft tissue measuring 1.2 x 0.5 x 0.3 cm. The specimen is submitted entirely in cassette H1.    Specimen I is submitted in formalin labeled “Horvat, infra colic, supra colic, splenocolic omentum” and consists of one piece of yellow-tan omentum measuring 21.5 x 13.7 x 3.6 cm. The specimen is sectioned to reveal multiple pink-gray nodules embedded within the omentum (0.2 to 3.8 cm in greatest dimension). Representative sections are submitted as follows: I1 - largest nodule; I2-I4 - other nodules.     Specimen J is submitted in formalin labeled “Horvat, rectosigmoid colon with tumor” and consists of an unoriented segment of colon with mesentery and overlying adhesions measuring 10.5 cm in length x 2.6 cm in diameter. The specimen is received with two stapled margins. The proximal margin is inked blue, the distal margin is inked green, the mesorectal/radial margin is inked black. The specimen is opened longitudinally to reveal a pink-tan, folded mucosa with no lesions, masses or perforations grossly identified. No definitive masses,  lesions or nodules are identified within the omentum or mesorectum.   Dissection of the pericolonic tissue reveals one lymph node candidate (0.8 cm in greatest dimension).   Representative sections are submitted as follows: J1 - proximal en face margin; J2 - distal en face margin; J3 - random mesorectal/radial margin, perpendicular section; J4-J8 - random sections of colon wall with pericolonic tissue; J9 - one lymph node candidate; J10-J12 - dissected pericolonic tissue. (jq)    Paul Wade M.D./[facility] [place]        Interpretation Malignant (A)     POCT Hemocue    Collection Time: 08/13/24 11:24 AM   Result Value Ref Range    Hemocue 10.6 (A) 12.0 - 16.0 g/dl   Basic Metabolic Panel (8)    Collection Time: 08/13/24  5:26 PM   Result Value Ref Range    Glucose 166 (H) 70 - 99 mg/dL    Sodium 142 136 - 145 mmol/L    Potassium 4.4 3.5 - 5.1 mmol/L    Chloride 110 98 - 112 mmol/L    CO2 26.0 21.0 - 32.0 mmol/L    Anion Gap 6 0 - 18 mmol/L    BUN 12 9 - 23 mg/dL    Creatinine 0.74 0.55 - 1.02 mg/dL    BUN/CREA Ratio 16.2 10.0 - 20.0    Calcium, Total 8.5 (L) 8.7 - 10.4 mg/dL    Calculated Osmolality 298 (H) 275 - 295 mOsm/kg    eGFR-Cr 95 >=60 mL/min/1.73m2   CBC With Differential With Platelet    Collection Time: 08/13/24  5:26 PM   Result Value Ref Range    WBC 10.8 4.0 - 11.0 x10(3) uL    RBC 3.38 (L) 3.80 - 5.30 x10(6)uL    HGB 11.3 (L) 12.0 - 16.0 g/dL    HCT 34.8 (L) 35.0 - 48.0 %    .0 (H) 80.0 - 100.0 fL    MCH 33.4 26.0 - 34.0 pg    MCHC 32.5 31.0 - 37.0 g/dL    RDW-SD 57.5 (H) 35.1 - 46.3 fL    RDW 15.7 (H) 11.0 - 15.0 %    .0 150.0 - 450.0 10(3)uL    Neutrophil Absolute Prelim 10.03 (H) 1.50 - 7.70 x10 (3) uL    Neutrophil Absolute 10.03 (H) 1.50 - 7.70 x10(3) uL    Lymphocyte Absolute 0.30 (L) 1.00 - 4.00 x10(3) uL    Monocyte Absolute 0.41 0.10 - 1.00 x10(3) uL    Eosinophil Absolute 0.00 0.00 - 0.70 x10(3) uL    Basophil Absolute 0.01 0.00 - 0.20 x10(3) uL    Immature Granulocyte Absolute 0.03 0.00 -  1.00 x10(3) uL    Neutrophil % 93.0 %    Lymphocyte % 2.8 %    Monocyte % 3.8 %    Eosinophil % 0.0 %    Basophil % 0.1 %    Immature Granulocyte % 0.3 %   CBC With Differential With Platelet    Collection Time: 08/14/24  5:08 AM   Result Value Ref Range    WBC 8.7 4.0 - 11.0 x10(3) uL    RBC 3.12 (L) 3.80 - 5.30 x10(6)uL    HGB 10.2 (L) 12.0 - 16.0 g/dL    HCT 31.9 (L) 35.0 - 48.0 %    .2 (H) 80.0 - 100.0 fL    MCH 32.7 26.0 - 34.0 pg    MCHC 32.0 31.0 - 37.0 g/dL    RDW-SD 57.0 (H) 35.1 - 46.3 fL    RDW 15.6 (H) 11.0 - 15.0 %    .0 150.0 - 450.0 10(3)uL    Neutrophil Absolute Prelim 6.66 1.50 - 7.70 x10 (3) uL    Neutrophil Absolute 6.66 1.50 - 7.70 x10(3) uL    Lymphocyte Absolute 1.18 1.00 - 4.00 x10(3) uL    Monocyte Absolute 0.85 0.10 - 1.00 x10(3) uL    Eosinophil Absolute 0.00 0.00 - 0.70 x10(3) uL    Basophil Absolute 0.01 0.00 - 0.20 x10(3) uL    Immature Granulocyte Absolute 0.03 0.00 - 1.00 x10(3) uL    Neutrophil % 76.4 %    Lymphocyte % 13.5 %    Monocyte % 9.7 %    Eosinophil % 0.0 %    Basophil % 0.1 %    Immature Granulocyte % 0.3 %   Basic Metabolic Panel (8)    Collection Time: 08/14/24  5:09 AM   Result Value Ref Range    Glucose 121 (H) 70 - 99 mg/dL    Sodium 143 136 - 145 mmol/L    Potassium 4.5 3.5 - 5.1 mmol/L    Chloride 110 98 - 112 mmol/L    CO2 27.0 21.0 - 32.0 mmol/L    Anion Gap 6 0 - 18 mmol/L    BUN 13 9 - 23 mg/dL    Creatinine 0.73 0.55 - 1.02 mg/dL    BUN/CREA Ratio 17.8 10.0 - 20.0    Calcium, Total 8.8 8.7 - 10.4 mg/dL    Calculated Osmolality 297 (H) 275 - 295 mOsm/kg    eGFR-Cr 97 >=60 mL/min/1.73m2   Basic Metabolic Panel (8)    Collection Time: 08/15/24  5:40 AM   Result Value Ref Range    Glucose 91 70 - 99 mg/dL    Sodium 144 136 - 145 mmol/L    Potassium 3.8 3.5 - 5.1 mmol/L    Chloride 110 98 - 112 mmol/L    CO2 28.0 21.0 - 32.0 mmol/L    Anion Gap 6 0 - 18 mmol/L    BUN 15 9 - 23 mg/dL    Creatinine 0.70 0.55 - 1.02 mg/dL    BUN/CREA Ratio 21.4 (H) 10.0 -  20.0    Calcium, Total 8.7 8.7 - 10.4 mg/dL    Calculated Osmolality 298 (H) 275 - 295 mOsm/kg    eGFR-Cr 102 >=60 mL/min/1.73m2   CBC With Differential With Platelet    Collection Time: 08/15/24  5:40 AM   Result Value Ref Range    WBC 8.2 4.0 - 11.0 x10(3) uL    RBC 2.78 (L) 3.80 - 5.30 x10(6)uL    HGB 9.3 (L) 12.0 - 16.0 g/dL    HCT 28.9 (L) 35.0 - 48.0 %    .0 (H) 80.0 - 100.0 fL    MCH 33.5 26.0 - 34.0 pg    MCHC 32.2 31.0 - 37.0 g/dL    RDW-SD 57.8 (H) 35.1 - 46.3 fL    RDW 15.7 (H) 11.0 - 15.0 %    .0 150.0 - 450.0 10(3)uL    Neutrophil Absolute Prelim 4.90 1.50 - 7.70 x10 (3) uL    Neutrophil Absolute 4.90 1.50 - 7.70 x10(3) uL    Lymphocyte Absolute 2.44 1.00 - 4.00 x10(3) uL    Monocyte Absolute 0.80 0.10 - 1.00 x10(3) uL    Eosinophil Absolute 0.01 0.00 - 0.70 x10(3) uL    Basophil Absolute 0.03 0.00 - 0.20 x10(3) uL    Immature Granulocyte Absolute 0.02 0.00 - 1.00 x10(3) uL    Neutrophil % 59.7 %    Lymphocyte % 29.8 %    Monocyte % 9.8 %    Eosinophil % 0.1 %    Basophil % 0.4 %    Immature Granulocyte % 0.2 %   Basic Metabolic Panel (8)    Collection Time: 08/16/24  4:39 AM   Result Value Ref Range    Glucose 79 70 - 99 mg/dL    Sodium 147 (H) 136 - 145 mmol/L    Potassium 3.8 3.5 - 5.1 mmol/L    Chloride 113 (H) 98 - 112 mmol/L    CO2 28.0 21.0 - 32.0 mmol/L    Anion Gap 6 0 - 18 mmol/L    BUN 15 9 - 23 mg/dL    Creatinine 0.58 0.55 - 1.02 mg/dL    BUN/CREA Ratio 25.9 (H) 10.0 - 20.0    Calcium, Total 8.8 8.7 - 10.4 mg/dL    Calculated Osmolality 304 (H) 275 - 295 mOsm/kg    eGFR-Cr 107 >=60 mL/min/1.73m2   CBC With Differential With Platelet    Collection Time: 08/16/24  4:39 AM   Result Value Ref Range    WBC 4.3 4.0 - 11.0 x10(3) uL    RBC 2.47 (L) 3.80 - 5.30 x10(6)uL    HGB 8.2 (L) 12.0 - 16.0 g/dL    HCT 26.7 (L) 35.0 - 48.0 %    .1 (H) 80.0 - 100.0 fL    MCH 33.2 26.0 - 34.0 pg    MCHC 30.7 (L) 31.0 - 37.0 g/dL    RDW-SD 58.1 (H) 35.1 - 46.3 fL    RDW 15.3 (H) 11.0 - 15.0  %    .0 150.0 - 450.0 10(3)uL    Neutrophil Absolute Prelim 2.47 1.50 - 7.70 x10 (3) uL    Neutrophil Absolute 2.47 1.50 - 7.70 x10(3) uL    Lymphocyte Absolute 1.38 1.00 - 4.00 x10(3) uL    Monocyte Absolute 0.43 0.10 - 1.00 x10(3) uL    Eosinophil Absolute 0.04 0.00 - 0.70 x10(3) uL    Basophil Absolute 0.01 0.00 - 0.20 x10(3) uL    Immature Granulocyte Absolute 0.01 0.00 - 1.00 x10(3) uL    Neutrophil % 57.0 %    Lymphocyte % 31.8 %    Monocyte % 9.9 %    Eosinophil % 0.9 %    Basophil % 0.2 %    Immature Granulocyte % 0.2 %   Basic Metabolic Panel (8)    Collection Time: 08/17/24  5:59 AM   Result Value Ref Range    Glucose 102 (H) 70 - 99 mg/dL    Sodium 141 136 - 145 mmol/L    Potassium 3.3 (L) 3.5 - 5.1 mmol/L    Chloride 108 98 - 112 mmol/L    CO2 26.0 21.0 - 32.0 mmol/L    Anion Gap 7 0 - 18 mmol/L    BUN 12 9 - 23 mg/dL    Creatinine 0.57 0.55 - 1.02 mg/dL    BUN/CREA Ratio 21.1 (H) 10.0 - 20.0    Calcium, Total 8.9 8.7 - 10.4 mg/dL    Calculated Osmolality 292 275 - 295 mOsm/kg    eGFR-Cr 107 >=60 mL/min/1.73m2   CBC With Differential With Platelet    Collection Time: 08/17/24  5:59 AM   Result Value Ref Range    WBC 4.1 4.0 - 11.0 x10(3) uL    RBC 2.57 (L) 3.80 - 5.30 x10(6)uL    HGB 8.6 (L) 12.0 - 16.0 g/dL    HCT 26.3 (L) 35.0 - 48.0 %    .3 (H) 80.0 - 100.0 fL    MCH 33.5 26.0 - 34.0 pg    MCHC 32.7 31.0 - 37.0 g/dL    RDW-SD 53.1 (H) 35.1 - 46.3 fL    RDW 14.6 11.0 - 15.0 %    .0 150.0 - 450.0 10(3)uL    Neutrophil Absolute Prelim 2.02 1.50 - 7.70 x10 (3) uL    Neutrophil Absolute 2.02 1.50 - 7.70 x10(3) uL    Lymphocyte Absolute 1.54 1.00 - 4.00 x10(3) uL    Monocyte Absolute 0.41 0.10 - 1.00 x10(3) uL    Eosinophil Absolute 0.07 0.00 - 0.70 x10(3) uL    Basophil Absolute 0.01 0.00 - 0.20 x10(3) uL    Immature Granulocyte Absolute 0.02 0.00 - 1.00 x10(3) uL    Neutrophil % 49.7 %    Lymphocyte % 37.8 %    Monocyte % 10.1 %    Eosinophil % 1.7 %    Basophil % 0.2 %    Immature  Granulocyte % 0.5 %   Basic Metabolic Panel (8)    Collection Time: 08/18/24  6:26 AM   Result Value Ref Range    Glucose 93 70 - 99 mg/dL    Sodium 142 136 - 145 mmol/L    Potassium 3.5 3.5 - 5.1 mmol/L    Chloride 108 98 - 112 mmol/L    CO2 27.0 21.0 - 32.0 mmol/L    Anion Gap 7 0 - 18 mmol/L    BUN 6 (L) 9 - 23 mg/dL    Creatinine 0.51 (L) 0.55 - 1.02 mg/dL    BUN/CREA Ratio 11.8 10.0 - 20.0    Calcium, Total 8.5 (L) 8.7 - 10.4 mg/dL    Calculated Osmolality 291 275 - 295 mOsm/kg    eGFR-Cr 110 >=60 mL/min/1.73m2   CBC With Differential With Platelet    Collection Time: 08/18/24  6:26 AM   Result Value Ref Range    WBC 3.1 (L) 4.0 - 11.0 x10(3) uL    RBC 2.44 (L) 3.80 - 5.30 x10(6)uL    HGB 8.1 (L) 12.0 - 16.0 g/dL    HCT 25.4 (L) 35.0 - 48.0 %    .1 (H) 80.0 - 100.0 fL    MCH 33.2 26.0 - 34.0 pg    MCHC 31.9 31.0 - 37.0 g/dL    RDW-SD 53.7 (H) 35.1 - 46.3 fL    RDW 14.6 11.0 - 15.0 %    .0 150.0 - 450.0 10(3)uL    Neutrophil Absolute Prelim 1.65 1.50 - 7.70 x10 (3) uL    Neutrophil Absolute 1.65 1.50 - 7.70 x10(3) uL    Lymphocyte Absolute 0.97 (L) 1.00 - 4.00 x10(3) uL    Monocyte Absolute 0.35 0.10 - 1.00 x10(3) uL    Eosinophil Absolute 0.06 0.00 - 0.70 x10(3) uL    Basophil Absolute 0.02 0.00 - 0.20 x10(3) uL    Immature Granulocyte Absolute 0.01 0.00 - 1.00 x10(3) uL    Neutrophil % 53.9 %    Lymphocyte % 31.7 %    Monocyte % 11.4 %    Eosinophil % 2.0 %    Basophil % 0.7 %    Immature Granulocyte % 0.3 %   Basic Metabolic Panel (8)    Collection Time: 08/19/24  5:30 AM   Result Value Ref Range    Glucose 89 70 - 99 mg/dL    Sodium 145 136 - 145 mmol/L    Potassium 3.4 (L) 3.5 - 5.1 mmol/L    Chloride 111 98 - 112 mmol/L    CO2 26.0 21.0 - 32.0 mmol/L    Anion Gap 8 0 - 18 mmol/L    BUN <5 (L) 9 - 23 mg/dL    Creatinine 0.51 (L) 0.55 - 1.02 mg/dL    BUN/CREA Ratio      Calcium, Total 8.6 (L) 8.7 - 10.4 mg/dL    Calculated Osmolality      eGFR-Cr 110 >=60 mL/min/1.73m2   CBC With Differential  With Platelet    Collection Time: 08/19/24  5:30 AM   Result Value Ref Range    WBC 3.0 (L) 4.0 - 11.0 x10(3) uL    RBC 2.45 (L) 3.80 - 5.30 x10(6)uL    HGB 8.1 (L) 12.0 - 16.0 g/dL    HCT 25.7 (L) 35.0 - 48.0 %    .9 (H) 80.0 - 100.0 fL    MCH 33.1 26.0 - 34.0 pg    MCHC 31.5 31.0 - 37.0 g/dL    RDW-SD 53.7 (H) 35.1 - 46.3 fL    RDW 14.5 11.0 - 15.0 %    .0 150.0 - 450.0 10(3)uL    Neutrophil Absolute Prelim 1.49 (L) 1.50 - 7.70 x10 (3) uL    Neutrophil Absolute 1.49 (L) 1.50 - 7.70 x10(3) uL    Lymphocyte Absolute 1.08 1.00 - 4.00 x10(3) uL    Monocyte Absolute 0.37 0.10 - 1.00 x10(3) uL    Eosinophil Absolute 0.06 0.00 - 0.70 x10(3) uL    Basophil Absolute 0.01 0.00 - 0.20 x10(3) uL    Immature Granulocyte Absolute 0.01 0.00 - 1.00 x10(3) uL    Neutrophil % 49.3 %    Lymphocyte % 35.8 %    Monocyte % 12.3 %    Eosinophil % 2.0 %    Basophil % 0.3 %    Immature Granulocyte % 0.3 %       Medications:    potassium chloride    simethicone    sodium chloride    LORazepam    HYDROmorphone **OR** HYDROmorphone **OR** HYDROmorphone    phenol    diazepam    ondansetron **OR** ondansetron    pantoprazole **OR** pantoprazole    enoxaparin    diphenhydrAMINE     ROS: Generally still doing well. Reports rumbling in the abdomen, but not flatus or BM yet. Denies any nausea or vomiting. Appetite is present. Tolerating clear liquid diet. Ambulating. Pain well managed. Inc sites unremarkable, no LUCHO complaints    Drains:  LUCHO right 260 ml serosang x 24 hours  LUCHO left 85 ml serosang x 24 hours    Physical examination:    /72 (BP Location: Right arm)   Pulse 68   Temp 98 °F (36.7 °C) (Oral)   Resp 16   Ht 5' 7\" (1.702 m)   Wt 173 lb 6.4 oz (78.7 kg)   LMP 12/22/2020   SpO2 100%   BMI 27.16 kg/m²     GENERAL: alert and oriented, cooperative, in no acute distress  ABDOMEN: soft, non-tender. Bowel sounds normal. No masses,  no organomegaly  INCISION/SURGICAL WOUNDS: clean, dry and intact midline incision  site  PELVIC: Pelvic exam: examination not indicated.    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, mobilization of splenic flexure, repair of transverse colon mesentery, rectosigmoid colon resection with end-to-end anastomosis on 8/13/24      POD #6     Generally doing well     AVSS     Labs reviewed- Wbc (3.0), hgb (8.1), cr (0.51) stable     UOP 1200 ml today     Awaiting flatus and BM, denies n/v    Tolerating clear liquid diet     PLAN:    If + flatus plan to advance to full liquid diet    Consult nutrition for clear ensure supplement     Continue IV fluids and LUCHO     Monitor I&O     Pain management as needed    Midline dressing removed, ok to shower, change LUCHO dressing PRN     LMWH     Continue to ambulate      Rehana Ortega PA-C  08/19/24

## 2024-08-19 NOTE — PROGRESS NOTES
[facility] Hospitalist Progress Note     CC: Hospital Follow up    PCP: Diana Black MD       Assessment/Plan:     Active Problems:    Ovarian cancer (HCC)    Yue Malin - 54 year old female w adenocarcinoma admitted 8/13/2024 for post op care after planned CHELSIE-BSO, colon resection for her gynecologic adenoca w Dr. Choudhary. Post op course with ileus.      Gynecologic adenocarcinoma w peritoneal carcinomatosis  S/p chemo  Now s/p CHELSIE-BSO, rectosigmoid colon resection w end-end anastomosis due to tumor involvement  - Post-op care per Dr. Choudhary's team. NGT & yao removed.  - Trend labs  - s/p dilaudid PCA, continue IV dilaudid PRN and IV toradol, avoid narcotics if able  - IVF until tolerating PO  - Up, OOB, IS use, ambulate  - s/p NGT removal    Post op ileus  - hypoactive BS, no flatus yet, denies nausea/vomiting  - ambulating frequently  - not taking opiates  - continue clear liquids for now     Hypernatremia- resolved  - 2/2 NPO & IVF  - Switch IVF to D5  - switch back to NS     Anemia, post-op - 2/2 expected blood loss w/ dilutional component from IVF   - No active external bleeding noted  - Daily CBC     Anxiety (ACOSTA)  - Stable, continue PTA ativan PRN as able  - Add IV valium for NGT discomfort/anxiety     ACP: Full code  Ppx: DVT: Enox  Dispo  EDoD:  TBD. Pending improvement of ileus  F/u:   - PCP:  Diana Black MD  - Onc  - Gyne/onc     Kim Jean MD  Internal Medicine - Hospitalist  Count includes the [facility]     Subjective:     Feeling frustrated that she still has not had any flatus, tearful. Feels stomach grumbling. Ambulating frequently, no pain meds. Denies worsening abdominal pain, no nausea or vomiting.     OBJECTIVE:    Blood pressure 114/76, pulse 68, temperature 98 °F (36.7 °C), temperature source Oral, resp. rate 19, height 5' 7\" (1.702 m), weight 173 lb 6.4 oz (78.7 kg), last menstrual period 12/22/2020, SpO2 96%, not currently breastfeeding.    Temp:  [97.4 °F (36.3 °C)-98 °F (36.7 °C)] 98 °F  (36.7 °C)  Pulse:  [68] 68  Resp:  [18-20] 19  BP: (114-131)/(70-79) 114/76  SpO2:  [96 %-100 %] 96 %      Intake/Output:    Intake/Output Summary (Last 24 hours) at 8/19/2024 1014  Last data filed at 8/19/2024 0800  Gross per 24 hour   Intake 2616 ml   Output 3530 ml   Net -914 ml       Last 3 Weights   08/13/24 0549 173 lb 6.4 oz (78.7 kg)   08/02/24 1128 180 lb (81.6 kg)   01/05/21 0831 190 lb 1.6 oz (86.2 kg)   11/22/17 0808 189 lb (85.7 kg)     Exam   GEN: female in NAD  HEENT: EOMI  Pulm: CTAB, no crackles or wheezes  CV: RRR, no murmurs  ABD: Soft, non-tender, non-distended, still hypoactive BS, dressing with some dried blood,+LUCHO  SKIN: warm, dry  EXT: trace edema    Data Review:       Labs:     Recent Labs   Lab 08/17/24  0559 08/18/24  0626 08/19/24  0530   RBC 2.57* 2.44* 2.45*   HGB 8.6* 8.1* 8.1*   HCT 26.3* 25.4* 25.7*   .3* 104.1* 104.9*   MCH 33.5 33.2 33.1   MCHC 32.7 31.9 31.5   RDW 14.6 14.6 14.5   NEPRELIM 2.02 1.65 1.49*   WBC 4.1 3.1* 3.0*   .0 190.0 214.0         Recent Labs   Lab 08/17/24  0559 08/18/24  0626 08/19/24  0530   * 93 89   BUN 12 6* <5*   CREATSERUM 0.57 0.51* 0.51*   EGFRCR 107 110 110   CA 8.9 8.5* 8.6*    142 145   K 3.3* 3.5 3.4*    108 111   CO2 26.0 27.0 26.0       No results for input(s): \"ALT\", \"AST\", \"ALB\", \"AMYLASE\", \"LIPASE\", \"LDH\" in the last 168 hours.    Invalid input(s): \"ALPHOS\", \"TBIL\", \"DBIL\", \"TPROT\"      Imaging:  No results found.      Meds:     INPATIENT MEDICATIONS    Scheduled Medications:      pantoprazole, 40 mg, QAM AC   Or  pantoprazole, 40 mg, QAM AC  enoxaparin, 40 mg, Daily            Drips:  sodium chloride, Last Rate: 83 mL/hr at 08/18/24 1153        PRN Medications  LORazepam, 1 mg, Q6H PRN  HYDROmorphone, 0.2 mg, Q2H PRN   Or  HYDROmorphone, 0.4 mg, Q2H PRN   Or  HYDROmorphone, 0.8 mg, Q2H PRN  phenol, , Q2H PRN  diazepam, 5 mg, Q6H PRN  ondansetron, 4 mg, Q8H PRN   Or  ondansetron, 4 mg, Q8H PRN  diphenhydrAMINE,  12.5 mg, Q4H PRN

## 2024-08-20 ENCOUNTER — APPOINTMENT (OUTPATIENT)
Dept: GENERAL RADIOLOGY | Facility: HOSPITAL | Age: 54
End: 2024-08-20
Attending: PHYSICIAN ASSISTANT
Payer: COMMERCIAL

## 2024-08-20 LAB
ANION GAP SERPL CALC-SCNC: 6 MMOL/L (ref 0–18)
BASOPHILS # BLD AUTO: 0.01 X10(3) UL (ref 0–0.2)
BASOPHILS NFR BLD AUTO: 0.4 %
BUN BLD-MCNC: <5 MG/DL (ref 9–23)
CALCIUM BLD-MCNC: 8.4 MG/DL (ref 8.7–10.4)
CHLORIDE SERPL-SCNC: 112 MMOL/L (ref 98–112)
CO2 SERPL-SCNC: 27 MMOL/L (ref 21–32)
CREAT BLD-MCNC: 0.53 MG/DL
DEPRECATED RDW RBC AUTO: 54.1 FL (ref 35.1–46.3)
EGFRCR SERPLBLD CKD-EPI 2021: 109 ML/MIN/1.73M2 (ref 60–?)
EOSINOPHIL # BLD AUTO: 0.08 X10(3) UL (ref 0–0.7)
EOSINOPHIL NFR BLD AUTO: 3.1 %
ERYTHROCYTE [DISTWIDTH] IN BLOOD BY AUTOMATED COUNT: 14.6 % (ref 11–15)
GLUCOSE BLD-MCNC: 92 MG/DL (ref 70–99)
HCT VFR BLD AUTO: 24.6 %
HGB BLD-MCNC: 8.1 G/DL
IMM GRANULOCYTES # BLD AUTO: 0.01 X10(3) UL (ref 0–1)
IMM GRANULOCYTES NFR BLD: 0.4 %
LYMPHOCYTES # BLD AUTO: 1.03 X10(3) UL (ref 1–4)
LYMPHOCYTES NFR BLD AUTO: 39.9 %
MCH RBC QN AUTO: 33.8 PG (ref 26–34)
MCHC RBC AUTO-ENTMCNC: 32.9 G/DL (ref 31–37)
MCV RBC AUTO: 102.5 FL
MONOCYTES # BLD AUTO: 0.4 X10(3) UL (ref 0.1–1)
MONOCYTES NFR BLD AUTO: 15.5 %
NEUTROPHILS # BLD AUTO: 1.05 X10 (3) UL (ref 1.5–7.7)
NEUTROPHILS # BLD AUTO: 1.05 X10(3) UL (ref 1.5–7.7)
NEUTROPHILS NFR BLD AUTO: 40.7 %
PLATELET # BLD AUTO: 220 10(3)UL (ref 150–450)
POTASSIUM SERPL-SCNC: 3.6 MMOL/L (ref 3.5–5.1)
POTASSIUM SERPL-SCNC: 3.6 MMOL/L (ref 3.5–5.1)
RBC # BLD AUTO: 2.4 X10(6)UL
SODIUM SERPL-SCNC: 145 MMOL/L (ref 136–145)
WBC # BLD AUTO: 2.6 X10(3) UL (ref 4–11)

## 2024-08-20 PROCEDURE — 80048 BASIC METABOLIC PNL TOTAL CA: CPT | Performed by: PHYSICIAN ASSISTANT

## 2024-08-20 PROCEDURE — 84132 ASSAY OF SERUM POTASSIUM: CPT | Performed by: HOSPITALIST

## 2024-08-20 PROCEDURE — 85025 COMPLETE CBC W/AUTO DIFF WBC: CPT | Performed by: PHYSICIAN ASSISTANT

## 2024-08-20 PROCEDURE — 74019 RADEX ABDOMEN 2 VIEWS: CPT | Performed by: PHYSICIAN ASSISTANT

## 2024-08-20 NOTE — PLAN OF CARE
Pt is alert and oriented on RA. Holguin in place. No BM over night. IVF and abx running as ordered. Pain controlled no pain medication needed. PRN ativan given for anxiety. Pt is tolerating a CLD. Pt is up independent. Call light is within reach and safety measures are in place.     Problem: PAIN - ADULT  Goal: Verbalizes/displays adequate comfort level or patient's stated pain goal  Description: INTERVENTIONS:  - Encourage pt to monitor pain and request assistance  - Assess pain using appropriate pain scale  - Administer analgesics based on type and severity of pain and evaluate response  - Implement non-pharmacological measures as appropriate and evaluate response  - Consider cultural and social influences on pain and pain management  - Manage/alleviate anxiety  - Utilize distraction and/or relaxation techniques  - Monitor for opioid side effects  - Notify MD/LIP if interventions unsuccessful or patient reports new pain  - Anticipate increased pain with activity and pre-medicate as appropriate  Outcome: Progressing     Problem: SAFETY ADULT - FALL  Goal: Free from fall injury  Description: INTERVENTIONS:  - Assess pt frequently for physical needs  - Identify cognitive and physical deficits and behaviors that affect risk of falls.  - [place] fall precautions as indicated by assessment.  - Educate pt/family on patient safety including physical limitations  - Instruct pt to call for assistance with activity based on assessment  - Modify environment to reduce risk of injury  - Provide assistive devices as appropriate  - Consider OT/PT consult to assist with strengthening/mobility  - Encourage toileting schedule  Outcome: Progressing     Problem: GASTROINTESTINAL - ADULT  Goal: Minimal or absence of nausea and vomiting  Description: INTERVENTIONS:  - Maintain adequate hydration with IV or PO as ordered and tolerated  - Nasogastric tube to low intermittent suction as ordered  - Evaluate effectiveness of ordered  antiemetic medications  - Provide nonpharmacologic comfort measures as appropriate  - Advance diet as tolerated, if ordered  - Obtain nutritional consult as needed  - Evaluate fluid balance  Outcome: Progressing  Goal: Maintains or returns to baseline bowel function  Description: INTERVENTIONS:  - Assess bowel function  - Maintain adequate hydration with IV or PO as ordered and tolerated  - Evaluate effectiveness of GI medications  - Encourage mobilization and activity  - Obtain nutritional consult as needed  - Establish a toileting routine/schedule  - Consider collaborating with pharmacy to review patient's medication profile  Outcome: Progressing     Problem: GENITOURINARY - ADULT  Goal: Absence of urinary retention  Description: INTERVENTIONS:  - Assess patient’s ability to void and empty bladder  - Monitor intake/output and perform bladder scan as needed  - Follow urinary retention protocol/standard of care  - Consider collaborating with pharmacy to review patient's medication profile  - Implement strategies to promote bladder emptying  Outcome: Progressing     Problem: SKIN/TISSUE INTEGRITY - ADULT  Goal: Incision(s), wounds(s) or drain site(s) healing without S/S of infection  Description: INTERVENTIONS:  - Assess and document risk factors for pressure ulcer development  - Assess and document skin integrity  - Assess and document dressing/incision, wound bed, drain sites and surrounding tissue  - Implement wound care per orders  - Initiate isolation precautions as appropriate  - Initiate Pressure Ulcer prevention bundle as indicated  Outcome: Progressing     Problem: MUSCULOSKELETAL - ADULT  Goal: Return mobility to safest level of function  Description: INTERVENTIONS:  - Assess patient stability and activity tolerance for standing, transferring and ambulating w/ or w/o assistive devices  - Assist with transfers and ambulation using safe patient handling equipment as needed  - Ensure adequate protection for  wounds/incisions during mobilization  - Obtain PT/OT consults as needed  - Advance activity as appropriate  - Communicate ordered activity level and limitations with patient/family  Outcome: Progressing

## 2024-08-20 NOTE — PROGRESS NOTES
[facility]   POST-OPERATIVE VISIT     MEDICAL RECORD #: [id number]   DATE OF SERVICE: 8/20/2024             Reason for visit:  Post-operative Visit  POD#7    Procedure:    54 year old female with a history of ovarian cancer- s/p  Exploratory laparotomy, total abdominal hysterectomy, bilateral salpingo-oophorectomy, bilateral ureterolysis, bilateral pelvic lymph node dissection, tumor debulking of various peritoneal surfaces, argon beam coagulation of peritoneal tumor nodules, infracolic-supracolic omentectomy, and resection of gastrosplenic ligament with tumor attached to tip of spleen, mobilization of splenic flexure, repair of transverse colon mesentery, rectosigmoid colon resection with end-to-end anastomosis on 8/13/24       Pathology/Lab Results:  Recent Results (from the past 720 hour(s))   ABORH (Blood Type)    Collection Time: 08/02/24 12:33 PM   Result Value Ref Range    ABO BLOOD TYPE O     RH BLOOD TYPE Negative    Antibody Screen    Collection Time: 08/02/24 12:33 PM   Result Value Ref Range    Antibody Screen Negative    ABORH Confirmation    Collection Time: 08/13/24  7:00 AM   Result Value Ref Range    ABO BLOOD TYPE O     RH BLOOD TYPE Negative    Specimen to Pathology Tissue    Collection Time: 08/13/24  9:09 AM   Result Value Ref Range    Case Report       Surgical Pathology                                Case: [id number]                                  Authorizing Provider:  Binh Choudhary MD         Collected:           08/13/2024 09:09 AM          Ordering Location:     [facility]          Received:            08/13/2024 01:52 PM                                 Operating Room                                                               Pathologist:           Paul Wade MD                                                             Specimens:   A) - Uterus and cervix, bilateral tubes and ovaries, 1. uterus, cervix, bilateral                   tubes and ovaries,  anterior bladder, peritoneal tumor                                               B) - Lymph node pelvic left, 2. Left pelvic lymph nodes                                             C) - Lymph node pelvic right, 3. Right pelvic lymph nodes                                           D) - Tissue, 4. left paracolic tumor                                                                 E) - Tissue, 5. Tumor attached to splenic flexure                                                   F) - Tissue, 6. nodule attached to small bowel                                                      G) - Tissue, 7. nodule attached to cecum                                                            H) - Tissue, 8. nodule attached to right diaphragm                                                  I) - Tissue, 9. infracolic, supracolic, splenocolic omentum                                         J) - Colon, 10. Rectal sigmoid colon with tumor                                            Final Diagnosis:         A. Uterus, cervix, bilateral fallopian tubes, ovaries, anterior bladder, peritoneal tumor; total abdominal hysterectomy, bilateral salpingo-oophorectomy, bilateral ureterolysis, bilateral pelvic lymph node dissection, tumor debulking:   Left ovary with high-grade serous carcinoma, with focal clear cell change, 1.2 cm in greatest dimension.  Carcinoma is seen involving the endometrium (focal, superficial) uterine serosa, right parametrium, left fallopian tube (surface), left para-adnexal soft tissue, and right ovary (focal).  Lymphovascular invasion is identified.  Ectocervix with no significant pathologic changes identified.  Endocervix with nabothian cysts; no significant pathologic changes identified.  Right fallopian tube with no significant pathologic changes identified.  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  ypT3c, N0.  See comment.    B. Left pelvic lymph node; biopsy:  Five lymph nodes negative for metastatic  carcinoma (0/5).    C. Right pelvic lymph nodes; biopsy:  Two lymph nodes negative for metastatic carcinoma (0/2).    D. Left paracolic tumor; biopsy:  Fibroconnective tissue involved by high-grade serous carcinoma.    E. Splenic flexure tumor; biopsy:   Fibroadipose tissue involved by high-grade serous carcinoma.    F. Small bowel nodule:  Nodular fibrotic tissue with chronic inflammation and calcifications.  No definitive viable carcinoma identified.    G. Cecal nodule:  Nodular fibrotic tissue with chronic inflammation.  No definitive viable carcinoma identified.    H. Right diaphragm nodule:  Fibroadipose tissue with extensive hemorrhage and acute inflammation.  No definitive viable carcinoma identified.    I. Infra colic, supracolic, splenocolic omentum; excision:   Omental fibroadipose tissue with extensive involvement by high-grade serous carcinoma.  Largest tumor nodule measures 3.8 cm.  See comment.    J. Rectosigmoid colon tumor; excision:   Segment of colon, 10.5 in length, demonstrating high-grade serous carcinoma extensively involving the pericolonic soft tissue and serosa.  Tumor is present less than 1 mm from the mesorectal/radial margin.  One lymph node, negative for metastatic carcinoma (0/1).  Mucosa at surgical margins is viable and free of carcinoma.    Comment:  The patient has a history of adenocarcinoma of gynecological origin diagnosed on previous omental mass biopsy and ascites fluid, both in March 2024, per notes, status post neoadjuvant chemotherapy x4 cycles with good response.     Immunohistochemical stains performed on the carcinoma present in the omentum (block I3) demonstrate that the tumor cells are positive for pankeratin (AE1/AE3), CK7, PAX8, WT1, and ER and negative for CK20 and p53, consistent with a high-grade serous carcinoma.    For  purposes, this case was reviewed by a second pathologist who concurred with the diagnosis.    The immunohistochemical stains  interpreted in this case demonstrated appropriate reactivity of the positive controls. These stains were performed on formalin-fixed, paraffin-embedded tissue sections with each antibody analysis being performed on a separate slide        Embedded Images      Synoptic Report       OVARY or FALLOPIAN TUBE or PRIMARY PERITONEUM   OVARY OR FALLOPIAN TUBE OR PRIMARY PERITONEUM - All Specimens   8th Edition - Protocol posted: 3/22/2023      SPECIMEN      Procedure:    Total hysterectomy and bilateral salpingo-oophorectomy       Hysterectomy Type:    Abdominal       Specimen Integrity:            Right Ovary Integrity:    Capsule intact       Specimen Integrity:            Left Ovary Integrity:    Capsule intact         Uterus Integrity:    Intact       TUMOR      Tumor Site:    Left ovary       Tumor Size:    Greatest Dimension (Centimeters): 1.2 cm      Histologic Type:    High grade serous carcinoma       Histologic Grade:    High grade       Ovarian Surface Involvement:    Not identified       Fallopian Tube Surface Involvement:    Present, left       Implants:    Present: Left paracolic, splenic flexure       Other Tissue / Organ Involvement:    Right ovary       Other Tissue / Organ Involvement:    Left fallopian tube       Other Tissue / Organ Involvement:    Uterine corpus       Other Tissue / Organ Involvement:    Abdominal peritoneum       Other Tissue / Organ Involvement:    Omentum       Other Tissue / Organ Involvement:    Rectosigmoid colon       Largest Extrapelvic Peritoneal Focus:    Macroscopic (greater than 2 cm): 3.8 cm       Peritoneal / Ascitic Fluid Involvement:    Not submitted / unknown       Chemotherapy Response Score (CRS):    CRS2 (moderate response)       REGIONAL LYMPH NODES      Regional Lymph Node Status:            :    All regional lymph nodes negative for tumor cells         Number of Lymph Nodes Examined:    8           Faiza Site(s) Examined:    Right pelvic           Faiza Site(s)  Examined:    Left pelvic           Faiza Site(s) Examined:    Pericolonic       pTNM CLASSIFICATION (AJCC 8th Edition)      Reporting of pT, pN, and (when applicable) pM categories is based on information available to the pathologist at the time the report is issued. As per the AJCC (Chapter 1, 8th Ed.) it is the managing physician’s responsibility to establish the final pathologic stage based upon all pertinent information, including but potentially not limited to this pathology report.      Modified Classification:    y       pT Category:    pT3c       pN Category:    pN0       FIGO STAGE      FIGO Stage:    IIIC       Clinical Information       Adenocarcinoma.        Gross Description       Specimen A is submitted in formalin labeled “Horvat, uterus, cervix, bilateral tubes and ovaries, anterior bladder, peritoneal tumor” and consists of a 98 gram total hysterectomy including uterus with cervix, bilateral fallopian tubes and ovaries and a flap of tissue attached to the lower uterine segment (possible bladder flap). The uterus and cervix measure 7.6 cm in length, 4.0 cm from cornu to cornu, 3.4 cm from anterior to posterior. The uterus is covered with pink-tan, smooth serosa. The ectocervix is covered with pink-tan smooth serosa. The ectocervix is covered with pink-tan, smooth mucosa and measures 2.5 x 2.4 cm in transverse dimension. The cervical os is stenosed and measures less than 0.1 cm in diameter. The anterior aspect is inked blue, the posterior aspect is inked black. The cervix and uterus are bivalved to reveal a pink-tan endocervix with a 2.5 cm endocervical canal. The endometrium is pink-tan and smooth with a 4.5 x 2.5 cm endometrial cavity. The uterus is sectioned to reveal a single posterior intramural fibroid (0.4 cm in greatest dimension). The remaining myometrium is pink-tan and trabeculated with no lesions or masses identified within the endomyometrium. The anterior bladder flap presents possible  tumor nodules (1.2 cm in greatest dimension).   The left fallopian tube measures 5.5 cm in length x 0.6 cm in diameter. The left ovary measures 2.5 x 1.5 x 1.2 cm. The left fallopian tube, ovary and para adnexal tissue are covered with pink-gray adhesions and possible nodules.  The right fallopian tube measures 5.0 cm in length x 0.4 cm in diameter and is grossly unremarkable. The right ovary is yellowish pink and lobulated, measures 2.0 x 1.5 x 0.8 cm and is grossly unremarkable.    Representative sections are submitted as follows: A1 - anterior cervix, longitudinal section; A2 - posterior cervix, longitudinal section; A3-A7 - full thickness anterior endomyometrium, from lower to upper, including bladder flap and possible tumor nodules; A8-A9 - full thickness posterior endomyometrium including fibroid; A10 - left parametrial soft tissue; A11 - right parametrial soft tissue; A12 - sections of left fallopian tube; A13 - left ovary, bisected, entirely; A14-A15 - left para adnexal tissue with possible nodules; A16 - sections of right fallopian tube; A17 - right ovary, bisected, entirely.     Specimen B is submitted in formalin labeled “Horvat, left pelvic lymph node” and consists of one piece of yellow-tan, fatty tissue measuring 3.8 x 3.1 x 0.8 cm. Dissection of the tissue reveals five lymph node candidates (0.4 to 1.5 cm in greatest dimension). The two larger candidates are submitted in cassette B1. The remaining candidates are submitted in cassette B2.     Specimen C is submitted in formalin labeled “Horvat, right pelvic lymph node” and consists of two pieces of yellow-tan, fatty tissue measuring in aggregate 2.8 x 2.3 x 0.7 cm. Dissection of the tissue reveals two lymph node candidates (0.8 and 1.3 cm in greatest dimension). Both candidates are submitted entirely in cassette C1.     Specimen D is submitted in formalin labeled “Horvat, left paracolic tumor” and consists of one pink-gray soft tissue nodule measuring  2.0 x 1.9 x 0.8 cm. The nodule is bisected and submitted entirely in cassette D1.     Specimen E is submitted in formalin labeled “Horvat, tumor attached to splenic flexure” and consists of one pink-gray soft tissue nodule measuring 1.8 x 1.5 x 0.7 cm. The specimen is submitted entirely in cassette E1.    Specimen F is submitted in formalin labeled “Horvat, nodule attached to small bowel” and consists of multiple pieces of pink-gray soft tissue measuring in aggregate 2.4 x 1.9 x 0.4 cm. The specimen is submitted entirely in cassette F1.    Specimen G is submitted in formalin labeled “Horvat, nodule attached to cecum” and consists of two pink-gray soft tissue nodules measuring in aggregate 0.7 x 0.6 x 0.3 cm. The specimen is submitted entirely in cassette G1.    Specimen H is submitted in formalin labeled “Horvat, nodule attached to right diaphragm” and consists of one piece of red-tan, soft tissue measuring 1.2 x 0.5 x 0.3 cm. The specimen is submitted entirely in cassette H1.    Specimen I is submitted in formalin labeled “Horvat, infra colic, supra colic, splenocolic omentum” and consists of one piece of yellow-tan omentum measuring 21.5 x 13.7 x 3.6 cm. The specimen is sectioned to reveal multiple pink-gray nodules embedded within the omentum (0.2 to 3.8 cm in greatest dimension). Representative sections are submitted as follows: I1 - largest nodule; I2-I4 - other nodules.     Specimen J is submitted in formalin labeled “Horvat, rectosigmoid colon with tumor” and consists of an unoriented segment of colon with mesentery and overlying adhesions measuring 10.5 cm in length x 2.6 cm in diameter. The specimen is received with two stapled margins. The proximal margin is inked blue, the distal margin is inked green, the mesorectal/radial margin is inked black. The specimen is opened longitudinally to reveal a pink-tan, folded mucosa with no lesions, masses or perforations grossly identified. No definitive masses,  lesions or nodules are identified within the omentum or mesorectum.   Dissection of the pericolonic tissue reveals one lymph node candidate (0.8 cm in greatest dimension).   Representative sections are submitted as follows: J1 - proximal en face margin; J2 - distal en face margin; J3 - random mesorectal/radial margin, perpendicular section; J4-J8 - random sections of colon wall with pericolonic tissue; J9 - one lymph node candidate; J10-J12 - dissected pericolonic tissue. (jq)    Paul Wade M.D./[facility]        Interpretation Malignant (A)     POCT Hemocue    Collection Time: 08/13/24 11:24 AM   Result Value Ref Range    Hemocue 10.6 (A) 12.0 - 16.0 g/dl   Basic Metabolic Panel (8)    Collection Time: 08/13/24  5:26 PM   Result Value Ref Range    Glucose 166 (H) 70 - 99 mg/dL    Sodium 142 136 - 145 mmol/L    Potassium 4.4 3.5 - 5.1 mmol/L    Chloride 110 98 - 112 mmol/L    CO2 26.0 21.0 - 32.0 mmol/L    Anion Gap 6 0 - 18 mmol/L    BUN 12 9 - 23 mg/dL    Creatinine 0.74 0.55 - 1.02 mg/dL    BUN/CREA Ratio 16.2 10.0 - 20.0    Calcium, Total 8.5 (L) 8.7 - 10.4 mg/dL    Calculated Osmolality 298 (H) 275 - 295 mOsm/kg    eGFR-Cr 95 >=60 mL/min/1.73m2   CBC With Differential With Platelet    Collection Time: 08/13/24  5:26 PM   Result Value Ref Range    WBC 10.8 4.0 - 11.0 x10(3) uL    RBC 3.38 (L) 3.80 - 5.30 x10(6)uL    HGB 11.3 (L) 12.0 - 16.0 g/dL    HCT 34.8 (L) 35.0 - 48.0 %    .0 (H) 80.0 - 100.0 fL    MCH 33.4 26.0 - 34.0 pg    MCHC 32.5 31.0 - 37.0 g/dL    RDW-SD 57.5 (H) 35.1 - 46.3 fL    RDW 15.7 (H) 11.0 - 15.0 %    .0 150.0 - 450.0 10(3)uL    Neutrophil Absolute Prelim 10.03 (H) 1.50 - 7.70 x10 (3) uL    Neutrophil Absolute 10.03 (H) 1.50 - 7.70 x10(3) uL    Lymphocyte Absolute 0.30 (L) 1.00 - 4.00 x10(3) uL    Monocyte Absolute 0.41 0.10 - 1.00 x10(3) uL    Eosinophil Absolute 0.00 0.00 - 0.70 x10(3) uL    Basophil Absolute 0.01 0.00 - 0.20 x10(3) uL    Immature Granulocyte Absolute 0.03 0.00 -  1.00 x10(3) uL    Neutrophil % 93.0 %    Lymphocyte % 2.8 %    Monocyte % 3.8 %    Eosinophil % 0.0 %    Basophil % 0.1 %    Immature Granulocyte % 0.3 %   CBC With Differential With Platelet    Collection Time: 08/14/24  5:08 AM   Result Value Ref Range    WBC 8.7 4.0 - 11.0 x10(3) uL    RBC 3.12 (L) 3.80 - 5.30 x10(6)uL    HGB 10.2 (L) 12.0 - 16.0 g/dL    HCT 31.9 (L) 35.0 - 48.0 %    .2 (H) 80.0 - 100.0 fL    MCH 32.7 26.0 - 34.0 pg    MCHC 32.0 31.0 - 37.0 g/dL    RDW-SD 57.0 (H) 35.1 - 46.3 fL    RDW 15.6 (H) 11.0 - 15.0 %    .0 150.0 - 450.0 10(3)uL    Neutrophil Absolute Prelim 6.66 1.50 - 7.70 x10 (3) uL    Neutrophil Absolute 6.66 1.50 - 7.70 x10(3) uL    Lymphocyte Absolute 1.18 1.00 - 4.00 x10(3) uL    Monocyte Absolute 0.85 0.10 - 1.00 x10(3) uL    Eosinophil Absolute 0.00 0.00 - 0.70 x10(3) uL    Basophil Absolute 0.01 0.00 - 0.20 x10(3) uL    Immature Granulocyte Absolute 0.03 0.00 - 1.00 x10(3) uL    Neutrophil % 76.4 %    Lymphocyte % 13.5 %    Monocyte % 9.7 %    Eosinophil % 0.0 %    Basophil % 0.1 %    Immature Granulocyte % 0.3 %   Basic Metabolic Panel (8)    Collection Time: 08/14/24  5:09 AM   Result Value Ref Range    Glucose 121 (H) 70 - 99 mg/dL    Sodium 143 136 - 145 mmol/L    Potassium 4.5 3.5 - 5.1 mmol/L    Chloride 110 98 - 112 mmol/L    CO2 27.0 21.0 - 32.0 mmol/L    Anion Gap 6 0 - 18 mmol/L    BUN 13 9 - 23 mg/dL    Creatinine 0.73 0.55 - 1.02 mg/dL    BUN/CREA Ratio 17.8 10.0 - 20.0    Calcium, Total 8.8 8.7 - 10.4 mg/dL    Calculated Osmolality 297 (H) 275 - 295 mOsm/kg    eGFR-Cr 97 >=60 mL/min/1.73m2   Basic Metabolic Panel (8)    Collection Time: 08/15/24  5:40 AM   Result Value Ref Range    Glucose 91 70 - 99 mg/dL    Sodium 144 136 - 145 mmol/L    Potassium 3.8 3.5 - 5.1 mmol/L    Chloride 110 98 - 112 mmol/L    CO2 28.0 21.0 - 32.0 mmol/L    Anion Gap 6 0 - 18 mmol/L    BUN 15 9 - 23 mg/dL    Creatinine 0.70 0.55 - 1.02 mg/dL    BUN/CREA Ratio 21.4 (H) 10.0 -  20.0    Calcium, Total 8.7 8.7 - 10.4 mg/dL    Calculated Osmolality 298 (H) 275 - 295 mOsm/kg    eGFR-Cr 102 >=60 mL/min/1.73m2   CBC With Differential With Platelet    Collection Time: 08/15/24  5:40 AM   Result Value Ref Range    WBC 8.2 4.0 - 11.0 x10(3) uL    RBC 2.78 (L) 3.80 - 5.30 x10(6)uL    HGB 9.3 (L) 12.0 - 16.0 g/dL    HCT 28.9 (L) 35.0 - 48.0 %    .0 (H) 80.0 - 100.0 fL    MCH 33.5 26.0 - 34.0 pg    MCHC 32.2 31.0 - 37.0 g/dL    RDW-SD 57.8 (H) 35.1 - 46.3 fL    RDW 15.7 (H) 11.0 - 15.0 %    .0 150.0 - 450.0 10(3)uL    Neutrophil Absolute Prelim 4.90 1.50 - 7.70 x10 (3) uL    Neutrophil Absolute 4.90 1.50 - 7.70 x10(3) uL    Lymphocyte Absolute 2.44 1.00 - 4.00 x10(3) uL    Monocyte Absolute 0.80 0.10 - 1.00 x10(3) uL    Eosinophil Absolute 0.01 0.00 - 0.70 x10(3) uL    Basophil Absolute 0.03 0.00 - 0.20 x10(3) uL    Immature Granulocyte Absolute 0.02 0.00 - 1.00 x10(3) uL    Neutrophil % 59.7 %    Lymphocyte % 29.8 %    Monocyte % 9.8 %    Eosinophil % 0.1 %    Basophil % 0.4 %    Immature Granulocyte % 0.2 %   Basic Metabolic Panel (8)    Collection Time: 08/16/24  4:39 AM   Result Value Ref Range    Glucose 79 70 - 99 mg/dL    Sodium 147 (H) 136 - 145 mmol/L    Potassium 3.8 3.5 - 5.1 mmol/L    Chloride 113 (H) 98 - 112 mmol/L    CO2 28.0 21.0 - 32.0 mmol/L    Anion Gap 6 0 - 18 mmol/L    BUN 15 9 - 23 mg/dL    Creatinine 0.58 0.55 - 1.02 mg/dL    BUN/CREA Ratio 25.9 (H) 10.0 - 20.0    Calcium, Total 8.8 8.7 - 10.4 mg/dL    Calculated Osmolality 304 (H) 275 - 295 mOsm/kg    eGFR-Cr 107 >=60 mL/min/1.73m2   CBC With Differential With Platelet    Collection Time: 08/16/24  4:39 AM   Result Value Ref Range    WBC 4.3 4.0 - 11.0 x10(3) uL    RBC 2.47 (L) 3.80 - 5.30 x10(6)uL    HGB 8.2 (L) 12.0 - 16.0 g/dL    HCT 26.7 (L) 35.0 - 48.0 %    .1 (H) 80.0 - 100.0 fL    MCH 33.2 26.0 - 34.0 pg    MCHC 30.7 (L) 31.0 - 37.0 g/dL    RDW-SD 58.1 (H) 35.1 - 46.3 fL    RDW 15.3 (H) 11.0 - 15.0  %    .0 150.0 - 450.0 10(3)uL    Neutrophil Absolute Prelim 2.47 1.50 - 7.70 x10 (3) uL    Neutrophil Absolute 2.47 1.50 - 7.70 x10(3) uL    Lymphocyte Absolute 1.38 1.00 - 4.00 x10(3) uL    Monocyte Absolute 0.43 0.10 - 1.00 x10(3) uL    Eosinophil Absolute 0.04 0.00 - 0.70 x10(3) uL    Basophil Absolute 0.01 0.00 - 0.20 x10(3) uL    Immature Granulocyte Absolute 0.01 0.00 - 1.00 x10(3) uL    Neutrophil % 57.0 %    Lymphocyte % 31.8 %    Monocyte % 9.9 %    Eosinophil % 0.9 %    Basophil % 0.2 %    Immature Granulocyte % 0.2 %   Basic Metabolic Panel (8)    Collection Time: 08/17/24  5:59 AM   Result Value Ref Range    Glucose 102 (H) 70 - 99 mg/dL    Sodium 141 136 - 145 mmol/L    Potassium 3.3 (L) 3.5 - 5.1 mmol/L    Chloride 108 98 - 112 mmol/L    CO2 26.0 21.0 - 32.0 mmol/L    Anion Gap 7 0 - 18 mmol/L    BUN 12 9 - 23 mg/dL    Creatinine 0.57 0.55 - 1.02 mg/dL    BUN/CREA Ratio 21.1 (H) 10.0 - 20.0    Calcium, Total 8.9 8.7 - 10.4 mg/dL    Calculated Osmolality 292 275 - 295 mOsm/kg    eGFR-Cr 107 >=60 mL/min/1.73m2   CBC With Differential With Platelet    Collection Time: 08/17/24  5:59 AM   Result Value Ref Range    WBC 4.1 4.0 - 11.0 x10(3) uL    RBC 2.57 (L) 3.80 - 5.30 x10(6)uL    HGB 8.6 (L) 12.0 - 16.0 g/dL    HCT 26.3 (L) 35.0 - 48.0 %    .3 (H) 80.0 - 100.0 fL    MCH 33.5 26.0 - 34.0 pg    MCHC 32.7 31.0 - 37.0 g/dL    RDW-SD 53.1 (H) 35.1 - 46.3 fL    RDW 14.6 11.0 - 15.0 %    .0 150.0 - 450.0 10(3)uL    Neutrophil Absolute Prelim 2.02 1.50 - 7.70 x10 (3) uL    Neutrophil Absolute 2.02 1.50 - 7.70 x10(3) uL    Lymphocyte Absolute 1.54 1.00 - 4.00 x10(3) uL    Monocyte Absolute 0.41 0.10 - 1.00 x10(3) uL    Eosinophil Absolute 0.07 0.00 - 0.70 x10(3) uL    Basophil Absolute 0.01 0.00 - 0.20 x10(3) uL    Immature Granulocyte Absolute 0.02 0.00 - 1.00 x10(3) uL    Neutrophil % 49.7 %    Lymphocyte % 37.8 %    Monocyte % 10.1 %    Eosinophil % 1.7 %    Basophil % 0.2 %    Immature  Granulocyte % 0.5 %   Basic Metabolic Panel (8)    Collection Time: 08/18/24  6:26 AM   Result Value Ref Range    Glucose 93 70 - 99 mg/dL    Sodium 142 136 - 145 mmol/L    Potassium 3.5 3.5 - 5.1 mmol/L    Chloride 108 98 - 112 mmol/L    CO2 27.0 21.0 - 32.0 mmol/L    Anion Gap 7 0 - 18 mmol/L    BUN 6 (L) 9 - 23 mg/dL    Creatinine 0.51 (L) 0.55 - 1.02 mg/dL    BUN/CREA Ratio 11.8 10.0 - 20.0    Calcium, Total 8.5 (L) 8.7 - 10.4 mg/dL    Calculated Osmolality 291 275 - 295 mOsm/kg    eGFR-Cr 110 >=60 mL/min/1.73m2   CBC With Differential With Platelet    Collection Time: 08/18/24  6:26 AM   Result Value Ref Range    WBC 3.1 (L) 4.0 - 11.0 x10(3) uL    RBC 2.44 (L) 3.80 - 5.30 x10(6)uL    HGB 8.1 (L) 12.0 - 16.0 g/dL    HCT 25.4 (L) 35.0 - 48.0 %    .1 (H) 80.0 - 100.0 fL    MCH 33.2 26.0 - 34.0 pg    MCHC 31.9 31.0 - 37.0 g/dL    RDW-SD 53.7 (H) 35.1 - 46.3 fL    RDW 14.6 11.0 - 15.0 %    .0 150.0 - 450.0 10(3)uL    Neutrophil Absolute Prelim 1.65 1.50 - 7.70 x10 (3) uL    Neutrophil Absolute 1.65 1.50 - 7.70 x10(3) uL    Lymphocyte Absolute 0.97 (L) 1.00 - 4.00 x10(3) uL    Monocyte Absolute 0.35 0.10 - 1.00 x10(3) uL    Eosinophil Absolute 0.06 0.00 - 0.70 x10(3) uL    Basophil Absolute 0.02 0.00 - 0.20 x10(3) uL    Immature Granulocyte Absolute 0.01 0.00 - 1.00 x10(3) uL    Neutrophil % 53.9 %    Lymphocyte % 31.7 %    Monocyte % 11.4 %    Eosinophil % 2.0 %    Basophil % 0.7 %    Immature Granulocyte % 0.3 %   Basic Metabolic Panel (8)    Collection Time: 08/19/24  5:30 AM   Result Value Ref Range    Glucose 89 70 - 99 mg/dL    Sodium 145 136 - 145 mmol/L    Potassium 3.4 (L) 3.5 - 5.1 mmol/L    Chloride 111 98 - 112 mmol/L    CO2 26.0 21.0 - 32.0 mmol/L    Anion Gap 8 0 - 18 mmol/L    BUN <5 (L) 9 - 23 mg/dL    Creatinine 0.51 (L) 0.55 - 1.02 mg/dL    BUN/CREA Ratio      Calcium, Total 8.6 (L) 8.7 - 10.4 mg/dL    Calculated Osmolality      eGFR-Cr 110 >=60 mL/min/1.73m2   CBC With Differential  With Platelet    Collection Time: 08/19/24  5:30 AM   Result Value Ref Range    WBC 3.0 (L) 4.0 - 11.0 x10(3) uL    RBC 2.45 (L) 3.80 - 5.30 x10(6)uL    HGB 8.1 (L) 12.0 - 16.0 g/dL    HCT 25.7 (L) 35.0 - 48.0 %    .9 (H) 80.0 - 100.0 fL    MCH 33.1 26.0 - 34.0 pg    MCHC 31.5 31.0 - 37.0 g/dL    RDW-SD 53.7 (H) 35.1 - 46.3 fL    RDW 14.5 11.0 - 15.0 %    .0 150.0 - 450.0 10(3)uL    Neutrophil Absolute Prelim 1.49 (L) 1.50 - 7.70 x10 (3) uL    Neutrophil Absolute 1.49 (L) 1.50 - 7.70 x10(3) uL    Lymphocyte Absolute 1.08 1.00 - 4.00 x10(3) uL    Monocyte Absolute 0.37 0.10 - 1.00 x10(3) uL    Eosinophil Absolute 0.06 0.00 - 0.70 x10(3) uL    Basophil Absolute 0.01 0.00 - 0.20 x10(3) uL    Immature Granulocyte Absolute 0.01 0.00 - 1.00 x10(3) uL    Neutrophil % 49.3 %    Lymphocyte % 35.8 %    Monocyte % 12.3 %    Eosinophil % 2.0 %    Basophil % 0.3 %    Immature Granulocyte % 0.3 %   Basic Metabolic Panel (8)    Collection Time: 08/20/24  5:32 AM   Result Value Ref Range    Glucose 92 70 - 99 mg/dL    Sodium 145 136 - 145 mmol/L    Potassium 3.6 3.5 - 5.1 mmol/L    Chloride 112 98 - 112 mmol/L    CO2 27.0 21.0 - 32.0 mmol/L    Anion Gap 6 0 - 18 mmol/L    BUN <5 (L) 9 - 23 mg/dL    Creatinine 0.53 (L) 0.55 - 1.02 mg/dL    BUN/CREA Ratio      Calcium, Total 8.4 (L) 8.7 - 10.4 mg/dL    Calculated Osmolality      eGFR-Cr 109 >=60 mL/min/1.73m2   CBC With Differential With Platelet    Collection Time: 08/20/24  5:32 AM   Result Value Ref Range    WBC 2.6 (L) 4.0 - 11.0 x10(3) uL    RBC 2.40 (L) 3.80 - 5.30 x10(6)uL    HGB 8.1 (L) 12.0 - 16.0 g/dL    HCT 24.6 (L) 35.0 - 48.0 %    .5 (H) 80.0 - 100.0 fL    MCH 33.8 26.0 - 34.0 pg    MCHC 32.9 31.0 - 37.0 g/dL    RDW-SD 54.1 (H) 35.1 - 46.3 fL    RDW 14.6 11.0 - 15.0 %    .0 150.0 - 450.0 10(3)uL    Neutrophil Absolute Prelim 1.05 (L) 1.50 - 7.70 x10 (3) uL    Neutrophil Absolute 1.05 (L) 1.50 - 7.70 x10(3) uL    Lymphocyte Absolute 1.03 1.00 -  4.00 x10(3) uL    Monocyte Absolute 0.40 0.10 - 1.00 x10(3) uL    Eosinophil Absolute 0.08 0.00 - 0.70 x10(3) uL    Basophil Absolute 0.01 0.00 - 0.20 x10(3) uL    Immature Granulocyte Absolute 0.01 0.00 - 1.00 x10(3) uL    Neutrophil % 40.7 %    Lymphocyte % 39.9 %    Monocyte % 15.5 %    Eosinophil % 3.1 %    Basophil % 0.4 %    Immature Granulocyte % 0.4 %   Potassium    Collection Time: 08/20/24  5:32 AM   Result Value Ref Range    Potassium 3.6 3.5 - 5.1 mmol/L       Medications:    simethicone    sodium chloride    LORazepam    HYDROmorphone **OR** HYDROmorphone **OR** HYDROmorphone    phenol    diazepam    ondansetron **OR** ondansetron    pantoprazole **OR** pantoprazole    enoxaparin    diphenhydrAMINE     ROS: Generally still doing well. Reports rumbling in the abdomen, but not flatus or BM yet. Denies any nausea or vomiting. Appetite is present. Tolerating clear liquid diet, drinking mainly water. Ambulating. Pain well managed. Inc sites unremarkable, no LUCHO complaints    Drains:  LCUHO right 215 ml serosang x 24 hours  LUCHO left 245 ml serosang x 24 hours    Physical examination:    /67 (BP Location: Right arm)   Pulse 91   Temp 98 °F (36.7 °C) (Oral)   Resp 14   Ht 5' 7\" (1.702 m)   Wt 173 lb 6.4 oz (78.7 kg)   LMP 12/22/2020   SpO2 100%   BMI 27.16 kg/m²     GENERAL: alert and oriented, cooperative, in no acute distress  ABDOMEN: soft, non-tender. Bowel sounds normal. No masses,  no organomegaly  INCISION/SURGICAL WOUNDS: clean, dry and intact midline incision site  PELVIC: Pelvic exam: examination not indicated.    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,  mobilization of splenic flexure, repair of transverse colon mesentery, rectosigmoid colon resection with end-to-end anastomosis on 8/13/24      POD #7     Generally doing well     AVSS     Labs reviewed- Wbc (2.6), hgb (8.1), cr (0.53) stable      ml overnight     Awaiting flatus and BM, denies n/v    Tolerating clear liquid diet     PLAN:      Post op ileus:  -If + flatus plan to advance to full liquid diet  -XR abdomen without obstruction  -Encouraged clear ensure supplement, dietary on consult, will consider ensure compact and TPN in the next couple of days  -Continue to ambulate     Continue IV fluids and LUCHO     Monitor I&O     Pain management as needed    Ok to shower, change LUCHO dressing PRN     LMWH    Pt seen and examined with Dr. Choudhary, pathology reviewed with pt and family on 8/20/24    Rehana Ortega PA-C  08/20/24

## 2024-08-20 NOTE — PLAN OF CARE
Problem: Patient Centered Care  Goal: Patient preferences are identified and integrated in the patient's plan of care  Description: Interventions:    - Provide timely, complete, and accurate information to patient/family  - Incorporate patient and family knowledge, values, beliefs, and cultural backgrounds into the planning and delivery of care  - Encourage patient/family to participate in care and decision-making at the level they choose  - Honor patient and family perspectives and choices  Outcome: Progressing   No acute changes during the shift, surgical dressing was removed today, incision dry, clean, intact. LUCHO drain sites with serous output dressing was changed after patient showered. Clear liquid diet patient tolerating liquids well, no c/o nausea, denies flatus. Iv fluids infusing. Patient frequently ambulating in hallways tolerating activity well. Fall precautions in place.

## 2024-08-20 NOTE — PROGRESS NOTES
[facility] Hospitalist Progress Note     CC: Hospital Follow up    PCP: Diana Black MD       Assessment/Plan:     Active Problems:    Ovarian cancer (HCC)    Yue Malin - 54 year old female w adenocarcinoma admitted 8/13/2024 for post op care after planned CHELSIE-BSO, colon resection for her gynecologic adenoca w Dr. Choudhary. Post op course with ileus.      Gynecologic adenocarcinoma w peritoneal carcinomatosis  S/p chemo  Now s/p CHELSIE-BSO, rectosigmoid colon resection w end-end anastomosis due to tumor involvement  - Post-op care per Dr. Choudhary's team. NGT & yao removed.  - Trend labs  - s/p dilaudid PCA, continue IV dilaudid PRN and IV toradol, avoid narcotics if able  - IVF until tolerating PO  - Up, OOB, IS use, ambulate  - s/p NGT removal    Post op ileus  - hypoactive BS, no flatus yet, denies nausea/vomiting  - ambulating frequently  - not taking opiates  - continue clear liquids for now  - KUB without obstruction  - may need TPN, per gyn onc     Hypernatremia- resolved  - 2/2 NPO & IVF  - Switch IVF to D5  - switch back to NS     Anemia, post-op - 2/2 expected blood loss w/ dilutional component from IVF   - No active external bleeding noted  - Daily CBC     Anxiety (ACOSTA)  - Stable, continue PTA ativan PRN as able  - Add IV valium for NGT discomfort/anxiety     ACP: Full code  Ppx: DVT: Enox  Dispo  EDoD:  TBD. Pending improvement of ileus  F/u:   - PCP:  Diana Black MD  - Onc  - Gyne/onc     Kim Jean MD  Internal Medicine - Hospitalist  [facility]     Subjective:     Still no flatus. Feels hungry. States she is not a big liquid drinker at home. Does not feel nauseated or worsening pain or bloating.     OBJECTIVE:    Blood pressure 134/74, pulse 91, temperature 97.6 °F (36.4 °C), temperature source Temporal, resp. rate 16, height 5' 7\" (1.702 m), weight 173 lb 6.4 oz (78.7 kg), last menstrual period 12/22/2020, SpO2 100%, not currently breastfeeding.    Temp:  [97.6 °F (36.4 °C)-98 °F (36.7  °C)] 97.6 °F (36.4 °C)  Pulse:  [69-91] 91  Resp:  [14-16] 16  BP: (117-134)/(65-74) 134/74  SpO2:  [100 %] 100 %      Intake/Output:    Intake/Output Summary (Last 24 hours) at 8/20/2024 1345  Last data filed at 8/20/2024 1218  Gross per 24 hour   Intake 1473.72 ml   Output 1315 ml   Net 158.72 ml       Last 3 Weights   08/13/24 0549 173 lb 6.4 oz (78.7 kg)   08/02/24 1128 180 lb (81.6 kg)   01/05/21 0831 190 lb 1.6 oz (86.2 kg)   11/22/17 0808 189 lb (85.7 kg)     Exam   GEN: female in NAD  HEENT: EOMI  Pulm: CTAB, no crackles or wheezes  CV: RRR, no murmurs  ABD: Soft, non-tender, non-distended, + hypoactive BS, dressing with some dried blood,+LUCHO  SKIN: warm, dry  EXT: trace edema    Data Review:       Labs:     Recent Labs   Lab 08/18/24  0626 08/19/24  0530 08/20/24  0532   RBC 2.44* 2.45* 2.40*   HGB 8.1* 8.1* 8.1*   HCT 25.4* 25.7* 24.6*   .1* 104.9* 102.5*   MCH 33.2 33.1 33.8   MCHC 31.9 31.5 32.9   RDW 14.6 14.5 14.6   NEPRELIM 1.65 1.49* 1.05*   WBC 3.1* 3.0* 2.6*   .0 214.0 220.0         Recent Labs   Lab 08/18/24  0626 08/19/24  0530 08/20/24  0532   GLU 93 89 92   BUN 6* <5* <5*   CREATSERUM 0.51* 0.51* 0.53*   EGFRCR 110 110 109   CA 8.5* 8.6* 8.4*    145 145   K 3.5 3.4* 3.6  3.6    111 112   CO2 27.0 26.0 27.0       No results for input(s): \"ALT\", \"AST\", \"ALB\", \"AMYLASE\", \"LIPASE\", \"LDH\" in the last 168 hours.    Invalid input(s): \"ALPHOS\", \"TBIL\", \"DBIL\", \"TPROT\"      Imaging:  XR ABDOMEN OBSTRUCTIVE SERIES ROUTINE(2 VW)(CPT=74019)    Result Date: 8/20/2024  CONCLUSION:  1. Mild gaseous distention of the colon with layering air and fluid. No dilated loops of small bowel are detectable radiographically.  2. Surgical drains in place.    Dictated by (CST): Suresh Wills MD on 8/20/2024 at 1:14 PM     Finalized by (CST): Suresh Wills MD on 8/20/2024 at 1:17 PM             Meds:     INPATIENT MEDICATIONS    Scheduled Medications:      pantoprazole, 40 mg, QAM AC    Or  pantoprazole, 40 mg, QAM AC  enoxaparin, 40 mg, Daily            Drips:  sodium chloride, Last Rate: 83 mL/hr at 08/19/24 2341        PRN Medications  simethicone, 80 mg, TID PRN  LORazepam, 1 mg, Q6H PRN  HYDROmorphone, 0.2 mg, Q2H PRN   Or  HYDROmorphone, 0.4 mg, Q2H PRN   Or  HYDROmorphone, 0.8 mg, Q2H PRN  phenol, , Q2H PRN  diazepam, 5 mg, Q6H PRN  ondansetron, 4 mg, Q8H PRN   Or  ondansetron, 4 mg, Q8H PRN  diphenhydrAMINE, 12.5 mg, Q4H PRN

## 2024-08-20 NOTE — PLAN OF CARE
Pt a/o x 4, vss. Pt ambulates in hallway frequently, tolerating a CLD, +belching, denies passing gas @ this time, pt denies N/V.  Discussed POC, offered emotional support, instructed pt to use call light for assistance.   Problem: Patient Centered Care  Goal: Patient preferences are identified and integrated in the patient's plan of care  Description: Interventions:  - What would you like us to know as we care for you?   - Provide timely, complete, and accurate information to patient/family  - Incorporate patient and family knowledge, values, beliefs, and cultural backgrounds into the planning and delivery of care  - Encourage patient/family to participate in care and decision-making at the level they choose  - Honor patient and family perspectives and choices  Outcome: Progressing     Problem: Patient/Family Goals  Goal: Patient/Family Long Term Goal  Description: Patient's Long Term Goal:     Interventions:  -   - See additional Care Plan goals for specific interventions  Outcome: Progressing  Goal: Patient/Family Short Term Goal  Description: Patient's Short Term Goal:     Interventions:   -   - See additional Care Plan goals for specific interventions  Outcome: Progressing     Problem: PAIN - ADULT  Goal: Verbalizes/displays adequate comfort level or patient's stated pain goal  Description: INTERVENTIONS:  - Encourage pt to monitor pain and request assistance  - Assess pain using appropriate pain scale  - Administer analgesics based on type and severity of pain and evaluate response  - Implement non-pharmacological measures as appropriate and evaluate response  - Consider cultural and social influences on pain and pain management  - Manage/alleviate anxiety  - Utilize distraction and/or relaxation techniques  - Monitor for opioid side effects  - Notify MD/LIP if interventions unsuccessful or patient reports new pain  - Anticipate increased pain with activity and pre-medicate as appropriate  Outcome:  Progressing     Problem: SAFETY ADULT - FALL  Goal: Free from fall injury  Description: INTERVENTIONS:  - Assess pt frequently for physical needs  - Identify cognitive and physical deficits and behaviors that affect risk of falls.  - Dahinda fall precautions as indicated by assessment.  - Educate pt/family on patient safety including physical limitations  - Instruct pt to call for assistance with activity based on assessment  - Modify environment to reduce risk of injury  - Provide assistive devices as appropriate  - Consider OT/PT consult to assist with strengthening/mobility  - Encourage toileting schedule  Outcome: Progressing     Problem: GASTROINTESTINAL - ADULT  Goal: Minimal or absence of nausea and vomiting  Description: INTERVENTIONS:  - Maintain adequate hydration with IV or PO as ordered and tolerated  - Nasogastric tube to low intermittent suction as ordered  - Evaluate effectiveness of ordered antiemetic medications  - Provide nonpharmacologic comfort measures as appropriate  - Advance diet as tolerated, if ordered  - Obtain nutritional consult as needed  - Evaluate fluid balance  Outcome: Progressing  Goal: Maintains or returns to baseline bowel function  Description: INTERVENTIONS:  - Assess bowel function  - Maintain adequate hydration with IV or PO as ordered and tolerated  - Evaluate effectiveness of GI medications  - Encourage mobilization and activity  - Obtain nutritional consult as needed  - Establish a toileting routine/schedule  - Consider collaborating with pharmacy to review patient's medication profile  Outcome: Progressing     Problem: GENITOURINARY - ADULT  Goal: Absence of urinary retention  Description: INTERVENTIONS:  - Assess patient’s ability to void and empty bladder  - Monitor intake/output and perform bladder scan as needed  - Follow urinary retention protocol/standard of care  - Consider collaborating with pharmacy to review patient's medication profile  - Implement strategies  to promote bladder emptying  Outcome: Progressing     Problem: SKIN/TISSUE INTEGRITY - ADULT  Goal: Incision(s), wounds(s) or drain site(s) healing without S/S of infection  Description: INTERVENTIONS:  - Assess and document risk factors for pressure ulcer development  - Assess and document skin integrity  - Assess and document dressing/incision, wound bed, drain sites and surrounding tissue  - Implement wound care per orders  - Initiate isolation precautions as appropriate  - Initiate Pressure Ulcer prevention bundle as indicated  Outcome: Progressing     Problem: MUSCULOSKELETAL - ADULT  Goal: Return mobility to safest level of function  Description: INTERVENTIONS:  - Assess patient stability and activity tolerance for standing, transferring and ambulating w/ or w/o assistive devices  - Assist with transfers and ambulation using safe patient handling equipment as needed  - Ensure adequate protection for wounds/incisions during mobilization  - Obtain PT/OT consults as needed  - Advance activity as appropriate  - Communicate ordered activity level and limitations with patient/family  Outcome: Progressing

## 2024-08-20 NOTE — DIETARY NOTE
ADULT NUTRITION INITIAL ASSESSMENT    Pt is at moderate nutrition risk.  Pt does not meet malnutrition criteria.      RECOMMENDATIONS TO MD: See Nutrition Intervention.     ADMITTING DIAGNOSIS:  Adenocarcinoma  Ovarian cancer (HCC)    PERTINENT PAST MEDICAL HISTORY:  has a past medical history of Anxiety state, Cancer (HCC), DEPRESSION, Depression, Esophageal reflux, HPV in female, Hypertriglyceridemia, OCD (obsessive compulsive disorder), Personal history of antineoplastic chemotherapy, PONV (postoperative nausea and vomiting), PTSD (post-traumatic stress disorder), SEASONAL ALLERGIES, Visual impairment, Vitamin B 12 deficiency, and Vitamin D deficiency.     PATIENT STATUS:   08/15/24: Pt screened for MST of 2, for wt loss (>14# wt loss) without decrease in po, and RN consult per protocol. Pt found to be at no nutrition risk at this time. Pt admitted for planned hysterectomy. Pt s/POD#2 extensive surgery including total hysterectomy, tumor debulking of peritoneal surfaces, omentectomy, rectosigmoidectomy and anastomosis. Visited pt today, family in the room. Pt currently NPO. Pt denied N/V, stated her abdominal pain is manageable, stated last BM 8/13 PTA. Pt denied decrease in po PTA,  dnied decrease in appetite 2/2 to chemo. Pt reported good PO intakes PTA and consuming her regular eating pattern. Pt  Weight relatively stable, pt denied significant wt loss, stated #, #. Unclear why MST was noted as 2 at admission; RD documented corrected MST today. Additionally, pt appears well nourished per visual NFPE. Labs reviewed. Pt currently NPO and per discussion with RN, no plans to advance diet today yet per MD. Consideration for ONS to help maximize nutrient intakes is recommended when diet is advanced. Will follow per protocol. Please consult RD if earlier nutrition intervention is needed.     08/20/24: Initial assessment: Seeing pt due to NPO/CLD x7 days.  Tolerating CLD with ensure clear, but can't  drink very much--never has been able to. Drinking about 1 ensure clear per day and other CLD. Which will not meet nutritional needs. Pt is having an xray today to assess status. Discussed with pt potential need for IV nutrition (TPN) thru her port if unable to progress diet soon. Pt got upset and was crying--she just wants to be able to eat and go home. Discussed that hopefully this will be the case, but if not TPN is an option to meet her nutritional needs to ensure healing post op, etc. MD's are hoping for no TPN as well.  Ensure compact is also allowed on a CLD and is more nutrient dense than ensure clear, so will add enrrique flavor in addition.      FOOD/NUTRITION RELATED HISTORY:  Appetite:  good appetite PTA, currently hungry and wants to eat  Intake:  CLD--1 ensure clear per day.    Intake Meeting Needs: No, but oral nutrition supplements (ONS) to maximize  Percent Meals Eaten (last 6 days)       Date/Time Percent Meals Eaten (%)    08/15/24 2000 0 %    08/16/24 0439 0 %           Food Allergies: No Known Food Allergies (NKFA)  Cultural/Ethnic/Yarsanism Preferences: Not Obtained    GASTROINTESTINAL:  no N/V, no flatus either    MEDICATIONS: reviewed   pantoprazole  40 mg Intravenous QAM AC    Or    pantoprazole  40 mg Oral QAM AC    enoxaparin  40 mg Subcutaneous Daily       LABS: reviewed  Recent Labs     08/17/24  0559 08/18/24  0626 08/19/24  0530 08/20/24  0532   * 93 89 92   BUN 12 6* <5* <5*   CREATSERUM 0.57 0.51* 0.51* 0.53*   CA 8.9 8.5* 8.6* 8.4*    142 145 145   K 3.3* 3.5 3.4* 3.6  3.6    108 111 112   CO2 26.0 27.0 26.0 27.0   OSMOCALC 292 291  --   --        NUTRITION RELATED PHYSICAL FINDINGS:  - Nutrition Focused Physical Exam (NFPE): well nourished per visual exam--did not do NFPE due to pt being upset during visit, but visually appears well nourished.    - Fluid Accumulation: none  see RN documentation for details  - Skin Integrity: surgical wound(s) see RN documentation for  details    ANTHROPOMETRICS:  HT: 170.2 cm (5' 7\")  WT: 78.7 kg (173 lb 6.4 oz)   BMI: Body mass index is 27.16 kg/m².  BMI CLASSIFICATION: 25-29.9 kg/m2 - overweight  IBW: 135 lbs        128% IBW  Usual Body Wt: was unable to obtain from pt at this visit. Pt with ascites at admission therefore doubt 173# at admission was true wt.   Request current wt    WEIGHT HISTORY:  Patient Weight(s) for the past 336 hrs:   Weight   08/13/24 0549 78.7 kg (173 lb 6.4 oz)     Wt Readings from Last 10 Encounters:   08/13/24 78.7 kg (173 lb 6.4 oz)   01/05/21 86.2 kg (190 lb 1.6 oz)   11/22/17 85.7 kg (189 lb)   12/08/15 75.5 kg (166 lb 6.4 oz)   10/14/14 77 kg (169 lb 11.2 oz)   06/28/13 77.1 kg (170 lb)   10/22/12 76.2 kg (168 lb)   01/17/12 71.7 kg (158 lb)   04/29/10 72.1 kg (159 lb)       NUTRITION DIAGNOSIS/PROBLEM:    Inadequate oral intake related to Decreased ability to consume sufficient energy as evidenced by NPO/CLD for 7 days.     NUTRITION INTERVENTION:     NUTRITION PRESCRIPTION:   Estimated Nutrition needs: --dosing wt of 78.7 kg - wt taken on 8/13 (even given ascites at adm. True wt unknown.    Calories: 9502-1538 calories/day (23-25 calories per kg Dosing wt)  Protein: 92 g protein/day (1.5 g protein/kg Ideal body wt (IBW))  Fluid Needs: 5565-3994 ml/day     - Diet:       Procedures    Clear liquid diet Is Patient on Accuchecks? No      - RD Care Plan:  increase CLD oral nutritional supplements to best meet nutritional needs.    - Parenteral Nutrition: none at this time. Monitor need.   - Meals and snacks: Encouraged increased PO intake  - Medical Food Supplements-RD added Ensure Clear (240 calories/ 8 g protein each) Apple Daily and Ensure Compact (220 calories/ 9 g protein each) Chocolate BID Rational/use of oral supplements discussed.  - Vitamin and mineral supplements: none  - Feeding assistance: meal set up  - Nutrition education: Discussed importance of adequate energy and protein intake   - Coordination of  nutrition care: collaboration with other providers  - Discharge and transfer of nutrition care to new setting or provider: to be determined    MONITOR AND EVALUATE/NUTRITION GOALS:  - Food and Nutrient Intake:      Monitor: tolerance of PO intake, tolerance of supplement intake, and for PO diet advancement  - Food and Nutrient Administration:      Monitor: nutrition support vs diet initiation  - Anthropometric Measurement:    Monitor weight  - Nutrition Goals:      allow wt loss due to fluid losses, diet initiation vs nutrition support within 24-48 hrs, labs within acceptable limits, support body systems, and improved GI status    DIETITIAN FOLLOW UP: RD to follow and monitor nutrition status      Meche Henning RD, LDN   Clinical Dietitian [phone]

## 2024-08-20 NOTE — SPIRITUAL CARE NOTE
Spiritual Care Visit Note    Patient Name: Yue Malin Date of Spiritual Care Visit: 24   : 1970 Primary Dx: <principal problem not specified>       Referred By: Referral From:     Spiritual Care Taxonomy:    Intended Effects: Demonstrate caring and concern    Methods: Offer support    Interventions: Active listening    Visit Type/Summary:     I met with patient and as I arrived her daughter did too.  Patient shared she didn't want to visit at this time.   will follow up as needed.    Chaplain Stauffer [phone]

## 2024-08-21 LAB
ANION GAP SERPL CALC-SCNC: 3 MMOL/L (ref 0–18)
BASOPHILS # BLD AUTO: 0.01 X10(3) UL (ref 0–0.2)
BASOPHILS NFR BLD AUTO: 0.3 %
BUN BLD-MCNC: <5 MG/DL (ref 9–23)
CALCIUM BLD-MCNC: 8.8 MG/DL (ref 8.7–10.4)
CHLORIDE SERPL-SCNC: 112 MMOL/L (ref 98–112)
CO2 SERPL-SCNC: 30 MMOL/L (ref 21–32)
CREAT BLD-MCNC: 0.58 MG/DL
DEPRECATED RDW RBC AUTO: 56.6 FL (ref 35.1–46.3)
EGFRCR SERPLBLD CKD-EPI 2021: 107 ML/MIN/1.73M2 (ref 60–?)
EOSINOPHIL # BLD AUTO: 0.07 X10(3) UL (ref 0–0.7)
EOSINOPHIL NFR BLD AUTO: 2.4 %
ERYTHROCYTE [DISTWIDTH] IN BLOOD BY AUTOMATED COUNT: 14.9 % (ref 11–15)
GLUCOSE BLD-MCNC: 98 MG/DL (ref 70–99)
HCT VFR BLD AUTO: 26.4 %
HGB BLD-MCNC: 8.3 G/DL
IMM GRANULOCYTES # BLD AUTO: 0.01 X10(3) UL (ref 0–1)
IMM GRANULOCYTES NFR BLD: 0.3 %
LYMPHOCYTES # BLD AUTO: 1.2 X10(3) UL (ref 1–4)
LYMPHOCYTES NFR BLD AUTO: 41.2 %
MCH RBC QN AUTO: 32.8 PG (ref 26–34)
MCHC RBC AUTO-ENTMCNC: 31.4 G/DL (ref 31–37)
MCV RBC AUTO: 104.3 FL
MONOCYTES # BLD AUTO: 0.47 X10(3) UL (ref 0.1–1)
MONOCYTES NFR BLD AUTO: 16.2 %
NEUTROPHILS # BLD AUTO: 1.15 X10 (3) UL (ref 1.5–7.7)
NEUTROPHILS # BLD AUTO: 1.15 X10(3) UL (ref 1.5–7.7)
NEUTROPHILS NFR BLD AUTO: 39.6 %
PLATELET # BLD AUTO: 264 10(3)UL (ref 150–450)
POTASSIUM SERPL-SCNC: 3.3 MMOL/L (ref 3.5–5.1)
RBC # BLD AUTO: 2.53 X10(6)UL
SODIUM SERPL-SCNC: 145 MMOL/L (ref 136–145)
WBC # BLD AUTO: 2.9 X10(3) UL (ref 4–11)

## 2024-08-21 PROCEDURE — 80048 BASIC METABOLIC PNL TOTAL CA: CPT | Performed by: PHYSICIAN ASSISTANT

## 2024-08-21 PROCEDURE — 85025 COMPLETE CBC W/AUTO DIFF WBC: CPT | Performed by: PHYSICIAN ASSISTANT

## 2024-08-21 RX ORDER — DOCUSATE SODIUM 100 MG/1
100 CAPSULE, LIQUID FILLED ORAL 2 TIMES DAILY
Status: DISCONTINUED | OUTPATIENT
Start: 2024-08-21 | End: 2024-08-24

## 2024-08-21 RX ORDER — POTASSIUM CHLORIDE 1500 MG/1
40 TABLET, EXTENDED RELEASE ORAL ONCE
Status: COMPLETED | OUTPATIENT
Start: 2024-08-21 | End: 2024-08-21

## 2024-08-21 NOTE — PLAN OF CARE
Problem: Patient Centered Care  Goal: Patient preferences are identified and integrated in the patient's plan of care  Description: Interventions:  - What would you like us to know as we care for you?   - Provide timely, complete, and accurate information to patient/family  - Incorporate patient and family knowledge, values, beliefs, and cultural backgrounds into the planning and delivery of care  - Encourage patient/family to participate in care and decision-making at the level they choose  - Honor patient and family perspectives and choices  Outcome: Progressing     Problem: Patient/Family Goals  Goal: Patient/Family Long Term Goal  Description: Patient's Long Term Goal:     Interventions:  - See additional Care Plan goals for specific interventions  Outcome: Progressing  Goal: Patient/Family Short Term Goal  Description: Patient's Short Term Goal:     Interventions:  - See additional Care Plan goals for specific interventions  Outcome: Progressing     Problem: PAIN - ADULT  Goal: Verbalizes/displays adequate comfort level or patient's stated pain goal  Description: INTERVENTIONS:  - Encourage pt to monitor pain and request assistance  - Assess pain using appropriate pain scale  - Administer analgesics based on type and severity of pain and evaluate response  - Implement non-pharmacological measures as appropriate and evaluate response  - Consider cultural and social influences on pain and pain management  - Manage/alleviate anxiety  - Utilize distraction and/or relaxation techniques  - Monitor for opioid side effects  - Notify MD/LIP if interventions unsuccessful or patient reports new pain  - Anticipate increased pain with activity and pre-medicate as appropriate  Outcome: Progressing     Problem: SAFETY ADULT - FALL  Goal: Free from fall injury  Description: INTERVENTIONS:  - Assess pt frequently for physical needs  - Identify cognitive and physical deficits and behaviors that affect risk of falls.  -  [place] fall precautions as indicated by assessment.  - Educate pt/family on patient safety including physical limitations  - Instruct pt to call for assistance with activity based on assessment  - Modify environment to reduce risk of injury  - Provide assistive devices as appropriate  - Consider OT/PT consult to assist with strengthening/mobility  - Encourage toileting schedule  Outcome: Progressing     Problem: GASTROINTESTINAL - ADULT  Goal: Minimal or absence of nausea and vomiting  Description: INTERVENTIONS:  - Maintain adequate hydration with IV or PO as ordered and tolerated  - Nasogastric tube to low intermittent suction as ordered  - Evaluate effectiveness of ordered antiemetic medications  - Provide nonpharmacologic comfort measures as appropriate  - Advance diet as tolerated, if ordered  - Obtain nutritional consult as needed  - Evaluate fluid balance  Outcome: Progressing  Goal: Maintains or returns to baseline bowel function  Description: INTERVENTIONS:  - Assess bowel function  - Maintain adequate hydration with IV or PO as ordered and tolerated  - Evaluate effectiveness of GI medications  - Encourage mobilization and activity  - Obtain nutritional consult as needed  - Establish a toileting routine/schedule  - Consider collaborating with pharmacy to review patient's medication profile  Outcome: Progressing     Problem: GENITOURINARY - ADULT  Goal: Absence of urinary retention  Description: INTERVENTIONS:  - Assess patient’s ability to void and empty bladder  - Monitor intake/output and perform bladder scan as needed  - Follow urinary retention protocol/standard of care  - Consider collaborating with pharmacy to review patient's medication profile  - Implement strategies to promote bladder emptying  Outcome: Progressing     Problem: SKIN/TISSUE INTEGRITY - ADULT  Goal: Incision(s), wounds(s) or drain site(s) healing without S/S of infection  Description: INTERVENTIONS:  - Assess and document risk  factors for pressure ulcer development  - Assess and document skin integrity  - Assess and document dressing/incision, wound bed, drain sites and surrounding tissue  - Implement wound care per orders  - Initiate isolation precautions as appropriate  - Initiate Pressure Ulcer prevention bundle as indicated  Outcome: Progressing     Problem: MUSCULOSKELETAL - ADULT  Goal: Return mobility to safest level of function  Description: INTERVENTIONS:  - Assess patient stability and activity tolerance for standing, transferring and ambulating w/ or w/o assistive devices  - Assist with transfers and ambulation using safe patient handling equipment as needed  - Ensure adequate protection for wounds/incisions during mobilization  - Obtain PT/OT consults as needed  - Advance activity as appropriate  - Communicate ordered activity level and limitations with patient/family  Outcome: Progressing   Yue is A/Ox4, on room air. Remote tele in place, no calls. X2 LUCHO drains in place, dressings changed. Tolerating clear liquids. +belching, no gas. Voiding freely. Denies pain and nausea. R port needle and dressing changed. Ambulating independently. Fall and safety precautions maintained. Bed locked in the lowest position. Call light and personal belongings within reach, frequent rounding done.

## 2024-08-21 NOTE — PROGRESS NOTES
[facility] Hospitalist Progress Note     CC: Hospital Follow up    PCP: Diana Black MD       Assessment/Plan:     Active Problems:    Ovarian cancer (HCC)    Yue Malin - 54 year old female w adenocarcinoma admitted 8/13/2024 for post op care after planned CHELSIE-BSO, colon resection for her gynecologic adenoca w Dr. Choudhary. Post op course with ileus.      Gynecologic adenocarcinoma w peritoneal carcinomatosis  S/p chemo  Now s/p CHELSIE-BSO, rectosigmoid colon resection w end-end anastomosis due to tumor involvement  - Post-op care per Dr. Choudhary's team. NGT & yao removed.  - Trend labs  - s/p dilaudid PCA, continue IV dilaudid PRN and IV toradol, avoid narcotics if able  - IVF until tolerating PO  - Up, OOB, IS use, ambulate  - s/p NGT removal    Post op ileus  - hypoactive BS, no flatus yet, denies nausea/vomiting  - ambulating frequently  - not taking opiates  - KUB without obstruction  - may need TPN but patient does not want this   - would trial advance diet if ok with gyn onc     Hypernatremia- resolved  - 2/2 NPO & IVF  - Switch IVF to D5  - switch back to NS     Anemia, post-op - 2/2 expected blood loss w/ dilutional component from IVF   - No active external bleeding noted  - Daily CBC     Anxiety (ACOSTA)  - Stable, continue PTA ativan PRN as able  - Add IV valium for NGT discomfort/anxiety     ACP: Full code  Ppx: DVT: Enox  Dispo  EDoD:  TBD. Pending improvement of ileus  F/u:   - PCP:  Diana Black MD  - Onc  - Gyne/onc     Kim Jean MD  Internal Medicine - Hospitalist  Formerly [facility]     Subjective:     Thinks she passed some flatus. No bloating, worsening abdominal pain, no nausea.     OBJECTIVE:    Blood pressure 112/74, pulse 92, temperature 98 °F (36.7 °C), temperature source Oral, resp. rate 16, height 5' 7\" (1.702 m), weight 178 lb 6.4 oz (80.9 kg), last menstrual period 12/22/2020, SpO2 99%, not currently breastfeeding.    Temp:  [97.6 °F (36.4 °C)-98 °F (36.7 °C)] 98 °F (36.7 °C)  Pulse:   [] 92  Resp:  [16] 16  BP: (112-134)/(71-74) 112/74  SpO2:  [99 %-100 %] 99 %      Intake/Output:    Intake/Output Summary (Last 24 hours) at 8/21/2024 1208  Last data filed at 8/21/2024 0838  Gross per 24 hour   Intake 100 ml   Output 885 ml   Net -785 ml       Last 3 Weights   08/20/24 2201 178 lb 6.4 oz (80.9 kg)   08/13/24 0549 173 lb 6.4 oz (78.7 kg)   08/02/24 1128 180 lb (81.6 kg)   01/05/21 0831 190 lb 1.6 oz (86.2 kg)   11/22/17 0808 189 lb (85.7 kg)     Exam   GEN: female in NAD  HEENT: EOMI  Pulm: CTAB, no crackles or wheezes  CV: RRR, no murmurs  ABD: Soft, non-tender, non-distended, + hypoactive BS, dressing with some dried blood,+LUCHO  SKIN: warm, dry  EXT: trace edema    Data Review:       Labs:     Recent Labs   Lab 08/19/24  0530 08/20/24  0532 08/21/24  0548   RBC 2.45* 2.40* 2.53*   HGB 8.1* 8.1* 8.3*   HCT 25.7* 24.6* 26.4*   .9* 102.5* 104.3*   MCH 33.1 33.8 32.8   MCHC 31.5 32.9 31.4   RDW 14.5 14.6 14.9   NEPRELIM 1.49* 1.05* 1.15*   WBC 3.0* 2.6* 2.9*   .0 220.0 264.0         Recent Labs   Lab 08/19/24  0530 08/20/24  0532 08/21/24  0548   GLU 89 92 98   BUN <5* <5* <5*   CREATSERUM 0.51* 0.53* 0.58   EGFRCR 110 109 107   CA 8.6* 8.4* 8.8    145 145   K 3.4* 3.6  3.6 3.3*    112 112   CO2 26.0 27.0 30.0       No results for input(s): \"ALT\", \"AST\", \"ALB\", \"AMYLASE\", \"LIPASE\", \"LDH\" in the last 168 hours.    Invalid input(s): \"ALPHOS\", \"TBIL\", \"DBIL\", \"TPROT\"      Imaging:  XR ABDOMEN OBSTRUCTIVE SERIES ROUTINE(2 VW)(CPT=74019)    Result Date: 8/20/2024  CONCLUSION:  1. Mild gaseous distention of the colon with layering air and fluid. No dilated loops of small bowel are detectable radiographically.  2. Surgical drains in place.    Dictated by (CST): Suresh Wills MD on 8/20/2024 at 1:14 PM     Finalized by (CST): Suresh Wills MD on 8/20/2024 at 1:17 PM             Meds:     INPATIENT MEDICATIONS    Scheduled Medications:      pantoprazole, 40 mg, QAM AC    Or  pantoprazole, 40 mg, QAM AC  enoxaparin, 40 mg, Daily            Drips:  sodium chloride, Last Rate: 50 mL/hr at 08/20/24 2006        PRN Medications  simethicone, 80 mg, TID PRN  LORazepam, 1 mg, Q6H PRN  HYDROmorphone, 0.2 mg, Q2H PRN   Or  HYDROmorphone, 0.4 mg, Q2H PRN   Or  HYDROmorphone, 0.8 mg, Q2H PRN  phenol, , Q2H PRN  diazepam, 5 mg, Q6H PRN  ondansetron, 4 mg, Q8H PRN   Or  ondansetron, 4 mg, Q8H PRN  diphenhydrAMINE, 12.5 mg, Q4H PRN

## 2024-08-21 NOTE — PROGRESS NOTES
[facility]  part of [facility]    Gyn Onc Progress Note      Yue Malin Patient Status:  Inpatient    1970 MRN [id number]   Location [facility] 4W/SW/SE Attending Binh Choudhary MD   Hosp Day # 8 PCP Diana Black MD     Assessment & Plan:     Ovarian cancer (HCC)  POD#8  No flatus/BM but feels rumbling   No N/V  Postop colonic ileus   AXR reviewed from yesterday, no signs of SBO or obstruction   Start chewing gum   Initiate Colace   Continue CLD with Ensure TID   Labs stable   LUCHO-SS x2, no unexpected output   Abd: soft, NT with +BS  May consider stimulant tomorrow if no flatus           Discussed with: hospitalist     nurse     patient     Plan discussed with patient who verbalizes understanding and agreement.    Subjective:   No flatus/BM but feels rumbling   No N/V    Objective:   Vital signs in last 24 hours:  Temp:  [98 °F (36.7 °C)] 98 °F (36.7 °C)  Pulse:  [] 92  Resp:  [16] 16  BP: (112-123)/(66-74) 113/66  SpO2:  [99 %-100 %] 100 %    Input/Output:    Intake/Output Summary (Last 24 hours) at 2024 1239  Last data filed at 2024 1211  Gross per 24 hour   Intake --   Output 1745 ml   Net -1745 ml       Weight (Last 6):  Wt Readings from Last 6 Encounters:   24 178 lb 6.4 oz (80.9 kg)   21 190 lb 1.6 oz (86.2 kg)   17 189 lb (85.7 kg)   12/08/15 166 lb 6.4 oz (75.5 kg)   10/14/14 169 lb 11.2 oz (77 kg)   13 170 lb (77.1 kg)     Body mass index is 27.94 kg/m².      VTE Prophylaxis Ordered: yes    Yao Catheter Information  Does patient have a yao catheter: no  Has the yao catheter been removed: Yes      LUCHO-SS x2, no unexpected output   Abd: soft, NT with +BS    Results:   Lab Results   Component Value Date    ABO O 2024    RH Negative 2024    WBC 2.9 (L) 2024    HGB 8.3 (L) 2024    HCT 26.4 (L) 2024    .0 2024    CREATSERUM 0.58 2024    BUN <5 (L) 2024     2024     K 3.3 (L) 08/21/2024     08/21/2024    CO2 30.0 08/21/2024    GLU 98 08/21/2024    CA 8.8 08/21/2024       No results found for: \"DDIMER\", \"ESRML\", \"ESRPF\", \"CRP\", \"BNP\", \"MG\", \"PHOS\", \"TROP\", \"TROPHS\", \"CK\", \"CKMB\", \"LOGAN\", \"RPR\", \"B12\", \"ETOH\", \"COLORUR\", \"CLARITY\", \"SPECGRAVITY\", \"PROUR\", \"GLUUR\", \"KETUR\", \"BILUR\", \"BLOODURINE\", \"NITRITE\", \"UROBILINOGEN\", \"LEUUR\", \"UASA\", \"POCGLU\", \"BLDCUL\", \"BLDSMR\"    No results found.       Binh Choudhary MD  8/21/2024  12:39 PM

## 2024-08-21 NOTE — PLAN OF CARE
Patient is alert and oriented. Denies pain, nausea, shortness of breath. Remote tele in place. Endorses passing small bubbles but is not confident that it is passing through the rectum. Ambulating independently and frequently in the keith. LUCHO drains to bulb suction with serous output. Both drains with small amount of fluid leaking at the site. Dressings changed. Tolerating clear liquid diet. Patient endorses feeling hungry. Bowel sounds present. Colace BID started and patient advised to try chewing gum by MD. IV fluids continued. Safety precautions in place.     Problem: Patient Centered Care  Goal: Patient preferences are identified and integrated in the patient's plan of care  Description: Interventions:  - What would you like us to know as we care for you?   - Provide timely, complete, and accurate information to patient/family  - Incorporate patient and family knowledge, values, beliefs, and cultural backgrounds into the planning and delivery of care  - Encourage patient/family to participate in care and decision-making at the level they choose  - Honor patient and family perspectives and choices  Outcome: Progressing     Problem: Patient/Family Goals  Goal: Patient/Family Long Term Goal  Description: Patient's Long Term Goal:     Interventions:  -   - See additional Care Plan goals for specific interventions  Outcome: Progressing  Goal: Patient/Family Short Term Goal  Description: Patient's Short Term Goal:     Interventions:   -   - See additional Care Plan goals for specific interventions  Outcome: Progressing     Problem: PAIN - ADULT  Goal: Verbalizes/displays adequate comfort level or patient's stated pain goal  Description: INTERVENTIONS:  - Encourage pt to monitor pain and request assistance  - Assess pain using appropriate pain scale  - Administer analgesics based on type and severity of pain and evaluate response  - Implement non-pharmacological measures as appropriate and evaluate response  - Consider  cultural and social influences on pain and pain management  - Manage/alleviate anxiety  - Utilize distraction and/or relaxation techniques  - Monitor for opioid side effects  - Notify MD/LIP if interventions unsuccessful or patient reports new pain  - Anticipate increased pain with activity and pre-medicate as appropriate  Outcome: Progressing     Problem: SAFETY ADULT - FALL  Goal: Free from fall injury  Description: INTERVENTIONS:  - Assess pt frequently for physical needs  - Identify cognitive and physical deficits and behaviors that affect risk of falls.  - [place] fall precautions as indicated by assessment.  - Educate pt/family on patient safety including physical limitations  - Instruct pt to call for assistance with activity based on assessment  - Modify environment to reduce risk of injury  - Provide assistive devices as appropriate  - Consider OT/PT consult to assist with strengthening/mobility  - Encourage toileting schedule  Outcome: Progressing     Problem: GASTROINTESTINAL - ADULT  Goal: Minimal or absence of nausea and vomiting  Description: INTERVENTIONS:  - Maintain adequate hydration with IV or PO as ordered and tolerated  - Nasogastric tube to low intermittent suction as ordered  - Evaluate effectiveness of ordered antiemetic medications  - Provide nonpharmacologic comfort measures as appropriate  - Advance diet as tolerated, if ordered  - Obtain nutritional consult as needed  - Evaluate fluid balance  Outcome: Progressing  Goal: Maintains or returns to baseline bowel function  Description: INTERVENTIONS:  - Assess bowel function  - Maintain adequate hydration with IV or PO as ordered and tolerated  - Evaluate effectiveness of GI medications  - Encourage mobilization and activity  - Obtain nutritional consult as needed  - Establish a toileting routine/schedule  - Consider collaborating with pharmacy to review patient's medication profile  Outcome: Progressing     Problem: GENITOURINARY -  ADULT  Goal: Absence of urinary retention  Description: INTERVENTIONS:  - Assess patient’s ability to void and empty bladder  - Monitor intake/output and perform bladder scan as needed  - Follow urinary retention protocol/standard of care  - Consider collaborating with pharmacy to review patient's medication profile  - Implement strategies to promote bladder emptying  Outcome: Progressing     Problem: SKIN/TISSUE INTEGRITY - ADULT  Goal: Incision(s), wounds(s) or drain site(s) healing without S/S of infection  Description: INTERVENTIONS:  - Assess and document risk factors for pressure ulcer development  - Assess and document skin integrity  - Assess and document dressing/incision, wound bed, drain sites and surrounding tissue  - Implement wound care per orders  - Initiate isolation precautions as appropriate  - Initiate Pressure Ulcer prevention bundle as indicated  Outcome: Progressing     Problem: MUSCULOSKELETAL - ADULT  Goal: Return mobility to safest level of function  Description: INTERVENTIONS:  - Assess patient stability and activity tolerance for standing, transferring and ambulating w/ or w/o assistive devices  - Assist with transfers and ambulation using safe patient handling equipment as needed  - Ensure adequate protection for wounds/incisions during mobilization  - Obtain PT/OT consults as needed  - Advance activity as appropriate  - Communicate ordered activity level and limitations with patient/family  Outcome: Progressing

## 2024-08-21 NOTE — PAYOR COMM NOTE
--------------  CONTINUED STAY REVIEW    Payor: [employer]  Subscriber #:  [id number]  Authorization Number: [id number]    Admit date: 8/13/24  Admit time:  5:41 AM    REVIEW DOCUMENTATION:        8/19 IM    Active Problems:    Ovarian cancer (HCC)     Yue Malin - 54 year old female w adenocarcinoma admitted 8/13/2024 for post op care after planned CHELSIE-BSO, colon resection for her gynecologic adenoca w Dr. Choudhary. Post op course with ileus.      Gynecologic adenocarcinoma w peritoneal carcinomatosis  S/p chemo  Now s/p CHELSIE-BSO, rectosigmoid colon resection w end-end anastomosis due to tumor involvement  - Post-op care per Dr. Choudhary's team. NGT & yao removed.  - Trend labs  - s/p dilaudid PCA, continue IV dilaudid PRN and IV toradol, avoid narcotics if able  - IVF until tolerating PO  - Up, OOB, IS use, ambulate  - s/p NGT removal     Post op ileus  - hypoactive BS, no flatus yet, denies nausea/vomiting  - ambulating frequently  - not taking opiates  - continue clear liquids for now     Hypernatremia- resolved  - 2/2 NPO & IVF  - Switch IVF to D5  - switch back to NS     Anemia, post-op - 2/2 expected blood loss w/ dilutional component from IVF   - No active external bleeding noted  - Daily CBC     Anxiety (ACOSTA)  - Stable, continue PTA ativan PRN as able  - Add IV valium for NGT discomfort/anxiety     ACP: Full code  Ppx: DVT: Enox  Dispo  EDoD:  TBD. Pending improvement of ileus  F/u:   - PCP:  Diana Black MD  - Onc  - Gyne/onc     Kim Jean MD  Internal Medicine - Hospitalist  [facility]      Subjective:      Feeling frustrated that she still has not had any flatus, tearful. Feels stomach grumbling. Ambulating frequently, no pain meds. Denies worsening abdominal pain, no nausea or vomiting.      OBJECTIVE:     Blood pressure 114/76, pulse 68, temperature 98 °F (36.7 °C), temperature source Oral, resp. rate 19, height 5' 7\" (1.702 m), weight 173 lb 6.4 oz (78.7 kg), last menstrual period  12/22/2020, SpO2 96%, not currently breastfeeding.     Temp:  [97.4 °F (36.3 °C)-98 °F (36.7 °C)] 98 °F (36.7 °C)  Pulse:  [68] 68  Resp:  [18-20] 19  BP: (114-131)/(70-79) 114/76  SpO2:  [96 %-100 %] 96 %        Intake/Output:     Intake/Output Summary (Last 24 hours) at 8/19/2024 1014  Last data filed at 8/19/2024 0800      Gross per 24 hour   Intake 2616 ml   Output 3530 ml   Net -914 ml              Last 3 Weights   08/13/24 0549 173 lb 6.4 oz (78.7 kg)   08/02/24 1128 180 lb (81.6 kg)   01/05/21 0831 190 lb 1.6 oz (86.2 kg)   11/22/17 0808 189 lb (85.7 kg)      Exam   GEN: female in NAD  HEENT: EOMI  Pulm: CTAB, no crackles or wheezes  CV: RRR, no murmurs  ABD: Soft, non-tender, non-distended, still hypoactive BS, dressing with some dried blood,+LUCHO  SKIN: warm, dry  EXT: trace edema     Data Review:       Labs:            Recent Labs   Lab 08/17/24  0559 08/18/24  0626 08/19/24  0530   RBC 2.57* 2.44* 2.45*   HGB 8.6* 8.1* 8.1*   HCT 26.3* 25.4* 25.7*   .3* 104.1* 104.9*   MCH 33.5 33.2 33.1   MCHC 32.7 31.9 31.5   RDW 14.6 14.6 14.5   NEPRELIM 2.02 1.65 1.49*   WBC 4.1 3.1* 3.0*   .0 190.0 214.0                  Recent Labs   Lab 08/17/24  0559 08/18/24  0626 08/19/24  0530   * 93 89   BUN 12 6* <5*   CREATSERUM 0.57 0.51* 0.51*   EGFRCR 107 110 110   CA 8.9 8.5* 8.6*    142 145   K 3.3* 3.5 3.4*    108 111   CO2 26.0 27.0 26.0         No results for input(s): \"ALT\", \"AST\", \"ALB\", \"AMYLASE\", \"LIPASE\", \"LDH\" in the last 168 hours.     Invalid input(s): \"ALPHOS\", \"TBIL\", \"DBIL\", \"TPROT\"        Imaging:  No results found.        Meds:      INPATIENT MEDICATIONS     Scheduled Medications:                                            Scheduled Meds   pantoprazole, 40 mg, QAM AC   Or  pantoprazole, 40 mg, QAM AC  enoxaparin, 40 mg, Daily                                                               Drips:    IV Meds   sodium chloride, Last Rate: 83 mL/hr at 08/18/24 1153            PRN  Medications    PRN Meds   LORazepam, 1 mg, Q6H PRN  HYDROmorphone, 0.2 mg, Q2H PRN   Or  HYDROmorphone, 0.4 mg, Q2H PRN   Or  HYDROmorphone, 0.8 mg, Q2H PRN  phenol, , Q2H PRN  diazepam, 5 mg, Q6H PRN  ondansetron, 4 mg, Q8H PRN   Or  ondansetron, 4 mg, Q8H PRN  diphenhydrAMINE, 12.5 mg, Q4H PRN                        8/20 IM          Signed       Expand All Collapse All    [facility] Hospitalist Progress Note      CC: Hospital Follow up     PCP: Diana Black MD         Assessment/Plan:      Active Problems:    Ovarian cancer (HCC)     Yue Malin - 54 year old female w adenocarcinoma admitted 8/13/2024 for post op care after planned CHELSIE-BSO, colon resection for her gynecologic adenoca w Dr. Choudhary. Post op course with ileus.      Gynecologic adenocarcinoma w peritoneal carcinomatosis  S/p chemo  Now s/p CHELSIE-BSO, rectosigmoid colon resection w end-end anastomosis due to tumor involvement  - Post-op care per Dr. Choudhary's team. NGT & yao removed.  - Trend labs  - s/p dilaudid PCA, continue IV dilaudid PRN and IV toradol, avoid narcotics if able  - IVF until tolerating PO  - Up, OOB, IS use, ambulate  - s/p NGT removal     Post op ileus  - hypoactive BS, no flatus yet, denies nausea/vomiting  - ambulating frequently  - not taking opiates  - continue clear liquids for now  - KUB without obstruction  - may need TPN, per gyn onc     Hypernatremia- resolved  - 2/2 NPO & IVF  - Switch IVF to D5  - switch back to NS     Anemia, post-op - 2/2 expected blood loss w/ dilutional component from IVF   - No active external bleeding noted  - Daily CBC     Anxiety (ACOSTA)  - Stable, continue PTA ativan PRN as able  - Add IV valium for NGT discomfort/anxiety     ACP: Full code  Ppx: DVT: Enox  Dispo  EDoD:  TBD. Pending improvement of ileus  F/u:   - PCP:  Diana Black MD  - Onc  - Gyne/onc     Kim Jean MD  Internal Medicine - Hospitalist  [facility]      Subjective:      Still no flatus. Feels hungry. States she is  not a big liquid drinker at home. Does not feel nauseated or worsening pain or bloating.      OBJECTIVE:     Blood pressure 134/74, pulse 91, temperature 97.6 °F (36.4 °C), temperature source Temporal, resp. rate 16, height 5' 7\" (1.702 m), weight 173 lb 6.4 oz (78.7 kg), last menstrual period 12/22/2020, SpO2 100%, not currently breastfeeding.     Temp:  [97.6 °F (36.4 °C)-98 °F (36.7 °C)] 97.6 °F (36.4 °C)  Pulse:  [69-91] 91  Resp:  [14-16] 16  BP: (117-134)/(65-74) 134/74  SpO2:  [100 %] 100 %        Intake/Output:     Intake/Output Summary (Last 24 hours) at 8/20/2024 1345  Last data filed at 8/20/2024 1218      Gross per 24 hour   Intake 1473.72 ml   Output 1315 ml   Net 158.72 ml              Last 3 Weights   08/13/24 0549 173 lb 6.4 oz (78.7 kg)   08/02/24 1128 180 lb (81.6 kg)   01/05/21 0831 190 lb 1.6 oz (86.2 kg)   11/22/17 0808 189 lb (85.7 kg)      Exam   GEN: female in NAD  HEENT: EOMI  Pulm: CTAB, no crackles or wheezes  CV: RRR, no murmurs  ABD: Soft, non-tender, non-distended, + hypoactive BS, dressing with some dried blood,+LUCHO  SKIN: warm, dry  EXT: trace edema     Data Review:       Labs:            Recent Labs   Lab 08/18/24  0626 08/19/24  0530 08/20/24  0532   RBC 2.44* 2.45* 2.40*   HGB 8.1* 8.1* 8.1*   HCT 25.4* 25.7* 24.6*   .1* 104.9* 102.5*   MCH 33.2 33.1 33.8   MCHC 31.9 31.5 32.9   RDW 14.6 14.5 14.6   NEPRELIM 1.65 1.49* 1.05*   WBC 3.1* 3.0* 2.6*   .0 214.0 220.0                  Recent Labs   Lab 08/18/24  0626 08/19/24  0530 08/20/24  0532   GLU 93 89 92   BUN 6* <5* <5*   CREATSERUM 0.51* 0.51* 0.53*   EGFRCR 110 110 109   CA 8.5* 8.6* 8.4*    145 145   K 3.5 3.4* 3.6  3.6    111 112   CO2 27.0 26.0 27.0         No results for input(s): \"ALT\", \"AST\", \"ALB\", \"AMYLASE\", \"LIPASE\", \"LDH\" in the last 168 hours.     Invalid input(s): \"ALPHOS\", \"TBIL\", \"DBIL\", \"TPROT\"        Imaging:  XR ABDOMEN OBSTRUCTIVE SERIES ROUTINE(2 VW)(CPT=74019)     Result Date:  8/20/2024  CONCLUSION:         1. Mild gaseous distention of the colon with layering air and fluid. No dilated loops of small bowel are detectable radiographically.  2. Surgical drains in place.    Dictated by (CST): Suresh Wills MD on 8/20/2024 at 1:14 PM     Finalized by (CST): Suresh Wills MD on 8/20/2024 at 1:17 PM               Meds:      INPATIENT MEDICATIONS     Scheduled Medications:                                            Scheduled Meds   pantoprazole, 40 mg, QAM AC   Or  pantoprazole, 40 mg, QAM AC  enoxaparin, 40 mg, Daily                                                               Drips:    IV Meds   sodium chloride, Last Rate: 83 mL/hr at 08/19/24 2341            PRN Medications    PRN Meds   simethicone, 80 mg, TID PRN  LORazepam, 1 mg, Q6H PRN  HYDROmorphone, 0.2 mg, Q2H PRN   Or  HYDROmorphone, 0.4 mg, Q2H PRN   Or  HYDROmorphone, 0.8 mg, Q2H PRN  phenol, , Q2H PRN  diazepam, 5 mg, Q6H PRN  ondansetron, 4 mg, Q8H PRN   Or  ondansetron, 4 mg, Q8H PRN  diphenhydrAMINE, 12.5 mg, Q4H PRN                                      MEDICATIONS ADMINISTERED IN LAST 1 DAY:  enoxaparin (Lovenox) 40 MG/0.4ML SUBQ injection 40 mg       Date Action Dose Route User    8/20/2024 1154 Given 40 mg Subcutaneous (Bilateral Upper Abdomen) Arelis Suarez RN          LORazepam (Ativan) tab 1 mg       Date Action Dose Route User    8/20/2024 2001 Given 1 mg Oral Terra Kolb RN    8/20/2024 1154 Given 1 mg Oral Arelis Suarez RN          pantoprazole (Protonix) DR tab 40 mg       Date Action Dose Route User    8/21/2024 0539 Given 40 mg Oral Yoly Abdul RN          simethicone (Mylicon) chewable tab 80 mg       Date Action Dose Route User    8/20/2024 1433 Given 80 mg Oral Arelis Suarez RN          sodium chloride 0.9% infusion       Date Action Dose Route User    8/20/2024 2006 Rate/Dose Change (none) Intravenous Yoly Abdul, RN            Vitals (last day)       Date/Time Temp Pulse Resp BP SpO2 Weight  O2 Device O2 Flow Rate (L/min) [facility]    08/21/24 0539 98 °F (36.7 °C) 92 16 112/74 99 % -- None (Room air) -- EK    08/20/24 2201 98 °F (36.7 °C) 103 16 123/71 100 % -- None (Room air) -- KJ    08/20/24 2201 -- -- -- -- -- 178 lb 6.4 oz (80.9 kg) -- -- EK    08/20/24 1401 -- 120 -- -- -- -- -- -- RW    08/20/24 1218 97.6 °F (36.4 °C) -- 16 134/74 100 % -- None (Room air) -- LB    08/20/24 0522 98 °F (36.7 °C) 91 14 118/67 100 % -- None (Room air) -- MW          CIWA Scores (since admission)       None

## 2024-08-22 LAB
ANION GAP SERPL CALC-SCNC: 7 MMOL/L (ref 0–18)
BASOPHILS # BLD AUTO: 0.02 X10(3) UL (ref 0–0.2)
BASOPHILS NFR BLD AUTO: 0.7 %
BUN BLD-MCNC: <5 MG/DL (ref 9–23)
CALCIUM BLD-MCNC: 8.7 MG/DL (ref 8.7–10.4)
CHLORIDE SERPL-SCNC: 110 MMOL/L (ref 98–112)
CO2 SERPL-SCNC: 28 MMOL/L (ref 21–32)
CREAT BLD-MCNC: 0.56 MG/DL
DEPRECATED RDW RBC AUTO: 55.2 FL (ref 35.1–46.3)
EGFRCR SERPLBLD CKD-EPI 2021: 108 ML/MIN/1.73M2 (ref 60–?)
EOSINOPHIL # BLD AUTO: 0.07 X10(3) UL (ref 0–0.7)
EOSINOPHIL NFR BLD AUTO: 2.5 %
ERYTHROCYTE [DISTWIDTH] IN BLOOD BY AUTOMATED COUNT: 14.9 % (ref 11–15)
GLUCOSE BLD-MCNC: 104 MG/DL (ref 70–99)
HCT VFR BLD AUTO: 25.9 %
HGB BLD-MCNC: 8.4 G/DL
IMM GRANULOCYTES # BLD AUTO: 0.01 X10(3) UL (ref 0–1)
IMM GRANULOCYTES NFR BLD: 0.4 %
LYMPHOCYTES # BLD AUTO: 1.02 X10(3) UL (ref 1–4)
LYMPHOCYTES NFR BLD AUTO: 36.7 %
MCH RBC QN AUTO: 33.3 PG (ref 26–34)
MCHC RBC AUTO-ENTMCNC: 32.4 G/DL (ref 31–37)
MCV RBC AUTO: 102.8 FL
MONOCYTES # BLD AUTO: 0.44 X10(3) UL (ref 0.1–1)
MONOCYTES NFR BLD AUTO: 15.8 %
NEUTROPHILS # BLD AUTO: 1.22 X10 (3) UL (ref 1.5–7.7)
NEUTROPHILS # BLD AUTO: 1.22 X10(3) UL (ref 1.5–7.7)
NEUTROPHILS NFR BLD AUTO: 43.9 %
PLATELET # BLD AUTO: 258 10(3)UL (ref 150–450)
POTASSIUM SERPL-SCNC: 3.4 MMOL/L (ref 3.5–5.1)
POTASSIUM SERPL-SCNC: 3.4 MMOL/L (ref 3.5–5.1)
RBC # BLD AUTO: 2.52 X10(6)UL
SODIUM SERPL-SCNC: 145 MMOL/L (ref 136–145)
WBC # BLD AUTO: 2.8 X10(3) UL (ref 4–11)

## 2024-08-22 PROCEDURE — 85025 COMPLETE CBC W/AUTO DIFF WBC: CPT | Performed by: PHYSICIAN ASSISTANT

## 2024-08-22 PROCEDURE — 84132 ASSAY OF SERUM POTASSIUM: CPT | Performed by: OBSTETRICS & GYNECOLOGY

## 2024-08-22 PROCEDURE — 80048 BASIC METABOLIC PNL TOTAL CA: CPT | Performed by: PHYSICIAN ASSISTANT

## 2024-08-22 RX ORDER — POTASSIUM CHLORIDE 1500 MG/1
40 TABLET, EXTENDED RELEASE ORAL ONCE
Status: COMPLETED | OUTPATIENT
Start: 2024-08-22 | End: 2024-08-22

## 2024-08-22 RX ORDER — SENNOSIDES 8.6 MG
8.6 TABLET ORAL 2 TIMES DAILY
Status: DISCONTINUED | OUTPATIENT
Start: 2024-08-22 | End: 2024-08-24

## 2024-08-22 NOTE — PLAN OF CARE
Pt a/o x 4, vss. Medicated per md order, encouraged pt to ambulate in hallway, pt walks frequently. Advanced diet to full liquid, pt tolerated well, ate half of tray. No n/v, no c/o pain. Pt denies flatus @ this time. Call light and belongings with in reach, assessment ongoing.   Problem: Patient Centered Care  Goal: Patient preferences are identified and integrated in the patient's plan of care  Description: Interventions:  - What would you like us to know as we care for you?   - Provide timely, complete, and accurate information to patient/family  - Incorporate patient and family knowledge, values, beliefs, and cultural backgrounds into the planning and delivery of care  - Encourage patient/family to participate in care and decision-making at the level they choose  - Honor patient and family perspectives and choices  Outcome: Progressing     Problem: Patient/Family Goals  Goal: Patient/Family Long Term Goal  Description: Patient's Long Term Goal:     Interventions:  -   - See additional Care Plan goals for specific interventions  Outcome: Progressing  Goal: Patient/Family Short Term Goal  Description: Patient's Short Term Goal:     Interventions:   -   - See additional Care Plan goals for specific interventions  Outcome: Progressing     Problem: PAIN - ADULT  Goal: Verbalizes/displays adequate comfort level or patient's stated pain goal  Description: INTERVENTIONS:  - Encourage pt to monitor pain and request assistance  - Assess pain using appropriate pain scale  - Administer analgesics based on type and severity of pain and evaluate response  - Implement non-pharmacological measures as appropriate and evaluate response  - Consider cultural and social influences on pain and pain management  - Manage/alleviate anxiety  - Utilize distraction and/or relaxation techniques  - Monitor for opioid side effects  - Notify MD/LIP if interventions unsuccessful or patient reports new pain  - Anticipate increased pain with  activity and pre-medicate as appropriate  Outcome: Progressing     Problem: SAFETY ADULT - FALL  Goal: Free from fall injury  Description: INTERVENTIONS:  - Assess pt frequently for physical needs  - Identify cognitive and physical deficits and behaviors that affect risk of falls.  - [place] fall precautions as indicated by assessment.  - Educate pt/family on patient safety including physical limitations  - Instruct pt to call for assistance with activity based on assessment  - Modify environment to reduce risk of injury  - Provide assistive devices as appropriate  - Consider OT/PT consult to assist with strengthening/mobility  - Encourage toileting schedule  Outcome: Progressing     Problem: GASTROINTESTINAL - ADULT  Goal: Minimal or absence of nausea and vomiting  Description: INTERVENTIONS:  - Maintain adequate hydration with IV or PO as ordered and tolerated  - Nasogastric tube to low intermittent suction as ordered  - Evaluate effectiveness of ordered antiemetic medications  - Provide nonpharmacologic comfort measures as appropriate  - Advance diet as tolerated, if ordered  - Obtain nutritional consult as needed  - Evaluate fluid balance  Outcome: Progressing  Goal: Maintains or returns to baseline bowel function  Description: INTERVENTIONS:  - Assess bowel function  - Maintain adequate hydration with IV or PO as ordered and tolerated  - Evaluate effectiveness of GI medications  - Encourage mobilization and activity  - Obtain nutritional consult as needed  - Establish a toileting routine/schedule  - Consider collaborating with pharmacy to review patient's medication profile  Outcome: Progressing     Problem: GENITOURINARY - ADULT  Goal: Absence of urinary retention  Description: INTERVENTIONS:  - Assess patient’s ability to void and empty bladder  - Monitor intake/output and perform bladder scan as needed  - Follow urinary retention protocol/standard of care  - Consider collaborating with pharmacy to review  patient's medication profile  - Implement strategies to promote bladder emptying  Outcome: Progressing     Problem: SKIN/TISSUE INTEGRITY - ADULT  Goal: Incision(s), wounds(s) or drain site(s) healing without S/S of infection  Description: INTERVENTIONS:  - Assess and document risk factors for pressure ulcer development  - Assess and document skin integrity  - Assess and document dressing/incision, wound bed, drain sites and surrounding tissue  - Implement wound care per orders  - Initiate isolation precautions as appropriate  - Initiate Pressure Ulcer prevention bundle as indicated  Outcome: Progressing     Problem: MUSCULOSKELETAL - ADULT  Goal: Return mobility to safest level of function  Description: INTERVENTIONS:  - Assess patient stability and activity tolerance for standing, transferring and ambulating w/ or w/o assistive devices  - Assist with transfers and ambulation using safe patient handling equipment as needed  - Ensure adequate protection for wounds/incisions during mobilization  - Obtain PT/OT consults as needed  - Advance activity as appropriate  - Communicate ordered activity level and limitations with patient/family  Outcome: Progressing

## 2024-08-22 NOTE — PROGRESS NOTES
[facility] Hospitalist Progress Note     CC: Hospital Follow up    PCP: Diana Black MD       Assessment/Plan:     Active Problems:    Ovarian cancer (HCC)    Yue Malin - 54 year old female w adenocarcinoma admitted 8/13/2024 for post op care after planned CHELSIE-BSO, colon resection for her gynecologic adenoca w Dr. Choudhary. Post op course with ileus.      Gynecologic adenocarcinoma w peritoneal carcinomatosis  S/p chemo  Now s/p CHELSIE-BSO, rectosigmoid colon resection w end-end anastomosis due to tumor involvement  - Post-op care per Dr. Choudhary's team. NGT & yao removed.  - Trend labs  - s/p dilaudid PCA, continue IV dilaudid PRN and IV toradol, avoid narcotics if able  - IVF until tolerating PO  - Up, OOB, IS use, ambulate  - s/p NGT removal    Post op ileus  - initially hypoactive BS, now more active, no flatus yet, denies nausea/vomiting  - ambulating frequently  - not taking opiates  - KUB without obstruction  - may need TPN but patient does not want this   - would trial advance diet if ok with gyn onc, plan for full liquids today     Hypernatremia- resolved  - 2/2 NPO & IVF  - Switch IVF to D5  - switch back to NS     Anemia, post-op - 2/2 expected blood loss w/ dilutional component from IVF   - No active external bleeding noted  - Daily CBC     Anxiety (ACOSTA)  - Stable, continue PTA ativan PRN as able  - Add IV valium for NGT discomfort/anxiety     ACP: Full code  Ppx: DVT: Enox  Dispo  EDoD:  TBD. Pending improvement of ileus  F/u:   - PCP:  Diana Black MD  - Onc  - Gyne/onc     Kim Jean MD  Internal Medicine - Hospitalist  [facility]     Subjective:     Does not think she's passed more gas. Feels hungry, no N/V/abdominal pain.     OBJECTIVE:    Blood pressure 108/63, pulse 80, temperature 98 °F (36.7 °C), temperature source Oral, resp. rate 16, height 5' 7\" (1.702 m), weight 178 lb 6.4 oz (80.9 kg), last menstrual period 12/22/2020, SpO2 100%, not currently breastfeeding.    Temp:  [97.6 °F  (36.4 °C)-98 °F (36.7 °C)] 98 °F (36.7 °C)  Pulse:  [65-80] 80  Resp:  [16] 16  BP: (108-122)/(63-69) 108/63  SpO2:  [100 %] 100 %      Intake/Output:    Intake/Output Summary (Last 24 hours) at 8/22/2024 1055  Last data filed at 8/22/2024 1051  Gross per 24 hour   Intake 550 ml   Output 3170 ml   Net -2620 ml       Last 3 Weights   08/20/24 2201 178 lb 6.4 oz (80.9 kg)   08/13/24 0549 173 lb 6.4 oz (78.7 kg)   08/02/24 1128 180 lb (81.6 kg)   01/05/21 0831 190 lb 1.6 oz (86.2 kg)   11/22/17 0808 189 lb (85.7 kg)     Exam   GEN: female in NAD  HEENT: EOMI  Pulm: CTAB, no crackles or wheezes  CV: RRR, no murmurs  ABD: Soft, non-tender, non-distended, + active BS today, dressing with some dried blood,+LUCHO  SKIN: warm, dry  EXT: trace edema    Data Review:       Labs:     Recent Labs   Lab 08/20/24  0532 08/21/24  0548 08/22/24  0600   RBC 2.40* 2.53* 2.52*   HGB 8.1* 8.3* 8.4*   HCT 24.6* 26.4* 25.9*   .5* 104.3* 102.8*   MCH 33.8 32.8 33.3   MCHC 32.9 31.4 32.4   RDW 14.6 14.9 14.9   NEPRELIM 1.05* 1.15* 1.22*   WBC 2.6* 2.9* 2.8*   .0 264.0 258.0         Recent Labs   Lab 08/20/24  0532 08/21/24  0548 08/22/24  0600   GLU 92 98 104*   BUN <5* <5* <5*   CREATSERUM 0.53* 0.58 0.56   EGFRCR 109 107 108   CA 8.4* 8.8 8.7    145 145   K 3.6  3.6 3.3* 3.4*  3.4*    112 110   CO2 27.0 30.0 28.0       No results for input(s): \"ALT\", \"AST\", \"ALB\", \"AMYLASE\", \"LIPASE\", \"LDH\" in the last 168 hours.    Invalid input(s): \"ALPHOS\", \"TBIL\", \"DBIL\", \"TPROT\"      Imaging:  XR ABDOMEN OBSTRUCTIVE SERIES ROUTINE(2 VW)(CPT=74019)    Result Date: 8/20/2024  CONCLUSION:  1. Mild gaseous distention of the colon with layering air and fluid. No dilated loops of small bowel are detectable radiographically.  2. Surgical drains in place.    Dictated by (CST): Suresh Wills MD on 8/20/2024 at 1:14 PM     Finalized by (CST): Suresh Wills MD on 8/20/2024 at 1:17 PM             Meds:     INPATIENT  MEDICATIONS    Scheduled Medications:      sennosides, 8.6 mg, BID  docusate sodium, 100 mg, BID  pantoprazole, 40 mg, QAM AC   Or  pantoprazole, 40 mg, QAM AC  enoxaparin, 40 mg, Daily            Drips:  sodium chloride, Last Rate: 50 mL/hr at 08/20/24 2006        PRN Medications  simethicone, 80 mg, TID PRN  LORazepam, 1 mg, Q6H PRN  HYDROmorphone, 0.2 mg, Q2H PRN   Or  HYDROmorphone, 0.4 mg, Q2H PRN   Or  HYDROmorphone, 0.8 mg, Q2H PRN  phenol, , Q2H PRN  diazepam, 5 mg, Q6H PRN  ondansetron, 4 mg, Q8H PRN   Or  ondansetron, 4 mg, Q8H PRN  diphenhydrAMINE, 12.5 mg, Q4H PRN

## 2024-08-22 NOTE — PROGRESS NOTES
[facility]   POST-OPERATIVE VISIT     MEDICAL RECORD #: [id number]   DATE OF SERVICE: 8/22/2024             Reason for visit:  Post-operative Visit  POD#9    Procedure:    54 year old female with a history of ovarian cancer- s/p  Exploratory laparotomy, total abdominal hysterectomy, bilateral salpingo-oophorectomy, bilateral ureterolysis, bilateral pelvic lymph node dissection, tumor debulking of various peritoneal surfaces, argon beam coagulation of peritoneal tumor nodules, infracolic-supracolic omentectomy, and resection of gastrosplenic ligament with tumor attached to tip of spleen, mobilization of splenic flexure, repair of transverse colon mesentery, rectosigmoid colon resection with end-to-end anastomosis on 8/13/24       Pathology/Lab Results:  Recent Results (from the past 720 hour(s))   ABORH (Blood Type)    Collection Time: 08/02/24 12:33 PM   Result Value Ref Range    ABO BLOOD TYPE O     RH BLOOD TYPE Negative    Antibody Screen    Collection Time: 08/02/24 12:33 PM   Result Value Ref Range    Antibody Screen Negative    ABORH Confirmation    Collection Time: 08/13/24  7:00 AM   Result Value Ref Range    ABO BLOOD TYPE O     RH BLOOD TYPE Negative    Specimen to Pathology Tissue    Collection Time: 08/13/24  9:09 AM   Result Value Ref Range    Case Report       Surgical Pathology                                Case: [id number]                                  Authorizing Provider:  Binh Choudhary MD         Collected:           08/13/2024 09:09 AM          Ordering Location:     [facility]          Received:            08/13/2024 01:52 PM                                 Operating Room                                                               Pathologist:           Paul Wade MD                                                             Specimens:   A) - Uterus and cervix, bilateral tubes and ovaries, 1. uterus, cervix, bilateral                   tubes and ovaries,  anterior bladder, peritoneal tumor                                               B) - Lymph node pelvic left, 2. Left pelvic lymph nodes                                             C) - Lymph node pelvic right, 3. Right pelvic lymph nodes                                           D) - Tissue, 4. left paracolic tumor                                                                 E) - Tissue, 5. Tumor attached to splenic flexure                                                   F) - Tissue, 6. nodule attached to small bowel                                                      G) - Tissue, 7. nodule attached to cecum                                                            H) - Tissue, 8. nodule attached to right diaphragm                                                  I) - Tissue, 9. infracolic, supracolic, splenocolic omentum                                         J) - Colon, 10. Rectal sigmoid colon with tumor                                            Final Diagnosis:         A. Uterus, cervix, bilateral fallopian tubes, ovaries, anterior bladder, peritoneal tumor; total abdominal hysterectomy, bilateral salpingo-oophorectomy, bilateral ureterolysis, bilateral pelvic lymph node dissection, tumor debulking:   Left ovary with high-grade serous carcinoma, with focal clear cell change, 1.2 cm in greatest dimension.  Carcinoma is seen involving the endometrium (focal, superficial) uterine serosa, right parametrium, left fallopian tube (surface), left para-adnexal soft tissue, and right ovary (focal).  Lymphovascular invasion is identified.  Ectocervix with no significant pathologic changes identified.  Endocervix with nabothian cysts; no significant pathologic changes identified.  Right fallopian tube with no significant pathologic changes identified.  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  ypT3c, N0.  See comment.    B. Left pelvic lymph node; biopsy:  Five lymph nodes negative for metastatic  carcinoma (0/5).    C. Right pelvic lymph nodes; biopsy:  Two lymph nodes negative for metastatic carcinoma (0/2).    D. Left paracolic tumor; biopsy:  Fibroconnective tissue involved by high-grade serous carcinoma.    E. Splenic flexure tumor; biopsy:   Fibroadipose tissue involved by high-grade serous carcinoma.    F. Small bowel nodule:  Nodular fibrotic tissue with chronic inflammation and calcifications.  No definitive viable carcinoma identified.    G. Cecal nodule:  Nodular fibrotic tissue with chronic inflammation.  No definitive viable carcinoma identified.    H. Right diaphragm nodule:  Fibroadipose tissue with extensive hemorrhage and acute inflammation.  No definitive viable carcinoma identified.    I. Infra colic, supracolic, splenocolic omentum; excision:   Omental fibroadipose tissue with extensive involvement by high-grade serous carcinoma.  Largest tumor nodule measures 3.8 cm.  See comment.    J. Rectosigmoid colon tumor; excision:   Segment of colon, 10.5 in length, demonstrating high-grade serous carcinoma extensively involving the pericolonic soft tissue and serosa.  Tumor is present less than 1 mm from the mesorectal/radial margin.  One lymph node, negative for metastatic carcinoma (0/1).  Mucosa at surgical margins is viable and free of carcinoma.    Comment:  The patient has a history of adenocarcinoma of gynecological origin diagnosed on previous omental mass biopsy and ascites fluid, both in March 2024, per notes, status post neoadjuvant chemotherapy x4 cycles with good response.     Immunohistochemical stains performed on the carcinoma present in the omentum (block I3) demonstrate that the tumor cells are positive for pankeratin (AE1/AE3), CK7, PAX8, WT1, and ER and negative for CK20 and p53, consistent with a high-grade serous carcinoma.    For  purposes, this case was reviewed by a second pathologist who concurred with the diagnosis.    The immunohistochemical stains  interpreted in this case demonstrated appropriate reactivity of the positive controls. These stains were performed on formalin-fixed, paraffin-embedded tissue sections with each antibody analysis being performed on a separate slide        Embedded Images      Synoptic Report       OVARY or FALLOPIAN TUBE or PRIMARY PERITONEUM   OVARY OR FALLOPIAN TUBE OR PRIMARY PERITONEUM - All Specimens   8th Edition - Protocol posted: 3/22/2023      SPECIMEN      Procedure:    Total hysterectomy and bilateral salpingo-oophorectomy       Hysterectomy Type:    Abdominal       Specimen Integrity:            Right Ovary Integrity:    Capsule intact       Specimen Integrity:            Left Ovary Integrity:    Capsule intact         Uterus Integrity:    Intact       TUMOR      Tumor Site:    Left ovary       Tumor Size:    Greatest Dimension (Centimeters): 1.2 cm      Histologic Type:    High grade serous carcinoma       Histologic Grade:    High grade       Ovarian Surface Involvement:    Not identified       Fallopian Tube Surface Involvement:    Present, left       Implants:    Present: Left paracolic, splenic flexure       Other Tissue / Organ Involvement:    Right ovary       Other Tissue / Organ Involvement:    Left fallopian tube       Other Tissue / Organ Involvement:    Uterine corpus       Other Tissue / Organ Involvement:    Abdominal peritoneum       Other Tissue / Organ Involvement:    Omentum       Other Tissue / Organ Involvement:    Rectosigmoid colon       Largest Extrapelvic Peritoneal Focus:    Macroscopic (greater than 2 cm): 3.8 cm       Peritoneal / Ascitic Fluid Involvement:    Not submitted / unknown       Chemotherapy Response Score (CRS):    CRS2 (moderate response)       REGIONAL LYMPH NODES      Regional Lymph Node Status:            :    All regional lymph nodes negative for tumor cells         Number of Lymph Nodes Examined:    8           Faiza Site(s) Examined:    Right pelvic           Faiza Site(s)  Examined:    Left pelvic           Faiza Site(s) Examined:    Pericolonic       pTNM CLASSIFICATION (AJCC 8th Edition)      Reporting of pT, pN, and (when applicable) pM categories is based on information available to the pathologist at the time the report is issued. As per the AJCC (Chapter 1, 8th Ed.) it is the managing physician’s responsibility to establish the final pathologic stage based upon all pertinent information, including but potentially not limited to this pathology report.      Modified Classification:    y       pT Category:    pT3c       pN Category:    pN0       FIGO STAGE      FIGO Stage:    IIIC       Clinical Information       Adenocarcinoma.        Gross Description       Specimen A is submitted in formalin labeled “Horvat, uterus, cervix, bilateral tubes and ovaries, anterior bladder, peritoneal tumor” and consists of a 98 gram total hysterectomy including uterus with cervix, bilateral fallopian tubes and ovaries and a flap of tissue attached to the lower uterine segment (possible bladder flap). The uterus and cervix measure 7.6 cm in length, 4.0 cm from cornu to cornu, 3.4 cm from anterior to posterior. The uterus is covered with pink-tan, smooth serosa. The ectocervix is covered with pink-tan smooth serosa. The ectocervix is covered with pink-tan, smooth mucosa and measures 2.5 x 2.4 cm in transverse dimension. The cervical os is stenosed and measures less than 0.1 cm in diameter. The anterior aspect is inked blue, the posterior aspect is inked black. The cervix and uterus are bivalved to reveal a pink-tan endocervix with a 2.5 cm endocervical canal. The endometrium is pink-tan and smooth with a 4.5 x 2.5 cm endometrial cavity. The uterus is sectioned to reveal a single posterior intramural fibroid (0.4 cm in greatest dimension). The remaining myometrium is pink-tan and trabeculated with no lesions or masses identified within the endomyometrium. The anterior bladder flap presents possible  tumor nodules (1.2 cm in greatest dimension).   The left fallopian tube measures 5.5 cm in length x 0.6 cm in diameter. The left ovary measures 2.5 x 1.5 x 1.2 cm. The left fallopian tube, ovary and para adnexal tissue are covered with pink-gray adhesions and possible nodules.  The right fallopian tube measures 5.0 cm in length x 0.4 cm in diameter and is grossly unremarkable. The right ovary is yellowish pink and lobulated, measures 2.0 x 1.5 x 0.8 cm and is grossly unremarkable.    Representative sections are submitted as follows: A1 - anterior cervix, longitudinal section; A2 - posterior cervix, longitudinal section; A3-A7 - full thickness anterior endomyometrium, from lower to upper, including bladder flap and possible tumor nodules; A8-A9 - full thickness posterior endomyometrium including fibroid; A10 - left parametrial soft tissue; A11 - right parametrial soft tissue; A12 - sections of left fallopian tube; A13 - left ovary, bisected, entirely; A14-A15 - left para adnexal tissue with possible nodules; A16 - sections of right fallopian tube; A17 - right ovary, bisected, entirely.     Specimen B is submitted in formalin labeled “Horvat, left pelvic lymph node” and consists of one piece of yellow-tan, fatty tissue measuring 3.8 x 3.1 x 0.8 cm. Dissection of the tissue reveals five lymph node candidates (0.4 to 1.5 cm in greatest dimension). The two larger candidates are submitted in cassette B1. The remaining candidates are submitted in cassette B2.     Specimen C is submitted in formalin labeled “Horvat, right pelvic lymph node” and consists of two pieces of yellow-tan, fatty tissue measuring in aggregate 2.8 x 2.3 x 0.7 cm. Dissection of the tissue reveals two lymph node candidates (0.8 and 1.3 cm in greatest dimension). Both candidates are submitted entirely in cassette C1.     Specimen D is submitted in formalin labeled “Horvat, left paracolic tumor” and consists of one pink-gray soft tissue nodule measuring  2.0 x 1.9 x 0.8 cm. The nodule is bisected and submitted entirely in cassette D1.     Specimen E is submitted in formalin labeled “Horvat, tumor attached to splenic flexure” and consists of one pink-gray soft tissue nodule measuring 1.8 x 1.5 x 0.7 cm. The specimen is submitted entirely in cassette E1.    Specimen F is submitted in formalin labeled “Horvat, nodule attached to small bowel” and consists of multiple pieces of pink-gray soft tissue measuring in aggregate 2.4 x 1.9 x 0.4 cm. The specimen is submitted entirely in cassette F1.    Specimen G is submitted in formalin labeled “Horvat, nodule attached to cecum” and consists of two pink-gray soft tissue nodules measuring in aggregate 0.7 x 0.6 x 0.3 cm. The specimen is submitted entirely in cassette G1.    Specimen H is submitted in formalin labeled “Horvat, nodule attached to right diaphragm” and consists of one piece of red-tan, soft tissue measuring 1.2 x 0.5 x 0.3 cm. The specimen is submitted entirely in cassette H1.    Specimen I is submitted in formalin labeled “Horvat, infra colic, supra colic, splenocolic omentum” and consists of one piece of yellow-tan omentum measuring 21.5 x 13.7 x 3.6 cm. The specimen is sectioned to reveal multiple pink-gray nodules embedded within the omentum (0.2 to 3.8 cm in greatest dimension). Representative sections are submitted as follows: I1 - largest nodule; I2-I4 - other nodules.     Specimen J is submitted in formalin labeled “Horvat, rectosigmoid colon with tumor” and consists of an unoriented segment of colon with mesentery and overlying adhesions measuring 10.5 cm in length x 2.6 cm in diameter. The specimen is received with two stapled margins. The proximal margin is inked blue, the distal margin is inked green, the mesorectal/radial margin is inked black. The specimen is opened longitudinally to reveal a pink-tan, folded mucosa with no lesions, masses or perforations grossly identified. No definitive masses,  lesions or nodules are identified within the omentum or mesorectum.   Dissection of the pericolonic tissue reveals one lymph node candidate (0.8 cm in greatest dimension).   Representative sections are submitted as follows: J1 - proximal en face margin; J2 - distal en face margin; J3 - random mesorectal/radial margin, perpendicular section; J4-J8 - random sections of colon wall with pericolonic tissue; J9 - one lymph node candidate; J10-J12 - dissected pericolonic tissue. (jq)    Paul Wade M.D./[facility]        Interpretation Malignant (A)     POCT Hemocue    Collection Time: 08/13/24 11:24 AM   Result Value Ref Range    Hemocue 10.6 (A) 12.0 - 16.0 g/dl   Basic Metabolic Panel (8)    Collection Time: 08/13/24  5:26 PM   Result Value Ref Range    Glucose 166 (H) 70 - 99 mg/dL    Sodium 142 136 - 145 mmol/L    Potassium 4.4 3.5 - 5.1 mmol/L    Chloride 110 98 - 112 mmol/L    CO2 26.0 21.0 - 32.0 mmol/L    Anion Gap 6 0 - 18 mmol/L    BUN 12 9 - 23 mg/dL    Creatinine 0.74 0.55 - 1.02 mg/dL    BUN/CREA Ratio 16.2 10.0 - 20.0    Calcium, Total 8.5 (L) 8.7 - 10.4 mg/dL    Calculated Osmolality 298 (H) 275 - 295 mOsm/kg    eGFR-Cr 95 >=60 mL/min/1.73m2   CBC With Differential With Platelet    Collection Time: 08/13/24  5:26 PM   Result Value Ref Range    WBC 10.8 4.0 - 11.0 x10(3) uL    RBC 3.38 (L) 3.80 - 5.30 x10(6)uL    HGB 11.3 (L) 12.0 - 16.0 g/dL    HCT 34.8 (L) 35.0 - 48.0 %    .0 (H) 80.0 - 100.0 fL    MCH 33.4 26.0 - 34.0 pg    MCHC 32.5 31.0 - 37.0 g/dL    RDW-SD 57.5 (H) 35.1 - 46.3 fL    RDW 15.7 (H) 11.0 - 15.0 %    .0 150.0 - 450.0 10(3)uL    Neutrophil Absolute Prelim 10.03 (H) 1.50 - 7.70 x10 (3) uL    Neutrophil Absolute 10.03 (H) 1.50 - 7.70 x10(3) uL    Lymphocyte Absolute 0.30 (L) 1.00 - 4.00 x10(3) uL    Monocyte Absolute 0.41 0.10 - 1.00 x10(3) uL    Eosinophil Absolute 0.00 0.00 - 0.70 x10(3) uL    Basophil Absolute 0.01 0.00 - 0.20 x10(3) uL    Immature Granulocyte Absolute 0.03 0.00 -  1.00 x10(3) uL    Neutrophil % 93.0 %    Lymphocyte % 2.8 %    Monocyte % 3.8 %    Eosinophil % 0.0 %    Basophil % 0.1 %    Immature Granulocyte % 0.3 %   CBC With Differential With Platelet    Collection Time: 08/14/24  5:08 AM   Result Value Ref Range    WBC 8.7 4.0 - 11.0 x10(3) uL    RBC 3.12 (L) 3.80 - 5.30 x10(6)uL    HGB 10.2 (L) 12.0 - 16.0 g/dL    HCT 31.9 (L) 35.0 - 48.0 %    .2 (H) 80.0 - 100.0 fL    MCH 32.7 26.0 - 34.0 pg    MCHC 32.0 31.0 - 37.0 g/dL    RDW-SD 57.0 (H) 35.1 - 46.3 fL    RDW 15.6 (H) 11.0 - 15.0 %    .0 150.0 - 450.0 10(3)uL    Neutrophil Absolute Prelim 6.66 1.50 - 7.70 x10 (3) uL    Neutrophil Absolute 6.66 1.50 - 7.70 x10(3) uL    Lymphocyte Absolute 1.18 1.00 - 4.00 x10(3) uL    Monocyte Absolute 0.85 0.10 - 1.00 x10(3) uL    Eosinophil Absolute 0.00 0.00 - 0.70 x10(3) uL    Basophil Absolute 0.01 0.00 - 0.20 x10(3) uL    Immature Granulocyte Absolute 0.03 0.00 - 1.00 x10(3) uL    Neutrophil % 76.4 %    Lymphocyte % 13.5 %    Monocyte % 9.7 %    Eosinophil % 0.0 %    Basophil % 0.1 %    Immature Granulocyte % 0.3 %   Basic Metabolic Panel (8)    Collection Time: 08/14/24  5:09 AM   Result Value Ref Range    Glucose 121 (H) 70 - 99 mg/dL    Sodium 143 136 - 145 mmol/L    Potassium 4.5 3.5 - 5.1 mmol/L    Chloride 110 98 - 112 mmol/L    CO2 27.0 21.0 - 32.0 mmol/L    Anion Gap 6 0 - 18 mmol/L    BUN 13 9 - 23 mg/dL    Creatinine 0.73 0.55 - 1.02 mg/dL    BUN/CREA Ratio 17.8 10.0 - 20.0    Calcium, Total 8.8 8.7 - 10.4 mg/dL    Calculated Osmolality 297 (H) 275 - 295 mOsm/kg    eGFR-Cr 97 >=60 mL/min/1.73m2   Basic Metabolic Panel (8)    Collection Time: 08/15/24  5:40 AM   Result Value Ref Range    Glucose 91 70 - 99 mg/dL    Sodium 144 136 - 145 mmol/L    Potassium 3.8 3.5 - 5.1 mmol/L    Chloride 110 98 - 112 mmol/L    CO2 28.0 21.0 - 32.0 mmol/L    Anion Gap 6 0 - 18 mmol/L    BUN 15 9 - 23 mg/dL    Creatinine 0.70 0.55 - 1.02 mg/dL    BUN/CREA Ratio 21.4 (H) 10.0 -  20.0    Calcium, Total 8.7 8.7 - 10.4 mg/dL    Calculated Osmolality 298 (H) 275 - 295 mOsm/kg    eGFR-Cr 102 >=60 mL/min/1.73m2   CBC With Differential With Platelet    Collection Time: 08/15/24  5:40 AM   Result Value Ref Range    WBC 8.2 4.0 - 11.0 x10(3) uL    RBC 2.78 (L) 3.80 - 5.30 x10(6)uL    HGB 9.3 (L) 12.0 - 16.0 g/dL    HCT 28.9 (L) 35.0 - 48.0 %    .0 (H) 80.0 - 100.0 fL    MCH 33.5 26.0 - 34.0 pg    MCHC 32.2 31.0 - 37.0 g/dL    RDW-SD 57.8 (H) 35.1 - 46.3 fL    RDW 15.7 (H) 11.0 - 15.0 %    .0 150.0 - 450.0 10(3)uL    Neutrophil Absolute Prelim 4.90 1.50 - 7.70 x10 (3) uL    Neutrophil Absolute 4.90 1.50 - 7.70 x10(3) uL    Lymphocyte Absolute 2.44 1.00 - 4.00 x10(3) uL    Monocyte Absolute 0.80 0.10 - 1.00 x10(3) uL    Eosinophil Absolute 0.01 0.00 - 0.70 x10(3) uL    Basophil Absolute 0.03 0.00 - 0.20 x10(3) uL    Immature Granulocyte Absolute 0.02 0.00 - 1.00 x10(3) uL    Neutrophil % 59.7 %    Lymphocyte % 29.8 %    Monocyte % 9.8 %    Eosinophil % 0.1 %    Basophil % 0.4 %    Immature Granulocyte % 0.2 %   Basic Metabolic Panel (8)    Collection Time: 08/16/24  4:39 AM   Result Value Ref Range    Glucose 79 70 - 99 mg/dL    Sodium 147 (H) 136 - 145 mmol/L    Potassium 3.8 3.5 - 5.1 mmol/L    Chloride 113 (H) 98 - 112 mmol/L    CO2 28.0 21.0 - 32.0 mmol/L    Anion Gap 6 0 - 18 mmol/L    BUN 15 9 - 23 mg/dL    Creatinine 0.58 0.55 - 1.02 mg/dL    BUN/CREA Ratio 25.9 (H) 10.0 - 20.0    Calcium, Total 8.8 8.7 - 10.4 mg/dL    Calculated Osmolality 304 (H) 275 - 295 mOsm/kg    eGFR-Cr 107 >=60 mL/min/1.73m2   CBC With Differential With Platelet    Collection Time: 08/16/24  4:39 AM   Result Value Ref Range    WBC 4.3 4.0 - 11.0 x10(3) uL    RBC 2.47 (L) 3.80 - 5.30 x10(6)uL    HGB 8.2 (L) 12.0 - 16.0 g/dL    HCT 26.7 (L) 35.0 - 48.0 %    .1 (H) 80.0 - 100.0 fL    MCH 33.2 26.0 - 34.0 pg    MCHC 30.7 (L) 31.0 - 37.0 g/dL    RDW-SD 58.1 (H) 35.1 - 46.3 fL    RDW 15.3 (H) 11.0 - 15.0  %    .0 150.0 - 450.0 10(3)uL    Neutrophil Absolute Prelim 2.47 1.50 - 7.70 x10 (3) uL    Neutrophil Absolute 2.47 1.50 - 7.70 x10(3) uL    Lymphocyte Absolute 1.38 1.00 - 4.00 x10(3) uL    Monocyte Absolute 0.43 0.10 - 1.00 x10(3) uL    Eosinophil Absolute 0.04 0.00 - 0.70 x10(3) uL    Basophil Absolute 0.01 0.00 - 0.20 x10(3) uL    Immature Granulocyte Absolute 0.01 0.00 - 1.00 x10(3) uL    Neutrophil % 57.0 %    Lymphocyte % 31.8 %    Monocyte % 9.9 %    Eosinophil % 0.9 %    Basophil % 0.2 %    Immature Granulocyte % 0.2 %   Basic Metabolic Panel (8)    Collection Time: 08/17/24  5:59 AM   Result Value Ref Range    Glucose 102 (H) 70 - 99 mg/dL    Sodium 141 136 - 145 mmol/L    Potassium 3.3 (L) 3.5 - 5.1 mmol/L    Chloride 108 98 - 112 mmol/L    CO2 26.0 21.0 - 32.0 mmol/L    Anion Gap 7 0 - 18 mmol/L    BUN 12 9 - 23 mg/dL    Creatinine 0.57 0.55 - 1.02 mg/dL    BUN/CREA Ratio 21.1 (H) 10.0 - 20.0    Calcium, Total 8.9 8.7 - 10.4 mg/dL    Calculated Osmolality 292 275 - 295 mOsm/kg    eGFR-Cr 107 >=60 mL/min/1.73m2   CBC With Differential With Platelet    Collection Time: 08/17/24  5:59 AM   Result Value Ref Range    WBC 4.1 4.0 - 11.0 x10(3) uL    RBC 2.57 (L) 3.80 - 5.30 x10(6)uL    HGB 8.6 (L) 12.0 - 16.0 g/dL    HCT 26.3 (L) 35.0 - 48.0 %    .3 (H) 80.0 - 100.0 fL    MCH 33.5 26.0 - 34.0 pg    MCHC 32.7 31.0 - 37.0 g/dL    RDW-SD 53.1 (H) 35.1 - 46.3 fL    RDW 14.6 11.0 - 15.0 %    .0 150.0 - 450.0 10(3)uL    Neutrophil Absolute Prelim 2.02 1.50 - 7.70 x10 (3) uL    Neutrophil Absolute 2.02 1.50 - 7.70 x10(3) uL    Lymphocyte Absolute 1.54 1.00 - 4.00 x10(3) uL    Monocyte Absolute 0.41 0.10 - 1.00 x10(3) uL    Eosinophil Absolute 0.07 0.00 - 0.70 x10(3) uL    Basophil Absolute 0.01 0.00 - 0.20 x10(3) uL    Immature Granulocyte Absolute 0.02 0.00 - 1.00 x10(3) uL    Neutrophil % 49.7 %    Lymphocyte % 37.8 %    Monocyte % 10.1 %    Eosinophil % 1.7 %    Basophil % 0.2 %    Immature  Granulocyte % 0.5 %   Basic Metabolic Panel (8)    Collection Time: 08/18/24  6:26 AM   Result Value Ref Range    Glucose 93 70 - 99 mg/dL    Sodium 142 136 - 145 mmol/L    Potassium 3.5 3.5 - 5.1 mmol/L    Chloride 108 98 - 112 mmol/L    CO2 27.0 21.0 - 32.0 mmol/L    Anion Gap 7 0 - 18 mmol/L    BUN 6 (L) 9 - 23 mg/dL    Creatinine 0.51 (L) 0.55 - 1.02 mg/dL    BUN/CREA Ratio 11.8 10.0 - 20.0    Calcium, Total 8.5 (L) 8.7 - 10.4 mg/dL    Calculated Osmolality 291 275 - 295 mOsm/kg    eGFR-Cr 110 >=60 mL/min/1.73m2   CBC With Differential With Platelet    Collection Time: 08/18/24  6:26 AM   Result Value Ref Range    WBC 3.1 (L) 4.0 - 11.0 x10(3) uL    RBC 2.44 (L) 3.80 - 5.30 x10(6)uL    HGB 8.1 (L) 12.0 - 16.0 g/dL    HCT 25.4 (L) 35.0 - 48.0 %    .1 (H) 80.0 - 100.0 fL    MCH 33.2 26.0 - 34.0 pg    MCHC 31.9 31.0 - 37.0 g/dL    RDW-SD 53.7 (H) 35.1 - 46.3 fL    RDW 14.6 11.0 - 15.0 %    .0 150.0 - 450.0 10(3)uL    Neutrophil Absolute Prelim 1.65 1.50 - 7.70 x10 (3) uL    Neutrophil Absolute 1.65 1.50 - 7.70 x10(3) uL    Lymphocyte Absolute 0.97 (L) 1.00 - 4.00 x10(3) uL    Monocyte Absolute 0.35 0.10 - 1.00 x10(3) uL    Eosinophil Absolute 0.06 0.00 - 0.70 x10(3) uL    Basophil Absolute 0.02 0.00 - 0.20 x10(3) uL    Immature Granulocyte Absolute 0.01 0.00 - 1.00 x10(3) uL    Neutrophil % 53.9 %    Lymphocyte % 31.7 %    Monocyte % 11.4 %    Eosinophil % 2.0 %    Basophil % 0.7 %    Immature Granulocyte % 0.3 %   Basic Metabolic Panel (8)    Collection Time: 08/19/24  5:30 AM   Result Value Ref Range    Glucose 89 70 - 99 mg/dL    Sodium 145 136 - 145 mmol/L    Potassium 3.4 (L) 3.5 - 5.1 mmol/L    Chloride 111 98 - 112 mmol/L    CO2 26.0 21.0 - 32.0 mmol/L    Anion Gap 8 0 - 18 mmol/L    BUN <5 (L) 9 - 23 mg/dL    Creatinine 0.51 (L) 0.55 - 1.02 mg/dL    BUN/CREA Ratio      Calcium, Total 8.6 (L) 8.7 - 10.4 mg/dL    Calculated Osmolality      eGFR-Cr 110 >=60 mL/min/1.73m2   CBC With Differential  With Platelet    Collection Time: 08/19/24  5:30 AM   Result Value Ref Range    WBC 3.0 (L) 4.0 - 11.0 x10(3) uL    RBC 2.45 (L) 3.80 - 5.30 x10(6)uL    HGB 8.1 (L) 12.0 - 16.0 g/dL    HCT 25.7 (L) 35.0 - 48.0 %    .9 (H) 80.0 - 100.0 fL    MCH 33.1 26.0 - 34.0 pg    MCHC 31.5 31.0 - 37.0 g/dL    RDW-SD 53.7 (H) 35.1 - 46.3 fL    RDW 14.5 11.0 - 15.0 %    .0 150.0 - 450.0 10(3)uL    Neutrophil Absolute Prelim 1.49 (L) 1.50 - 7.70 x10 (3) uL    Neutrophil Absolute 1.49 (L) 1.50 - 7.70 x10(3) uL    Lymphocyte Absolute 1.08 1.00 - 4.00 x10(3) uL    Monocyte Absolute 0.37 0.10 - 1.00 x10(3) uL    Eosinophil Absolute 0.06 0.00 - 0.70 x10(3) uL    Basophil Absolute 0.01 0.00 - 0.20 x10(3) uL    Immature Granulocyte Absolute 0.01 0.00 - 1.00 x10(3) uL    Neutrophil % 49.3 %    Lymphocyte % 35.8 %    Monocyte % 12.3 %    Eosinophil % 2.0 %    Basophil % 0.3 %    Immature Granulocyte % 0.3 %   Basic Metabolic Panel (8)    Collection Time: 08/20/24  5:32 AM   Result Value Ref Range    Glucose 92 70 - 99 mg/dL    Sodium 145 136 - 145 mmol/L    Potassium 3.6 3.5 - 5.1 mmol/L    Chloride 112 98 - 112 mmol/L    CO2 27.0 21.0 - 32.0 mmol/L    Anion Gap 6 0 - 18 mmol/L    BUN <5 (L) 9 - 23 mg/dL    Creatinine 0.53 (L) 0.55 - 1.02 mg/dL    BUN/CREA Ratio      Calcium, Total 8.4 (L) 8.7 - 10.4 mg/dL    Calculated Osmolality      eGFR-Cr 109 >=60 mL/min/1.73m2   CBC With Differential With Platelet    Collection Time: 08/20/24  5:32 AM   Result Value Ref Range    WBC 2.6 (L) 4.0 - 11.0 x10(3) uL    RBC 2.40 (L) 3.80 - 5.30 x10(6)uL    HGB 8.1 (L) 12.0 - 16.0 g/dL    HCT 24.6 (L) 35.0 - 48.0 %    .5 (H) 80.0 - 100.0 fL    MCH 33.8 26.0 - 34.0 pg    MCHC 32.9 31.0 - 37.0 g/dL    RDW-SD 54.1 (H) 35.1 - 46.3 fL    RDW 14.6 11.0 - 15.0 %    .0 150.0 - 450.0 10(3)uL    Neutrophil Absolute Prelim 1.05 (L) 1.50 - 7.70 x10 (3) uL    Neutrophil Absolute 1.05 (L) 1.50 - 7.70 x10(3) uL    Lymphocyte Absolute 1.03 1.00 -  4.00 x10(3) uL    Monocyte Absolute 0.40 0.10 - 1.00 x10(3) uL    Eosinophil Absolute 0.08 0.00 - 0.70 x10(3) uL    Basophil Absolute 0.01 0.00 - 0.20 x10(3) uL    Immature Granulocyte Absolute 0.01 0.00 - 1.00 x10(3) uL    Neutrophil % 40.7 %    Lymphocyte % 39.9 %    Monocyte % 15.5 %    Eosinophil % 3.1 %    Basophil % 0.4 %    Immature Granulocyte % 0.4 %   Potassium    Collection Time: 08/20/24  5:32 AM   Result Value Ref Range    Potassium 3.6 3.5 - 5.1 mmol/L   Basic Metabolic Panel (8)    Collection Time: 08/21/24  5:48 AM   Result Value Ref Range    Glucose 98 70 - 99 mg/dL    Sodium 145 136 - 145 mmol/L    Potassium 3.3 (L) 3.5 - 5.1 mmol/L    Chloride 112 98 - 112 mmol/L    CO2 30.0 21.0 - 32.0 mmol/L    Anion Gap 3 0 - 18 mmol/L    BUN <5 (L) 9 - 23 mg/dL    Creatinine 0.58 0.55 - 1.02 mg/dL    BUN/CREA Ratio      Calcium, Total 8.8 8.7 - 10.4 mg/dL    Calculated Osmolality      eGFR-Cr 107 >=60 mL/min/1.73m2   CBC With Differential With Platelet    Collection Time: 08/21/24  5:48 AM   Result Value Ref Range    WBC 2.9 (L) 4.0 - 11.0 x10(3) uL    RBC 2.53 (L) 3.80 - 5.30 x10(6)uL    HGB 8.3 (L) 12.0 - 16.0 g/dL    HCT 26.4 (L) 35.0 - 48.0 %    .3 (H) 80.0 - 100.0 fL    MCH 32.8 26.0 - 34.0 pg    MCHC 31.4 31.0 - 37.0 g/dL    RDW-SD 56.6 (H) 35.1 - 46.3 fL    RDW 14.9 11.0 - 15.0 %    .0 150.0 - 450.0 10(3)uL    Neutrophil Absolute Prelim 1.15 (L) 1.50 - 7.70 x10 (3) uL    Neutrophil Absolute 1.15 (L) 1.50 - 7.70 x10(3) uL    Lymphocyte Absolute 1.20 1.00 - 4.00 x10(3) uL    Monocyte Absolute 0.47 0.10 - 1.00 x10(3) uL    Eosinophil Absolute 0.07 0.00 - 0.70 x10(3) uL    Basophil Absolute 0.01 0.00 - 0.20 x10(3) uL    Immature Granulocyte Absolute 0.01 0.00 - 1.00 x10(3) uL    Neutrophil % 39.6 %    Lymphocyte % 41.2 %    Monocyte % 16.2 %    Eosinophil % 2.4 %    Basophil % 0.3 %    Immature Granulocyte % 0.3 %   Basic Metabolic Panel (8)    Collection Time: 08/22/24  6:00 AM   Result Value  Ref Range    Glucose 104 (H) 70 - 99 mg/dL    Sodium 145 136 - 145 mmol/L    Potassium 3.4 (L) 3.5 - 5.1 mmol/L    Chloride 110 98 - 112 mmol/L    CO2 28.0 21.0 - 32.0 mmol/L    Anion Gap 7 0 - 18 mmol/L    BUN <5 (L) 9 - 23 mg/dL    Creatinine 0.56 0.55 - 1.02 mg/dL    BUN/CREA Ratio      Calcium, Total 8.7 8.7 - 10.4 mg/dL    Calculated Osmolality      eGFR-Cr 108 >=60 mL/min/1.73m2   CBC With Differential With Platelet    Collection Time: 08/22/24  6:00 AM   Result Value Ref Range    WBC 2.8 (L) 4.0 - 11.0 x10(3) uL    RBC 2.52 (L) 3.80 - 5.30 x10(6)uL    HGB 8.4 (L) 12.0 - 16.0 g/dL    HCT 25.9 (L) 35.0 - 48.0 %    .8 (H) 80.0 - 100.0 fL    MCH 33.3 26.0 - 34.0 pg    MCHC 32.4 31.0 - 37.0 g/dL    RDW-SD 55.2 (H) 35.1 - 46.3 fL    RDW 14.9 11.0 - 15.0 %    .0 150.0 - 450.0 10(3)uL    Neutrophil Absolute Prelim 1.22 (L) 1.50 - 7.70 x10 (3) uL    Neutrophil Absolute 1.22 (L) 1.50 - 7.70 x10(3) uL    Lymphocyte Absolute 1.02 1.00 - 4.00 x10(3) uL    Monocyte Absolute 0.44 0.10 - 1.00 x10(3) uL    Eosinophil Absolute 0.07 0.00 - 0.70 x10(3) uL    Basophil Absolute 0.02 0.00 - 0.20 x10(3) uL    Immature Granulocyte Absolute 0.01 0.00 - 1.00 x10(3) uL    Neutrophil % 43.9 %    Lymphocyte % 36.7 %    Monocyte % 15.8 %    Eosinophil % 2.5 %    Basophil % 0.7 %    Immature Granulocyte % 0.4 %   Potassium    Collection Time: 08/22/24  6:00 AM   Result Value Ref Range    Potassium 3.4 (L) 3.5 - 5.1 mmol/L       Medications:    sennosides    docusate sodium    simethicone    sodium chloride    LORazepam    HYDROmorphone **OR** HYDROmorphone **OR** HYDROmorphone    phenol    diazepam    ondansetron **OR** ondansetron    pantoprazole **OR** pantoprazole    enoxaparin    diphenhydrAMINE     ROS: Generally still doing well. Reports rumbling in the abdomen, but not flatus or BM yet. Denies any nausea or vomiting. Appetite is present. Tolerating clear liquid diet. Ambulating. Pain well managed. Inc sites  unremarkable, no LUCHO complaints    Drains:  LUCHO right 85 ml serosang x 24 hours  LUCHO left 140 ml serosang x 24 hours    Physical examination:    /63 (BP Location: Right arm)   Pulse 80   Temp 98 °F (36.7 °C) (Oral)   Resp 16   Ht 5' 7\" (1.702 m)   Wt 178 lb 6.4 oz (80.9 kg)   LMP 12/22/2020   SpO2 100%   BMI 27.94 kg/m²     GENERAL: alert and oriented, cooperative, in no acute distress  ABDOMEN: soft, non-tender. Bowel sounds normal. No masses,  no organomegaly  INCISION/SURGICAL WOUNDS: clean, dry and intact midline incision site  PELVIC: Pelvic exam: examination not indicated.    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, mobilization of splenic flexure, repair of transverse colon mesentery, rectosigmoid colon resection with end-to-end anastomosis on 8/13/24      POD #9     Generally doing well     AVSS     Labs reviewed- Wbc (2.8), hgb (8.4), cr (0.56) stable      ml overnight     Awaiting flatus and BM, denies n/v    Tolerating clear liquid diet     PLAN:      Post op ileus:  -Will trial full liquid diet, notify if N/V  -XR abdomen without obstruction  -Encouraged clear ensure supplement, dietary on consult, will consider ensure compact and TPN in the next couple of days  -Continue to ambulate  -Colace and Senna BID     Continue IV fluids and LUCHO     Monitor I&O     Pain management as needed    Ok to shower, change LUCHO dressing PRN     LMWH    Dr. Choudhary reviewed pathology reviewed with pt and family on 8/20/24    Rehana Ortega PA-C  08/22/24

## 2024-08-23 ENCOUNTER — APPOINTMENT (OUTPATIENT)
Dept: CT IMAGING | Facility: HOSPITAL | Age: 54
End: 2024-08-23
Attending: PHYSICIAN ASSISTANT
Payer: COMMERCIAL

## 2024-08-23 LAB
ANION GAP SERPL CALC-SCNC: 4 MMOL/L (ref 0–18)
BASOPHILS # BLD AUTO: 0.01 X10(3) UL (ref 0–0.2)
BASOPHILS NFR BLD AUTO: 0.4 %
BUN BLD-MCNC: <5 MG/DL (ref 9–23)
CALCIUM BLD-MCNC: 8.8 MG/DL (ref 8.7–10.4)
CHLORIDE SERPL-SCNC: 110 MMOL/L (ref 98–112)
CO2 SERPL-SCNC: 30 MMOL/L (ref 21–32)
CREAT BLD-MCNC: 0.6 MG/DL
DEPRECATED RDW RBC AUTO: 56.9 FL (ref 35.1–46.3)
EGFRCR SERPLBLD CKD-EPI 2021: 106 ML/MIN/1.73M2 (ref 60–?)
EOSINOPHIL # BLD AUTO: 0.07 X10(3) UL (ref 0–0.7)
EOSINOPHIL NFR BLD AUTO: 2.6 %
ERYTHROCYTE [DISTWIDTH] IN BLOOD BY AUTOMATED COUNT: 14.8 % (ref 11–15)
GLUCOSE BLD-MCNC: 101 MG/DL (ref 70–99)
HCT VFR BLD AUTO: 27.6 %
HGB BLD-MCNC: 8.5 G/DL
IMM GRANULOCYTES # BLD AUTO: 0.01 X10(3) UL (ref 0–1)
IMM GRANULOCYTES NFR BLD: 0.4 %
LYMPHOCYTES # BLD AUTO: 1.07 X10(3) UL (ref 1–4)
LYMPHOCYTES NFR BLD AUTO: 40.1 %
MCH RBC QN AUTO: 32.6 PG (ref 26–34)
MCHC RBC AUTO-ENTMCNC: 30.8 G/DL (ref 31–37)
MCV RBC AUTO: 105.7 FL
MONOCYTES # BLD AUTO: 0.47 X10(3) UL (ref 0.1–1)
MONOCYTES NFR BLD AUTO: 17.6 %
NEUTROPHILS # BLD AUTO: 1.04 X10 (3) UL (ref 1.5–7.7)
NEUTROPHILS # BLD AUTO: 1.04 X10(3) UL (ref 1.5–7.7)
NEUTROPHILS NFR BLD AUTO: 38.9 %
PLATELET # BLD AUTO: 272 10(3)UL (ref 150–450)
POTASSIUM SERPL-SCNC: 3.5 MMOL/L (ref 3.5–5.1)
POTASSIUM SERPL-SCNC: 3.5 MMOL/L (ref 3.5–5.1)
RBC # BLD AUTO: 2.61 X10(6)UL
SODIUM SERPL-SCNC: 144 MMOL/L (ref 136–145)
WBC # BLD AUTO: 2.7 X10(3) UL (ref 4–11)

## 2024-08-23 PROCEDURE — 85025 COMPLETE CBC W/AUTO DIFF WBC: CPT | Performed by: PHYSICIAN ASSISTANT

## 2024-08-23 PROCEDURE — 74177 CT ABD & PELVIS W/CONTRAST: CPT | Performed by: PHYSICIAN ASSISTANT

## 2024-08-23 PROCEDURE — 80048 BASIC METABOLIC PNL TOTAL CA: CPT | Performed by: PHYSICIAN ASSISTANT

## 2024-08-23 PROCEDURE — 84132 ASSAY OF SERUM POTASSIUM: CPT | Performed by: HOSPITALIST

## 2024-08-23 NOTE — PLAN OF CARE
Patient is alert and oriented. Room air. Vital signs stable. Ativan given prn for anxiety. Tolerating full liquid diet - denies nausea/pain. Up independently. Denies flatus. IVF continued through right port. Right and left JPs to bulb suction. Surgical incision clean/dry/intact. Call light and personal belongings within reach. Safety precautions in place.     Problem: Patient Centered Care  Goal: Patient preferences are identified and integrated in the patient's plan of care  Description: Interventions:  - Provide timely, complete, and accurate information to patient/family  - Incorporate patient and family knowledge, values, beliefs, and cultural backgrounds into the planning and delivery of care  - Encourage patient/family to participate in care and decision-making at the level they choose  - Honor patient and family perspectives and choices  Outcome: Progressing     Problem: PAIN - ADULT  Goal: Verbalizes/displays adequate comfort level or patient's stated pain goal  Description: INTERVENTIONS:  - Encourage pt to monitor pain and request assistance  - Assess pain using appropriate pain scale  - Administer analgesics based on type and severity of pain and evaluate response  - Implement non-pharmacological measures as appropriate and evaluate response  - Consider cultural and social influences on pain and pain management  - Manage/alleviate anxiety  - Utilize distraction and/or relaxation techniques  - Monitor for opioid side effects  - Notify MD/LIP if interventions unsuccessful or patient reports new pain  - Anticipate increased pain with activity and pre-medicate as appropriate  Outcome: Progressing     Problem: SAFETY ADULT - FALL  Goal: Free from fall injury  Description: INTERVENTIONS:  - Assess pt frequently for physical needs  - Identify cognitive and physical deficits and behaviors that affect risk of falls.  - Nova fall precautions as indicated by assessment.  - Educate pt/family on patient safety  including physical limitations  - Instruct pt to call for assistance with activity based on assessment  - Modify environment to reduce risk of injury  - Provide assistive devices as appropriate  - Consider OT/PT consult to assist with strengthening/mobility  - Encourage toileting schedule  Outcome: Progressing     Problem: GASTROINTESTINAL - ADULT  Goal: Minimal or absence of nausea and vomiting  Description: INTERVENTIONS:  - Maintain adequate hydration with IV or PO as ordered and tolerated  - Nasogastric tube to low intermittent suction as ordered  - Evaluate effectiveness of ordered antiemetic medications  - Provide nonpharmacologic comfort measures as appropriate  - Advance diet as tolerated, if ordered  - Obtain nutritional consult as needed  - Evaluate fluid balance  Outcome: Progressing  Goal: Maintains or returns to baseline bowel function  Description: INTERVENTIONS:  - Assess bowel function  - Maintain adequate hydration with IV or PO as ordered and tolerated  - Evaluate effectiveness of GI medications  - Encourage mobilization and activity  - Obtain nutritional consult as needed  - Establish a toileting routine/schedule  - Consider collaborating with pharmacy to review patient's medication profile  Outcome: Progressing     Problem: GENITOURINARY - ADULT  Goal: Absence of urinary retention  Description: INTERVENTIONS:  - Assess patient’s ability to void and empty bladder  - Monitor intake/output and perform bladder scan as needed  - Follow urinary retention protocol/standard of care  - Consider collaborating with pharmacy to review patient's medication profile  - Implement strategies to promote bladder emptying  Outcome: Progressing     Problem: SKIN/TISSUE INTEGRITY - ADULT  Goal: Incision(s), wounds(s) or drain site(s) healing without S/S of infection  Description: INTERVENTIONS:  - Assess and document risk factors for pressure ulcer development  - Assess and document skin integrity  - Assess and  document dressing/incision, wound bed, drain sites and surrounding tissue  - Implement wound care per orders  - Initiate isolation precautions as appropriate  - Initiate Pressure Ulcer prevention bundle as indicated  Outcome: Progressing     Problem: MUSCULOSKELETAL - ADULT  Goal: Return mobility to safest level of function  Description: INTERVENTIONS:  - Assess patient stability and activity tolerance for standing, transferring and ambulating w/ or w/o assistive devices  - Assist with transfers and ambulation using safe patient handling equipment as needed  - Ensure adequate protection for wounds/incisions during mobilization  - Obtain PT/OT consults as needed  - Advance activity as appropriate  - Communicate ordered activity level and limitations with patient/family  Outcome: Progressing

## 2024-08-23 NOTE — PROGRESS NOTES
[facility] Hospitalist Progress Note     CC: Hospital Follow up    PCP: Diana Black MD       Assessment/Plan:     Active Problems:    Ovarian cancer (HCC)    Yue Malin - 54 year old female w adenocarcinoma admitted 8/13/2024 for post op care after planned CHELSIE-BSO, colon resection for her gynecologic adenoca w Dr. Choudhary. Post op course with ileus.      Gynecologic adenocarcinoma w peritoneal carcinomatosis  S/p chemo  Now s/p CHELSIE-BSO, rectosigmoid colon resection w end-end anastomosis due to tumor involvement  - Post-op care per Dr. Choudhary's team. NGT & yao removed.  - Trend labs  - s/p dilaudid PCA, continue IV dilaudid PRN and IV toradol, avoid narcotics if able  - IVF until tolerating PO  - Up, OOB, IS use, ambulate  - s/p NGT removal    Post op ileus  - initially hypoactive BS, now more active, no flatus yet, denies nausea/vomiting  - ambulating frequently  - not taking opiates  - KUB without obstruction  - may need TPN but patient does not want this   - started full liquids 8/22  - plan for CT A/P with PO contrast today per gyn onc recs     Hypernatremia- resolved  - 2/2 NPO & IVF  - Switch IVF to D5  - switch back to NS     Anemia, post-op - 2/2 expected blood loss w/ dilutional component from IVF   - No active external bleeding noted  - Daily CBC     Anxiety (ACOSTA)  - Stable, continue PTA ativan PRN as able  - Add IV valium for NGT discomfort/anxiety     ACP: Full code  Ppx: DVT: Enox  Dispo  EDoD:  TBD. Pending improvement of ileus  F/u:   - PCP:  Diana Black MD  - Onc  - Gyne/onc     Kim Jean MD  Internal Medicine - Hospitalist  [facility]     Subjective:     Tolerated full liquid diet yesterday. No flatus, felt like she needs to have BM. No worsening abdominal pain or bloating. No N/V    OBJECTIVE:    Blood pressure 112/59, pulse 81, temperature 98.4 °F (36.9 °C), temperature source Oral, resp. rate 18, height 5' 7\" (1.702 m), weight 178 lb 6.4 oz (80.9 kg), last menstrual period  12/22/2020, SpO2 100%, not currently breastfeeding.    Temp:  [98 °F (36.7 °C)-98.7 °F (37.1 °C)] 98.4 °F (36.9 °C)  Pulse:  [70-81] 81  Resp:  [16-18] 18  BP: (112-131)/(59-71) 112/59  SpO2:  [100 %] 100 %      Intake/Output:    Intake/Output Summary (Last 24 hours) at 8/23/2024 1109  Last data filed at 8/23/2024 0949  Gross per 24 hour   Intake 550 ml   Output 2490 ml   Net -1940 ml       Last 3 Weights   08/20/24 2201 178 lb 6.4 oz (80.9 kg)   08/13/24 0549 173 lb 6.4 oz (78.7 kg)   08/02/24 1128 180 lb (81.6 kg)   01/05/21 0831 190 lb 1.6 oz (86.2 kg)   11/22/17 0808 189 lb (85.7 kg)     Exam   GEN: female in NAD  HEENT: EOMI  Pulm: CTAB, no crackles or wheezes  CV: RRR, no murmurs  ABD: Soft, non-tender, non-distended, + BS ,+LUCHO  SKIN: warm, dry  EXT: trace edema    Data Review:       Labs:     Recent Labs   Lab 08/21/24 0548 08/22/24  0600 08/23/24  0458   RBC 2.53* 2.52* 2.61*   HGB 8.3* 8.4* 8.5*   HCT 26.4* 25.9* 27.6*   .3* 102.8* 105.7*   MCH 32.8 33.3 32.6   MCHC 31.4 32.4 30.8*   RDW 14.9 14.9 14.8   NEPRELIM 1.15* 1.22* 1.04*   WBC 2.9* 2.8* 2.7*   .0 258.0 272.0         Recent Labs   Lab 08/21/24 0548 08/22/24  0600 08/23/24  0458   GLU 98 104* 101*   BUN <5* <5* <5*   CREATSERUM 0.58 0.56 0.60   EGFRCR 107 108 106   CA 8.8 8.7 8.8    145 144   K 3.3* 3.4*  3.4* 3.5  3.5    110 110   CO2 30.0 28.0 30.0       No results for input(s): \"ALT\", \"AST\", \"ALB\", \"AMYLASE\", \"LIPASE\", \"LDH\" in the last 168 hours.    Invalid input(s): \"ALPHOS\", \"TBIL\", \"DBIL\", \"TPROT\"      Imaging:  No results found.      Meds:     INPATIENT MEDICATIONS    Scheduled Medications:      sennosides, 8.6 mg, BID  docusate sodium, 100 mg, BID  pantoprazole, 40 mg, QAM AC   Or  pantoprazole, 40 mg, QAM AC  enoxaparin, 40 mg, Daily            Drips:  sodium chloride, Last Rate: 50 mL/hr at 08/20/24 2006        PRN Medications  simethicone, 80 mg, TID PRN  LORazepam, 1 mg, Q6H PRN  HYDROmorphone, 0.2 mg, Q2H  PRN   Or  HYDROmorphone, 0.4 mg, Q2H PRN   Or  HYDROmorphone, 0.8 mg, Q2H PRN  phenol, , Q2H PRN  diazepam, 5 mg, Q6H PRN  ondansetron, 4 mg, Q8H PRN   Or  ondansetron, 4 mg, Q8H PRN  diphenhydrAMINE, 12.5 mg, Q4H PRN

## 2024-08-23 NOTE — PROGRESS NOTES
[facility]   POST-OPERATIVE VISIT     MEDICAL RECORD #: [id number]   DATE OF SERVICE: 8/23/2024             Reason for visit:  Post-operative Visit  POD#10    Procedure:    54 year old female with a history of ovarian cancer- s/p  Exploratory laparotomy, total abdominal hysterectomy, bilateral salpingo-oophorectomy, bilateral ureterolysis, bilateral pelvic lymph node dissection, tumor debulking of various peritoneal surfaces, argon beam coagulation of peritoneal tumor nodules, infracolic-supracolic omentectomy, and resection of gastrosplenic ligament with tumor attached to tip of spleen, mobilization of splenic flexure, repair of transverse colon mesentery, rectosigmoid colon resection with end-to-end anastomosis on 8/13/24       Pathology/Lab Results:  Recent Results (from the past 720 hour(s))   ABORH (Blood Type)    Collection Time: 08/02/24 12:33 PM   Result Value Ref Range    ABO BLOOD TYPE O     RH BLOOD TYPE Negative    Antibody Screen    Collection Time: 08/02/24 12:33 PM   Result Value Ref Range    Antibody Screen Negative    ABORH Confirmation    Collection Time: 08/13/24  7:00 AM   Result Value Ref Range    ABO BLOOD TYPE O     RH BLOOD TYPE Negative    Specimen to Pathology Tissue    Collection Time: 08/13/24  9:09 AM   Result Value Ref Range    Case Report       Surgical Pathology                                Case: [id number]                                  Authorizing Provider:  Bihn Choudhary MD         Collected:           08/13/2024 09:09 AM          Ordering Location:     [facility]          Received:            08/13/2024 01:52 PM                                 Operating Room                                                               Pathologist:           Paul Wade MD                                                             Specimens:   A) - Uterus and cervix, bilateral tubes and ovaries, 1. uterus, cervix, bilateral                   tubes and ovaries,  anterior bladder, peritoneal tumor                                               B) - Lymph node pelvic left, 2. Left pelvic lymph nodes                                             C) - Lymph node pelvic right, 3. Right pelvic lymph nodes                                           D) - Tissue, 4. left paracolic tumor                                                                 E) - Tissue, 5. Tumor attached to splenic flexure                                                   F) - Tissue, 6. nodule attached to small bowel                                                      G) - Tissue, 7. nodule attached to cecum                                                            H) - Tissue, 8. nodule attached to right diaphragm                                                  I) - Tissue, 9. infracolic, supracolic, splenocolic omentum                                         J) - Colon, 10. Rectal sigmoid colon with tumor                                            Final Diagnosis:         A. Uterus, cervix, bilateral fallopian tubes, ovaries, anterior bladder, peritoneal tumor; total abdominal hysterectomy, bilateral salpingo-oophorectomy, bilateral ureterolysis, bilateral pelvic lymph node dissection, tumor debulking:   Left ovary with high-grade serous carcinoma, with focal clear cell change, 1.2 cm in greatest dimension.  Carcinoma is seen involving the endometrium (focal, superficial) uterine serosa, right parametrium, left fallopian tube (surface), left para-adnexal soft tissue, and right ovary (focal).  Lymphovascular invasion is identified.  Ectocervix with no significant pathologic changes identified.  Endocervix with nabothian cysts; no significant pathologic changes identified.  Right fallopian tube with no significant pathologic changes identified.  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  ypT3c, N0.  See comment.    B. Left pelvic lymph node; biopsy:  Five lymph nodes negative for metastatic  carcinoma (0/5).    C. Right pelvic lymph nodes; biopsy:  Two lymph nodes negative for metastatic carcinoma (0/2).    D. Left paracolic tumor; biopsy:  Fibroconnective tissue involved by high-grade serous carcinoma.    E. Splenic flexure tumor; biopsy:   Fibroadipose tissue involved by high-grade serous carcinoma.    F. Small bowel nodule:  Nodular fibrotic tissue with chronic inflammation and calcifications.  No definitive viable carcinoma identified.    G. Cecal nodule:  Nodular fibrotic tissue with chronic inflammation.  No definitive viable carcinoma identified.    H. Right diaphragm nodule:  Fibroadipose tissue with extensive hemorrhage and acute inflammation.  No definitive viable carcinoma identified.    I. Infra colic, supracolic, splenocolic omentum; excision:   Omental fibroadipose tissue with extensive involvement by high-grade serous carcinoma.  Largest tumor nodule measures 3.8 cm.  See comment.    J. Rectosigmoid colon tumor; excision:   Segment of colon, 10.5 in length, demonstrating high-grade serous carcinoma extensively involving the pericolonic soft tissue and serosa.  Tumor is present less than 1 mm from the mesorectal/radial margin.  One lymph node, negative for metastatic carcinoma (0/1).  Mucosa at surgical margins is viable and free of carcinoma.    Comment:  The patient has a history of adenocarcinoma of gynecological origin diagnosed on previous omental mass biopsy and ascites fluid, both in March 2024, per notes, status post neoadjuvant chemotherapy x4 cycles with good response.     Immunohistochemical stains performed on the carcinoma present in the omentum (block I3) demonstrate that the tumor cells are positive for pankeratin (AE1/AE3), CK7, PAX8, WT1, and ER and negative for CK20 and p53, consistent with a high-grade serous carcinoma.    For  purposes, this case was reviewed by a second pathologist who concurred with the diagnosis.    The immunohistochemical stains  interpreted in this case demonstrated appropriate reactivity of the positive controls. These stains were performed on formalin-fixed, paraffin-embedded tissue sections with each antibody analysis being performed on a separate slide        Embedded Images      Synoptic Report       OVARY or FALLOPIAN TUBE or PRIMARY PERITONEUM   OVARY OR FALLOPIAN TUBE OR PRIMARY PERITONEUM - All Specimens   8th Edition - Protocol posted: 3/22/2023      SPECIMEN      Procedure:    Total hysterectomy and bilateral salpingo-oophorectomy       Hysterectomy Type:    Abdominal       Specimen Integrity:            Right Ovary Integrity:    Capsule intact       Specimen Integrity:            Left Ovary Integrity:    Capsule intact         Uterus Integrity:    Intact       TUMOR      Tumor Site:    Left ovary       Tumor Size:    Greatest Dimension (Centimeters): 1.2 cm      Histologic Type:    High grade serous carcinoma       Histologic Grade:    High grade       Ovarian Surface Involvement:    Not identified       Fallopian Tube Surface Involvement:    Present, left       Implants:    Present: Left paracolic, splenic flexure       Other Tissue / Organ Involvement:    Right ovary       Other Tissue / Organ Involvement:    Left fallopian tube       Other Tissue / Organ Involvement:    Uterine corpus       Other Tissue / Organ Involvement:    Abdominal peritoneum       Other Tissue / Organ Involvement:    Omentum       Other Tissue / Organ Involvement:    Rectosigmoid colon       Largest Extrapelvic Peritoneal Focus:    Macroscopic (greater than 2 cm): 3.8 cm       Peritoneal / Ascitic Fluid Involvement:    Not submitted / unknown       Chemotherapy Response Score (CRS):    CRS2 (moderate response)       REGIONAL LYMPH NODES      Regional Lymph Node Status:            :    All regional lymph nodes negative for tumor cells         Number of Lymph Nodes Examined:    8           Faiza Site(s) Examined:    Right pelvic           Faiza Site(s)  Examined:    Left pelvic           Faiza Site(s) Examined:    Pericolonic       pTNM CLASSIFICATION (AJCC 8th Edition)      Reporting of pT, pN, and (when applicable) pM categories is based on information available to the pathologist at the time the report is issued. As per the AJCC (Chapter 1, 8th Ed.) it is the managing physician’s responsibility to establish the final pathologic stage based upon all pertinent information, including but potentially not limited to this pathology report.      Modified Classification:    y       pT Category:    pT3c       pN Category:    pN0       FIGO STAGE      FIGO Stage:    IIIC       Clinical Information       Adenocarcinoma.        Gross Description       Specimen A is submitted in formalin labeled “Horvat, uterus, cervix, bilateral tubes and ovaries, anterior bladder, peritoneal tumor” and consists of a 98 gram total hysterectomy including uterus with cervix, bilateral fallopian tubes and ovaries and a flap of tissue attached to the lower uterine segment (possible bladder flap). The uterus and cervix measure 7.6 cm in length, 4.0 cm from cornu to cornu, 3.4 cm from anterior to posterior. The uterus is covered with pink-tan, smooth serosa. The ectocervix is covered with pink-tan smooth serosa. The ectocervix is covered with pink-tan, smooth mucosa and measures 2.5 x 2.4 cm in transverse dimension. The cervical os is stenosed and measures less than 0.1 cm in diameter. The anterior aspect is inked blue, the posterior aspect is inked black. The cervix and uterus are bivalved to reveal a pink-tan endocervix with a 2.5 cm endocervical canal. The endometrium is pink-tan and smooth with a 4.5 x 2.5 cm endometrial cavity. The uterus is sectioned to reveal a single posterior intramural fibroid (0.4 cm in greatest dimension). The remaining myometrium is pink-tan and trabeculated with no lesions or masses identified within the endomyometrium. The anterior bladder flap presents possible  tumor nodules (1.2 cm in greatest dimension).   The left fallopian tube measures 5.5 cm in length x 0.6 cm in diameter. The left ovary measures 2.5 x 1.5 x 1.2 cm. The left fallopian tube, ovary and para adnexal tissue are covered with pink-gray adhesions and possible nodules.  The right fallopian tube measures 5.0 cm in length x 0.4 cm in diameter and is grossly unremarkable. The right ovary is yellowish pink and lobulated, measures 2.0 x 1.5 x 0.8 cm and is grossly unremarkable.    Representative sections are submitted as follows: A1 - anterior cervix, longitudinal section; A2 - posterior cervix, longitudinal section; A3-A7 - full thickness anterior endomyometrium, from lower to upper, including bladder flap and possible tumor nodules; A8-A9 - full thickness posterior endomyometrium including fibroid; A10 - left parametrial soft tissue; A11 - right parametrial soft tissue; A12 - sections of left fallopian tube; A13 - left ovary, bisected, entirely; A14-A15 - left para adnexal tissue with possible nodules; A16 - sections of right fallopian tube; A17 - right ovary, bisected, entirely.     Specimen B is submitted in formalin labeled “Horvat, left pelvic lymph node” and consists of one piece of yellow-tan, fatty tissue measuring 3.8 x 3.1 x 0.8 cm. Dissection of the tissue reveals five lymph node candidates (0.4 to 1.5 cm in greatest dimension). The two larger candidates are submitted in cassette B1. The remaining candidates are submitted in cassette B2.     Specimen C is submitted in formalin labeled “Horvat, right pelvic lymph node” and consists of two pieces of yellow-tan, fatty tissue measuring in aggregate 2.8 x 2.3 x 0.7 cm. Dissection of the tissue reveals two lymph node candidates (0.8 and 1.3 cm in greatest dimension). Both candidates are submitted entirely in cassette C1.     Specimen D is submitted in formalin labeled “Horvat, left paracolic tumor” and consists of one pink-gray soft tissue nodule measuring  2.0 x 1.9 x 0.8 cm. The nodule is bisected and submitted entirely in cassette D1.     Specimen E is submitted in formalin labeled “Horvat, tumor attached to splenic flexure” and consists of one pink-gray soft tissue nodule measuring 1.8 x 1.5 x 0.7 cm. The specimen is submitted entirely in cassette E1.    Specimen F is submitted in formalin labeled “Horvat, nodule attached to small bowel” and consists of multiple pieces of pink-gray soft tissue measuring in aggregate 2.4 x 1.9 x 0.4 cm. The specimen is submitted entirely in cassette F1.    Specimen G is submitted in formalin labeled “Horvat, nodule attached to cecum” and consists of two pink-gray soft tissue nodules measuring in aggregate 0.7 x 0.6 x 0.3 cm. The specimen is submitted entirely in cassette G1.    Specimen H is submitted in formalin labeled “Horvat, nodule attached to right diaphragm” and consists of one piece of red-tan, soft tissue measuring 1.2 x 0.5 x 0.3 cm. The specimen is submitted entirely in cassette H1.    Specimen I is submitted in formalin labeled “Horvat, infra colic, supra colic, splenocolic omentum” and consists of one piece of yellow-tan omentum measuring 21.5 x 13.7 x 3.6 cm. The specimen is sectioned to reveal multiple pink-gray nodules embedded within the omentum (0.2 to 3.8 cm in greatest dimension). Representative sections are submitted as follows: I1 - largest nodule; I2-I4 - other nodules.     Specimen J is submitted in formalin labeled “Horvat, rectosigmoid colon with tumor” and consists of an unoriented segment of colon with mesentery and overlying adhesions measuring 10.5 cm in length x 2.6 cm in diameter. The specimen is received with two stapled margins. The proximal margin is inked blue, the distal margin is inked green, the mesorectal/radial margin is inked black. The specimen is opened longitudinally to reveal a pink-tan, folded mucosa with no lesions, masses or perforations grossly identified. No definitive masses,  lesions or nodules are identified within the omentum or mesorectum.   Dissection of the pericolonic tissue reveals one lymph node candidate (0.8 cm in greatest dimension).   Representative sections are submitted as follows: J1 - proximal en face margin; J2 - distal en face margin; J3 - random mesorectal/radial margin, perpendicular section; J4-J8 - random sections of colon wall with pericolonic tissue; J9 - one lymph node candidate; J10-J12 - dissected pericolonic tissue. (jq)    Paul Wade M.D./[facility] [place]        Interpretation Malignant (A)     POCT Hemocue    Collection Time: 08/13/24 11:24 AM   Result Value Ref Range    Hemocue 10.6 (A) 12.0 - 16.0 g/dl   Basic Metabolic Panel (8)    Collection Time: 08/13/24  5:26 PM   Result Value Ref Range    Glucose 166 (H) 70 - 99 mg/dL    Sodium 142 136 - 145 mmol/L    Potassium 4.4 3.5 - 5.1 mmol/L    Chloride 110 98 - 112 mmol/L    CO2 26.0 21.0 - 32.0 mmol/L    Anion Gap 6 0 - 18 mmol/L    BUN 12 9 - 23 mg/dL    Creatinine 0.74 0.55 - 1.02 mg/dL    BUN/CREA Ratio 16.2 10.0 - 20.0    Calcium, Total 8.5 (L) 8.7 - 10.4 mg/dL    Calculated Osmolality 298 (H) 275 - 295 mOsm/kg    eGFR-Cr 95 >=60 mL/min/1.73m2   CBC With Differential With Platelet    Collection Time: 08/13/24  5:26 PM   Result Value Ref Range    WBC 10.8 4.0 - 11.0 x10(3) uL    RBC 3.38 (L) 3.80 - 5.30 x10(6)uL    HGB 11.3 (L) 12.0 - 16.0 g/dL    HCT 34.8 (L) 35.0 - 48.0 %    .0 (H) 80.0 - 100.0 fL    MCH 33.4 26.0 - 34.0 pg    MCHC 32.5 31.0 - 37.0 g/dL    RDW-SD 57.5 (H) 35.1 - 46.3 fL    RDW 15.7 (H) 11.0 - 15.0 %    .0 150.0 - 450.0 10(3)uL    Neutrophil Absolute Prelim 10.03 (H) 1.50 - 7.70 x10 (3) uL    Neutrophil Absolute 10.03 (H) 1.50 - 7.70 x10(3) uL    Lymphocyte Absolute 0.30 (L) 1.00 - 4.00 x10(3) uL    Monocyte Absolute 0.41 0.10 - 1.00 x10(3) uL    Eosinophil Absolute 0.00 0.00 - 0.70 x10(3) uL    Basophil Absolute 0.01 0.00 - 0.20 x10(3) uL    Immature Granulocyte Absolute 0.03 0.00 -  1.00 x10(3) uL    Neutrophil % 93.0 %    Lymphocyte % 2.8 %    Monocyte % 3.8 %    Eosinophil % 0.0 %    Basophil % 0.1 %    Immature Granulocyte % 0.3 %   CBC With Differential With Platelet    Collection Time: 08/14/24  5:08 AM   Result Value Ref Range    WBC 8.7 4.0 - 11.0 x10(3) uL    RBC 3.12 (L) 3.80 - 5.30 x10(6)uL    HGB 10.2 (L) 12.0 - 16.0 g/dL    HCT 31.9 (L) 35.0 - 48.0 %    .2 (H) 80.0 - 100.0 fL    MCH 32.7 26.0 - 34.0 pg    MCHC 32.0 31.0 - 37.0 g/dL    RDW-SD 57.0 (H) 35.1 - 46.3 fL    RDW 15.6 (H) 11.0 - 15.0 %    .0 150.0 - 450.0 10(3)uL    Neutrophil Absolute Prelim 6.66 1.50 - 7.70 x10 (3) uL    Neutrophil Absolute 6.66 1.50 - 7.70 x10(3) uL    Lymphocyte Absolute 1.18 1.00 - 4.00 x10(3) uL    Monocyte Absolute 0.85 0.10 - 1.00 x10(3) uL    Eosinophil Absolute 0.00 0.00 - 0.70 x10(3) uL    Basophil Absolute 0.01 0.00 - 0.20 x10(3) uL    Immature Granulocyte Absolute 0.03 0.00 - 1.00 x10(3) uL    Neutrophil % 76.4 %    Lymphocyte % 13.5 %    Monocyte % 9.7 %    Eosinophil % 0.0 %    Basophil % 0.1 %    Immature Granulocyte % 0.3 %   Basic Metabolic Panel (8)    Collection Time: 08/14/24  5:09 AM   Result Value Ref Range    Glucose 121 (H) 70 - 99 mg/dL    Sodium 143 136 - 145 mmol/L    Potassium 4.5 3.5 - 5.1 mmol/L    Chloride 110 98 - 112 mmol/L    CO2 27.0 21.0 - 32.0 mmol/L    Anion Gap 6 0 - 18 mmol/L    BUN 13 9 - 23 mg/dL    Creatinine 0.73 0.55 - 1.02 mg/dL    BUN/CREA Ratio 17.8 10.0 - 20.0    Calcium, Total 8.8 8.7 - 10.4 mg/dL    Calculated Osmolality 297 (H) 275 - 295 mOsm/kg    eGFR-Cr 97 >=60 mL/min/1.73m2   Basic Metabolic Panel (8)    Collection Time: 08/15/24  5:40 AM   Result Value Ref Range    Glucose 91 70 - 99 mg/dL    Sodium 144 136 - 145 mmol/L    Potassium 3.8 3.5 - 5.1 mmol/L    Chloride 110 98 - 112 mmol/L    CO2 28.0 21.0 - 32.0 mmol/L    Anion Gap 6 0 - 18 mmol/L    BUN 15 9 - 23 mg/dL    Creatinine 0.70 0.55 - 1.02 mg/dL    BUN/CREA Ratio 21.4 (H) 10.0 -  20.0    Calcium, Total 8.7 8.7 - 10.4 mg/dL    Calculated Osmolality 298 (H) 275 - 295 mOsm/kg    eGFR-Cr 102 >=60 mL/min/1.73m2   CBC With Differential With Platelet    Collection Time: 08/15/24  5:40 AM   Result Value Ref Range    WBC 8.2 4.0 - 11.0 x10(3) uL    RBC 2.78 (L) 3.80 - 5.30 x10(6)uL    HGB 9.3 (L) 12.0 - 16.0 g/dL    HCT 28.9 (L) 35.0 - 48.0 %    .0 (H) 80.0 - 100.0 fL    MCH 33.5 26.0 - 34.0 pg    MCHC 32.2 31.0 - 37.0 g/dL    RDW-SD 57.8 (H) 35.1 - 46.3 fL    RDW 15.7 (H) 11.0 - 15.0 %    .0 150.0 - 450.0 10(3)uL    Neutrophil Absolute Prelim 4.90 1.50 - 7.70 x10 (3) uL    Neutrophil Absolute 4.90 1.50 - 7.70 x10(3) uL    Lymphocyte Absolute 2.44 1.00 - 4.00 x10(3) uL    Monocyte Absolute 0.80 0.10 - 1.00 x10(3) uL    Eosinophil Absolute 0.01 0.00 - 0.70 x10(3) uL    Basophil Absolute 0.03 0.00 - 0.20 x10(3) uL    Immature Granulocyte Absolute 0.02 0.00 - 1.00 x10(3) uL    Neutrophil % 59.7 %    Lymphocyte % 29.8 %    Monocyte % 9.8 %    Eosinophil % 0.1 %    Basophil % 0.4 %    Immature Granulocyte % 0.2 %   Basic Metabolic Panel (8)    Collection Time: 08/16/24  4:39 AM   Result Value Ref Range    Glucose 79 70 - 99 mg/dL    Sodium 147 (H) 136 - 145 mmol/L    Potassium 3.8 3.5 - 5.1 mmol/L    Chloride 113 (H) 98 - 112 mmol/L    CO2 28.0 21.0 - 32.0 mmol/L    Anion Gap 6 0 - 18 mmol/L    BUN 15 9 - 23 mg/dL    Creatinine 0.58 0.55 - 1.02 mg/dL    BUN/CREA Ratio 25.9 (H) 10.0 - 20.0    Calcium, Total 8.8 8.7 - 10.4 mg/dL    Calculated Osmolality 304 (H) 275 - 295 mOsm/kg    eGFR-Cr 107 >=60 mL/min/1.73m2   CBC With Differential With Platelet    Collection Time: 08/16/24  4:39 AM   Result Value Ref Range    WBC 4.3 4.0 - 11.0 x10(3) uL    RBC 2.47 (L) 3.80 - 5.30 x10(6)uL    HGB 8.2 (L) 12.0 - 16.0 g/dL    HCT 26.7 (L) 35.0 - 48.0 %    .1 (H) 80.0 - 100.0 fL    MCH 33.2 26.0 - 34.0 pg    MCHC 30.7 (L) 31.0 - 37.0 g/dL    RDW-SD 58.1 (H) 35.1 - 46.3 fL    RDW 15.3 (H) 11.0 - 15.0  %    .0 150.0 - 450.0 10(3)uL    Neutrophil Absolute Prelim 2.47 1.50 - 7.70 x10 (3) uL    Neutrophil Absolute 2.47 1.50 - 7.70 x10(3) uL    Lymphocyte Absolute 1.38 1.00 - 4.00 x10(3) uL    Monocyte Absolute 0.43 0.10 - 1.00 x10(3) uL    Eosinophil Absolute 0.04 0.00 - 0.70 x10(3) uL    Basophil Absolute 0.01 0.00 - 0.20 x10(3) uL    Immature Granulocyte Absolute 0.01 0.00 - 1.00 x10(3) uL    Neutrophil % 57.0 %    Lymphocyte % 31.8 %    Monocyte % 9.9 %    Eosinophil % 0.9 %    Basophil % 0.2 %    Immature Granulocyte % 0.2 %   Basic Metabolic Panel (8)    Collection Time: 08/17/24  5:59 AM   Result Value Ref Range    Glucose 102 (H) 70 - 99 mg/dL    Sodium 141 136 - 145 mmol/L    Potassium 3.3 (L) 3.5 - 5.1 mmol/L    Chloride 108 98 - 112 mmol/L    CO2 26.0 21.0 - 32.0 mmol/L    Anion Gap 7 0 - 18 mmol/L    BUN 12 9 - 23 mg/dL    Creatinine 0.57 0.55 - 1.02 mg/dL    BUN/CREA Ratio 21.1 (H) 10.0 - 20.0    Calcium, Total 8.9 8.7 - 10.4 mg/dL    Calculated Osmolality 292 275 - 295 mOsm/kg    eGFR-Cr 107 >=60 mL/min/1.73m2   CBC With Differential With Platelet    Collection Time: 08/17/24  5:59 AM   Result Value Ref Range    WBC 4.1 4.0 - 11.0 x10(3) uL    RBC 2.57 (L) 3.80 - 5.30 x10(6)uL    HGB 8.6 (L) 12.0 - 16.0 g/dL    HCT 26.3 (L) 35.0 - 48.0 %    .3 (H) 80.0 - 100.0 fL    MCH 33.5 26.0 - 34.0 pg    MCHC 32.7 31.0 - 37.0 g/dL    RDW-SD 53.1 (H) 35.1 - 46.3 fL    RDW 14.6 11.0 - 15.0 %    .0 150.0 - 450.0 10(3)uL    Neutrophil Absolute Prelim 2.02 1.50 - 7.70 x10 (3) uL    Neutrophil Absolute 2.02 1.50 - 7.70 x10(3) uL    Lymphocyte Absolute 1.54 1.00 - 4.00 x10(3) uL    Monocyte Absolute 0.41 0.10 - 1.00 x10(3) uL    Eosinophil Absolute 0.07 0.00 - 0.70 x10(3) uL    Basophil Absolute 0.01 0.00 - 0.20 x10(3) uL    Immature Granulocyte Absolute 0.02 0.00 - 1.00 x10(3) uL    Neutrophil % 49.7 %    Lymphocyte % 37.8 %    Monocyte % 10.1 %    Eosinophil % 1.7 %    Basophil % 0.2 %    Immature  Granulocyte % 0.5 %   Basic Metabolic Panel (8)    Collection Time: 08/18/24  6:26 AM   Result Value Ref Range    Glucose 93 70 - 99 mg/dL    Sodium 142 136 - 145 mmol/L    Potassium 3.5 3.5 - 5.1 mmol/L    Chloride 108 98 - 112 mmol/L    CO2 27.0 21.0 - 32.0 mmol/L    Anion Gap 7 0 - 18 mmol/L    BUN 6 (L) 9 - 23 mg/dL    Creatinine 0.51 (L) 0.55 - 1.02 mg/dL    BUN/CREA Ratio 11.8 10.0 - 20.0    Calcium, Total 8.5 (L) 8.7 - 10.4 mg/dL    Calculated Osmolality 291 275 - 295 mOsm/kg    eGFR-Cr 110 >=60 mL/min/1.73m2   CBC With Differential With Platelet    Collection Time: 08/18/24  6:26 AM   Result Value Ref Range    WBC 3.1 (L) 4.0 - 11.0 x10(3) uL    RBC 2.44 (L) 3.80 - 5.30 x10(6)uL    HGB 8.1 (L) 12.0 - 16.0 g/dL    HCT 25.4 (L) 35.0 - 48.0 %    .1 (H) 80.0 - 100.0 fL    MCH 33.2 26.0 - 34.0 pg    MCHC 31.9 31.0 - 37.0 g/dL    RDW-SD 53.7 (H) 35.1 - 46.3 fL    RDW 14.6 11.0 - 15.0 %    .0 150.0 - 450.0 10(3)uL    Neutrophil Absolute Prelim 1.65 1.50 - 7.70 x10 (3) uL    Neutrophil Absolute 1.65 1.50 - 7.70 x10(3) uL    Lymphocyte Absolute 0.97 (L) 1.00 - 4.00 x10(3) uL    Monocyte Absolute 0.35 0.10 - 1.00 x10(3) uL    Eosinophil Absolute 0.06 0.00 - 0.70 x10(3) uL    Basophil Absolute 0.02 0.00 - 0.20 x10(3) uL    Immature Granulocyte Absolute 0.01 0.00 - 1.00 x10(3) uL    Neutrophil % 53.9 %    Lymphocyte % 31.7 %    Monocyte % 11.4 %    Eosinophil % 2.0 %    Basophil % 0.7 %    Immature Granulocyte % 0.3 %   Basic Metabolic Panel (8)    Collection Time: 08/19/24  5:30 AM   Result Value Ref Range    Glucose 89 70 - 99 mg/dL    Sodium 145 136 - 145 mmol/L    Potassium 3.4 (L) 3.5 - 5.1 mmol/L    Chloride 111 98 - 112 mmol/L    CO2 26.0 21.0 - 32.0 mmol/L    Anion Gap 8 0 - 18 mmol/L    BUN <5 (L) 9 - 23 mg/dL    Creatinine 0.51 (L) 0.55 - 1.02 mg/dL    BUN/CREA Ratio      Calcium, Total 8.6 (L) 8.7 - 10.4 mg/dL    Calculated Osmolality      eGFR-Cr 110 >=60 mL/min/1.73m2   CBC With Differential  With Platelet    Collection Time: 08/19/24  5:30 AM   Result Value Ref Range    WBC 3.0 (L) 4.0 - 11.0 x10(3) uL    RBC 2.45 (L) 3.80 - 5.30 x10(6)uL    HGB 8.1 (L) 12.0 - 16.0 g/dL    HCT 25.7 (L) 35.0 - 48.0 %    .9 (H) 80.0 - 100.0 fL    MCH 33.1 26.0 - 34.0 pg    MCHC 31.5 31.0 - 37.0 g/dL    RDW-SD 53.7 (H) 35.1 - 46.3 fL    RDW 14.5 11.0 - 15.0 %    .0 150.0 - 450.0 10(3)uL    Neutrophil Absolute Prelim 1.49 (L) 1.50 - 7.70 x10 (3) uL    Neutrophil Absolute 1.49 (L) 1.50 - 7.70 x10(3) uL    Lymphocyte Absolute 1.08 1.00 - 4.00 x10(3) uL    Monocyte Absolute 0.37 0.10 - 1.00 x10(3) uL    Eosinophil Absolute 0.06 0.00 - 0.70 x10(3) uL    Basophil Absolute 0.01 0.00 - 0.20 x10(3) uL    Immature Granulocyte Absolute 0.01 0.00 - 1.00 x10(3) uL    Neutrophil % 49.3 %    Lymphocyte % 35.8 %    Monocyte % 12.3 %    Eosinophil % 2.0 %    Basophil % 0.3 %    Immature Granulocyte % 0.3 %   Basic Metabolic Panel (8)    Collection Time: 08/20/24  5:32 AM   Result Value Ref Range    Glucose 92 70 - 99 mg/dL    Sodium 145 136 - 145 mmol/L    Potassium 3.6 3.5 - 5.1 mmol/L    Chloride 112 98 - 112 mmol/L    CO2 27.0 21.0 - 32.0 mmol/L    Anion Gap 6 0 - 18 mmol/L    BUN <5 (L) 9 - 23 mg/dL    Creatinine 0.53 (L) 0.55 - 1.02 mg/dL    BUN/CREA Ratio      Calcium, Total 8.4 (L) 8.7 - 10.4 mg/dL    Calculated Osmolality      eGFR-Cr 109 >=60 mL/min/1.73m2   CBC With Differential With Platelet    Collection Time: 08/20/24  5:32 AM   Result Value Ref Range    WBC 2.6 (L) 4.0 - 11.0 x10(3) uL    RBC 2.40 (L) 3.80 - 5.30 x10(6)uL    HGB 8.1 (L) 12.0 - 16.0 g/dL    HCT 24.6 (L) 35.0 - 48.0 %    .5 (H) 80.0 - 100.0 fL    MCH 33.8 26.0 - 34.0 pg    MCHC 32.9 31.0 - 37.0 g/dL    RDW-SD 54.1 (H) 35.1 - 46.3 fL    RDW 14.6 11.0 - 15.0 %    .0 150.0 - 450.0 10(3)uL    Neutrophil Absolute Prelim 1.05 (L) 1.50 - 7.70 x10 (3) uL    Neutrophil Absolute 1.05 (L) 1.50 - 7.70 x10(3) uL    Lymphocyte Absolute 1.03 1.00 -  4.00 x10(3) uL    Monocyte Absolute 0.40 0.10 - 1.00 x10(3) uL    Eosinophil Absolute 0.08 0.00 - 0.70 x10(3) uL    Basophil Absolute 0.01 0.00 - 0.20 x10(3) uL    Immature Granulocyte Absolute 0.01 0.00 - 1.00 x10(3) uL    Neutrophil % 40.7 %    Lymphocyte % 39.9 %    Monocyte % 15.5 %    Eosinophil % 3.1 %    Basophil % 0.4 %    Immature Granulocyte % 0.4 %   Potassium    Collection Time: 08/20/24  5:32 AM   Result Value Ref Range    Potassium 3.6 3.5 - 5.1 mmol/L   Basic Metabolic Panel (8)    Collection Time: 08/21/24  5:48 AM   Result Value Ref Range    Glucose 98 70 - 99 mg/dL    Sodium 145 136 - 145 mmol/L    Potassium 3.3 (L) 3.5 - 5.1 mmol/L    Chloride 112 98 - 112 mmol/L    CO2 30.0 21.0 - 32.0 mmol/L    Anion Gap 3 0 - 18 mmol/L    BUN <5 (L) 9 - 23 mg/dL    Creatinine 0.58 0.55 - 1.02 mg/dL    BUN/CREA Ratio      Calcium, Total 8.8 8.7 - 10.4 mg/dL    Calculated Osmolality      eGFR-Cr 107 >=60 mL/min/1.73m2   CBC With Differential With Platelet    Collection Time: 08/21/24  5:48 AM   Result Value Ref Range    WBC 2.9 (L) 4.0 - 11.0 x10(3) uL    RBC 2.53 (L) 3.80 - 5.30 x10(6)uL    HGB 8.3 (L) 12.0 - 16.0 g/dL    HCT 26.4 (L) 35.0 - 48.0 %    .3 (H) 80.0 - 100.0 fL    MCH 32.8 26.0 - 34.0 pg    MCHC 31.4 31.0 - 37.0 g/dL    RDW-SD 56.6 (H) 35.1 - 46.3 fL    RDW 14.9 11.0 - 15.0 %    .0 150.0 - 450.0 10(3)uL    Neutrophil Absolute Prelim 1.15 (L) 1.50 - 7.70 x10 (3) uL    Neutrophil Absolute 1.15 (L) 1.50 - 7.70 x10(3) uL    Lymphocyte Absolute 1.20 1.00 - 4.00 x10(3) uL    Monocyte Absolute 0.47 0.10 - 1.00 x10(3) uL    Eosinophil Absolute 0.07 0.00 - 0.70 x10(3) uL    Basophil Absolute 0.01 0.00 - 0.20 x10(3) uL    Immature Granulocyte Absolute 0.01 0.00 - 1.00 x10(3) uL    Neutrophil % 39.6 %    Lymphocyte % 41.2 %    Monocyte % 16.2 %    Eosinophil % 2.4 %    Basophil % 0.3 %    Immature Granulocyte % 0.3 %   Basic Metabolic Panel (8)    Collection Time: 08/22/24  6:00 AM   Result Value  Ref Range    Glucose 104 (H) 70 - 99 mg/dL    Sodium 145 136 - 145 mmol/L    Potassium 3.4 (L) 3.5 - 5.1 mmol/L    Chloride 110 98 - 112 mmol/L    CO2 28.0 21.0 - 32.0 mmol/L    Anion Gap 7 0 - 18 mmol/L    BUN <5 (L) 9 - 23 mg/dL    Creatinine 0.56 0.55 - 1.02 mg/dL    BUN/CREA Ratio      Calcium, Total 8.7 8.7 - 10.4 mg/dL    Calculated Osmolality      eGFR-Cr 108 >=60 mL/min/1.73m2   CBC With Differential With Platelet    Collection Time: 08/22/24  6:00 AM   Result Value Ref Range    WBC 2.8 (L) 4.0 - 11.0 x10(3) uL    RBC 2.52 (L) 3.80 - 5.30 x10(6)uL    HGB 8.4 (L) 12.0 - 16.0 g/dL    HCT 25.9 (L) 35.0 - 48.0 %    .8 (H) 80.0 - 100.0 fL    MCH 33.3 26.0 - 34.0 pg    MCHC 32.4 31.0 - 37.0 g/dL    RDW-SD 55.2 (H) 35.1 - 46.3 fL    RDW 14.9 11.0 - 15.0 %    .0 150.0 - 450.0 10(3)uL    Neutrophil Absolute Prelim 1.22 (L) 1.50 - 7.70 x10 (3) uL    Neutrophil Absolute 1.22 (L) 1.50 - 7.70 x10(3) uL    Lymphocyte Absolute 1.02 1.00 - 4.00 x10(3) uL    Monocyte Absolute 0.44 0.10 - 1.00 x10(3) uL    Eosinophil Absolute 0.07 0.00 - 0.70 x10(3) uL    Basophil Absolute 0.02 0.00 - 0.20 x10(3) uL    Immature Granulocyte Absolute 0.01 0.00 - 1.00 x10(3) uL    Neutrophil % 43.9 %    Lymphocyte % 36.7 %    Monocyte % 15.8 %    Eosinophil % 2.5 %    Basophil % 0.7 %    Immature Granulocyte % 0.4 %   Potassium    Collection Time: 08/22/24  6:00 AM   Result Value Ref Range    Potassium 3.4 (L) 3.5 - 5.1 mmol/L   Basic Metabolic Panel (8)    Collection Time: 08/23/24  4:58 AM   Result Value Ref Range    Glucose 101 (H) 70 - 99 mg/dL    Sodium 144 136 - 145 mmol/L    Potassium 3.5 3.5 - 5.1 mmol/L    Chloride 110 98 - 112 mmol/L    CO2 30.0 21.0 - 32.0 mmol/L    Anion Gap 4 0 - 18 mmol/L    BUN <5 (L) 9 - 23 mg/dL    Creatinine 0.60 0.55 - 1.02 mg/dL    BUN/CREA Ratio      Calcium, Total 8.8 8.7 - 10.4 mg/dL    Calculated Osmolality      eGFR-Cr 106 >=60 mL/min/1.73m2   CBC With Differential With Platelet     Collection Time: 08/23/24  4:58 AM   Result Value Ref Range    WBC 2.7 (L) 4.0 - 11.0 x10(3) uL    RBC 2.61 (L) 3.80 - 5.30 x10(6)uL    HGB 8.5 (L) 12.0 - 16.0 g/dL    HCT 27.6 (L) 35.0 - 48.0 %    .7 (H) 80.0 - 100.0 fL    MCH 32.6 26.0 - 34.0 pg    MCHC 30.8 (L) 31.0 - 37.0 g/dL    RDW-SD 56.9 (H) 35.1 - 46.3 fL    RDW 14.8 11.0 - 15.0 %    .0 150.0 - 450.0 10(3)uL    Neutrophil Absolute Prelim 1.04 (L) 1.50 - 7.70 x10 (3) uL    Neutrophil Absolute 1.04 (L) 1.50 - 7.70 x10(3) uL    Lymphocyte Absolute 1.07 1.00 - 4.00 x10(3) uL    Monocyte Absolute 0.47 0.10 - 1.00 x10(3) uL    Eosinophil Absolute 0.07 0.00 - 0.70 x10(3) uL    Basophil Absolute 0.01 0.00 - 0.20 x10(3) uL    Immature Granulocyte Absolute 0.01 0.00 - 1.00 x10(3) uL    Neutrophil % 38.9 %    Lymphocyte % 40.1 %    Monocyte % 17.6 %    Eosinophil % 2.6 %    Basophil % 0.4 %    Immature Granulocyte % 0.4 %   Potassium    Collection Time: 08/23/24  4:58 AM   Result Value Ref Range    Potassium 3.5 3.5 - 5.1 mmol/L       Medications:    sennosides    docusate sodium    simethicone    sodium chloride    LORazepam    HYDROmorphone **OR** HYDROmorphone **OR** HYDROmorphone    phenol    diazepam    ondansetron **OR** ondansetron    pantoprazole **OR** pantoprazole    enoxaparin    diphenhydrAMINE     ROS: Generally still doing well. Reports rumbling in the abdomen, but not flatus or BM yet. Denies any nausea or vomiting. Appetite is present. Tolerating full liquid diet. Ambulating. Pain well managed. Inc sites unremarkable, no LUCHO complaints    Drains:  LUCHO right 400 ml serosang x 24 hours  LUCHO left 50 ml serosang x 24 hours    Physical examination:    /59 (BP Location: Right arm)   Pulse 81   Temp 98.4 °F (36.9 °C) (Oral)   Resp 18   Ht 5' 7\" (1.702 m)   Wt 178 lb 6.4 oz (80.9 kg)   LMP 12/22/2020   SpO2 100%   BMI 27.94 kg/m²     GENERAL: alert and oriented, cooperative, in no acute distress  ABDOMEN: soft, non-tender. Bowel sounds  normal. No masses,  no organomegaly  INCISION/SURGICAL WOUNDS: clean, dry and intact midline incision site  PELVIC: Pelvic exam: examination not indicated.    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, mobilization of splenic flexure, repair of transverse colon mesentery, rectosigmoid colon resection with end-to-end anastomosis on 8/13/24      POD #10     Generally doing well     AVSS     Labs reviewed- Wbc (2.7), hgb (8.5), cr (0.60) stable      ml overnight     Awaiting flatus and BM, denies n/v    Tolerating clear liquid diet     PLAN:      Post op ileus:  -Will trial full liquid diet, notify if N/V  -XR abdomen without obstruction  -Encouraged clear ensure supplement, dietary on consult, will consider ensure compact and TPN in the next couple of days  -Continue to ambulate  -Colace and Senna BID  -check CT A/P with IV and PO contrast     Continue IV fluids and LUCHO     Monitor I&O     Pain management as needed    Ok to shower, change LUCHO dressing PRN     LMWH    Dr. Choudhary reviewed pathology reviewed with pt and family on 8/20/24    Rehana Ortega PA-C  08/23/24

## 2024-08-23 NOTE — PLAN OF CARE
Pt a/o x 4, vss. Pt ambulating in hallway, states she is tolerating her full liquid diet well, no c/o n/v or abd pain, denies passing flatus. Pt informed MD ordered a CT with contrast and pt became very upset, stating she was unsure if she'd be able to tolerate that amount of volume with the drinking of the contrast. RN attempted to calm pt down, told her we could wait on the CT for now and RN would contact the ordering MD to discuss. Pt seemed to calm down and bit and was able to talk po meds. Notified PA of issue and pt spoke via phone with PA about plan for CT.   Problem: Patient Centered Care  Goal: Patient preferences are identified and integrated in the patient's plan of care  Description: Interventions:  - What would you like us to know as we care for you?   - Provide timely, complete, and accurate information to patient/family  - Incorporate patient and family knowledge, values, beliefs, and cultural backgrounds into the planning and delivery of care  - Encourage patient/family to participate in care and decision-making at the level they choose  - Honor patient and family perspectives and choices  Outcome: Progressing     Problem: Patient/Family Goals  Goal: Patient/Family Long Term Goal  Description: Patient's Long Term Goal:     Interventions:  -   - See additional Care Plan goals for specific interventions  Outcome: Progressing  Goal: Patient/Family Short Term Goal  Description: Patient's Short Term Goal:     Interventions:   -   - See additional Care Plan goals for specific interventions  Outcome: Progressing     Problem: PAIN - ADULT  Goal: Verbalizes/displays adequate comfort level or patient's stated pain goal  Description: INTERVENTIONS:  - Encourage pt to monitor pain and request assistance  - Assess pain using appropriate pain scale  - Administer analgesics based on type and severity of pain and evaluate response  - Implement non-pharmacological measures as appropriate and evaluate response  -  Consider cultural and social influences on pain and pain management  - Manage/alleviate anxiety  - Utilize distraction and/or relaxation techniques  - Monitor for opioid side effects  - Notify MD/LIP if interventions unsuccessful or patient reports new pain  - Anticipate increased pain with activity and pre-medicate as appropriate  Outcome: Progressing     Problem: SAFETY ADULT - FALL  Goal: Free from fall injury  Description: INTERVENTIONS:  - Assess pt frequently for physical needs  - Identify cognitive and physical deficits and behaviors that affect risk of falls.  - [place] fall precautions as indicated by assessment.  - Educate pt/family on patient safety including physical limitations  - Instruct pt to call for assistance with activity based on assessment  - Modify environment to reduce risk of injury  - Provide assistive devices as appropriate  - Consider OT/PT consult to assist with strengthening/mobility  - Encourage toileting schedule  Outcome: Progressing     Problem: GASTROINTESTINAL - ADULT  Goal: Minimal or absence of nausea and vomiting  Description: INTERVENTIONS:  - Maintain adequate hydration with IV or PO as ordered and tolerated  - Nasogastric tube to low intermittent suction as ordered  - Evaluate effectiveness of ordered antiemetic medications  - Provide nonpharmacologic comfort measures as appropriate  - Advance diet as tolerated, if ordered  - Obtain nutritional consult as needed  - Evaluate fluid balance  Outcome: Progressing  Goal: Maintains or returns to baseline bowel function  Description: INTERVENTIONS:  - Assess bowel function  - Maintain adequate hydration with IV or PO as ordered and tolerated  - Evaluate effectiveness of GI medications  - Encourage mobilization and activity  - Obtain nutritional consult as needed  - Establish a toileting routine/schedule  - Consider collaborating with pharmacy to review patient's medication profile  Outcome: Progressing     Problem: GENITOURINARY  - ADULT  Goal: Absence of urinary retention  Description: INTERVENTIONS:  - Assess patient’s ability to void and empty bladder  - Monitor intake/output and perform bladder scan as needed  - Follow urinary retention protocol/standard of care  - Consider collaborating with pharmacy to review patient's medication profile  - Implement strategies to promote bladder emptying  Outcome: Progressing     Problem: SKIN/TISSUE INTEGRITY - ADULT  Goal: Incision(s), wounds(s) or drain site(s) healing without S/S of infection  Description: INTERVENTIONS:  - Assess and document risk factors for pressure ulcer development  - Assess and document skin integrity  - Assess and document dressing/incision, wound bed, drain sites and surrounding tissue  - Implement wound care per orders  - Initiate isolation precautions as appropriate  - Initiate Pressure Ulcer prevention bundle as indicated  Outcome: Progressing     Problem: MUSCULOSKELETAL - ADULT  Goal: Return mobility to safest level of function  Description: INTERVENTIONS:  - Assess patient stability and activity tolerance for standing, transferring and ambulating w/ or w/o assistive devices  - Assist with transfers and ambulation using safe patient handling equipment as needed  - Ensure adequate protection for wounds/incisions during mobilization  - Obtain PT/OT consults as needed  - Advance activity as appropriate  - Communicate ordered activity level and limitations with patient/family  Outcome: Progressing

## 2024-08-24 VITALS
TEMPERATURE: 99 F | WEIGHT: 178.38 LBS | HEIGHT: 67 IN | HEART RATE: 84 BPM | SYSTOLIC BLOOD PRESSURE: 106 MMHG | OXYGEN SATURATION: 98 % | DIASTOLIC BLOOD PRESSURE: 59 MMHG | RESPIRATION RATE: 19 BRPM | BODY MASS INDEX: 28 KG/M2

## 2024-08-24 LAB
ANION GAP SERPL CALC-SCNC: 5 MMOL/L (ref 0–18)
BASOPHILS # BLD AUTO: 0.02 X10(3) UL (ref 0–0.2)
BASOPHILS NFR BLD AUTO: 0.7 %
BUN BLD-MCNC: <5 MG/DL (ref 9–23)
CALCIUM BLD-MCNC: 9.1 MG/DL (ref 8.7–10.4)
CHLORIDE SERPL-SCNC: 109 MMOL/L (ref 98–112)
CO2 SERPL-SCNC: 31 MMOL/L (ref 21–32)
CREAT BLD-MCNC: 0.61 MG/DL
DEPRECATED RDW RBC AUTO: 56.8 FL (ref 35.1–46.3)
EGFRCR SERPLBLD CKD-EPI 2021: 106 ML/MIN/1.73M2 (ref 60–?)
EOSINOPHIL # BLD AUTO: 0.05 X10(3) UL (ref 0–0.7)
EOSINOPHIL NFR BLD AUTO: 1.7 %
ERYTHROCYTE [DISTWIDTH] IN BLOOD BY AUTOMATED COUNT: 14.8 % (ref 11–15)
GLUCOSE BLD-MCNC: 99 MG/DL (ref 70–99)
HCT VFR BLD AUTO: 28.5 %
HGB BLD-MCNC: 8.8 G/DL
IMM GRANULOCYTES # BLD AUTO: 0.01 X10(3) UL (ref 0–1)
IMM GRANULOCYTES NFR BLD: 0.3 %
LYMPHOCYTES # BLD AUTO: 1.15 X10(3) UL (ref 1–4)
LYMPHOCYTES NFR BLD AUTO: 38.7 %
MCH RBC QN AUTO: 32.8 PG (ref 26–34)
MCHC RBC AUTO-ENTMCNC: 30.9 G/DL (ref 31–37)
MCV RBC AUTO: 106.3 FL
MONOCYTES # BLD AUTO: 0.46 X10(3) UL (ref 0.1–1)
MONOCYTES NFR BLD AUTO: 15.5 %
NEUTROPHILS # BLD AUTO: 1.28 X10 (3) UL (ref 1.5–7.7)
NEUTROPHILS # BLD AUTO: 1.28 X10(3) UL (ref 1.5–7.7)
NEUTROPHILS NFR BLD AUTO: 43.1 %
PLATELET # BLD AUTO: 280 10(3)UL (ref 150–450)
POTASSIUM SERPL-SCNC: 3.7 MMOL/L (ref 3.5–5.1)
RBC # BLD AUTO: 2.68 X10(6)UL
SODIUM SERPL-SCNC: 145 MMOL/L (ref 136–145)
WBC # BLD AUTO: 3 X10(3) UL (ref 4–11)

## 2024-08-24 PROCEDURE — 80048 BASIC METABOLIC PNL TOTAL CA: CPT | Performed by: PHYSICIAN ASSISTANT

## 2024-08-24 PROCEDURE — 85025 COMPLETE CBC W/AUTO DIFF WBC: CPT | Performed by: PHYSICIAN ASSISTANT

## 2024-08-24 RX ORDER — ACETAMINOPHEN 325 MG/1
650 TABLET ORAL EVERY 6 HOURS PRN
Qty: 20 TABLET | Refills: 0 | Status: SHIPPED | OUTPATIENT
Start: 2024-08-24

## 2024-08-24 RX ORDER — TRAMADOL HYDROCHLORIDE 50 MG/1
50 TABLET ORAL EVERY 4 HOURS PRN
Qty: 10 TABLET | Refills: 0 | Status: SHIPPED | OUTPATIENT
Start: 2024-08-24

## 2024-08-24 RX ORDER — PSEUDOEPHEDRINE HCL 30 MG
100 TABLET ORAL 2 TIMES DAILY
Qty: 60 CAPSULE | Refills: 0 | Status: SHIPPED | OUTPATIENT
Start: 2024-08-24

## 2024-08-24 RX ORDER — HYDROCODONE BITARTRATE AND ACETAMINOPHEN 5; 325 MG/1; MG/1
1-2 TABLET ORAL EVERY 4 HOURS PRN
Refills: 0 | Status: CANCELLED
Start: 2024-08-24

## 2024-08-24 RX ORDER — SIMETHICONE 80 MG
80 TABLET,CHEWABLE ORAL 3 TIMES DAILY PRN
Qty: 20 TABLET | Refills: 0 | Status: SHIPPED | OUTPATIENT
Start: 2024-08-24

## 2024-08-24 NOTE — PLAN OF CARE
Ativan given for anxiety. Denies pain/discomfort. Tolerating diet well. Ambulating frequently in room/hallways. Voiding freely. LUCHO drains removed by PA.   Cleared medically and surgically for DC.    Reviewed dc instructions, medications and follow ups with patient and family. Port decannulated without difficulty. DC'ed with family and all belongings in stable condition.    Problem: Patient Centered Care  Goal: Patient preferences are identified and integrated in the patient's plan of care  Description: Interventions:  - What would you like us to know as we care for you?   - Provide timely, complete, and accurate information to patient/family  - Incorporate patient and family knowledge, values, beliefs, and cultural backgrounds into the planning and delivery of care  - Encourage patient/family to participate in care and decision-making at the level they choose  - Honor patient and family perspectives and choices  Outcome: Adequate for Discharge     Problem: PAIN - ADULT  Goal: Verbalizes/displays adequate comfort level or patient's stated pain goal  Description: INTERVENTIONS:  - Encourage pt to monitor pain and request assistance  - Assess pain using appropriate pain scale  - Administer analgesics based on type and severity of pain and evaluate response  - Implement non-pharmacological measures as appropriate and evaluate response  - Consider cultural and social influences on pain and pain management  - Manage/alleviate anxiety  - Utilize distraction and/or relaxation techniques  - Monitor for opioid side effects  - Notify MD/LIP if interventions unsuccessful or patient reports new pain  - Anticipate increased pain with activity and pre-medicate as appropriate  Outcome: Adequate for Discharge     Problem: SAFETY ADULT - FALL  Goal: Free from fall injury  Description: INTERVENTIONS:  - Assess pt frequently for physical needs  - Identify cognitive and physical deficits and behaviors that affect risk of falls.  -  [place] fall precautions as indicated by assessment.  - Educate pt/family on patient safety including physical limitations  - Instruct pt to call for assistance with activity based on assessment  - Modify environment to reduce risk of injury  - Provide assistive devices as appropriate  - Consider OT/PT consult to assist with strengthening/mobility  - Encourage toileting schedule  Outcome: Adequate for Discharge     Problem: GASTROINTESTINAL - ADULT  Goal: Minimal or absence of nausea and vomiting  Description: INTERVENTIONS:  - Maintain adequate hydration with IV or PO as ordered and tolerated  - Nasogastric tube to low intermittent suction as ordered  - Evaluate effectiveness of ordered antiemetic medications  - Provide nonpharmacologic comfort measures as appropriate  - Advance diet as tolerated, if ordered  - Obtain nutritional consult as needed  - Evaluate fluid balance  Outcome: Adequate for Discharge  Goal: Maintains or returns to baseline bowel function  Description: INTERVENTIONS:  - Assess bowel function  - Maintain adequate hydration with IV or PO as ordered and tolerated  - Evaluate effectiveness of GI medications  - Encourage mobilization and activity  - Obtain nutritional consult as needed  - Establish a toileting routine/schedule  - Consider collaborating with pharmacy to review patient's medication profile  Outcome: Adequate for Discharge     Problem: GENITOURINARY - ADULT  Goal: Absence of urinary retention  Description: INTERVENTIONS:  - Assess patient’s ability to void and empty bladder  - Monitor intake/output and perform bladder scan as needed  - Follow urinary retention protocol/standard of care  - Consider collaborating with pharmacy to review patient's medication profile  - Implement strategies to promote bladder emptying  Outcome: Adequate for Discharge     Problem: SKIN/TISSUE INTEGRITY - ADULT  Goal: Incision(s), wounds(s) or drain site(s) healing without S/S of infection  Description:  INTERVENTIONS:  - Assess and document risk factors for pressure ulcer development  - Assess and document skin integrity  - Assess and document dressing/incision, wound bed, drain sites and surrounding tissue  - Implement wound care per orders  - Initiate isolation precautions as appropriate  - Initiate Pressure Ulcer prevention bundle as indicated  Outcome: Adequate for Discharge     Problem: MUSCULOSKELETAL - ADULT  Goal: Return mobility to safest level of function  Description: INTERVENTIONS:  - Assess patient stability and activity tolerance for standing, transferring and ambulating w/ or w/o assistive devices  - Assist with transfers and ambulation using safe patient handling equipment as needed  - Ensure adequate protection for wounds/incisions during mobilization  - Obtain PT/OT consults as needed  - Advance activity as appropriate  - Communicate ordered activity level and limitations with patient/family  Outcome: Adequate for Discharge

## 2024-08-24 NOTE — DISCHARGE INSTRUCTIONS
No lifting greater than 10lbs, no tub bathes/swimming, no driving, nothing in the vagina.  Follow up in 1 week for staple removal.

## 2024-08-24 NOTE — CM/SW NOTE
MDO for dc    Per chart review pt is now A&O, voids freely. Tolerating soft diet. Ambulates independently.    Attempted to call to discuss HHC, no answer. Pt no longer meets home bound criteria for HHC.    1130  Per RN pt dc, no needs for HHC. Ref cx    Plan  Home no needs    / to remain available for support and/or discharge planning.     Do Haddad RN    Ext [phone]

## 2024-08-24 NOTE — PROGRESS NOTES
[facility]   POST-OPERATIVE VISIT     MEDICAL RECORD #: [id number]   DATE OF SERVICE: 8/24/2024             Reason for visit:  Post-operative Visit  POD#11    Procedure:    54 year old female with a history of ovarian cancer- s/p  Exploratory laparotomy, total abdominal hysterectomy, bilateral salpingo-oophorectomy, bilateral ureterolysis, bilateral pelvic lymph node dissection, tumor debulking of various peritoneal surfaces, argon beam coagulation of peritoneal tumor nodules, infracolic-supracolic omentectomy, and resection of gastrosplenic ligament with tumor attached to tip of spleen, mobilization of splenic flexure, repair of transverse colon mesentery, rectosigmoid colon resection with end-to-end anastomosis on 8/13/24       Pathology/Lab Results:  Recent Results (from the past 720 hour(s))   ABORH (Blood Type)    Collection Time: 08/02/24 12:33 PM   Result Value Ref Range    ABO BLOOD TYPE O     RH BLOOD TYPE Negative    Antibody Screen    Collection Time: 08/02/24 12:33 PM   Result Value Ref Range    Antibody Screen Negative    ABORH Confirmation    Collection Time: 08/13/24  7:00 AM   Result Value Ref Range    ABO BLOOD TYPE O     RH BLOOD TYPE Negative    Specimen to Pathology Tissue    Collection Time: 08/13/24  9:09 AM   Result Value Ref Range    Case Report       Surgical Pathology                                Case: [id number]                                  Authorizing Provider:  Binh Choudhary MD         Collected:           08/13/2024 09:09 AM          Ordering Location:     [facility]          Received:            08/13/2024 01:52 PM                                 Operating Room                                                               Pathologist:           Paul Wade MD                                                             Specimens:   A) - Uterus and cervix, bilateral tubes and ovaries, 1. uterus, cervix, bilateral                   tubes and ovaries,  anterior bladder, peritoneal tumor                                               B) - Lymph node pelvic left, 2. Left pelvic lymph nodes                                             C) - Lymph node pelvic right, 3. Right pelvic lymph nodes                                           D) - Tissue, 4. left paracolic tumor                                                                 E) - Tissue, 5. Tumor attached to splenic flexure                                                   F) - Tissue, 6. nodule attached to small bowel                                                      G) - Tissue, 7. nodule attached to cecum                                                            H) - Tissue, 8. nodule attached to right diaphragm                                                  I) - Tissue, 9. infracolic, supracolic, splenocolic omentum                                         J) - Colon, 10. Rectal sigmoid colon with tumor                                            Final Diagnosis:         A. Uterus, cervix, bilateral fallopian tubes, ovaries, anterior bladder, peritoneal tumor; total abdominal hysterectomy, bilateral salpingo-oophorectomy, bilateral ureterolysis, bilateral pelvic lymph node dissection, tumor debulking:   Left ovary with high-grade serous carcinoma, with focal clear cell change, 1.2 cm in greatest dimension.  Carcinoma is seen involving the endometrium (focal, superficial) uterine serosa, right parametrium, left fallopian tube (surface), left para-adnexal soft tissue, and right ovary (focal).  Lymphovascular invasion is identified.  Ectocervix with no significant pathologic changes identified.  Endocervix with nabothian cysts; no significant pathologic changes identified.  Right fallopian tube with no significant pathologic changes identified.  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  ypT3c, N0.  See comment.    B. Left pelvic lymph node; biopsy:  Five lymph nodes negative for metastatic  carcinoma (0/5).    C. Right pelvic lymph nodes; biopsy:  Two lymph nodes negative for metastatic carcinoma (0/2).    D. Left paracolic tumor; biopsy:  Fibroconnective tissue involved by high-grade serous carcinoma.    E. Splenic flexure tumor; biopsy:   Fibroadipose tissue involved by high-grade serous carcinoma.    F. Small bowel nodule:  Nodular fibrotic tissue with chronic inflammation and calcifications.  No definitive viable carcinoma identified.    G. Cecal nodule:  Nodular fibrotic tissue with chronic inflammation.  No definitive viable carcinoma identified.    H. Right diaphragm nodule:  Fibroadipose tissue with extensive hemorrhage and acute inflammation.  No definitive viable carcinoma identified.    I. Infra colic, supracolic, splenocolic omentum; excision:   Omental fibroadipose tissue with extensive involvement by high-grade serous carcinoma.  Largest tumor nodule measures 3.8 cm.  See comment.    J. Rectosigmoid colon tumor; excision:   Segment of colon, 10.5 in length, demonstrating high-grade serous carcinoma extensively involving the pericolonic soft tissue and serosa.  Tumor is present less than 1 mm from the mesorectal/radial margin.  One lymph node, negative for metastatic carcinoma (0/1).  Mucosa at surgical margins is viable and free of carcinoma.    Comment:  The patient has a history of adenocarcinoma of gynecological origin diagnosed on previous omental mass biopsy and ascites fluid, both in March 2024, per notes, status post neoadjuvant chemotherapy x4 cycles with good response.     Immunohistochemical stains performed on the carcinoma present in the omentum (block I3) demonstrate that the tumor cells are positive for pankeratin (AE1/AE3), CK7, PAX8, WT1, and ER and negative for CK20 and p53, consistent with a high-grade serous carcinoma.    For  purposes, this case was reviewed by a second pathologist who concurred with the diagnosis.    The immunohistochemical stains  interpreted in this case demonstrated appropriate reactivity of the positive controls. These stains were performed on formalin-fixed, paraffin-embedded tissue sections with each antibody analysis being performed on a separate slide        Embedded Images      Synoptic Report       OVARY or FALLOPIAN TUBE or PRIMARY PERITONEUM   OVARY OR FALLOPIAN TUBE OR PRIMARY PERITONEUM - All Specimens   8th Edition - Protocol posted: 3/22/2023      SPECIMEN      Procedure:    Total hysterectomy and bilateral salpingo-oophorectomy       Hysterectomy Type:    Abdominal       Specimen Integrity:            Right Ovary Integrity:    Capsule intact       Specimen Integrity:            Left Ovary Integrity:    Capsule intact         Uterus Integrity:    Intact       TUMOR      Tumor Site:    Left ovary       Tumor Size:    Greatest Dimension (Centimeters): 1.2 cm      Histologic Type:    High grade serous carcinoma       Histologic Grade:    High grade       Ovarian Surface Involvement:    Not identified       Fallopian Tube Surface Involvement:    Present, left       Implants:    Present: Left paracolic, splenic flexure       Other Tissue / Organ Involvement:    Right ovary       Other Tissue / Organ Involvement:    Left fallopian tube       Other Tissue / Organ Involvement:    Uterine corpus       Other Tissue / Organ Involvement:    Abdominal peritoneum       Other Tissue / Organ Involvement:    Omentum       Other Tissue / Organ Involvement:    Rectosigmoid colon       Largest Extrapelvic Peritoneal Focus:    Macroscopic (greater than 2 cm): 3.8 cm       Peritoneal / Ascitic Fluid Involvement:    Not submitted / unknown       Chemotherapy Response Score (CRS):    CRS2 (moderate response)       REGIONAL LYMPH NODES      Regional Lymph Node Status:            :    All regional lymph nodes negative for tumor cells         Number of Lymph Nodes Examined:    8           Faiza Site(s) Examined:    Right pelvic           Faiza Site(s)  Examined:    Left pelvic           Faiza Site(s) Examined:    Pericolonic       pTNM CLASSIFICATION (AJCC 8th Edition)      Reporting of pT, pN, and (when applicable) pM categories is based on information available to the pathologist at the time the report is issued. As per the AJCC (Chapter 1, 8th Ed.) it is the managing physician’s responsibility to establish the final pathologic stage based upon all pertinent information, including but potentially not limited to this pathology report.      Modified Classification:    y       pT Category:    pT3c       pN Category:    pN0       FIGO STAGE      FIGO Stage:    IIIC       Clinical Information       Adenocarcinoma.        Gross Description       Specimen A is submitted in formalin labeled “Horvat, uterus, cervix, bilateral tubes and ovaries, anterior bladder, peritoneal tumor” and consists of a 98 gram total hysterectomy including uterus with cervix, bilateral fallopian tubes and ovaries and a flap of tissue attached to the lower uterine segment (possible bladder flap). The uterus and cervix measure 7.6 cm in length, 4.0 cm from cornu to cornu, 3.4 cm from anterior to posterior. The uterus is covered with pink-tan, smooth serosa. The ectocervix is covered with pink-tan smooth serosa. The ectocervix is covered with pink-tan, smooth mucosa and measures 2.5 x 2.4 cm in transverse dimension. The cervical os is stenosed and measures less than 0.1 cm in diameter. The anterior aspect is inked blue, the posterior aspect is inked black. The cervix and uterus are bivalved to reveal a pink-tan endocervix with a 2.5 cm endocervical canal. The endometrium is pink-tan and smooth with a 4.5 x 2.5 cm endometrial cavity. The uterus is sectioned to reveal a single posterior intramural fibroid (0.4 cm in greatest dimension). The remaining myometrium is pink-tan and trabeculated with no lesions or masses identified within the endomyometrium. The anterior bladder flap presents possible  tumor nodules (1.2 cm in greatest dimension).   The left fallopian tube measures 5.5 cm in length x 0.6 cm in diameter. The left ovary measures 2.5 x 1.5 x 1.2 cm. The left fallopian tube, ovary and para adnexal tissue are covered with pink-gray adhesions and possible nodules.  The right fallopian tube measures 5.0 cm in length x 0.4 cm in diameter and is grossly unremarkable. The right ovary is yellowish pink and lobulated, measures 2.0 x 1.5 x 0.8 cm and is grossly unremarkable.    Representative sections are submitted as follows: A1 - anterior cervix, longitudinal section; A2 - posterior cervix, longitudinal section; A3-A7 - full thickness anterior endomyometrium, from lower to upper, including bladder flap and possible tumor nodules; A8-A9 - full thickness posterior endomyometrium including fibroid; A10 - left parametrial soft tissue; A11 - right parametrial soft tissue; A12 - sections of left fallopian tube; A13 - left ovary, bisected, entirely; A14-A15 - left para adnexal tissue with possible nodules; A16 - sections of right fallopian tube; A17 - right ovary, bisected, entirely.     Specimen B is submitted in formalin labeled “Horvat, left pelvic lymph node” and consists of one piece of yellow-tan, fatty tissue measuring 3.8 x 3.1 x 0.8 cm. Dissection of the tissue reveals five lymph node candidates (0.4 to 1.5 cm in greatest dimension). The two larger candidates are submitted in cassette B1. The remaining candidates are submitted in cassette B2.     Specimen C is submitted in formalin labeled “Horvat, right pelvic lymph node” and consists of two pieces of yellow-tan, fatty tissue measuring in aggregate 2.8 x 2.3 x 0.7 cm. Dissection of the tissue reveals two lymph node candidates (0.8 and 1.3 cm in greatest dimension). Both candidates are submitted entirely in cassette C1.     Specimen D is submitted in formalin labeled “Horvat, left paracolic tumor” and consists of one pink-gray soft tissue nodule measuring  2.0 x 1.9 x 0.8 cm. The nodule is bisected and submitted entirely in cassette D1.     Specimen E is submitted in formalin labeled “Horvat, tumor attached to splenic flexure” and consists of one pink-gray soft tissue nodule measuring 1.8 x 1.5 x 0.7 cm. The specimen is submitted entirely in cassette E1.    Specimen F is submitted in formalin labeled “Horvat, nodule attached to small bowel” and consists of multiple pieces of pink-gray soft tissue measuring in aggregate 2.4 x 1.9 x 0.4 cm. The specimen is submitted entirely in cassette F1.    Specimen G is submitted in formalin labeled “Horvat, nodule attached to cecum” and consists of two pink-gray soft tissue nodules measuring in aggregate 0.7 x 0.6 x 0.3 cm. The specimen is submitted entirely in cassette G1.    Specimen H is submitted in formalin labeled “Horvat, nodule attached to right diaphragm” and consists of one piece of red-tan, soft tissue measuring 1.2 x 0.5 x 0.3 cm. The specimen is submitted entirely in cassette H1.    Specimen I is submitted in formalin labeled “Horvat, infra colic, supra colic, splenocolic omentum” and consists of one piece of yellow-tan omentum measuring 21.5 x 13.7 x 3.6 cm. The specimen is sectioned to reveal multiple pink-gray nodules embedded within the omentum (0.2 to 3.8 cm in greatest dimension). Representative sections are submitted as follows: I1 - largest nodule; I2-I4 - other nodules.     Specimen J is submitted in formalin labeled “Horvat, rectosigmoid colon with tumor” and consists of an unoriented segment of colon with mesentery and overlying adhesions measuring 10.5 cm in length x 2.6 cm in diameter. The specimen is received with two stapled margins. The proximal margin is inked blue, the distal margin is inked green, the mesorectal/radial margin is inked black. The specimen is opened longitudinally to reveal a pink-tan, folded mucosa with no lesions, masses or perforations grossly identified. No definitive masses,  lesions or nodules are identified within the omentum or mesorectum.   Dissection of the pericolonic tissue reveals one lymph node candidate (0.8 cm in greatest dimension).   Representative sections are submitted as follows: J1 - proximal en face margin; J2 - distal en face margin; J3 - random mesorectal/radial margin, perpendicular section; J4-J8 - random sections of colon wall with pericolonic tissue; J9 - one lymph node candidate; J10-J12 - dissected pericolonic tissue. (jq)    Paul Wade M.D[place] [place]        Interpretation Malignant (A)     POCT Hemocue    Collection Time: 08/13/24 11:24 AM   Result Value Ref Range    Hemocue 10.6 (A) 12.0 - 16.0 g/dl   Basic Metabolic Panel (8)    Collection Time: 08/13/24  5:26 PM   Result Value Ref Range    Glucose 166 (H) 70 - 99 mg/dL    Sodium 142 136 - 145 mmol/L    Potassium 4.4 3.5 - 5.1 mmol/L    Chloride 110 98 - 112 mmol/L    CO2 26.0 21.0 - 32.0 mmol/L    Anion Gap 6 0 - 18 mmol/L    BUN 12 9 - 23 mg/dL    Creatinine 0.74 0.55 - 1.02 mg/dL    BUN/CREA Ratio 16.2 10.0 - 20.0    Calcium, Total 8.5 (L) 8.7 - 10.4 mg/dL    Calculated Osmolality 298 (H) 275 - 295 mOsm/kg    eGFR-Cr 95 >=60 mL/min/1.73m2   CBC With Differential With Platelet    Collection Time: 08/13/24  5:26 PM   Result Value Ref Range    WBC 10.8 4.0 - 11.0 x10(3) uL    RBC 3.38 (L) 3.80 - 5.30 x10(6)uL    HGB 11.3 (L) 12.0 - 16.0 g/dL    HCT 34.8 (L) 35.0 - 48.0 %    .0 (H) 80.0 - 100.0 fL    MCH 33.4 26.0 - 34.0 pg    MCHC 32.5 31.0 - 37.0 g/dL    RDW-SD 57.5 (H) 35.1 - 46.3 fL    RDW 15.7 (H) 11.0 - 15.0 %    .0 150.0 - 450.0 10(3)uL    Neutrophil Absolute Prelim 10.03 (H) 1.50 - 7.70 x10 (3) uL    Neutrophil Absolute 10.03 (H) 1.50 - 7.70 x10(3) uL    Lymphocyte Absolute 0.30 (L) 1.00 - 4.00 x10(3) uL    Monocyte Absolute 0.41 0.10 - 1.00 x10(3) uL    Eosinophil Absolute 0.00 0.00 - 0.70 x10(3) uL    Basophil Absolute 0.01 0.00 - 0.20 x10(3) uL    Immature Granulocyte Absolute 0.03 0.00 -  1.00 x10(3) uL    Neutrophil % 93.0 %    Lymphocyte % 2.8 %    Monocyte % 3.8 %    Eosinophil % 0.0 %    Basophil % 0.1 %    Immature Granulocyte % 0.3 %   CBC With Differential With Platelet    Collection Time: 08/14/24  5:08 AM   Result Value Ref Range    WBC 8.7 4.0 - 11.0 x10(3) uL    RBC 3.12 (L) 3.80 - 5.30 x10(6)uL    HGB 10.2 (L) 12.0 - 16.0 g/dL    HCT 31.9 (L) 35.0 - 48.0 %    .2 (H) 80.0 - 100.0 fL    MCH 32.7 26.0 - 34.0 pg    MCHC 32.0 31.0 - 37.0 g/dL    RDW-SD 57.0 (H) 35.1 - 46.3 fL    RDW 15.6 (H) 11.0 - 15.0 %    .0 150.0 - 450.0 10(3)uL    Neutrophil Absolute Prelim 6.66 1.50 - 7.70 x10 (3) uL    Neutrophil Absolute 6.66 1.50 - 7.70 x10(3) uL    Lymphocyte Absolute 1.18 1.00 - 4.00 x10(3) uL    Monocyte Absolute 0.85 0.10 - 1.00 x10(3) uL    Eosinophil Absolute 0.00 0.00 - 0.70 x10(3) uL    Basophil Absolute 0.01 0.00 - 0.20 x10(3) uL    Immature Granulocyte Absolute 0.03 0.00 - 1.00 x10(3) uL    Neutrophil % 76.4 %    Lymphocyte % 13.5 %    Monocyte % 9.7 %    Eosinophil % 0.0 %    Basophil % 0.1 %    Immature Granulocyte % 0.3 %   Basic Metabolic Panel (8)    Collection Time: 08/14/24  5:09 AM   Result Value Ref Range    Glucose 121 (H) 70 - 99 mg/dL    Sodium 143 136 - 145 mmol/L    Potassium 4.5 3.5 - 5.1 mmol/L    Chloride 110 98 - 112 mmol/L    CO2 27.0 21.0 - 32.0 mmol/L    Anion Gap 6 0 - 18 mmol/L    BUN 13 9 - 23 mg/dL    Creatinine 0.73 0.55 - 1.02 mg/dL    BUN/CREA Ratio 17.8 10.0 - 20.0    Calcium, Total 8.8 8.7 - 10.4 mg/dL    Calculated Osmolality 297 (H) 275 - 295 mOsm/kg    eGFR-Cr 97 >=60 mL/min/1.73m2   Basic Metabolic Panel (8)    Collection Time: 08/15/24  5:40 AM   Result Value Ref Range    Glucose 91 70 - 99 mg/dL    Sodium 144 136 - 145 mmol/L    Potassium 3.8 3.5 - 5.1 mmol/L    Chloride 110 98 - 112 mmol/L    CO2 28.0 21.0 - 32.0 mmol/L    Anion Gap 6 0 - 18 mmol/L    BUN 15 9 - 23 mg/dL    Creatinine 0.70 0.55 - 1.02 mg/dL    BUN/CREA Ratio 21.4 (H) 10.0 -  20.0    Calcium, Total 8.7 8.7 - 10.4 mg/dL    Calculated Osmolality 298 (H) 275 - 295 mOsm/kg    eGFR-Cr 102 >=60 mL/min/1.73m2   CBC With Differential With Platelet    Collection Time: 08/15/24  5:40 AM   Result Value Ref Range    WBC 8.2 4.0 - 11.0 x10(3) uL    RBC 2.78 (L) 3.80 - 5.30 x10(6)uL    HGB 9.3 (L) 12.0 - 16.0 g/dL    HCT 28.9 (L) 35.0 - 48.0 %    .0 (H) 80.0 - 100.0 fL    MCH 33.5 26.0 - 34.0 pg    MCHC 32.2 31.0 - 37.0 g/dL    RDW-SD 57.8 (H) 35.1 - 46.3 fL    RDW 15.7 (H) 11.0 - 15.0 %    .0 150.0 - 450.0 10(3)uL    Neutrophil Absolute Prelim 4.90 1.50 - 7.70 x10 (3) uL    Neutrophil Absolute 4.90 1.50 - 7.70 x10(3) uL    Lymphocyte Absolute 2.44 1.00 - 4.00 x10(3) uL    Monocyte Absolute 0.80 0.10 - 1.00 x10(3) uL    Eosinophil Absolute 0.01 0.00 - 0.70 x10(3) uL    Basophil Absolute 0.03 0.00 - 0.20 x10(3) uL    Immature Granulocyte Absolute 0.02 0.00 - 1.00 x10(3) uL    Neutrophil % 59.7 %    Lymphocyte % 29.8 %    Monocyte % 9.8 %    Eosinophil % 0.1 %    Basophil % 0.4 %    Immature Granulocyte % 0.2 %   Basic Metabolic Panel (8)    Collection Time: 08/16/24  4:39 AM   Result Value Ref Range    Glucose 79 70 - 99 mg/dL    Sodium 147 (H) 136 - 145 mmol/L    Potassium 3.8 3.5 - 5.1 mmol/L    Chloride 113 (H) 98 - 112 mmol/L    CO2 28.0 21.0 - 32.0 mmol/L    Anion Gap 6 0 - 18 mmol/L    BUN 15 9 - 23 mg/dL    Creatinine 0.58 0.55 - 1.02 mg/dL    BUN/CREA Ratio 25.9 (H) 10.0 - 20.0    Calcium, Total 8.8 8.7 - 10.4 mg/dL    Calculated Osmolality 304 (H) 275 - 295 mOsm/kg    eGFR-Cr 107 >=60 mL/min/1.73m2   CBC With Differential With Platelet    Collection Time: 08/16/24  4:39 AM   Result Value Ref Range    WBC 4.3 4.0 - 11.0 x10(3) uL    RBC 2.47 (L) 3.80 - 5.30 x10(6)uL    HGB 8.2 (L) 12.0 - 16.0 g/dL    HCT 26.7 (L) 35.0 - 48.0 %    .1 (H) 80.0 - 100.0 fL    MCH 33.2 26.0 - 34.0 pg    MCHC 30.7 (L) 31.0 - 37.0 g/dL    RDW-SD 58.1 (H) 35.1 - 46.3 fL    RDW 15.3 (H) 11.0 - 15.0  %    .0 150.0 - 450.0 10(3)uL    Neutrophil Absolute Prelim 2.47 1.50 - 7.70 x10 (3) uL    Neutrophil Absolute 2.47 1.50 - 7.70 x10(3) uL    Lymphocyte Absolute 1.38 1.00 - 4.00 x10(3) uL    Monocyte Absolute 0.43 0.10 - 1.00 x10(3) uL    Eosinophil Absolute 0.04 0.00 - 0.70 x10(3) uL    Basophil Absolute 0.01 0.00 - 0.20 x10(3) uL    Immature Granulocyte Absolute 0.01 0.00 - 1.00 x10(3) uL    Neutrophil % 57.0 %    Lymphocyte % 31.8 %    Monocyte % 9.9 %    Eosinophil % 0.9 %    Basophil % 0.2 %    Immature Granulocyte % 0.2 %   Basic Metabolic Panel (8)    Collection Time: 08/17/24  5:59 AM   Result Value Ref Range    Glucose 102 (H) 70 - 99 mg/dL    Sodium 141 136 - 145 mmol/L    Potassium 3.3 (L) 3.5 - 5.1 mmol/L    Chloride 108 98 - 112 mmol/L    CO2 26.0 21.0 - 32.0 mmol/L    Anion Gap 7 0 - 18 mmol/L    BUN 12 9 - 23 mg/dL    Creatinine 0.57 0.55 - 1.02 mg/dL    BUN/CREA Ratio 21.1 (H) 10.0 - 20.0    Calcium, Total 8.9 8.7 - 10.4 mg/dL    Calculated Osmolality 292 275 - 295 mOsm/kg    eGFR-Cr 107 >=60 mL/min/1.73m2   CBC With Differential With Platelet    Collection Time: 08/17/24  5:59 AM   Result Value Ref Range    WBC 4.1 4.0 - 11.0 x10(3) uL    RBC 2.57 (L) 3.80 - 5.30 x10(6)uL    HGB 8.6 (L) 12.0 - 16.0 g/dL    HCT 26.3 (L) 35.0 - 48.0 %    .3 (H) 80.0 - 100.0 fL    MCH 33.5 26.0 - 34.0 pg    MCHC 32.7 31.0 - 37.0 g/dL    RDW-SD 53.1 (H) 35.1 - 46.3 fL    RDW 14.6 11.0 - 15.0 %    .0 150.0 - 450.0 10(3)uL    Neutrophil Absolute Prelim 2.02 1.50 - 7.70 x10 (3) uL    Neutrophil Absolute 2.02 1.50 - 7.70 x10(3) uL    Lymphocyte Absolute 1.54 1.00 - 4.00 x10(3) uL    Monocyte Absolute 0.41 0.10 - 1.00 x10(3) uL    Eosinophil Absolute 0.07 0.00 - 0.70 x10(3) uL    Basophil Absolute 0.01 0.00 - 0.20 x10(3) uL    Immature Granulocyte Absolute 0.02 0.00 - 1.00 x10(3) uL    Neutrophil % 49.7 %    Lymphocyte % 37.8 %    Monocyte % 10.1 %    Eosinophil % 1.7 %    Basophil % 0.2 %    Immature  Granulocyte % 0.5 %   Basic Metabolic Panel (8)    Collection Time: 08/18/24  6:26 AM   Result Value Ref Range    Glucose 93 70 - 99 mg/dL    Sodium 142 136 - 145 mmol/L    Potassium 3.5 3.5 - 5.1 mmol/L    Chloride 108 98 - 112 mmol/L    CO2 27.0 21.0 - 32.0 mmol/L    Anion Gap 7 0 - 18 mmol/L    BUN 6 (L) 9 - 23 mg/dL    Creatinine 0.51 (L) 0.55 - 1.02 mg/dL    BUN/CREA Ratio 11.8 10.0 - 20.0    Calcium, Total 8.5 (L) 8.7 - 10.4 mg/dL    Calculated Osmolality 291 275 - 295 mOsm/kg    eGFR-Cr 110 >=60 mL/min/1.73m2   CBC With Differential With Platelet    Collection Time: 08/18/24  6:26 AM   Result Value Ref Range    WBC 3.1 (L) 4.0 - 11.0 x10(3) uL    RBC 2.44 (L) 3.80 - 5.30 x10(6)uL    HGB 8.1 (L) 12.0 - 16.0 g/dL    HCT 25.4 (L) 35.0 - 48.0 %    .1 (H) 80.0 - 100.0 fL    MCH 33.2 26.0 - 34.0 pg    MCHC 31.9 31.0 - 37.0 g/dL    RDW-SD 53.7 (H) 35.1 - 46.3 fL    RDW 14.6 11.0 - 15.0 %    .0 150.0 - 450.0 10(3)uL    Neutrophil Absolute Prelim 1.65 1.50 - 7.70 x10 (3) uL    Neutrophil Absolute 1.65 1.50 - 7.70 x10(3) uL    Lymphocyte Absolute 0.97 (L) 1.00 - 4.00 x10(3) uL    Monocyte Absolute 0.35 0.10 - 1.00 x10(3) uL    Eosinophil Absolute 0.06 0.00 - 0.70 x10(3) uL    Basophil Absolute 0.02 0.00 - 0.20 x10(3) uL    Immature Granulocyte Absolute 0.01 0.00 - 1.00 x10(3) uL    Neutrophil % 53.9 %    Lymphocyte % 31.7 %    Monocyte % 11.4 %    Eosinophil % 2.0 %    Basophil % 0.7 %    Immature Granulocyte % 0.3 %   Basic Metabolic Panel (8)    Collection Time: 08/19/24  5:30 AM   Result Value Ref Range    Glucose 89 70 - 99 mg/dL    Sodium 145 136 - 145 mmol/L    Potassium 3.4 (L) 3.5 - 5.1 mmol/L    Chloride 111 98 - 112 mmol/L    CO2 26.0 21.0 - 32.0 mmol/L    Anion Gap 8 0 - 18 mmol/L    BUN <5 (L) 9 - 23 mg/dL    Creatinine 0.51 (L) 0.55 - 1.02 mg/dL    BUN/CREA Ratio      Calcium, Total 8.6 (L) 8.7 - 10.4 mg/dL    Calculated Osmolality      eGFR-Cr 110 >=60 mL/min/1.73m2   CBC With Differential  With Platelet    Collection Time: 08/19/24  5:30 AM   Result Value Ref Range    WBC 3.0 (L) 4.0 - 11.0 x10(3) uL    RBC 2.45 (L) 3.80 - 5.30 x10(6)uL    HGB 8.1 (L) 12.0 - 16.0 g/dL    HCT 25.7 (L) 35.0 - 48.0 %    .9 (H) 80.0 - 100.0 fL    MCH 33.1 26.0 - 34.0 pg    MCHC 31.5 31.0 - 37.0 g/dL    RDW-SD 53.7 (H) 35.1 - 46.3 fL    RDW 14.5 11.0 - 15.0 %    .0 150.0 - 450.0 10(3)uL    Neutrophil Absolute Prelim 1.49 (L) 1.50 - 7.70 x10 (3) uL    Neutrophil Absolute 1.49 (L) 1.50 - 7.70 x10(3) uL    Lymphocyte Absolute 1.08 1.00 - 4.00 x10(3) uL    Monocyte Absolute 0.37 0.10 - 1.00 x10(3) uL    Eosinophil Absolute 0.06 0.00 - 0.70 x10(3) uL    Basophil Absolute 0.01 0.00 - 0.20 x10(3) uL    Immature Granulocyte Absolute 0.01 0.00 - 1.00 x10(3) uL    Neutrophil % 49.3 %    Lymphocyte % 35.8 %    Monocyte % 12.3 %    Eosinophil % 2.0 %    Basophil % 0.3 %    Immature Granulocyte % 0.3 %   Basic Metabolic Panel (8)    Collection Time: 08/20/24  5:32 AM   Result Value Ref Range    Glucose 92 70 - 99 mg/dL    Sodium 145 136 - 145 mmol/L    Potassium 3.6 3.5 - 5.1 mmol/L    Chloride 112 98 - 112 mmol/L    CO2 27.0 21.0 - 32.0 mmol/L    Anion Gap 6 0 - 18 mmol/L    BUN <5 (L) 9 - 23 mg/dL    Creatinine 0.53 (L) 0.55 - 1.02 mg/dL    BUN/CREA Ratio      Calcium, Total 8.4 (L) 8.7 - 10.4 mg/dL    Calculated Osmolality      eGFR-Cr 109 >=60 mL/min/1.73m2   CBC With Differential With Platelet    Collection Time: 08/20/24  5:32 AM   Result Value Ref Range    WBC 2.6 (L) 4.0 - 11.0 x10(3) uL    RBC 2.40 (L) 3.80 - 5.30 x10(6)uL    HGB 8.1 (L) 12.0 - 16.0 g/dL    HCT 24.6 (L) 35.0 - 48.0 %    .5 (H) 80.0 - 100.0 fL    MCH 33.8 26.0 - 34.0 pg    MCHC 32.9 31.0 - 37.0 g/dL    RDW-SD 54.1 (H) 35.1 - 46.3 fL    RDW 14.6 11.0 - 15.0 %    .0 150.0 - 450.0 10(3)uL    Neutrophil Absolute Prelim 1.05 (L) 1.50 - 7.70 x10 (3) uL    Neutrophil Absolute 1.05 (L) 1.50 - 7.70 x10(3) uL    Lymphocyte Absolute 1.03 1.00 -  4.00 x10(3) uL    Monocyte Absolute 0.40 0.10 - 1.00 x10(3) uL    Eosinophil Absolute 0.08 0.00 - 0.70 x10(3) uL    Basophil Absolute 0.01 0.00 - 0.20 x10(3) uL    Immature Granulocyte Absolute 0.01 0.00 - 1.00 x10(3) uL    Neutrophil % 40.7 %    Lymphocyte % 39.9 %    Monocyte % 15.5 %    Eosinophil % 3.1 %    Basophil % 0.4 %    Immature Granulocyte % 0.4 %   Potassium    Collection Time: 08/20/24  5:32 AM   Result Value Ref Range    Potassium 3.6 3.5 - 5.1 mmol/L   Basic Metabolic Panel (8)    Collection Time: 08/21/24  5:48 AM   Result Value Ref Range    Glucose 98 70 - 99 mg/dL    Sodium 145 136 - 145 mmol/L    Potassium 3.3 (L) 3.5 - 5.1 mmol/L    Chloride 112 98 - 112 mmol/L    CO2 30.0 21.0 - 32.0 mmol/L    Anion Gap 3 0 - 18 mmol/L    BUN <5 (L) 9 - 23 mg/dL    Creatinine 0.58 0.55 - 1.02 mg/dL    BUN/CREA Ratio      Calcium, Total 8.8 8.7 - 10.4 mg/dL    Calculated Osmolality      eGFR-Cr 107 >=60 mL/min/1.73m2   CBC With Differential With Platelet    Collection Time: 08/21/24  5:48 AM   Result Value Ref Range    WBC 2.9 (L) 4.0 - 11.0 x10(3) uL    RBC 2.53 (L) 3.80 - 5.30 x10(6)uL    HGB 8.3 (L) 12.0 - 16.0 g/dL    HCT 26.4 (L) 35.0 - 48.0 %    .3 (H) 80.0 - 100.0 fL    MCH 32.8 26.0 - 34.0 pg    MCHC 31.4 31.0 - 37.0 g/dL    RDW-SD 56.6 (H) 35.1 - 46.3 fL    RDW 14.9 11.0 - 15.0 %    .0 150.0 - 450.0 10(3)uL    Neutrophil Absolute Prelim 1.15 (L) 1.50 - 7.70 x10 (3) uL    Neutrophil Absolute 1.15 (L) 1.50 - 7.70 x10(3) uL    Lymphocyte Absolute 1.20 1.00 - 4.00 x10(3) uL    Monocyte Absolute 0.47 0.10 - 1.00 x10(3) uL    Eosinophil Absolute 0.07 0.00 - 0.70 x10(3) uL    Basophil Absolute 0.01 0.00 - 0.20 x10(3) uL    Immature Granulocyte Absolute 0.01 0.00 - 1.00 x10(3) uL    Neutrophil % 39.6 %    Lymphocyte % 41.2 %    Monocyte % 16.2 %    Eosinophil % 2.4 %    Basophil % 0.3 %    Immature Granulocyte % 0.3 %   Basic Metabolic Panel (8)    Collection Time: 08/22/24  6:00 AM   Result Value  Ref Range    Glucose 104 (H) 70 - 99 mg/dL    Sodium 145 136 - 145 mmol/L    Potassium 3.4 (L) 3.5 - 5.1 mmol/L    Chloride 110 98 - 112 mmol/L    CO2 28.0 21.0 - 32.0 mmol/L    Anion Gap 7 0 - 18 mmol/L    BUN <5 (L) 9 - 23 mg/dL    Creatinine 0.56 0.55 - 1.02 mg/dL    BUN/CREA Ratio      Calcium, Total 8.7 8.7 - 10.4 mg/dL    Calculated Osmolality      eGFR-Cr 108 >=60 mL/min/1.73m2   CBC With Differential With Platelet    Collection Time: 08/22/24  6:00 AM   Result Value Ref Range    WBC 2.8 (L) 4.0 - 11.0 x10(3) uL    RBC 2.52 (L) 3.80 - 5.30 x10(6)uL    HGB 8.4 (L) 12.0 - 16.0 g/dL    HCT 25.9 (L) 35.0 - 48.0 %    .8 (H) 80.0 - 100.0 fL    MCH 33.3 26.0 - 34.0 pg    MCHC 32.4 31.0 - 37.0 g/dL    RDW-SD 55.2 (H) 35.1 - 46.3 fL    RDW 14.9 11.0 - 15.0 %    .0 150.0 - 450.0 10(3)uL    Neutrophil Absolute Prelim 1.22 (L) 1.50 - 7.70 x10 (3) uL    Neutrophil Absolute 1.22 (L) 1.50 - 7.70 x10(3) uL    Lymphocyte Absolute 1.02 1.00 - 4.00 x10(3) uL    Monocyte Absolute 0.44 0.10 - 1.00 x10(3) uL    Eosinophil Absolute 0.07 0.00 - 0.70 x10(3) uL    Basophil Absolute 0.02 0.00 - 0.20 x10(3) uL    Immature Granulocyte Absolute 0.01 0.00 - 1.00 x10(3) uL    Neutrophil % 43.9 %    Lymphocyte % 36.7 %    Monocyte % 15.8 %    Eosinophil % 2.5 %    Basophil % 0.7 %    Immature Granulocyte % 0.4 %   Potassium    Collection Time: 08/22/24  6:00 AM   Result Value Ref Range    Potassium 3.4 (L) 3.5 - 5.1 mmol/L   Basic Metabolic Panel (8)    Collection Time: 08/23/24  4:58 AM   Result Value Ref Range    Glucose 101 (H) 70 - 99 mg/dL    Sodium 144 136 - 145 mmol/L    Potassium 3.5 3.5 - 5.1 mmol/L    Chloride 110 98 - 112 mmol/L    CO2 30.0 21.0 - 32.0 mmol/L    Anion Gap 4 0 - 18 mmol/L    BUN <5 (L) 9 - 23 mg/dL    Creatinine 0.60 0.55 - 1.02 mg/dL    BUN/CREA Ratio      Calcium, Total 8.8 8.7 - 10.4 mg/dL    Calculated Osmolality      eGFR-Cr 106 >=60 mL/min/1.73m2   CBC With Differential With Platelet     Collection Time: 08/23/24  4:58 AM   Result Value Ref Range    WBC 2.7 (L) 4.0 - 11.0 x10(3) uL    RBC 2.61 (L) 3.80 - 5.30 x10(6)uL    HGB 8.5 (L) 12.0 - 16.0 g/dL    HCT 27.6 (L) 35.0 - 48.0 %    .7 (H) 80.0 - 100.0 fL    MCH 32.6 26.0 - 34.0 pg    MCHC 30.8 (L) 31.0 - 37.0 g/dL    RDW-SD 56.9 (H) 35.1 - 46.3 fL    RDW 14.8 11.0 - 15.0 %    .0 150.0 - 450.0 10(3)uL    Neutrophil Absolute Prelim 1.04 (L) 1.50 - 7.70 x10 (3) uL    Neutrophil Absolute 1.04 (L) 1.50 - 7.70 x10(3) uL    Lymphocyte Absolute 1.07 1.00 - 4.00 x10(3) uL    Monocyte Absolute 0.47 0.10 - 1.00 x10(3) uL    Eosinophil Absolute 0.07 0.00 - 0.70 x10(3) uL    Basophil Absolute 0.01 0.00 - 0.20 x10(3) uL    Immature Granulocyte Absolute 0.01 0.00 - 1.00 x10(3) uL    Neutrophil % 38.9 %    Lymphocyte % 40.1 %    Monocyte % 17.6 %    Eosinophil % 2.6 %    Basophil % 0.4 %    Immature Granulocyte % 0.4 %   Potassium    Collection Time: 08/23/24  4:58 AM   Result Value Ref Range    Potassium 3.5 3.5 - 5.1 mmol/L   Basic Metabolic Panel (8)    Collection Time: 08/24/24  5:51 AM   Result Value Ref Range    Glucose 99 70 - 99 mg/dL    Sodium 145 136 - 145 mmol/L    Potassium 3.7 3.5 - 5.1 mmol/L    Chloride 109 98 - 112 mmol/L    CO2 31.0 21.0 - 32.0 mmol/L    Anion Gap 5 0 - 18 mmol/L    BUN <5 (L) 9 - 23 mg/dL    Creatinine 0.61 0.55 - 1.02 mg/dL    BUN/CREA Ratio      Calcium, Total 9.1 8.7 - 10.4 mg/dL    Calculated Osmolality      eGFR-Cr 106 >=60 mL/min/1.73m2   CBC With Differential With Platelet    Collection Time: 08/24/24  5:51 AM   Result Value Ref Range    WBC 3.0 (L) 4.0 - 11.0 x10(3) uL    RBC 2.68 (L) 3.80 - 5.30 x10(6)uL    HGB 8.8 (L) 12.0 - 16.0 g/dL    HCT 28.5 (L) 35.0 - 48.0 %    .3 (H) 80.0 - 100.0 fL    MCH 32.8 26.0 - 34.0 pg    MCHC 30.9 (L) 31.0 - 37.0 g/dL    RDW-SD 56.8 (H) 35.1 - 46.3 fL    RDW 14.8 11.0 - 15.0 %    .0 150.0 - 450.0 10(3)uL    Neutrophil Absolute Prelim 1.28 (L) 1.50 - 7.70 x10 (3)  uL    Neutrophil Absolute 1.28 (L) 1.50 - 7.70 x10(3) uL    Lymphocyte Absolute 1.15 1.00 - 4.00 x10(3) uL    Monocyte Absolute 0.46 0.10 - 1.00 x10(3) uL    Eosinophil Absolute 0.05 0.00 - 0.70 x10(3) uL    Basophil Absolute 0.02 0.00 - 0.20 x10(3) uL    Immature Granulocyte Absolute 0.01 0.00 - 1.00 x10(3) uL    Neutrophil % 43.1 %    Lymphocyte % 38.7 %    Monocyte % 15.5 %    Eosinophil % 1.7 %    Basophil % 0.7 %    Immature Granulocyte % 0.3 %       Medications:    sennosides    docusate sodium    simethicone    LORazepam    HYDROmorphone **OR** HYDROmorphone **OR** HYDROmorphone    phenol    diazepam    ondansetron **OR** ondansetron    pantoprazole **OR** pantoprazole    enoxaparin    diphenhydrAMINE     ROS: Generally still doing well. Reports flatus and multiple loose BMs. Denies any nausea or vomiting. Appetite is present. Tolerating soft diet. Ambulating. Pain well managed. Inc sites unremarkable, no LUCHO complaints    Drains:  LUCHO right 465 ml serosang x 24 hours  LUCHO left 27 ml serosang x 24 hours    Physical examination:    /59 (BP Location: Right arm)   Pulse 84   Temp 98.7 °F (37.1 °C) (Oral)   Resp 19   Ht 5' 7\" (1.702 m)   Wt 178 lb 6.4 oz (80.9 kg)   LMP 12/22/2020   SpO2 98%   BMI 27.94 kg/m²     GENERAL: alert and oriented, cooperative, in no acute distress  ABDOMEN: soft, non-tender. Bowel sounds normal. 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, mobilization of splenic flexure, repair of transverse colon  mesentery, rectosigmoid colon resection with end-to-end anastomosis on 8/13/24      POD #11     Generally doing well     AVSS     Labs reviewed- Wbc (3.0), hgb (8.8), cr (0.61) stable      ml overnight - pt reports she missed the had       PLAN:      Post op ileus:  -tolerating soft diet  -XR abdomen without obstruction  -Encouraged clear ensure supplement, dietary on consult, will consider ensure compact and TPN in the next couple of days  -Continue to ambulate  -Colace and Senna BID  -CT A/P with IV and PO contrast with expected post op changes  -resolved, s/p flatus and multiple loose stools     Mark's removed    Continue eliquis 2.5 mg BID x 2 weeks    No lifting greater than 10lbs, no tub bathes/swimming, no driving, nothing in the vagina.    Ok to discharge    Follow up in 1 week for staple removal    Dr. Choudhary reviewed pathology reviewed with pt and family on 8/20/24    Rehana Ortega PA-C  08/24/24

## 2024-08-24 NOTE — PLAN OF CARE
Problem: Patient Centered Care  Goal: Patient preferences are identified and integrated in the patient's plan of care  Description: Interventions:  - What would you like us to know as we care for you?   - Provide timely, complete, and accurate information to patient/family  - Incorporate patient and family knowledge, values, beliefs, and cultural backgrounds into the planning and delivery of care  - Encourage patient/family to participate in care and decision-making at the level they choose  - Honor patient and family perspectives and choices  Outcome: Progressing     Problem: Patient/Family Goals  Goal: Patient/Family Long Term Goal  Description: Patient's Long Term Goal:     Interventions:  -   - See additional Care Plan goals for specific interventions  Outcome: Progressing  Goal: Patient/Family Short Term Goal  Description: Patient's Short Term Goal:     Interventions:   -   - See additional Care Plan goals for specific interventions  Outcome: Progressing     Problem: PAIN - ADULT  Goal: Verbalizes/displays adequate comfort level or patient's stated pain goal  Description: INTERVENTIONS:  - Encourage pt to monitor pain and request assistance  - Assess pain using appropriate pain scale  - Administer analgesics based on type and severity of pain and evaluate response  - Implement non-pharmacological measures as appropriate and evaluate response  - Consider cultural and social influences on pain and pain management  - Manage/alleviate anxiety  - Utilize distraction and/or relaxation techniques  - Monitor for opioid side effects  - Notify MD/LIP if interventions unsuccessful or patient reports new pain  - Anticipate increased pain with activity and pre-medicate as appropriate  Outcome: Progressing     Problem: SAFETY ADULT - FALL  Goal: Free from fall injury  Description: INTERVENTIONS:  - Assess pt frequently for physical needs  - Identify cognitive and physical deficits and behaviors that affect risk of  falls.  - [place] fall precautions as indicated by assessment.  - Educate pt/family on patient safety including physical limitations  - Instruct pt to call for assistance with activity based on assessment  - Modify environment to reduce risk of injury  - Provide assistive devices as appropriate  - Consider OT/PT consult to assist with strengthening/mobility  - Encourage toileting schedule  Outcome: Progressing     Problem: GASTROINTESTINAL - ADULT  Goal: Minimal or absence of nausea and vomiting  Description: INTERVENTIONS:  - Maintain adequate hydration with IV or PO as ordered and tolerated  - Nasogastric tube to low intermittent suction as ordered  - Evaluate effectiveness of ordered antiemetic medications  - Provide nonpharmacologic comfort measures as appropriate  - Advance diet as tolerated, if ordered  - Obtain nutritional consult as needed  - Evaluate fluid balance  Outcome: Progressing  Goal: Maintains or returns to baseline bowel function  Description: INTERVENTIONS:  - Assess bowel function  - Maintain adequate hydration with IV or PO as ordered and tolerated  - Evaluate effectiveness of GI medications  - Encourage mobilization and activity  - Obtain nutritional consult as needed  - Establish a toileting routine/schedule  - Consider collaborating with pharmacy to review patient's medication profile  Outcome: Progressing     Problem: GENITOURINARY - ADULT  Goal: Absence of urinary retention  Description: INTERVENTIONS:  - Assess patient’s ability to void and empty bladder  - Monitor intake/output and perform bladder scan as needed  - Follow urinary retention protocol/standard of care  - Consider collaborating with pharmacy to review patient's medication profile  - Implement strategies to promote bladder emptying  Outcome: Progressing     Problem: SKIN/TISSUE INTEGRITY - ADULT  Goal: Incision(s), wounds(s) or drain site(s) healing without S/S of infection  Description: INTERVENTIONS:  - Assess and  document risk factors for pressure ulcer development  - Assess and document skin integrity  - Assess and document dressing/incision, wound bed, drain sites and surrounding tissue  - Implement wound care per orders  - Initiate isolation precautions as appropriate  - Initiate Pressure Ulcer prevention bundle as indicated  Outcome: Progressing     Problem: MUSCULOSKELETAL - ADULT  Goal: Return mobility to safest level of function  Description: INTERVENTIONS:  - Assess patient stability and activity tolerance for standing, transferring and ambulating w/ or w/o assistive devices  - Assist with transfers and ambulation using safe patient handling equipment as needed  - Ensure adequate protection for wounds/incisions during mobilization  - Obtain PT/OT consults as needed  - Advance activity as appropriate  - Communicate ordered activity level and limitations with patient/family  Outcome: Progressing   No acute changes. Vital signs stable. Denies pain or nausea. Passing flatus, belching, voids freely. Tolerating sift diet. Ambulates independently. Possible discharge today.

## 2024-08-24 NOTE — DISCHARGE SUMMARY
General Medicine Discharge Summary     Patient ID:  Yue Malin  54 year old  7/30/1970    Admit date: 8/13/2024    Discharge date and time: 8/24/24    Attending Physician: Binh Choudhary MD     Consults: IP CONSULT TO FAMILY/INTERNAL MED  IP CONSULT TO RESPIRATORY CARE  NURSING CONSULT TO DIETITIAN  IP CONSULT TO FOOD AND NUTRITION SERVICES    Primary Care Physician: Diana Black MD     Reason for admission: Gynecologic adenocarcinoma w peritoneal carcinomatosis     Risk For Readmission: low    Discharge Diagnoses: Adenocarcinoma  Ovarian cancer (HCC)  See Additional Discharge Diagnoses in Hospital Course    Discharged Condition: stable    Follow-up with labs/images appointments: PCP, GYN    Exam  GEN: female in NAD  HEENT: EOMI  Pulm: CTAB   CV: RRR, no murmurs  ABD: Soft, non-tender, non-distended, + BS   SKIN: warm, dry  EXT: trace edema    HPI: per chart  Yue Malin - 54 year old female presenting with above CC.     History obtained from patient w/ additional history from review of outside records in care everywhere.  Abd pain & bloating - had CT in march which showed ascites & abnormal L ovary. Paracentesis c/f carcinoma. Subsequent diagnostics w high-grade adenocarcinoma c/w gynecologic primary.   Sees Dr. Maggie Mackey h/o. Started Carbo/Taxol/Avastin on 4/26/24, S/p C 4 on 6/28/24,   She saw Dr. Choudhary who recommended surgery. She thus opted for operative management. She presents today for planned procedure. Operative course progressed as expected, long surgery requiring bowel resection. Medicine consulted for post-op med management. She notes pain but ivp dilaudid helping  She denies HA, blurry vision, chest pain, difficulty breathing. She notes good sensation, no specific numbness, tingling, or weakness.    Hospital Course:   Yue Malin - 54 year old female w adenocarcinoma admitted 8/13/2024 for post op care after planned CHELSIE-BSO, colon resection for her gynecologic adenoca w Dr. Choudhary. Post op  course with ileus.  Diet slowly advanced with pain well-controlled.  Status post NGT removal.  Ambulating well.  Will plan to follow-up with PCP and GYN as outpatient.     Gynecologic adenocarcinoma w peritoneal carcinomatosis  S/p chemo  Now s/p CHELSIE-BSO, rectosigmoid colon resection w end-end anastomosis due to tumor involvement  - Post-op care per Dr. Choudhary's team. NGT & yao removed.  - Trend labs  - s/p dilaudid PCA, continue IV dilaudid PRN and IV toradol, avoid narcotics if able  - IVF until tolerating PO  - Up, OOB, IS use, ambulate  - s/p NGT removal     Post op ileus resolved   - initially hypoactive BS, now more active, no flatus yet, denies nausea/vomiting  - ambulating frequently  - not taking opiates  - KUB without obstruction  - may need TPN but patient does not want this   - started full liquids 8/22  - CT A/P with PO contrast with no SBO      Hypernatremia- resolved  - 2/2 NPO & IVF  - Switch IVF to D5  - switch back to NS     Anemia, post-op - 2/2 expected blood loss w/ dilutional component from IVF   - No active external bleeding noted     Anxiety (ACOSTA)  - Stable, continue PTA ativan PRN as able    Operative Procedures: Procedure(s) (LRB):  Exploratory laparotomy, total abdominal hysterectomy, bilateral salpingo-oophorectomy, bilateral ureterolysis, bilateral pelvic lymph node dissection, tumor debulking, Argon Beam coagulation of peritoneal nodules, mobilization of splenic flexure,repair of transverse colon mesentery, infracolic, supracolic and splenocolic ligament resection with omentectomy, rectosigmoid colon resection with end to end anastomosis (N/A)  LAPAROTOMY SALPINGO OOPHORECTOMY (Bilateral)  COLON RESECTION LEFT (Left)     Imaging: CT ABDOMEN+PELVIS(CONTRAST ONLY)(CPT=74177)    Result Date: 8/23/2024  CONCLUSION:   Postsurgical changes of recent laparotomy with hysterectomy and distal left colonic resection with rectosigmoid anastomosis.  Left upper quadrant and midline pelvis surgical  drains.  No discrete drainable fluid collection.  No evidence of bowel obstruction at this time.   Some liquid stool within the right colon.  Correlate with diarrheal symptoms.  A 4 mm sclerotic focus within the right posterior L3 vertebral body, nonspecific with differentials including osseous metastatic disease in this clinical setting. Correlation with prior imaging to assess stability would be helpful.  Otherwise, recommend correlation with nuclear medicine bone scan.   A few mesenteric lymph nodes measuring 1.0 cm or less are nonspecific in the immediate postoperative setting.  Correlation with prior imaging would be helpful.   Hazy infiltration of the omentum in the left upper quadrant, could reflect peritoneal carcinomatosis and/or postoperative changes.  There was report of peritoneal carcinomatosis on outside 07/23/2024 PET-CT imaging.  Trace fluid in the abdomen and pelvis is nonspecific in the immediate postoperative setting.  Malignant fluid not excluded in this setting.  Correlate with prior workup.    Dictated by (CST): Adalgisa Ryan MD on 8/23/2024 at 2:01 PM     Finalized by (CST): Adalgisa Ryan MD on 8/23/2024 at 2:24 PM             Disposition: home    Activity: as tolerated   Diet: general   Wound Care: no needs  Code Status: Full Code  O2: no needs    Home Medication Changes: as below   All discharge medications have been reconciled with current medication list.     Med list     Medication List        START taking these medications      apixaban 2.5 MG Tabs  Commonly known as: Eliquis  Take 1 tablet (2.5 mg total) by mouth 2 (two) times daily for 14 days.     docusate sodium 100 MG Caps  Commonly known as: COLACE  Take 100 mg by mouth 2 (two) times daily.     simethicone 80 MG Chew  Commonly known as: Mylicon  Chew 1 tablet (80 mg total) by mouth 3 (three) times daily as needed (Gas pain).     traMADol 50 MG Tabs  Commonly known as: Ultram  Take 1 tablet (50 mg total) by mouth every 4  (four) hours as needed for Pain.            CHANGE how you take these medications      * acetaminophen 325 MG Tabs  Commonly known as: Tylenol  What changed: Another medication with the same name was added. Make sure you understand how and when to take each.     * acetaminophen 325 MG Tabs  Commonly known as: Tylenol  Take 2 tablets (650 mg total) by mouth every 6 (six) hours as needed for Pain.  What changed: You were already taking a medication with the same name, and this prescription was added. Make sure you understand how and when to take each.           * This list has 2 medication(s) that are the same as other medications prescribed for you. Read the directions carefully, and ask your doctor or other care provider to review them with you.                CONTINUE taking these medications      LORazepam 0.5 MG Tabs  Commonly known as: Ativan               Where to Get Your Medications        These medications were sent to [id number] - [place], [place] - [address] -[phone], [phone] [address], [facility] [address]      Phone: [phone]   acetaminophen 325 MG Tabs  apixaban 2.5 MG Tabs  docusate sodium 100 MG Caps  simethicone 80 MG Chew  traMADol 50 MG Tabs         FU   Follow-up Information       Binh Choudhary MD. Schedule an appointment as soon as possible for a visit in 1 week(s).    Specialty: Gynecology Oncology  Why: For suture removal  Contact information:  [address]  [facility] [address] [phone]               Diana Black MD Follow up in 1 week(s).    Specialty: Internal Medicine  Contact information:  [address] [facility] [address]  [facility] [address] [phone]                             DC instructions:      Other Discharge Instructions:         No lifting greater than 10lbs, no tub bathes/swimming, no driving, nothing in the vagina.  Follow up in 1 week for staple removal.              Patient had opportunity to ask questions and state understand and  agree with therapeutic plan as outlined    Thank You,    Anthony Mcdaniel M.D.  [facility]

## 2024-10-08 ENCOUNTER — HOSPITAL ENCOUNTER (EMERGENCY)
Facility: HOSPITAL | Age: 54
Discharge: HOME | End: 2024-10-08
Attending: EMERGENCY MEDICINE
Payer: COMMERCIAL

## 2024-10-08 VITALS
WEIGHT: 170 LBS | BODY MASS INDEX: 26.68 KG/M2 | OXYGEN SATURATION: 97 % | HEART RATE: 112 BPM | SYSTOLIC BLOOD PRESSURE: 142 MMHG | TEMPERATURE: 98 F | RESPIRATION RATE: 19 BRPM | HEIGHT: 67 IN | DIASTOLIC BLOOD PRESSURE: 82 MMHG

## 2024-10-08 DIAGNOSIS — R50.9 FEBRILE ILLNESS, ACUTE: Primary | ICD-10-CM

## 2024-10-08 LAB
ANION GAP SERPL CALC-SCNC: 6 MMOL/L (ref 0–18)
BASOPHILS # BLD AUTO: 0.06 X10(3) UL (ref 0–0.2)
BASOPHILS NFR BLD AUTO: 0.5 %
BILIRUB UR QL: NEGATIVE
BUN BLD-MCNC: 10 MG/DL (ref 9–23)
BUN/CREAT SERPL: 14.1 (ref 10–20)
CALCIUM BLD-MCNC: 9.9 MG/DL (ref 8.7–10.4)
CHLORIDE SERPL-SCNC: 103 MMOL/L (ref 98–112)
CLARITY UR: CLEAR
CO2 SERPL-SCNC: 27 MMOL/L (ref 21–32)
COLOR UR: COLORLESS
CREAT BLD-MCNC: 0.71 MG/DL
DEPRECATED RDW RBC AUTO: 45.9 FL (ref 35.1–46.3)
EGFRCR SERPLBLD CKD-EPI 2021: 100 ML/MIN/1.73M2 (ref 60–?)
EOSINOPHIL # BLD AUTO: 0 X10(3) UL (ref 0–0.7)
EOSINOPHIL NFR BLD AUTO: 0 %
ERYTHROCYTE [DISTWIDTH] IN BLOOD BY AUTOMATED COUNT: 13.7 % (ref 11–15)
GLUCOSE BLD-MCNC: 142 MG/DL (ref 70–99)
GLUCOSE UR-MCNC: NORMAL MG/DL
HCT VFR BLD AUTO: 34.9 %
HGB BLD-MCNC: 12 G/DL
HGB UR QL STRIP.AUTO: NEGATIVE
IMM GRANULOCYTES # BLD AUTO: 0.1 X10(3) UL (ref 0–1)
IMM GRANULOCYTES NFR BLD: 0.8 %
KETONES UR-MCNC: NEGATIVE MG/DL
LEUKOCYTE ESTERASE UR QL STRIP.AUTO: NEGATIVE
LYMPHOCYTES # BLD AUTO: 1.1 X10(3) UL (ref 1–4)
LYMPHOCYTES NFR BLD AUTO: 9.1 %
MCH RBC QN AUTO: 31.6 PG (ref 26–34)
MCHC RBC AUTO-ENTMCNC: 34.4 G/DL (ref 31–37)
MCV RBC AUTO: 91.8 FL
MONOCYTES # BLD AUTO: 1.04 X10(3) UL (ref 0.1–1)
MONOCYTES NFR BLD AUTO: 8.6 %
NEUTROPHILS # BLD AUTO: 9.83 X10 (3) UL (ref 1.5–7.7)
NEUTROPHILS # BLD AUTO: 9.83 X10(3) UL (ref 1.5–7.7)
NEUTROPHILS NFR BLD AUTO: 81 %
NITRITE UR QL STRIP.AUTO: NEGATIVE
OSMOLALITY SERPL CALC.SUM OF ELEC: 283 MOSM/KG (ref 275–295)
PH UR: 5.5 [PH] (ref 5–8)
PLATELET # BLD AUTO: 143 10(3)UL (ref 150–450)
POTASSIUM SERPL-SCNC: 3.8 MMOL/L (ref 3.5–5.1)
PROCALCITONIN SERPL-MCNC: 0.27 NG/ML (ref ?–0.05)
PROT UR-MCNC: NEGATIVE MG/DL
RBC # BLD AUTO: 3.8 X10(6)UL
SODIUM SERPL-SCNC: 136 MMOL/L (ref 136–145)
SP GR UR STRIP: 1 (ref 1–1.03)
UROBILINOGEN UR STRIP-ACNC: NORMAL
WBC # BLD AUTO: 12.1 X10(3) UL (ref 4–11)

## 2024-10-08 PROCEDURE — 81003 URINALYSIS AUTO W/O SCOPE: CPT | Performed by: EMERGENCY MEDICINE

## 2024-10-08 PROCEDURE — 99284 EMERGENCY DEPT VISIT MOD MDM: CPT

## 2024-10-08 PROCEDURE — 36415 COLL VENOUS BLD VENIPUNCTURE: CPT

## 2024-10-08 PROCEDURE — 84145 PROCALCITONIN (PCT): CPT | Performed by: EMERGENCY MEDICINE

## 2024-10-08 PROCEDURE — 80048 BASIC METABOLIC PNL TOTAL CA: CPT | Performed by: EMERGENCY MEDICINE

## 2024-10-08 PROCEDURE — 99283 EMERGENCY DEPT VISIT LOW MDM: CPT

## 2024-10-08 PROCEDURE — 85025 COMPLETE CBC W/AUTO DIFF WBC: CPT | Performed by: EMERGENCY MEDICINE

## 2024-10-08 RX ORDER — AZITHROMYCIN 250 MG/1
TABLET, FILM COATED ORAL
Qty: 6 TABLET | Refills: 0 | Status: SHIPPED | OUTPATIENT
Start: 2024-10-08 | End: 2024-10-13

## 2024-10-08 NOTE — ED INITIAL ASSESSMENT (HPI)
Patient woke up from nap with low grade fever T=100 around 1700 along with mild headache and mid back pain. Currently undergoing chemo for ovarian cancer 9/26 next scheduled 10/18. Patient is AAOx4 and in no active distress.

## 2024-10-09 NOTE — ED PROVIDER NOTES
Patient Seen in: [facility] Emergency Department    History   No chief complaint on file.      HPI    Patient presents to the ED complaining of mild fevers and chills starting today around 5 PM.  She also reports a mild headache and some pain in her right mid back.  She is currently on chemo, last received .  Denies other complaints.  Tmax 100.5 at home.    History reviewed.   Past Medical History:    Anxiety state    Cancer (HCC)    ovary    DEPRESSION    Depression    Esophageal reflux    HPV in female    Hypertriglyceridemia    OCD (obsessive compulsive disorder)    Personal history of antineoplastic chemotherapy    PONV (postoperative nausea and vomiting)    PTSD (post-traumatic stress disorder)    SEASONAL ALLERGIES    Visual impairment    glasses    Vitamin B 12 deficiency    Vitamin D deficiency       History reviewed.   Past Surgical History:   Procedure Laterality Date          x3    Implant left      Implant left  2009    implants replaced    Implant right      Implant right  2009    implants replaced    Other surgical history   &    breast implants, deflation    Other surgical history  2007    carpal tunnel R hand    Total abdom hysterectomy      2024         Medications :  (Not in a hospital admission)       Family History   Problem Relation Age of Onset    Diabetes Father     Heart Disorder Father     Other (Other) Father         rheumatoid arthritis    Cancer Mother         breast 56y/o    Breast Cancer Mother 60        60       Smoking Status:   Social History     Socioeconomic History    Marital status:    Tobacco Use    Smoking status: Never    Smokeless tobacco: Never   Vaping Use    Vaping status: Never Used   Substance and Sexual Activity    Alcohol use: Not Currently     Comment: rare    Drug use: No    Sexual activity: Not Currently     Partners: Male     Comment: 2021       Constitutional and vital signs reviewed.      Social History and  Family History elements reviewed from today, pertinent positives to the presenting problem noted.    Physical Exam     ED Triage Vitals [10/08/24 1840]   /82   Pulse 112   Resp 19   Temp 98.4 °F (36.9 °C)   Temp src Oral   SpO2 97 %   O2 Device None (Room air)       All measures to prevent infection transmission during my interaction with the patient were taken. Handwashing was performed prior to and after the exam.  Stethoscope and any equipment used during my examination was cleaned with super sani-cloth germicidal wipes following the exam.     Physical Exam  Vitals and nursing note reviewed.   Constitutional:       General: She is not in acute distress.     Appearance: Normal appearance. She is well-developed. She is not ill-appearing or toxic-appearing.   HENT:      Head: Normocephalic and atraumatic.   Eyes:      General:         Right eye: No discharge.         Left eye: No discharge.      Conjunctiva/sclera: Conjunctivae normal.   Neck:      Trachea: No tracheal deviation.   Cardiovascular:      Rate and Rhythm: Normal rate and regular rhythm.   Pulmonary:      Effort: Pulmonary effort is normal. No respiratory distress.      Breath sounds: No stridor.   Abdominal:      General: There is no distension.      Palpations: Abdomen is soft.      Tenderness: There is no abdominal tenderness.   Musculoskeletal:         General: No deformity.      Cervical back: Neck supple. No tenderness.   Skin:     General: Skin is warm and dry.   Neurological:      Mental Status: She is alert and oriented to person, place, and time.   Psychiatric:         Mood and Affect: Mood normal.         Behavior: Behavior normal.         ED Course        Labs Reviewed   URINALYSIS WITH CULTURE REFLEX - Abnormal; Notable for the following components:       Result Value    Urine Color Colorless (*)     All other components within normal limits   BASIC METABOLIC PANEL (8) - Abnormal; Notable for the following components:    Glucose 142  (*)     All other components within normal limits   CBC WITH DIFFERENTIAL WITH PLATELET - Abnormal; Notable for the following components:    WBC 12.1 (*)     HCT 34.9 (*)     .0 (*)     Neutrophil Absolute Prelim 9.83 (*)     Neutrophil Absolute 9.83 (*)     Monocyte Absolute 1.04 (*)     All other components within normal limits   PROCALCITONIN - Abnormal; Notable for the following components:    Procalcitonin 0.27 (*)     All other components within normal limits       As Interpreted by me    Imaging Results Available and Reviewed while in ED: No results found.  ED Medications Administered: Medications - No data to display      MDM     Vitals:    10/08/24 1840   BP: 142/82   Pulse: 112   Resp: 19   Temp: 98.4 °F (36.9 °C)   TempSrc: Oral   SpO2: 97%   Weight: 77.1 kg   Height: 170.2 cm (5' 7\")     *I personally reviewed and interpreted all ED vitals.    Pulse Ox: 97%, Room air, Normal     Differential Diagnosis/ Diagnostic Considerations: Viral syndrome, UTI, other    Complicating Factors: The patient already has does not have any pertinent problems on file. to contribute to the complexity of this ED evaluation.    Medical Decision Making  The patient presents to the ED with low-grade fever symptoms starting this afternoon.  Nondistressed on examination.  Right flank pain however nontender on exam and urinalysis without infection.  Trace leukocytosis, no neutropenia.  Patient concerned about possible sinusitis as she states he was bit hard in her sinuses.  No sinus tenderness on reexam but will prescribe azithromycin at patient's request.  She will call her oncologist tomorrow for further evaluation.    Problems Addressed:  Febrile illness, acute: acute illness or injury    Amount and/or Complexity of Data Reviewed  Labs: ordered. Decision-making details documented in ED Course.    Risk  Prescription drug management.        Condition upon leaving the department: Stable    Disposition and Plan     Clinical  Impression:  1. Febrile illness, acute        Disposition:  Discharge    Follow-up:  Diana Black MD  [address] [id number]  [facility] [address]  [phone]    Schedule an appointment as soon as possible for a visit in 3 day(s)        Medications Prescribed:  Discharge Medication List as of 10/8/2024  9:55 PM        START taking these medications    Details   azithromycin (ZITHROMAX Z-LIMA) 250 MG Oral Tab Take 2 tablets (500 mg total) by mouth daily for 1 day, THEN 1 tablet (250 mg total) daily for 4 days., Normal, Disp-6 tablet, R-0

## 2024-10-15 ENCOUNTER — TELEPHONE (OUTPATIENT)
Dept: GENETICS | Facility: HOSPITAL | Age: 54
End: 2024-10-15

## 2024-10-15 NOTE — TELEPHONE ENCOUNTER
Spoke to Yue as she had questions as to when to schedule genetic counseling appointment. Recommended several weeks following chemo to avoid any erroneous results. She plans to call to schedule an appointment and has the contact information to do so. She has BC/BS PPO so does not need a referral. She requested I email the intake form so she could fill in family history information in advance so did this.

## 2024-10-24 ENCOUNTER — LAB REQUISITION (OUTPATIENT)
Dept: LAB | Facility: HOSPITAL | Age: 54
End: 2024-10-24
Payer: COMMERCIAL

## 2024-10-24 DIAGNOSIS — C56.2 MALIGNANT NEOPLASM OF LEFT OVARY (HCC): ICD-10-CM

## 2024-10-24 PROCEDURE — 88363 XM ARCHIVE TISSUE MOLEC ANAL: CPT | Performed by: PATHOLOGY

## 2024-12-12 ENCOUNTER — GENETICS ENCOUNTER (OUTPATIENT)
Dept: GENETICS | Facility: HOSPITAL | Age: 54
End: 2024-12-12
Attending: INTERNAL MEDICINE
Payer: COMMERCIAL

## 2024-12-12 ENCOUNTER — NURSE ONLY (OUTPATIENT)
Dept: HEMATOLOGY/ONCOLOGY | Facility: HOSPITAL | Age: 54
End: 2024-12-12
Attending: INTERNAL MEDICINE
Payer: COMMERCIAL

## 2024-12-12 DIAGNOSIS — C56.9 MALIGNANT NEOPLASM OF OVARY, UNSPECIFIED LATERALITY (HCC): Primary | ICD-10-CM

## 2024-12-12 DIAGNOSIS — Z80.3 FAMILY HISTORY OF BREAST CANCER: ICD-10-CM

## 2024-12-12 DIAGNOSIS — Z80.41 FAMILY HISTORY OF OVARIAN CANCER: ICD-10-CM

## 2024-12-12 DIAGNOSIS — Z80.9 FAMILY HISTORY OF CANCER: ICD-10-CM

## 2024-12-12 PROCEDURE — 96040 HC GENETIC COUNSELING EA 30 MIN: CPT | Performed by: GENETIC COUNSELOR, MS

## 2024-12-12 PROCEDURE — 36415 COLL VENOUS BLD VENIPUNCTURE: CPT

## 2024-12-12 NOTE — PROGRESS NOTES
Patient Name: Yue Malin  YOB: 1970  Date of Visit: 2024    Reason for visit: Ms. Malin was seen for the purposes of genetic counseling due to her diagnosis of ovarian cancer at age 53y and a family history of breast and ovarian cancer. The purpose of this visit was to review information regarding genetic testing options for mutations in high penetrance cancer susceptibility genes.    Referring Provider: Luana Serrano MD    Medical History: Ms. 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 She is s/p NAC started on 24. She had surgery; ex-lap, CHELSIE-BSO, pelvic LND, and CRS on 2024, surgical staging was ypT3c, N0. She has since resumed chemotherapy on 24. Germline hereditary cancer testing was advised.    She denies any other cancer history.    Ms. Malin achieved menarche at approximately 11 years of age, is post-menopausal (~51y), and has been pregnant 3 times, delivering her first of 2 children at 23y. Ms. Malin has a history of oral contraceptive use and denies any fertility or hormone replacement use.      Relevant Family History: Ms. Malin has 1 daughter (31y) and 2 sons (22y, 20y).     She has 4 sisters (1  at 52y dt breast cancer dx at 52y with no genetic testing done; 1 is living in her 60s with a h/o thyroid cancer dx in her 30s, she reportedly has had negative genetic testing; 2 other sisters are living with no cancer histories, 1 of them has also reportedly had negative genetic testing) and 2 brothers (living with no cancer hx).     Her mother (87y) has a h/o breast cancer dx ~50y. There are 2 maternal uncles (1  >50y dt lung cancer; 1 is living). Limited info is known about the maternal cousins. The maternal grandmother  in her 80s dt an unknown cause. No info is known about the maternal grandfather.     Ms. Malin's father  at 78y dt  complications from an infection with no cancer hx. There are 6 paternal aunts/uncles, 1 paternal aunt  likely >50y dt breast cancer. One of the paternal uncles has ~12 children, 1 of his daughters was dx with ovarian cancer in her 40s. The paternal grandmother  in her 90s dt natural causes. The paternal grandfather  ~50y dt a CVA.     The rest of the family history is negative for other significant genetic conditions, cancers, or birth defects of any kind. See scanned pedigree for full family history reported during the session.    Ms. Malin's maternal ethnicity is Turkmen and her paternal ethnicity is Palestinian/Eliza. She is not aware of any Ashkenazi Holiness heritage.     Summary: The majority of cancers are sporadic whereas approximately 10-30% of cases of cancer cases are attributed to familial factors such as unidentified low penetrance genes in the family or shared environmental factors.  Approximately 5-10% of cancers are related to a hereditary cancer syndrome.  Signs of a hereditary cancer syndrome include some rare cancers, common cancers occurring at unusually young ages, multiple primary cancers in the same individual, multiple colorectal polyps, or the same type of cancer or related cancers (e.g., breast and ovarian, colorectal and endometrial) in three or more individuals in the same lineage.     Cancer is usually caused by gene mutations that occur randomly in one or a few cells of the body. Such gene changes, called somatic mutations, may arise as a natural consequence of aging or when a cell’s DNA has been damaged. Acquired mutations are only present in some of the body’s cells, and they are not passed on from parents to their children.    However, in the small percentage of people with a hereditary cancer syndrome, the disease is due to a different type of mutation called a hereditary mutation, or germline mutation. These mutations are usually inherited from one or both of the person’s  parents, and are present in nearly every cell of the body. Because hereditary mutations are present in the DNA of sperm and egg cells, they can be passed down in families. People who carry such hereditary mutations do not necessarily get cancer, but their risk of developing the disease at some point during their lifetime is higher than average. When a personal and/or family history doesn't clearly fit a single hereditary cancer syndrome, panel genetic testing examining multiple genes in which pathogenic mutations cause hereditary cancer syndromes is appropriate.     If testing is performed, three results are possible: positive, negative, and variant of uncertain significance.  A positive result indicates a pathogenic variant (harmful gene mutation) has been identified, and there is an increased risk for the cancers associated with the specific gene.  Since pathogenic variants in most cancer susceptibility genes are inherited in an autosomal dominant fashion, siblings and children of individuals with a pathogenic variant have a 50% risk of carrying the same familial pathogenic variant as well.      A negative test result would indicate that no pathogenic variant was identified in a cancer susceptibility gene.  While testing detects gene mutations, it is possible for a genetic variant to be present and go undetected.  It is also possible for a genetic variant to be located in a gene other than those being tested.     A variant of uncertain significance means that a change has been identified a cancer susceptibility gene; however, it is uncertain if the variant is pathogenic or a non-deleterious (benign) change.  With time, the variant may be reclassified as either pathogenic or benign.    Since pathogenic variant identification is necessary, an affected family member is preferred in order to confirm that a pathogenic variant is indeed present in a particular family.  If the pathogenic variant can be identified in an  affected individual, this information can be used to screen other at-risk individuals in the family.  Individuals who are positive for the same pathogenic variant would be expected to have a much greater risk for developing hereditary cancer during their lifetime than the general population and surveillance and management would be rigorous.  For those individuals who are found to not carry the pathogenic variant, risks for developing cancer during their lifetime would return to those expected for individuals in the general population.  It should be emphasized that absence of a pathogenic variant in an at-risk individual would not eliminate their risk for cancer, but simply return them to the risk expected for the general population. If no affected individual is available for testing, a negative result, while reassuring, cannot be completely informative of the familial cancer risk as it is unknown whether no pathogenic variant was found because the individual tested is truly negative for the familial pathogenic variant or whether the familial pathogenic variant was unable to be detected by the genetic testing method used. In such cases, screening and follow-up should be guided by personal and family history.     Because Ms. Malin was diagnosed with ovarian cancer at age 53y with a family history of breast cancer in her sister, mother, and paternal aunt, and ovarian cancer in her paternal 1st cousin, genetic testing for pathogenic variants in high-penetrance cancer susceptibility genes is indicated based on the NCCN guidelines (BOP V.2.2025).      Ms. Malin appeared to understand the information presented. On the day of the visit Ms. Malin elected to proceed with genetic testing for hereditary cancer syndromes. My office will call Ms. Malin as soon as results are received; post-test counseling can be scheduled at that time. Thank you for allowing me to participate in the care of your patient; please do not  hesitate to contact my office if you have any questions or concerns, [phone].    Plan:   Blood was drawn and sent out for [employer] [facility]'s multi-cancer + RNA panel (70 genes, including BRCA1/2; TAT: ~2-3 weeks).    The Genetics office will call MsKane Dea when results are available.  Recommendations for Ms. Malin and family members will depend the above genetic testing results.      Send to: Maggie  Time spent with patient: 35 minutes      Arleen Campos MS, [facility]

## 2024-12-27 ENCOUNTER — GENETICS ENCOUNTER (OUTPATIENT)
Dept: HEMATOLOGY/ONCOLOGY | Facility: HOSPITAL | Age: 54
End: 2024-12-27

## 2024-12-27 DIAGNOSIS — Z13.71 BRCA GENE MUTATION NEGATIVE IN FEMALE: Primary | ICD-10-CM

## 2024-12-27 NOTE — PROGRESS NOTES
Patient Name: Yue Malin  YOB: 1970    Referring Provider:  Luana Serrano MD     Reason for Referral:  Ms. Malin had genetic testing performed on 12/12/2024 because of her diagnosis of ovarian cancer at age 53y and a family history of breast and ovarian cancer.      Genetic Testing Result:  Negative for pathogenic variants. One variant of uncertain significance identified. No pathogenic variant was found in the following 70 genes on the [employer] [facility] multi-cancer + RNA panel: AIP, ALK, APC*, HAILY*, AXIN2, BAP1, BARD1, BLM, BMPR1A, BRCA1, BRCA2, BRIP1, CDC73, CDH1, CDK4, CDKN1B, CDKN2A (p14ARF), CDKN2A (p54WLB7s), CHEK2, CTNNA1, DICER1*, EGFR, EPCAM*, FH*, FLCN, GREM1*, HOXB13, KIT, LZTR1, MAX*, MBD4, MEN1*, MET*, MITF, MLH1*, MSH2*, MSH3*, MSH6*, MUTYH, NF1*, NF2, NTHL1, PALB2, PDGFRA, PMS2*, POLD1*, POLE, POT1, LSQOK9I, PTCH1, PTEN*, RAD51C, RAD51D, RB1*, RET, SDHA*, SDHAF2, SDHB, SDHC*, SDHD, SMAD4, SMARCA4, SMARCB1, SMARCE1, STK11, SUFU, PPLD072, TP53, TSC1*, TSC2, VHL. A variant of uncertain significance, SUFU c.703A>G ([id number]), was identified.  Please refer to the report from [employer] [facility] for additional testing information.  These results were discussed with Ms. Malin via telephone on 12/30/2024.    Summary and Plan:   These results indicate that Ms. Malin was not found to have a pathogenic variant (harmful genetic mutation) in any of the genes listed above. No pathogenic variants associated with hereditary gynecological, breast, or other cancer syndromes were identified. The limitations of the testing were discussed with Ms. Malin including the chance that a pathogenic variant in a gene other than those included in this analysis might be the cause of cancer in Ms. Malin or in relatives.     Ms. Malin was found to have a SUFU c.703A>G (p.[id number]) variant of uncertain significance. A variant of uncertain significance (VUS) means that a genetic variant has been identified in a cancer  susceptibility gene; however, it is currently uncertain if the variant is pathogenic (harmful) or benign (harmless).  With time, the variant may be reclassified as either harmful or harmless, most VUS's end up being reclassified as harmless variants. Should a VUS get reclassified in the future, the genetic testing laboratory will issue an amended report with the updated classification. No changes in management or testing of family members is recommended based on a VUS result.     Medical management and surveillance for Ms. Malin and other family members should be based on their personal and family history. All medical management decisions should be made with a physician.     I encouraged Ms. Malin to share her genetic test results with her children and other relatives so that they may discuss the implications of this information with their health providers. Ms. Malin is also encouraged to contact me on an annual basis to learn if there have been any updates in genetic information that would apply, changes in the personal and/or family history, or changes in the classification of the FU VUS. Please do not hesitate to contact my office if you have any questions or concerns, [phone].     Arleen Campos MS, CGC

## 2024-12-30 PROBLEM — Z13.71 BRCA GENE MUTATION NEGATIVE IN FEMALE: Status: ACTIVE | Noted: 2024-12-01

## 2025-04-05 ENCOUNTER — HOSPITAL ENCOUNTER (INPATIENT)
Facility: HOSPITAL | Age: 55
LOS: 9 days | Discharge: HOME | End: 2025-04-14
Attending: EMERGENCY MEDICINE | Admitting: HOSPITALIST
Payer: COMMERCIAL

## 2025-04-05 ENCOUNTER — APPOINTMENT (OUTPATIENT)
Dept: CT IMAGING | Facility: HOSPITAL | Age: 55
End: 2025-04-05
Attending: EMERGENCY MEDICINE
Payer: COMMERCIAL

## 2025-04-05 ENCOUNTER — APPOINTMENT (OUTPATIENT)
Dept: CV DIAGNOSTICS | Facility: HOSPITAL | Age: 55
End: 2025-04-05
Attending: HOSPITALIST
Payer: COMMERCIAL

## 2025-04-05 ENCOUNTER — APPOINTMENT (OUTPATIENT)
Dept: GENERAL RADIOLOGY | Facility: HOSPITAL | Age: 55
End: 2025-04-05
Attending: EMERGENCY MEDICINE
Payer: COMMERCIAL

## 2025-04-05 DIAGNOSIS — I95.9 HYPOTENSION, UNSPECIFIED HYPOTENSION TYPE: ICD-10-CM

## 2025-04-05 DIAGNOSIS — N17.9 AKI (ACUTE KIDNEY INJURY): ICD-10-CM

## 2025-04-05 DIAGNOSIS — D72.825 BANDEMIA: ICD-10-CM

## 2025-04-05 DIAGNOSIS — C78.2 MALIGNANT NEOPLASM METASTATIC TO PLEURA (HCC): ICD-10-CM

## 2025-04-05 DIAGNOSIS — J96.01 ACUTE HYPOXEMIC RESPIRATORY FAILURE (HCC): ICD-10-CM

## 2025-04-05 DIAGNOSIS — C56.2: Primary | ICD-10-CM

## 2025-04-05 DIAGNOSIS — E87.20 LACTIC ACIDOSIS: ICD-10-CM

## 2025-04-05 DIAGNOSIS — J90 RECURRENT PLEURAL EFFUSION: ICD-10-CM

## 2025-04-05 DIAGNOSIS — R11.2 NAUSEA AND VOMITING IN ADULT: ICD-10-CM

## 2025-04-05 LAB
ALBUMIN SERPL-MCNC: 3 G/DL (ref 3.2–4.8)
ALP LIVER SERPL-CCNC: 125 U/L
ALT SERPL-CCNC: 39 U/L
ANION GAP SERPL CALC-SCNC: 22 MMOL/L (ref 0–18)
ANTIBODY SCREEN: NEGATIVE
APTT PPP: 32 SECONDS (ref 23–36)
APTT PPP: 94.1 SECONDS (ref 23–36)
AST SERPL-CCNC: 98 U/L (ref ?–34)
BASOPHILS # BLD: 0 X10(3) UL (ref 0–0.2)
BASOPHILS NFR BLD: 0 %
BILIRUB DIRECT SERPL-MCNC: 0.2 MG/DL (ref ?–0.3)
BILIRUB SERPL-MCNC: 0.4 MG/DL (ref 0.3–1.2)
BILIRUB UR QL: NEGATIVE
BUN BLD-MCNC: 37 MG/DL (ref 9–23)
BUN/CREAT SERPL: 22.2 (ref 10–20)
CA-I BLD-SCNC: 1.07 MMOL/L (ref 0.95–1.32)
CALCIUM BLD-MCNC: 7.9 MG/DL (ref 8.7–10.4)
CHLORIDE SERPL-SCNC: 91 MMOL/L (ref 98–112)
CO2 SERPL-SCNC: 17 MMOL/L (ref 21–32)
COHGB MFR BLD: 2.3 % (ref 0–3)
COLOR UR: YELLOW
CREAT BLD-MCNC: 1.67 MG/DL
DEPRECATED RDW RBC AUTO: 48 FL (ref 35.1–46.3)
EGFRCR SERPLBLD CKD-EPI 2021: 36 ML/MIN/1.73M2 (ref 60–?)
EOSINOPHIL # BLD: 0 X10(3) UL (ref 0–0.7)
EOSINOPHIL NFR BLD: 0 %
ERYTHROCYTE [DISTWIDTH] IN BLOOD BY AUTOMATED COUNT: 17.9 % (ref 11–15)
GLUCOSE BLD-MCNC: 84 MG/DL (ref 70–99)
GLUCOSE UR-MCNC: NORMAL MG/DL
HCT VFR BLD AUTO: 22.1 %
HCT VFR BLD AUTO: 25.2 %
HGB BLD-MCNC: 7.5 G/DL
HGB BLD-MCNC: 8.3 G/DL
HGB BLD-MCNC: 8.8 G/DL
HGB UR QL STRIP.AUTO: NEGATIVE
KETONES UR-MCNC: NEGATIVE MG/DL
LACTATE BLD-SCNC: 13.2 MMOL/L (ref 0.5–2)
LACTATE SERPL-SCNC: 11.6 MMOL/L (ref 0.5–2)
LACTATE SERPL-SCNC: 4.1 MMOL/L (ref 0.5–2)
LACTATE SERPL-SCNC: 7 MMOL/L (ref 0.5–2)
LEUKOCYTE ESTERASE UR QL STRIP.AUTO: NEGATIVE
LIPASE SERPL-CCNC: 29 U/L (ref 12–53)
LYMPHOCYTES NFR BLD: 0.24 X10(3) UL (ref 1–4)
LYMPHOCYTES NFR BLD: 3 %
MAGNESIUM SERPL-MCNC: 1.6 MG/DL (ref 1.6–2.6)
MCH RBC QN AUTO: 28.3 PG (ref 26–34)
MCHC RBC AUTO-ENTMCNC: 32.9 G/DL (ref 31–37)
MCV RBC AUTO: 86 FL
METAMYELOCYTES # BLD: 0.08 X10(3) UL
METAMYELOCYTES NFR BLD: 1 %
METHGB MFR BLD: 1.3 % SAT (ref 0.4–1.5)
MODIFIED ALLEN TEST: POSITIVE
MONOCYTES # BLD: 0 X10(3) UL (ref 0.1–1)
MONOCYTES NFR BLD: 0 %
NEUTROPHILS # BLD AUTO: 7.42 X10 (3) UL (ref 1.5–7.7)
NEUTROPHILS NFR BLD: 86 %
NEUTS BAND NFR BLD: 10 %
NEUTS HYPERSEG # BLD: 7.78 X10(3) UL (ref 1.5–7.7)
NITRITE UR QL STRIP.AUTO: NEGATIVE
NT-PROBNP SERPL-MCNC: 1225 PG/ML (ref ?–125)
O2 CT BLD-SCNC: 11.3 VOL% (ref 15–23)
OSMOLALITY SERPL CALC.SUM OF ELEC: 278 MOSM/KG (ref 275–295)
OXYGEN LITERS/MINUTE: 4 L/MIN
PCO2 BLDA: 21 MM HG (ref 35–45)
PH BLDA: 7.5 [PH] (ref 7.35–7.45)
PH UR: 5.5 [PH] (ref 5–8)
PLATELET # BLD AUTO: 338 10(3)UL (ref 150–450)
PLATELET MORPHOLOGY: NORMAL
PO2 BLDA: 62 MM HG (ref 80–100)
POTASSIUM BLD-SCNC: 3.2 MMOL/L (ref 3.6–5.1)
POTASSIUM SERPL-SCNC: 3.4 MMOL/L (ref 3.5–5.1)
PROT SERPL-MCNC: 5.4 G/DL (ref 5.7–8.2)
PROT UR-MCNC: 20 MG/DL
PUNCTURE CHARGE: YES
RBC # BLD AUTO: 2.93 X10(6)UL
RH BLOOD TYPE: NEGATIVE
SAO2 % BLDA: 94.2 % (ref 94–100)
SODIUM BLD-SCNC: 127 MMOL/L (ref 135–145)
SODIUM SERPL-SCNC: 130 MMOL/L (ref 136–145)
SP GR UR STRIP: >1.03 (ref 1–1.03)
TOTAL CELLS COUNTED BLD: 100
TROPONIN I SERPL HS-MCNC: 27 NG/L
TSI SER-ACNC: 1.95 UIU/ML (ref 0.55–4.78)
UROBILINOGEN UR STRIP-ACNC: 4
WBC # BLD AUTO: 8.1 X10(3) UL (ref 4–11)

## 2025-04-05 PROCEDURE — 93306 TTE W/DOPPLER COMPLETE: CPT | Performed by: HOSPITALIST

## 2025-04-05 PROCEDURE — 5A09357 ASSISTANCE WITH RESPIRATORY VENTILATION, LESS THAN 24 CONSECUTIVE HOURS, CONTINUOUS POSITIVE AIRWAY PRESSURE: ICD-10-PCS | Performed by: INTERNAL MEDICINE

## 2025-04-05 PROCEDURE — 99291 CRITICAL CARE FIRST HOUR: CPT | Performed by: NURSE PRACTITIONER

## 2025-04-05 PROCEDURE — 71045 X-RAY EXAM CHEST 1 VIEW: CPT | Performed by: EMERGENCY MEDICINE

## 2025-04-05 PROCEDURE — 71260 CT THORAX DX C+: CPT | Performed by: EMERGENCY MEDICINE

## 2025-04-05 RX ORDER — ONDANSETRON 2 MG/ML
4 INJECTION INTRAMUSCULAR; INTRAVENOUS EVERY 6 HOURS PRN
Status: DISCONTINUED | OUTPATIENT
Start: 2025-04-05 | End: 2025-04-14

## 2025-04-05 RX ORDER — HEPARIN SODIUM AND DEXTROSE 10000; 5 [USP'U]/100ML; G/100ML
INJECTION INTRAVENOUS CONTINUOUS
Status: DISCONTINUED | OUTPATIENT
Start: 2025-04-05 | End: 2025-04-11

## 2025-04-05 RX ORDER — SENNOSIDES 8.6 MG
17.2 TABLET ORAL NIGHTLY PRN
Status: DISCONTINUED | OUTPATIENT
Start: 2025-04-05 | End: 2025-04-14

## 2025-04-05 RX ORDER — MAGNESIUM SULFATE HEPTAHYDRATE 40 MG/ML
2 INJECTION, SOLUTION INTRAVENOUS ONCE
Status: COMPLETED | OUTPATIENT
Start: 2025-04-05 | End: 2025-04-05

## 2025-04-05 RX ORDER — POLYETHYLENE GLYCOL 3350 17 G/17G
17 POWDER, FOR SOLUTION ORAL DAILY PRN
Status: DISCONTINUED | OUTPATIENT
Start: 2025-04-05 | End: 2025-04-14

## 2025-04-05 RX ORDER — DEXMEDETOMIDINE HYDROCHLORIDE 4 UG/ML
INJECTION, SOLUTION INTRAVENOUS CONTINUOUS
Status: DISCONTINUED | OUTPATIENT
Start: 2025-04-05 | End: 2025-04-06

## 2025-04-05 RX ORDER — HEPARIN SODIUM 1000 [USP'U]/ML
80 INJECTION, SOLUTION INTRAVENOUS; SUBCUTANEOUS ONCE
Status: COMPLETED | OUTPATIENT
Start: 2025-04-05 | End: 2025-04-05

## 2025-04-05 RX ORDER — BACLOFEN 10 MG/1
10 TABLET ORAL 3 TIMES DAILY PRN
COMMUNITY

## 2025-04-05 RX ORDER — ACETAMINOPHEN 500 MG
500 TABLET ORAL EVERY 4 HOURS PRN
Status: DISCONTINUED | OUTPATIENT
Start: 2025-04-05 | End: 2025-04-14

## 2025-04-05 RX ORDER — FAMOTIDINE 20 MG/1
20 TABLET, FILM COATED ORAL 2 TIMES DAILY
COMMUNITY

## 2025-04-05 RX ORDER — DILTIAZEM HYDROCHLORIDE 5 MG/ML
10 INJECTION INTRAVENOUS ONCE
Status: COMPLETED | OUTPATIENT
Start: 2025-04-05 | End: 2025-04-05

## 2025-04-05 RX ORDER — OLAPARIB 150 MG/1
2 TABLET, FILM COATED ORAL 2 TIMES DAILY
COMMUNITY

## 2025-04-05 RX ORDER — SODIUM PHOSPHATE, DIBASIC AND SODIUM PHOSPHATE, MONOBASIC 7; 19 G/230ML; G/230ML
1 ENEMA RECTAL ONCE AS NEEDED
Status: DISCONTINUED | OUTPATIENT
Start: 2025-04-05 | End: 2025-04-14

## 2025-04-05 RX ORDER — HEPARIN SODIUM AND DEXTROSE 10000; 5 [USP'U]/100ML; G/100ML
18 INJECTION INTRAVENOUS ONCE
Status: COMPLETED | OUTPATIENT
Start: 2025-04-05 | End: 2025-04-05

## 2025-04-05 RX ORDER — ONDANSETRON 2 MG/ML
4 INJECTION INTRAMUSCULAR; INTRAVENOUS ONCE
Status: COMPLETED | OUTPATIENT
Start: 2025-04-05 | End: 2025-04-05

## 2025-04-05 RX ORDER — PROCHLORPERAZINE EDISYLATE 5 MG/ML
5 INJECTION INTRAMUSCULAR; INTRAVENOUS EVERY 8 HOURS PRN
Status: DISCONTINUED | OUTPATIENT
Start: 2025-04-05 | End: 2025-04-14

## 2025-04-05 RX ORDER — BISACODYL 10 MG
10 SUPPOSITORY, RECTAL RECTAL
Status: DISCONTINUED | OUTPATIENT
Start: 2025-04-05 | End: 2025-04-14

## 2025-04-05 RX ORDER — VANCOMYCIN HYDROCHLORIDE
15 EVERY 24 HOURS
Status: DISCONTINUED | OUTPATIENT
Start: 2025-04-05 | End: 2025-04-07

## 2025-04-05 NOTE — PROGRESS NOTES
Critical Care    Called for blood in emesis.    Awake alert on bipap.  Nausea resolved after vomiting coffee ground emesis.    BP 95/58 (BP Location: Left arm)   Pulse 109   Temp 98.8 °F (37.1 °C) (Temporal)   Resp (!) 41   Wt 177 lb 7.5 oz (80.5 kg)   LMP 12/22/2020   SpO2 98%   BMI 27.80 kg/m²      CV S1 S2 regular tachy  Lungs decreased BS R>L  Abdomen soft non tender  Ext trace LE edema  Skin pale warm dry    A/P Metastatic ovarian Ca w/ sepsis possible aspiration PNA and recurrent malignant pleural effusion.  H/o DVT Eliquis was held and transitioned to IV Heparin in anticipation of thoracentesis.    Called w/ nausea and small volume hematemesis.  IV Heparin stopped.  She received Compazine w/ resolution of nausea. RT came to bedside and changed BiPAP to HFNC given risk of aspiration.  She is maintaining Sa02 97% on 3 liters though tachypneic w/ RR mid 30s.    H/H sent, Protonix 40 mg IV BID added.  I updated Dr. Barba on call for Dr. Sherman.  I also spoke to Dr. Bates.    I did speak w/ patient and family at bedside regarding code status that has been addressed by Harriett Sherman  and Paulo.  She was undecided after the conversations and still undecided about intubation mechanical ventilation.  3 adult children at bedside including POA daughter Ami.  I reviewed that she is currently maintaining good 02 sat on NC but concern if she has recurrent vomiting or worsening respiratory status may progress to need for intubation.  One of family will be here overnight.    Meseret Rodríguez NP  Critical Care  40 minutes    Addendum:  Hemoglobin 7.5 will repeat in 6 hours and AM.

## 2025-04-05 NOTE — PROGRESS NOTES
[facility] Pharmacy Dosing Service      Initial Pharmacokinetic Consult for Vancomycin Dosing     Yue Malin is a 54 year old female who is being initiated on vancomycin therapy for sepsis.  Pharmacy has been asked to dose vancomycin by Dr Sherman. The initial treatment and monitoring approach will be non-AUC strategy.        Weight and Temperature:    Wt Readings from Last 1 Encounters:   25 80.5 kg (177 lb 7.5 oz)        Temp Readings from Last 1 Encounters:   25 98.8 °F (37.1 °C) (Temporal)      Labs:   Recent Labs   Lab 25  1019   CREATSERUM 1.67*      Estimated Creatinine Clearance: 37 mL/min (A) (based on SCr of 1.67 mg/dL (H)).     Recent Labs   Lab 25  1018   WBC 8.1          The Pharmacokinetic Target is:    Trough/random 10-15 mg/L    Renal Dosing Considerations:    MEGAN/ARF     Assessment/Plan:   Initial/Loading dose: Will receive 1250 mg IV (15 mg/kg, capped at 2250 mg) x 1 initial dose.      Maintenance dose: Pharmacy will dose vancomycin at 1250 mg IV every 24 hours    Monitorin) Plan for vancomycin trough to be obtained at steady state    2) Pharmacy will order SCr as clinically indicated to assess renal function.    3) Pharmacy will monitor for toxicity and efficacy, adjust vancomycin dose and/or frequency, and order vancomycin levels as appropriate per the Pharmacy and Therapeutics Committee approved protocol until discontinuation of the medication.       We appreciate the opportunity to assist in the care of this patient.     Esteban Rivas, Rick  2025  4:10 PM  [facility] Pharmacy Extension: [phone]

## 2025-04-05 NOTE — ED INITIAL ASSESSMENT (HPI)
Patient presents to the ED c/o SOB since yesterday. Pt reports nausea/vomiting x one week.  Denies fevers. Denies cough.   Pt appears visibly labored and tachypneic. O2 saturation is 89% on room air.   Currently undergoing treatment for Ovarian cancer.

## 2025-04-05 NOTE — PROGRESS NOTES
[facility] Pharmacy Dosing Service  Antibiotic Dosing    Yue Malin is a 54 year old for whom pharmacy was consulted to dose piperacillin/tazobactam (ZOSYN) for treatment of sepsis.    CrCl: estimated creatinine clearance is 37 mL/min (A) (based on SCr of 1.67 mg/dL (H)).    Actual Body Weight:   Wt Readings from Last 1 Encounters:   04/05/25 80.5 kg (177 lb 7.5 oz)    Ideal body weight: 61.6 kg (135 lb 12.9 oz)  Adjusted ideal body weight: 69.2 kg (152 lb 7.5 oz)   Body mass index is 27.8 kg/m².    piperacillin/tazobactam (ZOSYN) has been ordered per P&T approved protocol.    Thank you,   Esteban Rivas, PharmD  4/5/2025  4:19 PM

## 2025-04-05 NOTE — CONSULTS
Critical Care H&P/Consult     NAME: Yue Malin - ROOM: 36/36 - MRN: [id number] - Age: 54 year old - :  1970    Date of Admission: 2025  9:46 AM  Admission Diagnosis: No admission diagnoses are documented for this encounter.    Assessment/Plan:  Acute respiratory failure - from possible aspiration and recurrent MPE  - cont bipap, tolerating currently, but remains at risk for intubation  - precedex gtt prn  Septic Shock - suspected PNA  - s/p IVF resuscitation  - wean pressors as able  - trend LA to clear  - broaden to zosyn/vanc  - f/u cultures. PCT elevated in the context of active malignancy  MPE  - needs thoracentesis, last eliquis this morning and currently on heparin gtt  - reviewed risks of bleeding vs resp failure at length with family at bedside. They favor waiting for anticoagulation risk to resolve before proceeding with a thoracentesis. They understand the risk of further decompensation and intubation.   - will hold heparin at MN and readdress in the morning  - at this point, there is a small loculated component so would favor a diagnostic thora prior to consideration of an indwelling pleural catheter  DVT  - on eliquis, last dose this AM. Currently on heparin gtt in ED which we will hold  - given the anticipation of frequent interruption in anticoagulation due to procedures, would be reasonable to consider and IVC filter  Anemia - she is close to her basline  -  no overt bleeding, monitor  Afib w/RVR  - per cars  Ovarian adenocarcimona, metastatic  - per outpt onc  FEN  - NPO  Ppx  - SCT and anticoagulation as above  Dispo - full code. Family declined to address code status. Reviewed with Dr. Bates.  - ICU, at risk for decompnesation.     Discussed at length with patient and family.    Critical Care Time greater than: 1 Hour    Aye Sherman M.D.  Pulmonary and Critical Care Medicine  [facility]       History of Present Illness:   Yue Malin is a 54 year old  never-smoker with metastatic ovarian cancer on avastin/lynparza c/b DVT on eliquis and recurrent malignant ascites/MPE who is admitted with weakness and emesis. One day of poor po intake and emesis with progressive dyspnea and generalized weakness. In the ED was initially normotensive with afib w/RVR for which she was started on a dilt gtt. Subsequent blood pressures were hypotensive for which she was given a fluid bolus and started on pressors along with bipap.     Past Medical History:    Anxiety state    BRCA gene mutation negative in female    Negative 70 gene hereditary cancer panel ([employer]), x1 VUS, report in media tab    DEPRESSION    Depression    Esophageal reflux    HPV in female    Hypertriglyceridemia    OCD (obsessive compulsive disorder)    Ovarian cancer (HCC)    s/p NAC, surgery, adj chemo    Personal history of antineoplastic chemotherapy    PONV (postoperative nausea and vomiting)    PTSD (post-traumatic stress disorder)    SEASONAL ALLERGIES    Visual impairment    glasses    Vitamin B 12 deficiency    Vitamin D deficiency     Past Surgical History:   Procedure Laterality Date          x3    Implant left  1999    Implant left  2009    implants replaced    Implant right  1999    Implant right  2009    implants replaced    Other surgical history   &2006    breast implants, deflation    Other surgical history  2007    carpal tunnel R hand    Total abdom hysterectomy      2024     Social History:  Social History     Tobacco Use    Smoking status: Never    Smokeless tobacco: Never   Substance Use Topics    Alcohol use: Not Currently     Comment: rare     Family History:  She indicated that her mother is alive. She indicated that her father is . She indicated that three of her four sisters are alive. She indicated that both of her brothers are alive. She indicated that her maternal grandmother is . She indicated that her maternal grandfather is . She indicated  that her paternal grandmother is . She indicated that her paternal grandfather is . She indicated that her daughter is alive. She indicated that both of her sons are alive. She indicated that her paternal aunt is . She indicated that her maternal uncle is . She indicated that her paternal cousin female is alive.      Allergies:Allergies[1]    Current Outpatient Medications:     apixaban 5 MG Oral Tab, Take 1 tablet (5 mg total) by mouth 2 (two) times daily., Disp: , Rfl:     baclofen 10 MG Oral Tab, Take 1 tablet (10 mg total) by mouth 3 (three) times daily as needed., Disp: , Rfl:     famotidine 20 MG Oral Tab, Take 1 tablet (20 mg total) by mouth 2 (two) times daily., Disp: , Rfl:     Olaparib (LYNPARZA) 150 MG Oral Tab, Take 2 tablets by mouth 2 (two) times daily., Disp: , Rfl:     docusate sodium 100 MG Oral Cap, Take 100 mg by mouth 2 (two) times daily., Disp: 60 capsule, Rfl: 0    LORazepam 1 MG Oral Tab, Take 1 tablet (1 mg total) by mouth 3 (three) times daily as needed for Anxiety., Disp: , Rfl:     acetaminophen 325 MG Oral Tab, Take 1-2 tablets (325-650 mg total) by mouth every 6 (six) hours as needed for Pain., Disp: , Rfl:    norepinephrine (Levophed) 4 mg/250mL infusion premix  0.5-50 mcg/min Intravenous Continuous    [COMPLETED] sodium chloride 0.9 % IV bolus 1,000 mL  1,000 mL Intravenous Once    [] sodium chloride 0.9 % IV bolus 1,848 mL  30 mL/kg (Ideal) Intravenous Continuous    [COMPLETED] iopamidol 76% (ISOVUE-370) injection for power injector  80 mL Intravenous ONCE PRN    [COMPLETED] heparin (Porcine) 1000 UNIT/ML injection - BOLUS IV 6,200 Units  80 Units/kg Intravenous Once    heparin (Porcine) 54239 units/250 mL infusion ED (PE/DVT/THROMBUS) INITIAL DOSE  18 Units/kg/hr Intravenous Once    heparin (Porcine) 06495 units/250mL infusion PE/DVT/THROMBUS CONTINUOUS  200-3,000 Units/hr Intravenous Continuous    [COMPLETED] ondansetron (Zofran) 4 MG/2ML  injection 4 mg  4 mg Intravenous Once    [COMPLETED] ampicillin-sulbactam (Unasyn) 3 g in sodium chloride 0.9% 100mL IVPB-ADD  3 g Intravenous Once    [COMPLETED] dilTIAZem (cardIZEM) 25 mg/5mL injection 10 mg  10 mg Intravenous Once    dilTIAZem 10 mg BOLUS FROM BAG infusion  10 mg Intravenous Q1H PRN    dilTIAZem (cardIZEM) 100 mg in sodium chloride 0.9% 100 mL IVPB-ADDV  2.5-20 mg/hr Intravenous Continuous    lactated ringers IV bolus 1,000 mL  1,000 mL Intravenous Once    vasopressin (Vasostrict) 20 Units in sodium chloride 0.9% 100 mL infusion for non-septic shock  0.04 Units/min Intravenous Continuous      norepinephrine 30 mcg/min (04/05/25 1155)    continuous dose heparin      dilTIAZem Stopped (04/05/25 1235)    vasopressin (Vasostrict) 20 Units in sodium chloride 0.9% 100 mL infusion for non-septic shock       Review of systems:  12 systems reviewed, negative except as stated in HPI    Objective:  Intake/Output Summary (Last 24 hours) at 4/5/2025 1457  Last data filed at 4/5/2025 1248  Gross per 24 hour   Intake 1109.5 ml   Output --   Net 1109.5 ml   FiO2 (%):  [40 %] 40 %  /64   Pulse 107   Temp 97.9 °F (36.6 °C) (Temporal)   Resp (!) 38   Wt 169 lb 15.6 oz (77.1 kg)   LMP 12/22/2020   SpO2 98%   BMI 26.62 kg/m²   Physical Exam:   General: alert   HEENT: on bipap   Lungs: diminished at the bases   Chest wall: No tenderness or deformity.   Heart: Regular rate and rhythm, normal S1S2, no murmur.   Abdomen: soft, non-tender, non-distended, positive BS.   Extremity: No clubbing or cyanosis. + edema   Skin: No rashes or lesions.   Neuro: no focal weakness    Recent Labs   Lab 04/05/25  1018   RBC 2.93*   HGB 8.3*   HCT 25.2*   MCV 86.0   MCH 28.3   MCHC 32.9   RDW 17.9*   NEPRELIM 7.42   WBC 8.1   .0     Recent Labs   Lab 04/05/25  1019   GLU 84   BUN 37*   CREATSERUM 1.67*   CA 7.9*   ALB 3.0*   *   K 3.4*   CL 91*   CO2 17.0*   ALKPHO 125*   AST 98*   ALT 39   BILT 0.4   TP 5.4*      Recent Labs   Lab 04/05/25  1015   ABGPHT 7.50*   TJMWYN8F 21*   ZYKZL8K 62*   SITE Right Radial   THGB 8.8*     Recent Labs   Lab 04/05/25  1019   GLU 84   BUN 37*   CREATSERUM 1.67*   CA 7.9*   *   K 3.4*   CL 91*   CO2 17.0*     No results for input(s): \"TROP\", \"CK\" in the last 168 hours.    Imaging: I independently visualized all relevant chest imaging in PACS, agree with radiology interpretation except where noted.        [1]   Allergies  Allergen Reactions    Hydrocodone NAUSEA AND VOMITING     agitation    Adhesive Tape RASH

## 2025-04-05 NOTE — ED PROVIDER NOTES
Patient Seen in: [facility] Emergency Department    History     Chief Complaint   Patient presents with    Shortness Of Breath     Stated Complaint: trouble breathing, cancer pt     HPI    54-year-old female with past medical history of metastatic ovarian cancer on adjuvant chemotherapy presenting for evaluation with ongoing and worsening shortness of breath since recent thoracentesis several weeks ago with acute worsening of symptoms over the past day associated with nausea/vomiting and cough.  No chest pain.  No fevers or chills.  No vomiting or diarrhea.  Arrives tachypneic, conversationally dyspneic and pale with HRof 130 and oxygenation of 89%.  EMR reviewed, 3/10/2025 PET scan notable for right pleural tissue nodularity concerning for metastases associated bilateral pleural effusions now status post 3/14 thoracentesis with 1300 mL drained. No prior history of similar.    Past Medical History:    Anxiety state    BRCA gene mutation negative in female    Negative 70 gene hereditary cancer panel ([employer]), x1 VUS, report in media tab    DEPRESSION    Depression    Esophageal reflux    HPV in female    Hypertriglyceridemia    OCD (obsessive compulsive disorder)    Ovarian cancer (HCC)    s/p NAC, surgery, adj chemo    Personal history of antineoplastic chemotherapy    PONV (postoperative nausea and vomiting)    PTSD (post-traumatic stress disorder)    SEASONAL ALLERGIES    Visual impairment    glasses    Vitamin B 12 deficiency    Vitamin D deficiency       Past Surgical History:   Procedure Laterality Date          x3    Implant left  1999    Implant left  2009    implants replaced    Implant right      Implant right  2009    implants replaced    Other surgical history   &    breast implants, deflation    Other surgical history      carpal tunnel R hand    Total abdom hysterectomy      2024            Family History   Problem Relation Age of Onset    Breast Cancer Mother 50     Diabetes Father     Heart Disorder Father     Other (Other) Father         rheumatoid arthritis    Thyroid Cancer Sister 30    Breast Cancer Sister 52         at 52y dt cancer    Stroke Paternal Grandfather 50    Breast Cancer Paternal Aunt         dx >50y    Ovarian Cancer Paternal Cousin Female         dx 40s    Cancer Maternal Uncle         lung cancer dx >50y       Social History     Socioeconomic History    Marital status:    Tobacco Use    Smoking status: Never    Smokeless tobacco: Never   Vaping Use    Vaping status: Never Used   Substance and Sexual Activity    Alcohol use: Not Currently     Comment: rare    Drug use: No    Sexual activity: Not Currently     Partners: Male     Comment: 2021     Social Drivers of Health     Food Insecurity: No Food Insecurity (2024)    Food Insecurity     Food Insecurity: Never true   Transportation Needs: No Transportation Needs (2024)    Transportation Needs     Lack of Transportation: No   Housing Stability: Low Risk  (2024)    Housing Stability     Housing Instability: No       Review of Systems : LIMITED BY CLINICAL CONDITION  Constitutional: As per HPI  Respiratory: (+) cough and shortness of breath.    Cardiovascular: Negative for chest pain and palpitations.   Gastrointestinal: (+) vomiting, negative for abdominal pain.    Positive for stated complaint: trouble breathing, cancer pt  Other systems are as noted in HPI.  Constitutional and vital signs reviewed.      All other systems reviewed and negative except as noted above.    PSFH elements reviewed from today and agreed except as otherwise stated in HPI.    Physical Exam     ED Triage Vitals [25 0936]   /79   Pulse (!) 130   Resp (!) 33   Temp 97.9 °F (36.6 °C)   Temp src Temporal   SpO2 (!) 89 %   O2 Device None (Room air)       Current:/79   Pulse (!) 130   Temp 97.9 °F (36.6 °C) (Temporal)   Resp (!) 33   Wt 77.1 kg   LMP 2020   SpO2 (!) 89%   BMI  26.62 kg/m²         Physical Exam   Constitutional: Chronically ill-appearing, tachypneic, pale.  HEENT: Dry MM.  Head: Normocephalic. Atraumatic.  Eyes: No injection.   Cardiovascular: Tachycardic.  Pulmonary/Chest: Tachypneic with diminished basilar aeration and conversationally dyspneic.  Abdominal: Soft. Nontender.  Musculoskeletal: No gross deformity. BLE without calf tenderness or edema.  Neurological: Alert.  Skin: Skin is warm.   Psychiatric: Cooperative.  Nursing note and vitals reviewed.        ED Course     Labs Reviewed   CBC WITH DIFFERENTIAL WITH PLATELET - Abnormal; Notable for the following components:       Result Value    RBC 2.93 (*)     HGB 8.3 (*)     HCT 25.2 (*)     RDW-SD 48.0 (*)     RDW 17.9 (*)     All other components within normal limits   BASIC METABOLIC PANEL (8) - Abnormal; Notable for the following components:    Sodium 130 (*)     Potassium 3.4 (*)     Chloride 91 (*)     CO2 17.0 (*)     Anion Gap 22 (*)     BUN 37 (*)     Creatinine 1.67 (*)     BUN/CREA Ratio 22.2 (*)     Calcium, Total 7.9 (*)     eGFR-Cr 36 (*)     All other components within normal limits   PRO BETA NATRIURETIC PEPTIDE - Abnormal; Notable for the following components:    Pro-Beta Natriuretic Peptide 1,225 (*)     All other components within normal limits   EXPANDED BLOOD GAS, ARTERIAL - Abnormal; Notable for the following components:    ABG pH 7.50 (*)     ABG pCO2 21 (*)     ABG PO2 62 (*)     Potassium Blood Gas 3.2 (*)     Sodium Blood Gas 127 (*)     Lactic Acid (Blood Gas) 13.2 (*)     Total Hemoglobin 8.8 (*)     Oxygen Content 11.3 (*)     All other components within normal limits   LACTIC ACID, PLASMA - Abnormal; Notable for the following components:    Lactic Acid 11.6 (*)     All other components within normal limits   HEPATIC FUNCTION PANEL (7) - Abnormal; Notable for the following components:    AST 98 (*)     Alkaline Phosphatase 125 (*)     Total Protein 5.4 (*)     Albumin 3.0 (*)     All other  components within normal limits   MANUAL DIFFERENTIAL - Abnormal; Notable for the following components:    Neutrophil Absolute Manual 7.78 (*)     Lymphocyte Absolute Manual 0.24 (*)     Monocyte Absolute Manual 0.00 (*)     Metamyelocyte Absolute Manual 0.08 (*)     RBC Morphology See morphology below (*)     All other components within normal limits   TROPONIN I HIGH SENSITIVITY - Normal   PTT, ACTIVATED - Normal   MAGNESIUM - Normal   TSH W REFLEX TO FREE T4 - Normal   LIPASE - Normal   SCAN SLIDE   LACTIC ACID REFLEX POST POSTIVE   TYPE AND SCREEN    Narrative:     The following orders were created for panel order Type and screen.  Procedure                               Abnormality         Status                     ---------                               -----------         ------                     ABORH (Blood Type)[[id number]]                               Final result               Antibody Screen[[id number]]                                  Final result                 Please view results for these tests on the individual orders.   ABORH (BLOOD TYPE)   ANTIBODY SCREEN   RAINBOW DRAW LAVENDER   RAINBOW DRAW LIGHT GREEN   RAINBOW DRAW BLUE   BLOOD CULTURE   BLOOD CULTURE     XR CHEST AP PORTABLE  (CPT=71045)    Result Date: 4/5/2025  PROCEDURE: XR CHEST AP PORTABLE  (CPT=71045)  COMPARISON: [facility], XR CHEST AP PORTABLE (CPT=71045), 8/15/2024, 1:30 AM.  INDICATIONS: Difficulty in breathing.  Findings and impression:  Normal heart size.  Well-positioned port a catheter.  Moderate right basilar opacity and effusion  Small left basilar opacity and effusion  No pneumothorax Finalized by (CST): Rodrigo Caldera MD on 4/05/2025 at 11:51 AM          CT CHEST PE AORTA (IV ONLY) (CPT=71260)    Result Date: 4/5/2025  PROCEDURE: CT CHEST PE AORTA (IV ONLY) (CPT=71260)  COMPARISON: None.  INDICATIONS: trouble breathing, cancer pt  TECHNIQUE: CT images of the chest were obtained with non-ionic intravenous  contrast material.  Automated exposure control for dose reduction was used. Adjustment of the mA and/or kV was done based on the patient's size. Use of iterative reconstruction technique for dose reduction was used. Dose information is transmitted to the ACR (American College of Radiology) NRDR (National Radiology Data Registry) which includes the Dose Index Registry.  FINDINGS:  No acute aortic syndrome  No PE  Normal heart size.  No pericardial effusion.  No coronary calcification  Esophagus is filled with fluid from the GE junction to the thoracic inlet and dilated to 3.5 centimeter  Small volume left free-flowing effusion.  Moderate volume right effusion loculated at the lung base  Numerous subcentimeter anterior mediastinal and right cardiophrenic angle lymph nodes  Hanna catheter lower SVC  Passive bibasilar atelectasis due to effusions  3 millimeter micro nodule in the right upper lobe  Upper abdomen shows moderate fluid throughout the stomach  Impression:  No PE  Moderate volume loculated right effusion.  Small free-flowing left effusion.  Passive atelectasis due to effusion  Moderate fluid-filled stomach and diffuse fluid-filled dilated intrathoracic esophagus Finalized by (CST): Rodrigo Caldera MD on 4/05/2025 at 10:50 AM           EKG    Rate, intervals and axes as noted on EKG Report.  Rate: 134  Rhythm: Sinus Rhythm  Reading: sinus tach 134 without CHELITA as independently interpreted by myself     EKG    Rate, intervals and axes as noted on EKG Report.  Rate: 173   Rhythm: SVT/AF   Reading: SVT/ without CHELITA as independently interpreted by myself               [facility]  part of [facility]      Sepsis Reassessment Note    /66   Pulse 78   Temp 97.9 °F (36.6 °C) (Temporal)   Resp (!) 41   Wt 77.1 kg   LMP 12/22/2020   SpO2 98%   BMI 26.62 kg/m²      I completed the sepsis reassessment at 1103    Cardiac:  Regularity: Regular  Rate: Tachycardic  Heart Sounds:  S1,S2    Lungs:   Right: Diminished  Left: Diminished    Peripheral Pulses:  Radial: Right 1+ or Left 1+      Capillary Refill:  <3 Secs    Skin:  Temp/Moisture: Warm and Dry  Color: Normal      Baldemar Tolliver MD  4/5/2025  11:03 AM        ED Course as of 04/05/25 1342  ------------------------------------------------------------  Time: 04/05 1001  Comment: Bedside US without obvious effusion or RV strain, CT chest pending PIV placement.  ------------------------------------------------------------  Time: 04/05 1019  Comment: ABG as noted with patient in CT BL (R>L) pleural effusion noted without obvious PE.  ------------------------------------------------------------  Time: 04/05 1033  Comment: Case d/w [facility] intensivist/pulmonary Dr. Sherman and hospitalist Dr. Bates for admission; improved on return from CT with IVF/vasopressor therapy and BiPAP.  ------------------------------------------------------------  Time: 04/05 1114  Comment: Resting comfortably, clinically improving with meds/NIPPV, pressor requirement as noted with patient/family updated regarding findings/admission plan of care.  ------------------------------------------------------------  Time: 04/05 1236  Comment: BP as noted with increasing vasopressor requirement, patient resting comfortably with family/hospitalist/intensivist updated.       MDM   DIFFERENTIAL DIAGNOSIS: After history and physical exam differential diagnosis includes but is not limited to aspiration, saddle/massive PE, effusion/empyema, sepsis, bacteremia, cardiogenic shock.    Pulse ox: (!) 89%:Abnormal and Improved after treatment on NC/BiPAP, as independently interpreted by myself    Cardiac Monitor Interpretation:   Pulse Readings from Last 1 Encounters:   04/05/25 (!) 130   , sinus,      Medical Decision Making  Evaluation for nausea/vomiting associate cough for past week in setting of being status post right sided thoracentesis with patent scan demonstrating pleural  nodularity in setting of prior DVT history on Eliquis without overt complaints aside from dyspnea/cough and with soft/nontender abdomen.  Patient noted to be tachycardic and hypoxic/hypotensive for which IV fluids/vasopressor therapy emergently initiated without obvious BSUS/POCUS evidence for RV strain/effusion with CT emergently obtained without obvious evidence for saddle pulmonary embolism/pneumothorax though with bilateral pleural effusions noted for which BiPAP thereafter initiated.  Lactic acidosis likely combination of hypoxia/hypotension, Unasyn initiated for possible likely aspiration in setting of CT demonstrating loculated effusion with consideration for empyema; case d/w [facility] hospitalist/intensivist Harriett Bates/[facility] for admission and IR Dr. Holley with consideration for pleurex and will admit to CCU; ED course with paroxysmal AF/SVT requiring brief course of diltiazem, thereafter with conversion to NSR; vasopressin ordered as needed given uptitration in vasopressor therapy with intensivist/family/admitting service aware of same in addition to plan/consideration for Pleurex placement.    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     Details: 3/10/2025 PET scan with right pleural nodule, 3/14 CXR with right pleural effusion; 3/4/2025 paracentesisi fluid cytology, 5/7/2024 EKG report reviewed  Labs: ordered. Decision-making details documented in ED Course.  Radiology: ordered and independent interpretation performed. Decision-making details documented in ED Course.     Details: CXR and CT chest without obvious pneumothorax as independently interpreted by myself    ECG/medicine tests: ordered and independent interpretation performed. Decision-making details documented in ED Course.  Discussion of management or test interpretation with external provider(s): Case d/w [facility] hospitalist/intensivist Harriett Bates/[facility], IR Dr. Holley    Risk  Prescription drug management.  Decision regarding hospitalization.  Minor surgery with identified risk factors.    Critical Care  Total time providing critical care: 77 minutes      A total of 77 minutes of critical care time (exclusive of billable procedures) was administered to manage the patient's critical lab values, unstable vital signs, respiratory instability, and cardiovascular instability due to her metastatic ovarian cancer complicated by recurrent pleural effusion, acute hypoxemic respiratory failure, hypotension, lactic acidosis/acute kidney injury.  This involved direct patient intervention, complex decision making, and/or extensive discussions with the patient, family, and clinical staff.      I was wearing at minimum a facemask and eye protection throughout this encounter with handwashing performed prior and after patient evaluation without personal hand/facial/oropharyngeal contact and gloves worn throughout encounter. See note and/or contact this  provider for further PPE details.      We recommend that you schedule follow up care with a primary care provider within the next three months to obtain basic health screening including reassessment of your blood pressure.      You had elevated blood pressure today and you need to follow up with your doctor for a repeat blood pressure check and further discussion of lifestyle modifications that include Weight Reduction - Dietary Sodium Restriction - Increased Physical Activity and Moderation in alcohol (ETOH) Consumption. If possible check your pressure at home and keep a blood pressure log to bring to your physician.  Disposition and Plan     Clinical Impression:  1. Disseminated ovarian cancer, left (HCC)    2. Acute hypoxemic respiratory failure (HCC)    3. Hypotension, unspecified hypotension type    4. Recurrent pleural effusion    5. Nausea and vomiting in adult    6. 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 MEGAN (acute kidney injury)    8. Lactic acidosis    9. Bandemia        Disposition:  Admit    Follow-up:  No follow-up provider specified.    Medications Prescribed:  Current Discharge Medication List

## 2025-04-05 NOTE — H&P
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 Sister and spouse at bedside who help with history. Sp most recent para last week- since ongoing progression of symptoms. Here noted tachycardic in afib with rates to 140s started on diltiazem gtt also on heparin gtt, tachypneic, hypoxic/ hyperpapneic on bipap, pressures to 50-70s systolic given 0.9NS bolus then was to have LR bolus started on levephed and vaso gtts since seen. On unasyn as well. Lactate 13 to 11.6 on repeat Cr 1.67 from bsl 0.7 (1.01 earlier in March but bsl closer to 0.7),       Past Medical History:    Anxiety state    BRCA gene mutation negative in female    Negative 70 gene hereditary cancer panel ([employer]), x1 VUS, report in media tab    DEPRESSION    Depression    Esophageal reflux    HPV in female    Hypertriglyceridemia    OCD (obsessive compulsive disorder)    Ovarian cancer (HCC)    s/p NAC, surgery, adj chemo    Personal history of antineoplastic chemotherapy    PONV (postoperative nausea and vomiting)    PTSD (post-traumatic stress disorder)    SEASONAL ALLERGIES    Visual impairment    glasses    Vitamin B 12 deficiency    Vitamin D deficiency      Past Surgical History:   Procedure Laterality Date          x3    Implant left  1999    Implant left  2009    implants replaced    Implant right   1999    Implant right  2009    implants replaced    Other surgical history   &    breast implants, deflation    Other surgical history  2007    carpal tunnel R hand    Total abdom hysterectomy      2024        ALL:  Allergies[1]     heparin, 80 Units/kg, Once  ED initial dose (PE/DVT/THROMBUS) heparin, 18 Units/kg/hr, Once  ampicillin-sulbactam, 3 g, Once  dilTIAZem, 10 mg, Once        Social History     Tobacco Use    Smoking status: Never    Smokeless tobacco: Never   Substance Use Topics    Alcohol use: Not Currently     Comment: rare        Fam Hx  Family History   Problem Relation Age of Onset    Breast Cancer Mother 50    Diabetes Father     Heart Disorder Father     Other (Other) Father         rheumatoid arthritis    Thyroid Cancer Sister 30    Breast Cancer Sister 52         at 52y dt cancer    Stroke Paternal Grandfather 50    Breast Cancer Paternal Aunt         dx >50y    Ovarian Cancer Paternal Cousin Female         dx 40s    Cancer Maternal Uncle         lung cancer dx >50y       Review of Systems  10 point Comprehensive ROS reviewed and negative except for what's stated above.     OBJECTIVE:  /66   Pulse 78   Temp 97.9 °F (36.6 °C) (Temporal)   Resp (!) 41   Wt 169 lb 15.6 oz (77.1 kg)   LMP 2020   SpO2 98%   BMI 26.62 kg/m²     GEN: mod distress, on bipap  NECK: supple,  trachea midline  HEENT- HC/AT  CV: rtachycardic irr irr, + anasarca  LUNGS: dim bl R > L tachypneic on bipap  ABL soft, healed surg scar non tender per fam improved ascites  EXT: + bl LE edema b/l , DP pulses 2+ b/l  Neuro: sensation intact, no focal deficits  SKIN- no rashes, no lesion  PSYCH- tearful, anxious      LABS:   Lab Results   Component Value Date    WBC 8.1 2025    HGB 8.3 2025    HCT 25.2 2025    .0 2025    CREATSERUM 1.67 2025    BUN 37 2025     2025    K 3.4 2025    CL 91 2025    CO2 17.0 2025    GLU 84  04/05/2025    CA 7.9 04/05/2025    ALB 3.0 04/05/2025    ALKPHO 125 04/05/2025    BILT 0.4 04/05/2025    TP 5.4 04/05/2025    AST 98 04/05/2025    ALT 39 04/05/2025    PTT 32.0 04/05/2025    TSH 1.950 04/05/2025    LIP 29 04/05/2025    MG 1.6 04/05/2025       Radiology: CT CHEST PE AORTA (IV ONLY) (CPT=71260)    Result Date: 4/5/2025  PROCEDURE: CT CHEST PE AORTA (IV ONLY) (CPT=71260)  COMPARISON: None.  INDICATIONS: trouble breathing, cancer pt  TECHNIQUE: CT images of the chest were obtained with non-ionic intravenous contrast material.  Automated exposure control for dose reduction was used. Adjustment of the mA and/or kV was done based on the patient's size. Use of iterative reconstruction technique for dose reduction was used. Dose information is transmitted to the ACR (American College of Radiology) NRDR (National Radiology Data Registry) which includes the Dose Index Registry.  FINDINGS:  No acute aortic syndrome  No PE  Normal heart size.  No pericardial effusion.  No coronary calcification  Esophagus is filled with fluid from the GE junction to the thoracic inlet and dilated to 3.5 centimeter  Small volume left free-flowing effusion.  Moderate volume right effusion loculated at the lung base  Numerous subcentimeter anterior mediastinal and right cardiophrenic angle lymph nodes  Hanna catheter lower SVC  Passive bibasilar atelectasis due to effusions  3 millimeter micro nodule in the right upper lobe  Upper abdomen shows moderate fluid throughout the stomach  Impression:  No PE  Moderate volume loculated right effusion.  Small free-flowing left effusion.  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    SIRS v sepsis (HR, RR, WBC) with MSOF- shock with MEGAN/ lactic acidosis/afib w RVR, resp failure, LFT elevation  Acute respiratory failure with hypoxia/ hypercapnia  Dyspnea- multifactorial from below  Afib w RVR  Hypotension with lactic acidosis  Hyponatremia chronic   Hypokalemia  MEGAN Cr 1.67 from 1.01 3/21/25 0.75 2/28/25  Ovarian adenocarcinoma with peritoneal carcinomatosis dx Mar 2024 sp TAHBSO Aug 2024 on lynparza 300 mg BID w avastin  Recurrent malignant effusion sp thoracentesis 3/14/25 with 1300 cc removed  Recurrent malignant ascites sp multi giuliana (3/28/24 1.5L 4/16 4.4L, 3/4/25 5.2L 3/14/25 1.7L)  BR  LLE DVT dx 3/21/25  Anemia suspected med related  BRCA 1 + somatic not genetic mutation  ACOSTA/ MDD  Hypertriglyceridemia  Chronic Back pain      Sepsis v SIRS severe w shock/ MSOF (MEGAN/lactic acidosis, resp failure, af RVR, LFT elevation)  - unasyn started in ER - agree - aspiration coverage w recent bilious emesis- continue  - f/u cx  - resp  BP support- full measures at this time  - on levophed /vaso gtts  - d/w Dr Tolliver and Dr Lundberg will see  - currently on bipap    Dyspnea, Acute resp failure w hypoxia/ hypercapnia  - suspect multifactorial from recurrent malignant effusion/ ascites and now w severe lactic acidosis   - also high risk for PE as well but already on AC since March 21 (DVT dx) -on heparin gtt here  - inc pBNP c/w overload- tap as able BP and labs preclude diuresis  - also w hg drop 8.3 from 9.6 on 3/21- follow may need pRBC/ further eval pending course  - ddx includes infection- on abx coverage as  noted   - d/w Dr Tolliver IR and pulm consulted- last thora 3/14 and last para 3/28 (has had 6)- inc risk for procedure since on full dose AC  - currently on bipap and in GOC discussions patient /family aware risk of needing intubation     Afib w RVR  - new dx suspect from above  - started on diltiazem gtt here- now on pressor support- MEGAN/ LFT limiting use dig or amio  - notified Dr Donis cards for in house consult  - was on AC prior to admission started on heparin gtt here  - echo when stable/ able pending course  - tele monitoring    BRCA 1 + (somatic non genetic mutation) Ovarian adenocarcinoma w peritoneal carcinomatosis and malignant recurrent effusion/ ascites  - dx March 2024 seebeni Serrano onc  - sp TAHBSO Aug 2024 w B[facility] Choudhary at [facility]  - sp multi lines treatment most recently on lynparza 300 mg BID w avastin  - text message out to Dr Serrano to notify of admission / hopefully help facilitate GOC if needed    MEGAN w hyponatremia, severe lactic acidosis  - fsuspect pre-renal fm sepsis +/- recent N/V and poor po intkae  - given boluses in ER doubt if able to tolerate bicarb gtt  - follow avoid nephrotoxics may need renal cs if worsening pending overall course    LFT elevation  - suspect from above as well  - trend    Anemia  - most recent bsl 9.6 3/21/25 with onc notes mentioning anemia possible related to lynparza  - acute drop and w dyspnea/ pressures and current full measure will need to follow closely- serial hg,   - transfuse/ further evaluation if dropping  - obtain w/u    LLE DVT  - dx 3/21/25 on w/u bl LE edema  - switched to heparin gtt in house was on eliquis    ACOSTA/ MDD  - home meds = venlafaxine and ativan PRN follows w psych outpatient  - requesting anxiety meds in ER- reviewed high risk of intubation w any sedating med given at this time  - fam proving support/ non pharmacologic measures at bedside    Chronic back pain  - hold home tramadol    ACP/ GOC- discussed with patient, spouse and sister at  bedside. hcPOA - patient's daughter Ami paperwork here. Code status and prognosis discussed. Reviewed that she is at risk of intubation and asked if intubation/ resuscitation measures in line with her wishes. Patient/ family overwhelmed and quite tearful- at this time remain undecided and aware that we will pursue full measures unless they opt to pursue DNR/DNI. Also informed that I will discuss with their oncologist if able (text message sent) and have additional doctors on the team follow up to further address. Palliative consutled by ER as well. D/w Dr Tolliver and DR Sherman. 16 min    Dispo ICU      Outpatient records or previous hospital records reviewed as detailed above.    JEREMIAH hospitalist to continue to follow patient while in house      JEREMIAH [facility] Hospitalist  Pager [phone]    4/5/2025  11:41 AM         [1]   Allergies  Allergen Reactions    Hydrocodone NAUSEA AND VOMITING     agitation    Adhesive Tape RASH

## 2025-04-06 ENCOUNTER — APPOINTMENT (OUTPATIENT)
Dept: ULTRASOUND IMAGING | Facility: HOSPITAL | Age: 55
End: 2025-04-06
Attending: INTERNAL MEDICINE
Payer: COMMERCIAL

## 2025-04-06 LAB
ANION GAP SERPL CALC-SCNC: 10 MMOL/L (ref 0–18)
ANION GAP SERPL CALC-SCNC: 11 MMOL/L (ref 0–18)
ATRIAL RATE: 134 BPM
BASOPHILS # BLD AUTO: 0.05 X10(3) UL (ref 0–0.2)
BASOPHILS # BLD AUTO: 0.08 X10(3) UL (ref 0–0.2)
BASOPHILS NFR BLD AUTO: 1.1 %
BASOPHILS NFR BLD AUTO: 1.9 %
BUN BLD-MCNC: 38 MG/DL (ref 9–23)
BUN BLD-MCNC: 41 MG/DL (ref 9–23)
BUN/CREAT SERPL: 36.6 (ref 10–20)
BUN/CREAT SERPL: 46.3 (ref 10–20)
CALCIUM BLD-MCNC: 6.8 MG/DL (ref 8.7–10.4)
CALCIUM BLD-MCNC: 7 MG/DL (ref 8.7–10.4)
CHLORIDE SERPL-SCNC: 100 MMOL/L (ref 98–112)
CHLORIDE SERPL-SCNC: 105 MMOL/L (ref 98–112)
CO2 SERPL-SCNC: 24 MMOL/L (ref 21–32)
CO2 SERPL-SCNC: 25 MMOL/L (ref 21–32)
CREAT BLD-MCNC: 0.82 MG/DL
CREAT BLD-MCNC: 1.12 MG/DL
DEPRECATED RDW RBC AUTO: 45.7 FL (ref 35.1–46.3)
DEPRECATED RDW RBC AUTO: 49 FL (ref 35.1–46.3)
EGFRCR SERPLBLD CKD-EPI 2021: 58 ML/MIN/1.73M2 (ref 60–?)
EGFRCR SERPLBLD CKD-EPI 2021: 84 ML/MIN/1.73M2 (ref 60–?)
EOSINOPHIL # BLD AUTO: 0 X10(3) UL (ref 0–0.7)
EOSINOPHIL # BLD AUTO: 0 X10(3) UL (ref 0–0.7)
EOSINOPHIL NFR BLD AUTO: 0 %
EOSINOPHIL NFR BLD AUTO: 0 %
ERYTHROCYTE [DISTWIDTH] IN BLOOD BY AUTOMATED COUNT: 17.1 % (ref 11–15)
ERYTHROCYTE [DISTWIDTH] IN BLOOD BY AUTOMATED COUNT: 17.8 % (ref 11–15)
GLUCOSE BLD-MCNC: 115 MG/DL (ref 70–99)
GLUCOSE BLD-MCNC: 125 MG/DL (ref 70–99)
HCT VFR BLD AUTO: 21.1 %
HCT VFR BLD AUTO: 22.6 %
HCT VFR BLD AUTO: 24.2 %
HGB BLD-MCNC: 7.1 G/DL
HGB BLD-MCNC: 7.4 G/DL
HGB BLD-MCNC: 7.8 G/DL
IMM GRANULOCYTES # BLD AUTO: 0.03 X10(3) UL (ref 0–1)
IMM GRANULOCYTES # BLD AUTO: 0.07 X10(3) UL (ref 0–1)
IMM GRANULOCYTES NFR BLD: 0.7 %
IMM GRANULOCYTES NFR BLD: 1.5 %
LYMPHOCYTES # BLD AUTO: 0.32 X10(3) UL (ref 1–4)
LYMPHOCYTES # BLD AUTO: 0.75 X10(3) UL (ref 1–4)
LYMPHOCYTES NFR BLD AUTO: 16.4 %
LYMPHOCYTES NFR BLD AUTO: 7.5 %
MAGNESIUM SERPL-MCNC: 2.1 MG/DL (ref 1.6–2.6)
MAGNESIUM SERPL-MCNC: 2.2 MG/DL (ref 1.6–2.6)
MCH RBC QN AUTO: 27.5 PG (ref 26–34)
MCH RBC QN AUTO: 28.2 PG (ref 26–34)
MCHC RBC AUTO-ENTMCNC: 32.2 G/DL (ref 31–37)
MCHC RBC AUTO-ENTMCNC: 32.7 G/DL (ref 31–37)
MCV RBC AUTO: 84 FL
MCV RBC AUTO: 87.4 FL
MONOCYTES # BLD AUTO: 0.05 X10(3) UL (ref 0.1–1)
MONOCYTES # BLD AUTO: 0.2 X10(3) UL (ref 0.1–1)
MONOCYTES NFR BLD AUTO: 1.2 %
MONOCYTES NFR BLD AUTO: 4.4 %
MRSA DNA SPEC QL NAA+PROBE: NEGATIVE
MRSA DNA SPEC QL NAA+PROBE: NEGATIVE
NEUTROPHILS # BLD AUTO: 3.51 X10 (3) UL (ref 1.5–7.7)
NEUTROPHILS # BLD AUTO: 3.51 X10(3) UL (ref 1.5–7.7)
NEUTROPHILS # BLD AUTO: 3.78 X10 (3) UL (ref 1.5–7.7)
NEUTROPHILS # BLD AUTO: 3.78 X10(3) UL (ref 1.5–7.7)
NEUTROPHILS NFR BLD AUTO: 76.6 %
NEUTROPHILS NFR BLD AUTO: 88.7 %
OSMOLALITY SERPL CALC.SUM OF ELEC: 292 MOSM/KG (ref 275–295)
OSMOLALITY SERPL CALC.SUM OF ELEC: 300 MOSM/KG (ref 275–295)
P AXIS: 45 DEGREES
P-R INTERVAL: 126 MS
PLATELET # BLD AUTO: 234 10(3)UL (ref 150–450)
PLATELET # BLD AUTO: 292 10(3)UL (ref 150–450)
POTASSIUM SERPL-SCNC: 3.4 MMOL/L (ref 3.5–5.1)
POTASSIUM SERPL-SCNC: 3.6 MMOL/L (ref 3.5–5.1)
POTASSIUM SERPL-SCNC: 3.6 MMOL/L (ref 3.5–5.1)
Q-T INTERVAL: 286 MS
QRS DURATION: 82 MS
QTC CALCULATION (BEZET): 427 MS
R AXIS: 31 DEGREES
RBC # BLD AUTO: 2.69 X10(6)UL
RBC # BLD AUTO: 2.77 X10(6)UL
SODIUM SERPL-SCNC: 135 MMOL/L (ref 136–145)
SODIUM SERPL-SCNC: 140 MMOL/L (ref 136–145)
T AXIS: 50 DEGREES
VENTRICULAR RATE: 134 BPM
WBC # BLD AUTO: 4.3 X10(3) UL (ref 4–11)
WBC # BLD AUTO: 4.6 X10(3) UL (ref 4–11)

## 2025-04-06 PROCEDURE — 30233N1 TRANSFUSION OF NONAUTOLOGOUS RED BLOOD CELLS INTO PERIPHERAL VEIN, PERCUTANEOUS APPROACH: ICD-10-PCS | Performed by: INTERNAL MEDICINE

## 2025-04-06 PROCEDURE — 30233H1 TRANSFUSION OF NONAUTOLOGOUS WHOLE BLOOD INTO PERIPHERAL VEIN, PERCUTANEOUS APPROACH: ICD-10-PCS | Performed by: INTERNAL MEDICINE

## 2025-04-06 PROCEDURE — 93970 EXTREMITY STUDY: CPT | Performed by: INTERNAL MEDICINE

## 2025-04-06 RX ORDER — SODIUM CHLORIDE 9 MG/ML
INJECTION, SOLUTION INTRAVENOUS ONCE
Status: COMPLETED | OUTPATIENT
Start: 2025-04-06 | End: 2025-04-06

## 2025-04-06 RX ORDER — DIGOXIN 0.25 MG/ML
250 INJECTION INTRAMUSCULAR; INTRAVENOUS ONCE AS NEEDED
Status: ACTIVE | OUTPATIENT
Start: 2025-04-06 | End: 2025-04-06

## 2025-04-06 RX ORDER — LORAZEPAM 2 MG/ML
1 INJECTION INTRAMUSCULAR ONCE
Status: COMPLETED | OUTPATIENT
Start: 2025-04-06 | End: 2025-04-06

## 2025-04-06 RX ORDER — LORAZEPAM 2 MG/ML
INJECTION INTRAMUSCULAR
Status: COMPLETED
Start: 2025-04-06 | End: 2025-04-06

## 2025-04-06 RX ORDER — DILTIAZEM HYDROCHLORIDE 5 MG/ML
10 INJECTION INTRAVENOUS EVERY 6 HOURS PRN
Status: DISCONTINUED | OUTPATIENT
Start: 2025-04-06 | End: 2025-04-06

## 2025-04-06 NOTE — PLAN OF CARE
Problem: CARDIOVASCULAR - ADULT  Goal: Absence of cardiac arrhythmias or at baseline  Description: INTERVENTIONS:- Continuous cardiac monitoring, monitor vital signs, obtain 12 lead EKG if indicated- Evaluate effectiveness of antiarrhythmic and heart rate control medications as ordered- Initiate emergency measures for life threatening arrhythmias- Monitor electrolytes and administer replacement therapy as ordered  Outcome: Progressing     Problem: RESPIRATORY - ADULT  Goal: Achieves optimal ventilation and oxygenation  Description: INTERVENTIONS:- Assess for changes in respiratory status- Assess for changes in mentation and behavior- Position to facilitate oxygenation and minimize respiratory effort- Oxygen supplementation based on oxygen saturation or ABGs- Provide Smoking Cessation handout, if applicable- Encourage broncho-pulmonary hygiene including cough, deep breathe, Incentive Spirometry- Assess the need for suctioning and perform as needed- Assess and instruct to report SOB or any respiratory difficulty- Respiratory Therapy support as indicated- Manage/alleviate anxiety- Monitor for signs/symptoms of CO2 retention  Outcome: Progressing     Problem: GASTROINTESTINAL - ADULT  Goal: Minimal or absence of nausea and vomiting  Description: INTERVENTIONS:- Maintain adequate hydration with IV or PO as ordered and tolerated- Nasogastric tube to low intermittent suction as ordered- Evaluate effectiveness of ordered antiemetic medications- Provide nonpharmacologic comfort measures as appropriate- Advance diet as tolerated, if ordered- Obtain nutritional consult as needed- Evaluate fluid balance  Outcome: Progressing  Goal: Maintains or returns to baseline bowel function  Description: INTERVENTIONS:- Assess bowel function- Maintain adequate hydration with IV or PO as ordered and tolerated- Evaluate effectiveness of GI medications- Encourage mobilization and activity- Obtain nutritional consult as needed- Establish a  toileting routine/schedule- Consider collaborating with pharmacy to review patient's medication profile  Outcome: Progressing  Goal: Maintains adequate nutritional intake (undernourished)  Description: INTERVENTIONS:- Monitor percentage of each meal consumed- Identify factors contributing to decreased intake, treat as appropriate- Assist with meals as needed- Monitor I&O, WT and lab values- Obtain nutritional consult as needed- Optimize oral hygiene and moisture- Encourage food from home; allow for food preferences- Enhance eating environment  Outcome: Progressing  Goal: Achieves appropriate nutritional intake (bariatric)  Description: INTERVENTIONS:- Monitor for over-consumption- Identify factors contributing to increased intake, treat as appropriate- Monitor I&O, WT and lab values- Obtain nutritional consult as needed- Evaluate psychosocial factors contributing to over-consumption  Outcome: Progressing     Problem: GENITOURINARY - ADULT  Goal: Absence of urinary retention  Description: INTERVENTIONS:- Assess patient’s ability to void and empty bladder- Monitor intake/output and perform bladder scan as needed- Follow urinary retention protocol/standard of care- Consider collaborating with pharmacy to review patient's medication profile- Implement strategies to promote bladder emptying  Outcome: Progressing     Problem: SKIN/TISSUE INTEGRITY - ADULT  Goal: Skin integrity remains intact  Description: INTERVENTIONS- Assess and document risk factors for pressure ulcer development- Assess and document skin integrity- Monitor for areas of redness and/or skin breakdown- Initiate interventions, skin care algorithm/standards of care as needed  Outcome: Progressing     Problem: HEMATOLOGIC - ADULT  Goal: Maintains hematologic stability  Description: INTERVENTIONS- Assess for signs and symptoms of bleeding or hemorrhage- Monitor labs and vital signs for trends- Administer supportive blood products/factors, fluids and  medications as ordered and appropriate- Administer supportive blood products/factors as ordered and appropriate  Outcome: Progressing  Goal: Free from bleeding injury  Description: (Example usage: patient with low platelets)INTERVENTIONS:- Avoid intramuscular injections, enemas and rectal medication administration- Ensure safe mobilization of patient- Hold pressure on venipuncture sites to achieve adequate hemostasis- Assess for signs and symptoms of internal bleeding- Monitor lab trends- Patient is to report abnormal signs of bleeding to staff- Avoid use of toothpicks and dental floss- Use electric shaver for shaving- Use soft bristle tooth brush- Limit straining and forceful nose blowing  Outcome: Progressing

## 2025-04-06 NOTE — PLAN OF CARE
Pt came from ED for SOB; experiencing hypotension; thoracentesis and paracentesis within the last few weeks. Diagnosed with LLE DVT on 3/21. Lactic acid trending down. Most recent was 4.1. On bipap at time of admission; vomiting blood; stopped bipap. Heparin gtt stopped; takes eliquis at home (stopped in ED); A-fib in ED; given cardizem bolus and drip; currently NSR; pt has h/o anxiety    Problem: CARDIOVASCULAR - ADULT  Goal: Absence of cardiac arrhythmias or at baseline  Description: INTERVENTIONS:- Continuous cardiac monitoring, monitor vital signs, obtain 12 lead EKG if indicated- Evaluate effectiveness of antiarrhythmic and heart rate control medications as ordered- Initiate emergency measures for life threatening arrhythmias- Monitor electrolytes and administer replacement therapy as ordered  Outcome: Progressing     Problem: RESPIRATORY - ADULT  Goal: Achieves optimal ventilation and oxygenation  Description: INTERVENTIONS:- Assess for changes in respiratory status- Assess for changes in mentation and behavior- Position to facilitate oxygenation and minimize respiratory effort- Oxygen supplementation based on oxygen saturation or ABGs- Provide Smoking Cessation handout, if applicable- Encourage broncho-pulmonary hygiene including cough, deep breathe, Incentive Spirometry- Assess the need for suctioning and perform as needed- Assess and instruct to report SOB or any respiratory difficulty- Respiratory Therapy support as indicated- Manage/alleviate anxiety- Monitor for signs/symptoms of CO2 retention  Outcome: Progressing     Problem: GASTROINTESTINAL - ADULT  Goal: Minimal or absence of nausea and vomiting  Description: INTERVENTIONS:- Maintain adequate hydration with IV or PO as ordered and tolerated- Nasogastric tube to low intermittent suction as ordered- Evaluate effectiveness of ordered antiemetic medications- Provide nonpharmacologic comfort measures as appropriate- Advance diet as tolerated, if ordered-  Obtain nutritional consult as needed- Evaluate fluid balance  Outcome: Progressing  Goal: Maintains or returns to baseline bowel function  Description: INTERVENTIONS:- Assess bowel function- Maintain adequate hydration with IV or PO as ordered and tolerated- Evaluate effectiveness of GI medications- Encourage mobilization and activity- Obtain nutritional consult as needed- Establish a toileting routine/schedule- Consider collaborating with pharmacy to review patient's medication profile  Outcome: Progressing  Goal: Maintains adequate nutritional intake (undernourished)  Description: INTERVENTIONS:- Monitor percentage of each meal consumed- Identify factors contributing to decreased intake, treat as appropriate- Assist with meals as needed- Monitor I&O, WT and lab values- Obtain nutritional consult as needed- Optimize oral hygiene and moisture- Encourage food from home; allow for food preferences- Enhance eating environment  Outcome: Progressing  Goal: Achieves appropriate nutritional intake (bariatric)  Description: INTERVENTIONS:- Monitor for over-consumption- Identify factors contributing to increased intake, treat as appropriate- Monitor I&O, WT and lab values- Obtain nutritional consult as needed- Evaluate psychosocial factors contributing to over-consumption  Outcome: Progressing     Problem: GENITOURINARY - ADULT  Goal: Absence of urinary retention  Description: INTERVENTIONS:- Assess patient’s ability to void and empty bladder- Monitor intake/output and perform bladder scan as needed- Follow urinary retention protocol/standard of care- Consider collaborating with pharmacy to review patient's medication profile- Implement strategies to promote bladder emptying  Outcome: Progressing     Problem: SKIN/TISSUE INTEGRITY - ADULT  Goal: Skin integrity remains intact  Description: INTERVENTIONS- Assess and document risk factors for pressure ulcer development- Assess and document skin integrity- Monitor for areas of  redness and/or skin breakdown- Initiate interventions, skin care algorithm/standards of care as needed  Outcome: Progressing     Problem: HEMATOLOGIC - ADULT  Goal: Maintains hematologic stability  Description: INTERVENTIONS- Assess for signs and symptoms of bleeding or hemorrhage- Monitor labs and vital signs for trends- Administer supportive blood products/factors, fluids and medications as ordered and appropriate- Administer supportive blood products/factors as ordered and appropriate  Outcome: Progressing  Goal: Free from bleeding injury  Description: (Example usage: patient with low platelets)INTERVENTIONS:- Avoid intramuscular injections, enemas and rectal medication administration- Ensure safe mobilization of patient- Hold pressure on venipuncture sites to achieve adequate hemostasis- Assess for signs and symptoms of internal bleeding- Monitor lab trends- Patient is to report abnormal signs of bleeding to staff- Avoid use of toothpicks and dental floss- Use electric shaver for shaving- Use soft bristle tooth brush- Limit straining and forceful nose blowing  Outcome: Progressing

## 2025-04-06 NOTE — PROGRESS NOTES
[facility] Hospitalist Progress Note     PCP: Diana Black MD    Chief Complaint: follow-up      SUBJECTIVE:  Episode hematemesis / coffee ground emesis yesterday no further episodes, hg drop heparin gtt held  Remains tachycardic tachypneic  2/2 cx from admission K pneumo blood culte  MRSA pending   1 sister at bedside (has 7 siblings)  On 4L     OBJECTIVE:  Temp:  [97 °F (36.1 °C)-98.8 °F (37.1 °C)] 98.7 °F (37.1 °C)  Pulse:  [] 104  Resp:  [20-43] 32  BP: ()/(43-85) 98/58  SpO2:  [88 %-100 %] 97 %  FiO2 (%):  [30 %-40 %] 30 %    Intake/Output:    Intake/Output Summary (Last 24 hours) at 4/6/2025 0853  Last data filed at 4/6/2025 0510  Gross per 24 hour   Intake 2967.6 ml   Output 1150 ml   Net 1817.6 ml       Last 3 Weights   04/06/25 0510 179 lb 0.2 oz (81.2 kg)   04/05/25 1551 177 lb 7.5 oz (80.5 kg)   04/05/25 0936 169 lb 15.6 oz (77.1 kg)   10/08/24 1840 170 lb (77.1 kg)   08/20/24 2201 178 lb 6.4 oz (80.9 kg)   08/13/24 0549 173 lb 6.4 oz (78.7 kg)   08/02/24 1128 180 lb (81.6 kg)       Exam  Gen: awake,   Pulm:  less tachypneic dim  CV: Heart with regular rate and rhythm, no peripheral edema  Abd: Abdomen distended non tender +BS  MSK: Full range of motion in extremities,   Skin: no rashes or lesions  NEURO- no focal decificts    Data Review:       Labs:     Recent Labs   Lab 04/05/25  1018 04/05/25  1718 04/05/25  2359 04/06/25  0500   WBC 8.1  --   --  4.3   HGB 8.3* 7.5* 7.1* 7.4*   MCV 86.0  --   --  84.0   .0  --   --  292.0   BAND 10  --   --   --        Recent Labs   Lab 04/05/25  1019 04/06/25  0500   * 135*   K 3.4* 3.6  3.6   CL 91* 100   CO2 17.0* 24.0   BUN 37* 41*   CREATSERUM 1.67* 1.12*   CA 7.9* 6.8*   MG 1.6 2.1   GLU 84 125*       Recent Labs   Lab 04/05/25  1019   ALT 39   AST 98*   ALB 3.0*       No results for input(s): \"PGLU\" in the last 168 hours.    No results for input(s): \"TROP\" in the last 168 hours.      Meds:      potassium chloride  40 mEq Intravenous Once     vancomycin  15 mg/kg Intravenous Q24H    piperacillin-tazobactam  4.5 g Intravenous Q8H    pantoprazole  40 mg Intravenous Q12H      [Held by provider] continuous dose heparin Stopped (04/05/25 1653)    dexmedetomidine      norepinephrine 8 mcg/min (04/06/25 0600)    vasopressin (Vasostrict) 20 Units in sodium chloride 0.9% 100 mL infusion for septic shock 0.03 Units/min (04/06/25 0803)       acetaminophen    ondansetron    prochlorperazine    polyethylene glycol (PEG 3350)    sennosides    bisacodyl    fleet enema      ASSESSMENT AND PLAN    54YOF w PMH sig for 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     sepsis (HR, RR, WBC) with MSOF- shock with MEGAN/ lactic acidosis/afib w RVR, resp failure, LFT elevation  Acute respiratory failure with hypoxia/ hypercapnia  Dyspnea- multifactorial from below  Afib w RVR  Hypotension with lactic acidosis  Hyponatremia chronic   Hypokalemia  MEGAN Cr 1.67 from 1.01 3/21/25 0.75 2/28/25  Ovarian adenocarcinoma with peritoneal carcinomatosis dx Mar 2024 sp TAHBSO Aug 2024 on lynparza 300 mg BID w avastin  Recurrent malignant effusion sp thoracentesis 3/14/25 with 1300 cc removed  Recurrent malignant ascites sp multi giuliana (3/28/24 1.5L 4/16 4.4L, 3/4/25 5.2L 3/14/25 1.7L)  BR  LLE DVT dx 3/21/25  Anemia suspected med related  BRCA 1 + somatic not genetic mutation  ACOSTA/ MDD  Hypertriglyceridemia  Chronic Back pain        Sepsis severe w shock w MEGAN/lactic acidosis, resp failure, af RVR, LFT elevation)  - unasyn broadened to vanc/ zosyn  - concern re lung source  - MRSA swab  pending   - cx w K pneumo bacteremia suspect lung will have ID see before tayloring abx just yet  - resp  BP support- full measures at this time  - on levophed /vaso gtts cont  to require  - was on bipap > now HFNC wean as able      Dyspnea, Acute resp failure w hypoxia/ hypercapnia  - suspect multifactorial from recurrent malignant effusion/ ascites and now w severe lactic acidosis   - also high risk for PE as well but already on AC since March 21 (DVT dx) -on heparin gtt here  - inc pBNP c/w overload- tap as able BP and labs preclude diuresis  - also w anemia see below  - ddx includes infection- on abx coverage as noted   -  last thora 3/14 and last para 3/28 (has had 6)- inc risk for procedure since on full dose AC d/w Dr Sherman re tap see below  - was on bipap > now HFNC wean as able    Pleural effusion  - d/w Dr Sherman effusion w possible loculation ddx is malignant but also possible infectious/ empyema  - with improved resp status will hold today and reassess tomorrow- he recommends:  - diagnostic tap- if c/w infection- possible chest tube if malignancy- indwelling pleurx for recurrence      anemia and episode coffee ground emesis  - most recent bsl 9.6 3/21/25 with onc notes mentioning anemia possible related to lynparza  - coffee ground emesis x1 after multiple episodes n/v prior to admit possible mw tear v stress response  - cont iv PPI and NPO for now  - holding ac resume when safely able (heparin gtt not yet eliquis) would want to ensure hg stable no more episodes   - follow hg transfuse if < 7      Afib w RVR -now resolved   - new dx suspect from above  - started on diltiazem gtt here- now on pressor support- MEGAN/ LFT limiting use dig or amio  -  seen by cards d/w team  - was on AC prior to admission started on heparin gtt here hold as noted   - echo when stable/ able pending course  - tele monitoring     BRCA 1 + (somatic non genetic mutation) Ovarian adenocarcinoma w peritoneal carcinomatosis  and malignant recurrent effusion/ ascites  - dx March 2024 sees Luana Serrano onc  - sp TAHBSO Aug 2024 w W[facility]oudhary at [facility]  - sp multi lines treatment most recently on lynparza 300 mg BID w avastin  - notified his onc of admission      MEGAN w hyponatremia, severe lactic acidosis  - fsuspect pre-renal fm sepsis +/- recent N/V and poor po intkae  - given boluses in ER doubt if able to tolerate bicarb gtt  - follow avoid nephrotoxics may need renal cs if worsening pending overall course     LFT elevation  - suspect from above as well  - trend     LLE DVT  - dx 3/21/25 on w/u bl LE edema  - holding ac w possible bleed- d/w Dr Sherman re-eval w repeat US today to reassess remaining clot burden/  need for poss filter      ACOSTA/ MDD  - home meds = venlafaxine and ativan PRN follows w psych outpatient  - requesting anxiety meds resume if safe pressures/ bp allowed   - fam proving support/ non pharmacologic measures at bedside     Chronic back pain  - hold home tramadol     ACP/ GOC- discussed with patient, spouse and sister at bedside. hcPOA - patient's daughter Ami on admit full code at this time did inform palliative consulted to see in house     Dispo ICU  Carolina Bates MD  [facility] Hospitalist  [phone]  4/6/2025  8:53 AM

## 2025-04-06 NOTE — CONSULTS
[facility]  part of [facility] Infectious Disease Consult    Yue Malin Patient Status:  Inpatient    1970 MRN [id number]   Location [facility] 2W/SW Attending Carolina Bates MD   Hosp Day # 1 PCP Diana Black MD     Reason for Consultation:  To help with infection and treatment, requested by Dr. Bates,     History of Present Illness:  Yue Malin is a 54 year old female admitted to [facility] on  weakness and SOB,   Significant hx of   - BRCA 1+ in the tumour only,   - Metastatic Ovarian Ca with peritoneal carcinomatosis diagnosed in 2024, S/P TAHBSO followed by multiple courses of chemotherapy, With recurrent malignant ascites, pleural effusion, s/p multiple paracenteses ( last one a week ago), malignant R effusion s/p thoracentesis 3/14/25,  - LLE DVT, On anticoagulation,   Has been increasingly weak at home with N/ Vomiting, Poor PO intake and worsening SOB,  Also has abdominal pain which is at baseline,   PORT in the chest unchanged,   Admitted to ICU, On pressors,Blood cul are now growing GNR in 2/2 bottles,    On IV Unasyn then changed to IV Zosyn, ID is asked to help with infection and treatment,       History:  Past Medical History:    Anxiety state    BRCA gene mutation negative in female    Negative 70 gene hereditary cancer panel ([employer]), x1 VUS, report in media tab    DEPRESSION    Depression    Esophageal reflux    HPV in female    Hypertriglyceridemia    OCD (obsessive compulsive disorder)    Ovarian cancer (HCC)    s/p NAC, surgery, adj chemo    Personal history of antineoplastic chemotherapy    PONV (postoperative nausea and vomiting)    PTSD (post-traumatic stress disorder)    SEASONAL ALLERGIES    Visual impairment    glasses    Vitamin B 12 deficiency    Vitamin D deficiency     Past Surgical History:   Procedure Laterality Date          x3    Implant left  1999    Implant left  2009    implants replaced    Implant right   1999    Implant right  2009    implants replaced    Other surgical history   &    breast implants, deflation    Other surgical history      carpal tunnel R hand    Total abdom hysterectomy      2024     Family History   Problem Relation Age of Onset    Breast Cancer Mother 50    Diabetes Father     Heart Disorder Father     Other (Other) Father         rheumatoid arthritis    Thyroid Cancer Sister 30    Breast Cancer Sister 52         at 52y dt cancer    Stroke Paternal Grandfather 50    Breast Cancer Paternal Aunt         dx >50y    Ovarian Cancer Paternal Cousin Female         dx 40s    Cancer Maternal Uncle         lung cancer dx >50y      reports that she has never smoked. She has never used smokeless tobacco. She reports that she does not currently use alcohol. She reports that she does not use drugs.    Allergies:  Allergies[1]    Medications:    Current Facility-Administered Medications:     digoxin (Lanoxin) 250 MCG/ML injection 250 mcg, 250 mcg, Intravenous, Once PRN    [Held by provider] heparin (Porcine) 11048 units/250mL infusion PE/DVT/THROMBUS CONTINUOUS, 200-3,000 Units/hr, Intravenous, Continuous    acetaminophen (Tylenol Extra Strength) tab 500 mg, 500 mg, Oral, Q4H PRN    ondansetron (Zofran) 4 MG/2ML injection 4 mg, 4 mg, Intravenous, Q6H PRN    prochlorperazine (Compazine) 10 MG/2ML injection 5 mg, 5 mg, Intravenous, Q8H PRN    polyethylene glycol (PEG 3350) (Miralax) 17 g oral packet 17 g, 17 g, Oral, Daily PRN    sennosides (Senokot) tab 17.2 mg, 17.2 mg, Oral, Nightly PRN    bisacodyl (Dulcolax) 10 MG rectal suppository 10 mg, 10 mg, Rectal, Daily PRN    fleet enema (Fleet) rectal enema 133 mL, 1 enema, Rectal, Once PRN    vancomycin (Vancocin) 1.25 g in sodium chloride 0.9% 250mL IVPB premix, 15 mg/kg, Intravenous, Q24H    [COMPLETED] piperacillin-tazobactam (Zosyn) 4.5 g in dextrose 5% 100 mL IVPB-ADDV, 4.5 g, Intravenous, Once **FOLLOWED BY** piperacillin-tazobactam  (Zosyn) 4.5 g in dextrose 5% 100 mL IVPB-ADDV, 4.5 g, Intravenous, Q8H    pantoprazole (Protonix) 40 mg in sodium chloride 0.9% PF 10 mL IV push, 40 mg, Intravenous, Q12H    norepinephrine (Levophed) 32 mg in dextrose 5% 250 mL infusion, 0.5-50 mcg/min, Intravenous, Continuous    vasopressin (Vasostrict) 20 Units in sodium chloride 0.9% 100 mL infusion for septic shock, 0.01-0.03 Units/min, Intravenous, Continuous    Review of Systems:   Constitutional: Negative for anorexia, chills, fatigue, fevers, malaise, night sweats and weight loss.  Eyes: Negative for visual disturbance, irritation and redness.  Ears, nose, mouth, throat, and face: Negative for hearing loss, tinnitus, nasal congestion, snoring, sore throat, hoarseness and voice change.  Respiratory: Negative for cough, sputum, hemoptysis, chest pain, wheezing, dyspnea on exertion, or stridor.  Cardiovascular: Negative for chest pain, palpitations, irregular heart beats, syncope, fatigue, orthopnea, paroxysmal nocturnal dyspnea, lower extremity edema.  Gastrointestinal: Negative for dysphagia, odynophagia, reflux symptoms, nausea, vomiting, change in bowel habits, diarrhea, constipation and abdominal pain.  Integument/breast: Negative for rash, skin lesions, and pruritus.  Hematologic/lymphatic: Negative for easy bruising, bleeding, and lymphadenopathy.  Musculoskeletal: Negative for myalgias, arthralgias, muscle weakness.  Neurological: Negative for headaches, dizziness, seizures, memory problems, trouble swallowing, speech problems, gait problems and weakness.  Behavioral/Psych: Negative for active tobacco use.  Endocrine: No history of of diabetes, thyroid disorder.  All other review of systems are negative.    Vital signs in last 24 hours:  Patient Vitals for the past 24 hrs:   BP Temp Temp src Pulse Resp SpO2 Weight   04/06/25 1100 95/62 -- -- 92 (!) 27 98 % --   04/06/25 1000 92/60 -- -- 99 (!) 29 97 % --   04/06/25 0900 (!) 85/50 -- -- 100 (!) 27 97 %  --   04/06/25 0800 98/58 98.7 °F (37.1 °C) Temporal 104 (!) 32 97 % --   04/06/25 0722 101/57 -- -- 99 (!) 35 94 % --   04/06/25 0630 101/56 -- -- 105 (!) 27 96 % --   04/06/25 0600 92/55 -- -- 111 (!) 34 95 % --   04/06/25 0530 101/64 -- -- (!) 121 (!) 34 94 % --   04/06/25 0510 -- -- -- -- -- -- 179 lb 0.2 oz (81.2 kg)   04/06/25 0500 104/63 -- -- 116 (!) 32 95 % --   04/06/25 0458 106/72 -- -- 114 25 96 % --   04/06/25 0449 -- -- -- 120 20 93 % --   04/06/25 0442 -- -- -- (!) 144 (!) 28 (!) 89 % --   04/06/25 0435 119/62 98 °F (36.7 °C) Temporal (!) 144 26 90 % --   04/06/25 0430 -- -- -- (!) 146 22 (!) 88 % --   04/06/25 0428 105/85 -- -- (!) 143 26 94 % --   04/06/25 0400 111/61 97.2 °F (36.2 °C) Temporal 94 (!) 33 98 % --   04/06/25 0300 107/60 -- -- 90 26 98 % --   04/06/25 0230 105/58 -- -- 89 (!) 29 96 % --   04/06/25 0228 110/58 -- -- 88 (!) 30 95 % --   04/06/25 0200 106/64 -- -- 89 (!) 30 98 % --   04/06/25 0100 107/63 -- -- 90 (!) 32 96 % --   04/06/25 0000 92/60 97 °F (36.1 °C) Temporal 101 (!) 40 97 % --   04/05/25 2330 102/60 -- -- 90 (!) 29 97 % --   04/05/25 2300 111/58 -- -- 90 (!) 27 99 % --   04/05/25 2230 102/66 -- -- 91 (!) 36 98 % --   04/05/25 2200 106/63 -- -- 91 (!) 30 98 % --   04/05/25 2130 103/70 -- -- 90 (!) 35 98 % --   04/05/25 2100 104/73 -- -- 90 (!) 31 100 % --   04/05/25 2000 107/61 98.3 °F (36.8 °C) Temporal 91 (!) 36 100 % --   04/05/25 1900 111/73 -- -- 103 (!) 36 97 % --   04/05/25 1830 91/60 -- -- 94 (!) 37 100 % --   04/05/25 1800 104/73 -- -- 101 (!) 37 98 % --   04/05/25 1705 -- -- -- -- -- 98 % --   04/05/25 1700 109/61 -- -- 109 (!) 37 98 % --   04/05/25 1600 95/58 -- -- 109 (!) 41 98 % --   04/05/25 1551 -- -- -- -- -- -- 177 lb 7.5 oz (80.5 kg)   04/05/25 1545 103/61 98.8 °F (37.1 °C) Temporal 109 (!) 41 96 % --   04/05/25 1535 -- -- -- -- -- 100 % --   04/05/25 1445 103/64 -- -- 107 (!) 38 98 % --   04/05/25 1430 98/53 -- -- 107 (!) 40 100 % --   04/05/25 1345 92/67  -- -- 106 (!) 32 100 % --       Physical Exam:   General: alert, cooperative, oriented.  No respiratory distress.   Head: Normocephalic, without obvious abnormality, atraumatic.   Eyes: Conjunctivae/corneas clear.  No scleral icterus.  No conjunctival     hemorrhage.   Nose: Nares normal.   Throat: Lips, mucosa, and tongue normal.  No thrush noted.   Neck: Soft, supple neck; trachea midline, no adenopathy, no thyromegaly.   Lungs: CTAB, normal and equal bilateral chest rise   Chest wall: No tenderness or deformity.   Heart: Regular rate and rhythm, normal S1S2, no murmur.   Abdomen: soft, non-tender, non-distended, no masses, no guarding, no     rebound, positive BS.   Extremity: no edema, no cyanosis   Skin: No rashes or lesions.   Neurological: Alert, interactive, no focal deficits    Labs:  Lab Results   Component Value Date    WBC 5.1 04/06/2025    HGB 6.5 04/06/2025    HCT 20.5 04/06/2025    .0 04/06/2025    CREATSERUM 1.12 04/06/2025    BUN 41 04/06/2025     04/06/2025    K 3.6 04/06/2025    K 3.6 04/06/2025     04/06/2025    CO2 24.0 04/06/2025     04/06/2025    CA 6.8 04/06/2025    PTT 94.1 04/05/2025    MG 2.1 04/06/2025       Radiology:  CT- No PE, Moderate volume loculated right effusion.  Small free-flowing left effusion.  Passive atelectasis due to effusion     CT a month ago- 1. Enhancing omental metastases with marked increase in simple ascitic fluid.   2. Bilateral pleural effusions. Some loculation on the right is suspected in some additional   enhancing soft tissue in the right costophrenic angle may represent pleural-based metastatic   disease.   3. Single low density lesion in the left hepatic lobe likely represents a cyst and is stable since   the prior.       Cultures:  Reviewed,     Assessment and Plan:    1.  Severe Sepsis with Gram negative Bacteremia: in an immunocompromised host,  - Blood cul with GNR in 2/2 bottles on admission,   - presenting symptoms of SOB, N/  V and loss of apatite, no worsening abd pain, no abn LFTs,  - UA is bland,   - Lfts will be trended, no tenderness specifically in RUQ,   - CT with Moderate volume loculated right effusion.  Small free-flowing left effusion.  Passive atelectasis due to effusion.  - PORT site is clean,   - CT of abd done a month ago reviewed,   - Possible sources of infections can be Line vs infected Pleural effusion, Rule out SBP causing N/V and loss of apatite,   - On IV Zosyn, pharm to dose, also on IV Vanc, will dc IV Vanc if cul stay negative,   - Repeat blood cul,     2.    Metastatic Ovarian Ca with Malignant pleural effusion, Peritoneal carcinomatosis,   - per PCP,     3.     Significant anemia: due to above,     4.     Disposition: in house, a middle aged immunocompromised female with severe sepsis, gram negative bacteremia,   - Follow Pending cul,   - Continue IV Zosyn, pharm to dose,   - Continue IV Vanc will dc if cul stay negative,   - repeat blood cul,   - may need repeat CT abd- not a order at this point,   - Follow thoracentesis and paracentesis results,     Discussed with patient, RN, family, all questions answered, further recommendations to follow, Thanks,     Thank you for consulting [facility] for Yue Malin.  If you have any questions or concerns please call [facility] Infectious Disease at [phone].     Madina Finnegan MD  4/6/2025  12:57 PM         [1]   Allergies  Allergen Reactions    Hydrocodone NAUSEA AND VOMITING     agitation    Adhesive Tape RASH

## 2025-04-06 NOTE — PLAN OF CARE
Problem: CARDIOVASCULAR - ADULT  Goal: Absence of cardiac arrhythmias or at baseline  Description: INTERVENTIONS:- Continuous cardiac monitoring, monitor vital signs, obtain 12 lead EKG if indicated- Evaluate effectiveness of antiarrhythmic and heart rate control medications as ordered- Initiate emergency measures for life threatening arrhythmias- Monitor electrolytes and administer replacement therapy as ordered  Outcome: Progressing   Noted that patient was trying to switch to Afib, noted the pause, heart rate is Afib for less than 2 seconds and, heart rate slows down to Ciaran in the 50s for 6 seconds and goes back to NSR 60-80s. Patient felt her heart was racing for brief moment. She was watching TV as this was happening. Checked her BP and was at 135/78. Will wean down Levophed. ARIADNE Rodríguez notified. Patient is not on any kind of beta blocker pr calcium channel blocker.  Will continue to monitor.

## 2025-04-06 NOTE — PLAN OF CARE
Problem: CARDIOVASCULAR - ADULT  Goal: Absence of cardiac arrhythmias or at baseline  Description: INTERVENTIONS:- Continuous cardiac monitoring, monitor vital signs, obtain 12 lead EKG if indicated- Evaluate effectiveness of antiarrhythmic and heart rate control medications as ordered- Initiate emergency measures for life threatening arrhythmias- Monitor electrolytes and administer replacement therapy as ordered  4/6/2025 1827 by Erinn Pizarro, RN  Outcome: Not Progressing  4/6/2025 1754 by Erinn Pizarro, RN  Outcome: Progressing   About 1800 patient went into Afib -170s for about 15 seconds and converted back to NSR 80s on its own without any intervention. Blood pressure dropped to 88/64. Sent BMP with Magnesium along with repeat CBC. 12 lead EKG was done but did not capture the Afib RVR.  Patient did not have any complaints of chest pain, but she felt the fast heart beat. Informed Dr Donis thru a secure chat. Dr. Donis says Levophed can worsen Afib, hence he wants her started on Phenylephrine to titrate SBP>90 or MAP>65, and wean off Levophed. Updated family at bedside of the plan.

## 2025-04-06 NOTE — CONSULTS
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 Noted to be in afib wih RVR with rates in the 140's bpm.    Assessment/Plan:    Afib with RVR- now in sinus tach  Shock, likely septic on levophed  Ovarian adenocarcinoma  Acute hypoxic respiratory failure- likely 2/2 recurrent malignant effusion. No edema noted on CT or CXR.  MEGAN with hyponatremia  LLE DVT- on Eliquis at home    - AF with RVR likely 2/2 septic shock. She is now back in NSR after volume resuscitation and stabilization.   - While acute ill on pressors, would not use rate control agents in order to allow for some degree of compensatory tachycardia.  Can do IV dig 250 mcg for rates > 160 bpm.   - Tele  - Echo pending    History:  Past Medical History:    Anxiety state    BRCA gene mutation negative in female    Negative 70 gene hereditary cancer panel ([employer]), x1 VUS, report in media tab    DEPRESSION    Depression    Esophageal reflux    HPV in female    Hypertriglyceridemia    OCD (obsessive compulsive disorder)    Ovarian cancer (HCC)    s/p NAC, surgery, adj chemo    Personal history of antineoplastic chemotherapy    PONV (postoperative nausea and vomiting)    PTSD (post-traumatic stress disorder)    SEASONAL ALLERGIES    Visual impairment    glasses    Vitamin B 12 deficiency    Vitamin D deficiency     Past Surgical History:   Procedure Laterality Date          x3    Implant left      Implant left  2009    implants replaced    Implant right      Implant right  2009    implants  replaced    Other surgical history   &    breast implants, deflation    Other surgical history      carpal tunnel R hand    Total abdom hysterectomy      2024     Family History   Problem Relation Age of Onset    Breast Cancer Mother 50    Diabetes Father     Heart Disorder Father     Other (Other) Father         rheumatoid arthritis    Thyroid Cancer Sister 30    Breast Cancer Sister 52         at 52y dt cancer    Stroke Paternal Grandfather 50    Breast Cancer Paternal Aunt         dx >50y    Ovarian Cancer Paternal Cousin Female         dx 40s    Cancer Maternal Uncle         lung cancer dx >50y      reports that she has never smoked. She has never used smokeless tobacco. She reports that she does not currently use alcohol. She reports that she does not use drugs.    Allergies:  Allergies[1]    Medications:  Medications Ordered Prior to Encounter[2]    Review of Systems:  As above, otherwise negative      Physical Exam:  Blood pressure 101/57, pulse 99, temperature 98 °F (36.7 °C), temperature source Temporal, resp. rate (!) 35, weight 179 lb 0.2 oz (81.2 kg), last menstrual period 2020, SpO2 94%, not currently breastfeeding.  Wt Readings from Last 3 Encounters:   25 179 lb 0.2 oz (81.2 kg)   10/08/24 170 lb (77.1 kg)   24 178 lb 6.4 oz (80.9 kg)       General: awake, alert, oriented x 3, no acute distress  HEENT: at/nc, perrl, eomi  Neck: No JVD, carotids 2+ no bruits.  Cardiac: Regular rate and rhythm, S1, S2 normal, no murmur, rub or gallop.  Lungs: Clear without wheezes, rales, rhonchi or dullness.  Normal excursions and effort.  Abdomen: Soft, non-tender, non-distended, normal bowel sounds   Extremities: Without clubbing, cyanosis or edema.  Peripheral pulses are 2+.  Neurologic: Alert and oriented, normal affect.  Psych: normal mood and affect  Skin: Warm and dry.       Laboratories and Data:  Diagnostics:      Labs:   CBC:    Lab Results   Component Value Date    WBC  4.3 04/06/2025    WBC 8.1 04/05/2025    WBC 12.1 (H) 10/08/2024     Lab Results   Component Value Date    HGB 7.4 (L) 04/06/2025    HGB 7.1 (L) 04/05/2025    HGB 7.5 (L) 04/05/2025      Lab Results   Component Value Date    .0 04/06/2025    .0 04/05/2025    .0 (L) 10/08/2024     BMP:   No results found for: \"GLUCOSE\"  Lab Results   Component Value Date    K 3.6 04/06/2025    K 3.6 04/06/2025    K 3.4 (L) 04/05/2025     Lab Results   Component Value Date    BUN 41 (H) 04/06/2025    BUN 37 (H) 04/05/2025    BUN 10 10/08/2024     Lab Results   Component Value Date    CREATSERUM 1.12 (H) 04/06/2025    CREATSERUM 1.67 (H) 04/05/2025    CREATSERUM 0.71 10/08/2024     Cholesterol:   No results found for: \"CHOLEST\"No results found for: \"HDL\"No results found for: \"TRIG\", \"TRIGLY\"No results found for: \"LDL\"  Lab Results   Component Value Date    AST 98 (H) 04/05/2025     Lab Results   Component Value Date    ALT 39 04/05/2025           Jean Donis MD  Interventional Cardiology  [place]  4/6/2025  8:14 AM         [1]   Allergies  Allergen Reactions    Hydrocodone NAUSEA AND VOMITING     agitation    Adhesive Tape RASH   [2]   No current facility-administered medications on file prior to encounter.     Current Outpatient Medications on File Prior to Encounter   Medication Sig Dispense Refill    apixaban 5 MG Oral Tab Take 1 tablet (5 mg total) by mouth 2 (two) times daily.      baclofen 10 MG Oral Tab Take 1 tablet (10 mg total) by mouth 3 (three) times daily as needed.      famotidine 20 MG Oral Tab Take 1 tablet (20 mg total) by mouth 2 (two) times daily.      Olaparib (LYNPARZA) 150 MG Oral Tab Take 2 tablets by mouth 2 (two) times daily.      docusate sodium 100 MG Oral Cap Take 100 mg by mouth 2 (two) times daily. 60 capsule 0    LORazepam 1 MG Oral Tab Take 1 tablet (1 mg total) by mouth 3 (three) times daily as needed for Anxiety.      acetaminophen 325 MG Oral Tab Take 1-2 tablets (325-650 mg total)  by mouth every 6 (six) hours as needed for Pain.

## 2025-04-06 NOTE — PLAN OF CARE
Problem: HEMATOLOGIC - ADULT  Goal: Maintains hematologic stability  Description: INTERVENTIONS- Assess for signs and symptoms of bleeding or hemorrhage- Monitor labs and vital signs for trends- Administer supportive blood products/factors, fluids and medications as ordered and appropriate- Administer supportive blood products/factors as ordered and appropriate  4/6/2025 1338 by Erinn Pizarro RN  Outcome: Progressing  4/6/2025 0947 by Erinn Pizarro, RN  Outcome: Progressing   Hemoglobin low at 6.5. Informed Dr Bates and ordered 1 unit PRBC to be transfused. Patient and family notified of  low Hemoglobin and need to transfuse. Patient signed her blood transfusion consent. She has no signs and symptoms of bleeding. Her last coffee ground emesis was yesterday afternoon. Will continue to monitor.  1800 Repeat Hemoglobin after 1 unit of PRBC is up to 7.8. Will continue to monitor.

## 2025-04-06 NOTE — PROGRESS NOTES
Critical Care Progress Note     Assessment / Plan:  Acute respiratory failure - from possible aspiration and recurrent MPE  - bipap prn, wean O2 as able  - precedex gtt prn  Septic Shock - suspected PNA  - s/p IVF resuscitation  - wean pressors as able  - LA downtrended  - broadened to zosyn/vanc 4/5  - f/u cultures, GNR in blood cultures.  MPE  - needs thoracentesis, last eliquis 4/5 morning and currently on heparin gtt  - reviewed risks of bleeding vs resp failure at length with family at bedside. They favor waiting for anticoagulation risk to resolve before proceeding with a thoracentesis. They understand the risk of further decompensation and intubation, however she is improved clinically this morning.  - have held heparin gtt  - at this point, there is a small loculated component and positive blood cultures so would favor a diagnostic thora prior to consideration of an indwelling pleural catheter vs small bore chest tube  DVT  - on eliquis, last dose 4/5 AM. Was on heparin gtt, now held  - given the anticipation of frequent interruption in anticoagulation due to procedures, would be reasonable to consider and IVC filter. Check LE US  MEGAN w/hyponateremia  - improved with resuscitation  Anemia - she is close to her basline  -  uptrending monitor  Afib w/RVR  - per cars  Ovarian adenocarcimona, metastatic  - per outpt onc  Hematemesis - small volume, no recurrence  - IV PPI  - holding anticoagulation  FEN  - NPO  Ppx  - SCD only  Dispo - full code. Family declined to address code status. Reviewed with Dr. Bates.  - ICU, at risk for decompnesation  - [facility]ists will assume care 4/7 AM.    Critical care time: 35 minutes      Subjective:  Small volume hematemesis x1 overnight    Objective:  Vitals:    04/06/25 0600 04/06/25 0630 04/06/25 0722 04/06/25 0800   BP: 92/55 101/56 101/57 98/58   BP Location: Left arm  Left arm Left arm   Pulse: 111 105 99 104   Resp: (!) 34 (!) 27 (!) 35 (!) 32   Temp:    98.7  °F (37.1 °C)   TempSrc:    Temporal   SpO2: 95% 96% 94% 97%   Weight:         Physical Exam:  General - laying in bed  Respiratory - clear bilaterally  Cardiovascular - regular rate and rhythm, no MRG  Abdo - soft, NTND  Ext - no LE edema  Mental status - interactive, answering questions appropriately    Medications:  Reviewed in EMR    Lab Data:  Reviewed in EMR    Imaging:  I independently visualized all relevant chest imaging in PACS and agree with radiology interpretation except where noted.

## 2025-04-06 NOTE — PLAN OF CARE
Problem: CARDIOVASCULAR - ADULT  Goal: Maintains optimal cardiac output and hemodynamic stability  Description: INTERVENTIONS:- Monitor vital signs, rhythm, and trends- Monitor for bleeding, hypotension and signs of decreased cardiac output- Evaluate effectiveness of vasoactive medications to optimize hemodynamic stability- Monitor arterial and/or venous puncture sites for bleeding and/or hematoma- Assess quality of pulses, skin color and temperature- Assess for signs of decreased coronary artery perfusion - ex. Angina- Evaluate fluid balance, assess for edema, trend weights  Outcome: Progressing   Patient with Metastatic Ovarian Cancer, CM scope  NSR, ST. BP supported with Levophed at 10 mcg/min and Vasopressin at 0.03 units/min to keep Systolic pressure >90 and MAP >65. She was given Potassium replacement per protocol.  Problem: RESPIRATORY - ADULT  Goal: Achieves optimal ventilation and oxygenation  Description: INTERVENTIONS:- Assess for changes in respiratory status- Assess for changes in mentation and behavior- Position to facilitate oxygenation and minimize respiratory effort- Oxygen supplementation based on oxygen saturation or ABGs- Provide Smoking Cessation handout, if applicable- Encourage broncho-pulmonary hygiene including cough, deep breathe, Incentive Spirometry- Assess the need for suctioning and perform as needed- Assess and instruct to report SOB or any respiratory difficulty- Respiratory Therapy support as indicated- Manage/alleviate anxiety- Monitor for signs/symptoms of CO2 retention  Outcome: Progressing   She is breathing regularly on HF NC 4L/min, 02 saturation 94-97%. She has no c/o shortness of breath, she tolerates being turned from side to side.  Problem: GASTROINTESTINAL - ADULT  Goal: Minimal or absence of nausea and vomiting  Description: INTERVENTIONS:- Maintain adequate hydration with IV or PO as ordered and tolerated- Nasogastric tube to low intermittent suction as ordered-  Evaluate effectiveness of ordered antiemetic medications- Provide nonpharmacologic comfort measures as appropriate- Advance diet as tolerated, if ordered- Obtain nutritional consult as needed- Evaluate fluid balance  Outcome: Progressing   She has had not any bloody emesis. She is NPO for now.   Problem: SKIN/TISSUE INTEGRITY - ADULT  Goal: Skin integrity remains intact  Description: INTERVENTIONS- Assess and document risk factors for pressure ulcer development- Assess and document skin integrity- Monitor for areas of redness and/or skin breakdown- Initiate interventions, skin care algorithm/standards of care as needed  Outcome: Progressing   Checked her skin integrity and is intact. With prophylactic foam dressing on her sacrum. Heels are off loaded. She is turned every 2 hours.  Problem: HEMATOLOGIC - ADULT  Goal: Maintains hematologic stability  Description: INTERVENTIONS- Assess for signs and symptoms of bleeding or hemorrhage- Monitor labs and vital signs for trends- Administer supportive blood products/factors, fluids and medications as ordered and appropriate- Administer supportive blood products/factors as ordered and appropriate  Outcome: Progressing   Hemoglobin is stable at 7.4. Heparin drip was stopped when she had bloody emesis yesterday. Will continue to monitor.

## 2025-04-07 ENCOUNTER — APPOINTMENT (OUTPATIENT)
Dept: GENERAL RADIOLOGY | Facility: HOSPITAL | Age: 55
End: 2025-04-07
Attending: INTERNAL MEDICINE
Payer: COMMERCIAL

## 2025-04-07 ENCOUNTER — APPOINTMENT (OUTPATIENT)
Dept: CT IMAGING | Facility: HOSPITAL | Age: 55
End: 2025-04-07
Attending: INTERNAL MEDICINE
Payer: COMMERCIAL

## 2025-04-07 PROBLEM — Z51.5 PALLIATIVE CARE BY SPECIALIST: Status: ACTIVE | Noted: 2025-04-07

## 2025-04-07 LAB
ANION GAP SERPL CALC-SCNC: 9 MMOL/L (ref 0–18)
ATRIAL RATE: 92 BPM
BASOPHILS # BLD AUTO: 0.02 X10(3) UL (ref 0–0.2)
BASOPHILS # BLD AUTO: 0.03 X10(3) UL (ref 0–0.2)
BASOPHILS # BLD AUTO: 0.05 X10(3) UL (ref 0–0.2)
BASOPHILS # BLD: 0 X10(3) UL (ref 0–0.2)
BASOPHILS # BLD: 0 X10(3) UL (ref 0–0.2)
BASOPHILS NFR BLD AUTO: 0.6 %
BASOPHILS NFR BLD AUTO: 0.8 %
BASOPHILS NFR BLD AUTO: 1 %
BASOPHILS NFR BLD: 0 %
BASOPHILS NFR BLD: 0 %
BLOOD TYPE BARCODE: 9500
BUN BLD-MCNC: 34 MG/DL (ref 9–23)
BUN/CREAT SERPL: 49.3 (ref 10–20)
CALCIUM BLD-MCNC: 6.9 MG/DL (ref 8.7–10.4)
CHLORIDE SERPL-SCNC: 104 MMOL/L (ref 98–112)
CO2 SERPL-SCNC: 26 MMOL/L (ref 21–32)
CREAT BLD-MCNC: 0.69 MG/DL
DEPRECATED RDW RBC AUTO: 46.6 FL (ref 35.1–46.3)
DEPRECATED RDW RBC AUTO: 46.9 FL (ref 35.1–46.3)
DEPRECATED RDW RBC AUTO: 47.4 FL (ref 35.1–46.3)
DEPRECATED RDW RBC AUTO: 47.4 FL (ref 35.1–46.3)
DEPRECATED RDW RBC AUTO: 47.7 FL (ref 35.1–46.3)
DOHLE BOD BLD QL SMEAR: PRESENT
DOHLE BOD BLD QL SMEAR: PRESENT
EGFRCR SERPLBLD CKD-EPI 2021: 102 ML/MIN/1.73M2 (ref 60–?)
EOSINOPHIL # BLD AUTO: 0 X10(3) UL (ref 0–0.7)
EOSINOPHIL # BLD AUTO: 0 X10(3) UL (ref 0–0.7)
EOSINOPHIL # BLD AUTO: 0.01 X10(3) UL (ref 0–0.7)
EOSINOPHIL # BLD: 0 X10(3) UL (ref 0–0.7)
EOSINOPHIL # BLD: 0 X10(3) UL (ref 0–0.7)
EOSINOPHIL NFR BLD AUTO: 0 %
EOSINOPHIL NFR BLD AUTO: 0 %
EOSINOPHIL NFR BLD AUTO: 0.3 %
EOSINOPHIL NFR BLD: 0 %
EOSINOPHIL NFR BLD: 0 %
ERYTHROCYTE [DISTWIDTH] IN BLOOD BY AUTOMATED COUNT: 16.6 % (ref 11–15)
ERYTHROCYTE [DISTWIDTH] IN BLOOD BY AUTOMATED COUNT: 17 % (ref 11–15)
ERYTHROCYTE [DISTWIDTH] IN BLOOD BY AUTOMATED COUNT: 17.6 % (ref 11–15)
GLUCOSE BLD-MCNC: 98 MG/DL (ref 70–99)
HCT VFR BLD AUTO: 20.5 %
HCT VFR BLD AUTO: 21 %
HCT VFR BLD AUTO: 21.8 %
HCT VFR BLD AUTO: 22 %
HCT VFR BLD AUTO: 22.1 %
HGB BLD-MCNC: 6.5 G/DL
HGB BLD-MCNC: 7.3 G/DL
HGB BLD-MCNC: 7.4 G/DL
IMM GRANULOCYTES # BLD AUTO: 0.05 X10(3) UL (ref 0–1)
IMM GRANULOCYTES # BLD AUTO: 0.06 X10(3) UL (ref 0–1)
IMM GRANULOCYTES # BLD AUTO: 0.06 X10(3) UL (ref 0–1)
IMM GRANULOCYTES NFR BLD: 1.2 %
IMM GRANULOCYTES NFR BLD: 1.5 %
IMM GRANULOCYTES NFR BLD: 1.6 %
LYMPHOCYTES # BLD AUTO: 0.57 X10(3) UL (ref 1–4)
LYMPHOCYTES NFR BLD AUTO: 11.2 %
LYMPHOCYTES NFR BLD AUTO: 15.2 %
LYMPHOCYTES NFR BLD AUTO: 16.9 %
LYMPHOCYTES NFR BLD: 0.27 X10(3) UL (ref 1–4)
LYMPHOCYTES NFR BLD: 0.48 X10(3) UL (ref 1–4)
LYMPHOCYTES NFR BLD: 11 %
LYMPHOCYTES NFR BLD: 7 %
MCH RBC QN AUTO: 27.5 PG (ref 26–34)
MCH RBC QN AUTO: 28.6 PG (ref 26–34)
MCH RBC QN AUTO: 28.7 PG (ref 26–34)
MCH RBC QN AUTO: 29.4 PG (ref 26–34)
MCH RBC QN AUTO: 29.9 PG (ref 26–34)
MCHC RBC AUTO-ENTMCNC: 31.7 G/DL (ref 31–37)
MCHC RBC AUTO-ENTMCNC: 33 G/DL (ref 31–37)
MCHC RBC AUTO-ENTMCNC: 33.2 G/DL (ref 31–37)
MCHC RBC AUTO-ENTMCNC: 33.9 G/DL (ref 31–37)
MCHC RBC AUTO-ENTMCNC: 34.8 G/DL (ref 31–37)
MCV RBC AUTO: 86.1 FL
MCV RBC AUTO: 86.3 FL
MCV RBC AUTO: 86.5 FL
MCV RBC AUTO: 86.9 FL
MCV RBC AUTO: 87 FL
METAMYELOCYTES # BLD: 0.04 X10(3) UL
METAMYELOCYTES NFR BLD: 1 %
MONOCYTES # BLD AUTO: 0.1 X10(3) UL (ref 0.1–1)
MONOCYTES # BLD AUTO: 0.12 X10(3) UL (ref 0.1–1)
MONOCYTES # BLD AUTO: 0.22 X10(3) UL (ref 0.1–1)
MONOCYTES # BLD: 0.04 X10(3) UL (ref 0.1–1)
MONOCYTES # BLD: 0.12 X10(3) UL (ref 0.1–1)
MONOCYTES NFR BLD AUTO: 3 %
MONOCYTES NFR BLD AUTO: 3.2 %
MONOCYTES NFR BLD AUTO: 4.3 %
MONOCYTES NFR BLD: 1 %
MONOCYTES NFR BLD: 3 %
NEUTROPHILS # BLD AUTO: 2.64 X10 (3) UL (ref 1.5–7.7)
NEUTROPHILS # BLD AUTO: 2.64 X10(3) UL (ref 1.5–7.7)
NEUTROPHILS # BLD AUTO: 2.95 X10 (3) UL (ref 1.5–7.7)
NEUTROPHILS # BLD AUTO: 2.97 X10 (3) UL (ref 1.5–7.7)
NEUTROPHILS # BLD AUTO: 2.97 X10(3) UL (ref 1.5–7.7)
NEUTROPHILS # BLD AUTO: 3.13 X10 (3) UL (ref 1.5–7.7)
NEUTROPHILS # BLD AUTO: 4.2 X10 (3) UL (ref 1.5–7.7)
NEUTROPHILS # BLD AUTO: 4.2 X10(3) UL (ref 1.5–7.7)
NEUTROPHILS NFR BLD AUTO: 78 %
NEUTROPHILS NFR BLD AUTO: 78.9 %
NEUTROPHILS NFR BLD AUTO: 82.3 %
NEUTROPHILS NFR BLD: 68 %
NEUTROPHILS NFR BLD: 84 %
NEUTS BAND NFR BLD: 17 %
NEUTS BAND NFR BLD: 7 %
NEUTS HYPERSEG # BLD: 3.4 X10(3) UL (ref 1.5–7.7)
NEUTS HYPERSEG # BLD: 3.55 X10(3) UL (ref 1.5–7.7)
OSMOLALITY SERPL CALC.SUM OF ELEC: 296 MOSM/KG (ref 275–295)
P AXIS: 34 DEGREES
P-R INTERVAL: 112 MS
PLATELET # BLD AUTO: 203 10(3)UL (ref 150–450)
PLATELET # BLD AUTO: 204 10(3)UL (ref 150–450)
PLATELET # BLD AUTO: 204 10(3)UL (ref 150–450)
PLATELET # BLD AUTO: 217 10(3)UL (ref 150–450)
PLATELET # BLD AUTO: 254 10(3)UL (ref 150–450)
PLATELET MORPHOLOGY: NORMAL
POTASSIUM SERPL-SCNC: 3.2 MMOL/L (ref 3.5–5.1)
POTASSIUM SERPL-SCNC: 3.2 MMOL/L (ref 3.5–5.1)
Q-T INTERVAL: 398 MS
QRS DURATION: 94 MS
QTC CALCULATION (BEZET): 492 MS
R AXIS: 30 DEGREES
RBC # BLD AUTO: 2.36 X10(6)UL
RBC # BLD AUTO: 2.44 X10(6)UL
RBC # BLD AUTO: 2.52 X10(6)UL
RBC # BLD AUTO: 2.54 X10(6)UL
RBC # BLD AUTO: 2.55 X10(6)UL
SODIUM SERPL-SCNC: 139 MMOL/L (ref 136–145)
T AXIS: 34 DEGREES
TOTAL CELLS COUNTED BLD: 100
TOTAL CELLS COUNTED BLD: 100
TOXIC GRANULES BLD QL SMEAR: PRESENT
TOXIC GRANULES BLD QL SMEAR: PRESENT
UNIT VOLUME: 350 ML
VARIANT LYMPHS NFR BLD MANUAL: 1 %
VENTRICULAR RATE: 92 BPM
WBC # BLD AUTO: 3.4 X10(3) UL (ref 4–11)
WBC # BLD AUTO: 3.8 X10(3) UL (ref 4–11)
WBC # BLD AUTO: 3.9 X10(3) UL (ref 4–11)
WBC # BLD AUTO: 4 X10(3) UL (ref 4–11)
WBC # BLD AUTO: 5.1 X10(3) UL (ref 4–11)

## 2025-04-07 PROCEDURE — 74176 CT ABD & PELVIS W/O CONTRAST: CPT | Performed by: INTERNAL MEDICINE

## 2025-04-07 PROCEDURE — 99233 SBSQ HOSP IP/OBS HIGH 50: CPT | Performed by: INTERNAL MEDICINE

## 2025-04-07 PROCEDURE — 99222 1ST HOSP IP/OBS MODERATE 55: CPT | Performed by: INTERNAL MEDICINE

## 2025-04-07 PROCEDURE — 71045 X-RAY EXAM CHEST 1 VIEW: CPT | Performed by: INTERNAL MEDICINE

## 2025-04-07 NOTE — CONSULTS
Palliative Care Initial Inpatient Consult    Yue Malin Patient Status:  Inpatient    1970 MRN [id number]   Location [facility] 2W/SW Attending Carolina Bates MD   Hosp Day # 2 PCP Diana Black MD     Date of Consult: 2025  Patient seen at: [facility] Inpatient    Reason for Consultation: Consult requested for goals of care and care coordination.    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 Sp most recent para last week- since ongoing progression of symptoms. Here noted tachycardic in afib with rates to 140s started on diltiazem gtt also on heparin gtt, tachypneic, hypoxic/ hyperpapneic on bipap, pressures to 50-70s systolic given 0.9NS bolus then was to have LR bolus started on levephed and vaso gtts since seen. On unasyn as well. Lactate 13 to 11.6 on repeat Cr 1.67 from bsl 0.7 (1.01 earlier in March but bsl closer to 0.7), Our palliative care service was asked to evaluate the patient for a goals of care discussion and symptom management.      Review of Systems: Palliative Care symptom needs assessed:     Denies dypsnea.   Denies cough or lightheadedness/dizziness.   No current pain issues.   Increasing appetite  Denies n/v   No constipation/diarrhea issues.   Hx of depression/anxiety     Palliative Care Social History:   Marital Status: Single  Children: Yes  Living Situation Prior to Admit: Home  Occupational History: Working  Personal: She is an . 3 adult children.  Lives in [place] with 2 cats.     Spiritual  Yue KEN    requested: No    Medical History: obtained from [facility]  Past Medical History:    Anxiety state    BRCA gene mutation negative in female    Negative 70 gene hereditary cancer panel ([employer]), x1 VUS, report in media tab    DEPRESSION    Depression    Esophageal reflux    HPV in female    Hypertriglyceridemia    OCD (obsessive compulsive disorder)    Ovarian cancer (HCC)    s/p NAC, surgery, adj chemo    Personal history of antineoplastic chemotherapy    PONV (postoperative nausea and vomiting)    PTSD (post-traumatic stress disorder)    SEASONAL ALLERGIES    Visual impairment    glasses    Vitamin B 12 deficiency    Vitamin D deficiency     Past Surgical History:   Procedure Laterality Date          x3    Implant left  1999    Implant left  2009    implants replaced    Implant right  1999    Implant right  2009    implants replaced    Other surgical history   &    breast implants, deflation    Other surgical history      carpal tunnel R hand    Total abdom hysterectomy      2024       Family History: obtained from [facility]  Family History   Problem Relation Age of Onset    Breast Cancer Mother 50    Diabetes Father     Heart Disorder Father     Other (Other) Father         rheumatoid arthritis    Thyroid Cancer Sister 30    Breast Cancer Sister 52         at 52y dt cancer    Stroke Paternal Grandfather 50    Breast Cancer Paternal Aunt         dx >50y    Ovarian Cancer Paternal Cousin Female         dx 40s    Cancer Maternal Uncle         lung cancer dx >50y       Allergies:  Allergies[1]    Medications:     Current Facility-Administered Medications:     potassium chloride 40 mEq in 250mL sodium chloride 0.9% IVPB premix, 40 mEq, Intravenous, Once    phenylephrine (Arnaldo-Synephrine) 100 mg in sodium chloride 0.9% 250 mL infusion,  mcg/min, Intravenous, Continuous    [Held by provider] heparin (Porcine)  80127 units/250mL infusion PE/DVT/THROMBUS CONTINUOUS, 200-3,000 Units/hr, Intravenous, Continuous    acetaminophen (Tylenol Extra Strength) tab 500 mg, 500 mg, Oral, Q4H PRN    ondansetron (Zofran) 4 MG/2ML injection 4 mg, 4 mg, Intravenous, Q6H PRN    prochlorperazine (Compazine) 10 MG/2ML injection 5 mg, 5 mg, Intravenous, Q8H PRN    polyethylene glycol (PEG 3350) (Miralax) 17 g oral packet 17 g, 17 g, Oral, Daily PRN    sennosides (Senokot) tab 17.2 mg, 17.2 mg, Oral, Nightly PRN    bisacodyl (Dulcolax) 10 MG rectal suppository 10 mg, 10 mg, Rectal, Daily PRN    fleet enema (Fleet) rectal enema 133 mL, 1 enema, Rectal, Once PRN    [COMPLETED] piperacillin-tazobactam (Zosyn) 4.5 g in dextrose 5% 100 mL IVPB-ADDV, 4.5 g, Intravenous, Once **FOLLOWED BY** piperacillin-tazobactam (Zosyn) 4.5 g in dextrose 5% 100 mL IVPB-ADDV, 4.5 g, Intravenous, Q8H    pantoprazole (Protonix) 40 mg in sodium chloride 0.9% PF 10 mL IV push, 40 mg, Intravenous, Q12H    norepinephrine (Levophed) 32 mg in dextrose 5% 250 mL infusion, 0.5-50 mcg/min, Intravenous, Continuous    vasopressin (Vasostrict) 20 Units in sodium chloride 0.9% 100 mL infusion for septic shock, 0.01-0.03 Units/min, Intravenous, Continuous  No current outpatient medications on file.         Palliative Performance Scale: 50 %  % Ambulation Activity Level Self-Care Intake Consciousness   100 Full  Normal  No Disease Full Normal Full   90 Full  Normal  Some Disease Full Normal Full   80 Full  Normal w/effort  Some Disease Full Normal or reduced Full   70 Reduced  Can't Perform Job  Some Disease Full Normal or reduced Full   60 Reduced  Can't Perform Hobby   Significant Disease Occ Assist Normal or reduced Full or confused   50 Mainly sit/lie Can't do any work  Extensive Disease Partial Assist Normal or reduced Full or confused   40 Mainly in bed Can't do any work  Extensive Disease Mainly Assist Normal or reduced Full or confused   30 Bed Bound Can't do any  work  Extensive Disease Max Assist  Total Care Reduced  Drowsy/confused   20 Bed Bound Can't do any work  Extensive Disease Max Assist  Total Care Minimal  Drowsy/confused   10 Bed Bound Can't do any work  Extensive Disease Max Assist  Total Care Mouth Care  Drowsy/confused   0 Death        Objective      Vital Signs:  Blood pressure 98/68, pulse 65, temperature 98 °F (36.7 °C), temperature source Temporal, resp. rate (!) 27, weight 179 lb 0.2 oz (81.2 kg), last menstrual period 12/22/2020, SpO2 98%, not currently breastfeeding.  Body mass index is 28.04 kg/m².  Non-verbal signs of pain present: No    Physical Exam:  General: Alert, awake and in no  apparent respiratory distress.  HEENT: MMM. No obvious focal deficits.   Cardiac: RRR  Lungs: Normal effort on RA  Abdomen: Soft, non-distended  Extremities:  mild Edema present  Skin: Warm and dry.    Hematology:  Lab Results   Component Value Date    WBC 3.9 (L) 04/07/2025    HGB 7.4 (L) 04/07/2025    HCT 21.8 (L) 04/07/2025    .0 04/07/2025       Coags:  Lab Results   Component Value Date    PTT 94.1 (H) 04/05/2025       Chemistry:  Lab Results   Component Value Date    CREATSERUM 0.69 04/07/2025    BUN 34 (H) 04/07/2025     04/07/2025    K 3.2 (L) 04/07/2025    K 3.2 (L) 04/07/2025     04/07/2025    CO2 26.0 04/07/2025    GLU 98 04/07/2025    CA 6.9 (L) 04/07/2025    ALB 3.0 (L) 04/05/2025    ALKPHO 125 (H) 04/05/2025    BILT 0.4 04/05/2025    TP 5.4 (L) 04/05/2025    AST 98 (H) 04/05/2025    ALT 39 04/05/2025    MG 2.2 04/06/2025       Imaging:  No results found.    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    Nausea/Cachexia:  -she is hungry  -hope to advance diet    Anxiety/Adjustment:  -hx of depression/anxiety  -was on Venlafaxine and ativan  -will revisit SNRI (deferred discussion due to sibling present in the room)     Prevention of Constipation:    - Recommend Senna-S 8.6mg (2) tabs BID Scheduled   - Recommend Miralax 17g daily Scheduled   - Recommend Dulcolax suppository daily PRN    2.     Advanced Care Planning  Deferred today for first meeting. Has discussed that her quality of life is more important that quantity. There has been hesitation to discuss further, I will explore.    She has HC-POA with her Daughter Ami Persaud    3.     Serious Illness Conversation:   Code Status: Full    Palliative Care Follow Up: Palliative care team will Continue to follow while inpatient    Thank you for allowing Palliative Care services to participate in the care of Yue Malin.    A total of 60 minutes were spent on this visit, which included all of the following: direct face to face contact, history taking, physical examination, and >50% was spent counseling and coordinating care.    Gian Valdez MD  4/7/2025  9:52 AM  Palliative Care Services  [facility] [phone]    Note to patient:  The 21st Century Cures Act makes medical notes like these available to patients in the interest of transparency. However, be advised this is a medical document. It is intended as peer to peer communication. It is written in medical language and may contain abbreviations or verbiage that are unfamiliar. It may appear blunt or direct. Medical documents are intended to carry relevant information, facts as evident, and the clinical opinion of the practitioner.           [1]   Allergies  Allergen Reactions    Hydrocodone NAUSEA AND VOMITING     agitation    Adhesive Tape RASH

## 2025-04-07 NOTE — PLAN OF CARE
Problem: CARDIOVASCULAR - ADULT  Goal: Absence of cardiac arrhythmias or at baseline  Description: INTERVENTIONS:- Continuous cardiac monitoring, monitor vital signs, obtain 12 lead EKG if indicated- Evaluate effectiveness of antiarrhythmic and heart rate control medications as ordered- Initiate emergency measures for life threatening arrhythmias- Monitor electrolytes and administer replacement therapy as ordered  Outcome: Progressing  Goal: Maintains optimal cardiac output and hemodynamic stability  Description: INTERVENTIONS:- Monitor vital signs, rhythm, and trends- Monitor for bleeding, hypotension and signs of decreased cardiac output- Evaluate effectiveness of vasoactive medications to optimize hemodynamic stability- Monitor arterial and/or venous puncture sites for bleeding and/or hematoma- Assess quality of pulses, skin color and temperature- Assess for signs of decreased coronary artery perfusion - ex. Angina- Evaluate fluid balance, assess for edema, trend weights  Outcome: Progressing     Problem: RESPIRATORY - ADULT  Goal: Achieves optimal ventilation and oxygenation  Description: INTERVENTIONS:- Assess for changes in respiratory status- Assess for changes in mentation and behavior- Position to facilitate oxygenation and minimize respiratory effort- Oxygen supplementation based on oxygen saturation or ABGs- Provide Smoking Cessation handout, if applicable- Encourage broncho-pulmonary hygiene including cough, deep breathe, Incentive Spirometry- Assess the need for suctioning and perform as needed- Assess and instruct to report SOB or any respiratory difficulty- Respiratory Therapy support as indicated- Manage/alleviate anxiety- Monitor for signs/symptoms of CO2 retention  Outcome: Progressing     Problem: GASTROINTESTINAL - ADULT  Goal: Minimal or absence of nausea and vomiting  Description: INTERVENTIONS:- Maintain adequate hydration with IV or PO as ordered and tolerated- Nasogastric tube to low  intermittent suction as ordered- Evaluate effectiveness of ordered antiemetic medications- Provide nonpharmacologic comfort measures as appropriate- Advance diet as tolerated, if ordered- Obtain nutritional consult as needed- Evaluate fluid balance  Outcome: Progressing  Goal: Maintains or returns to baseline bowel function  Description: INTERVENTIONS:- Assess bowel function- Maintain adequate hydration with IV or PO as ordered and tolerated- Evaluate effectiveness of GI medications- Encourage mobilization and activity- Obtain nutritional consult as needed- Establish a toileting routine/schedule- Consider collaborating with pharmacy to review patient's medication profile  Outcome: Progressing  Goal: Maintains adequate nutritional intake (undernourished)  Description: INTERVENTIONS:- Monitor percentage of each meal consumed- Identify factors contributing to decreased intake, treat as appropriate- Assist with meals as needed- Monitor I&O, WT and lab values- Obtain nutritional consult as needed- Optimize oral hygiene and moisture- Encourage food from home; allow for food preferences- Enhance eating environment  Outcome: Progressing  Goal: Achieves appropriate nutritional intake (bariatric)  Description: INTERVENTIONS:- Monitor for over-consumption- Identify factors contributing to increased intake, treat as appropriate- Monitor I&O, WT and lab values- Obtain nutritional consult as needed- Evaluate psychosocial factors contributing to over-consumption  Outcome: Progressing     Problem: HEMATOLOGIC - ADULT  Goal: Maintains hematologic stability  Description: INTERVENTIONS- Assess for signs and symptoms of bleeding or hemorrhage- Monitor labs and vital signs for trends- Administer supportive blood products/factors, fluids and medications as ordered and appropriate- Administer supportive blood products/factors as ordered and appropriate  Outcome: Progressing  Goal: Free from bleeding injury  Description: (Example usage:  patient with low platelets)INTERVENTIONS:- Avoid intramuscular injections, enemas and rectal medication administration- Ensure safe mobilization of patient- Hold pressure on venipuncture sites to achieve adequate hemostasis- Assess for signs and symptoms of internal bleeding- Monitor lab trends- Patient is to report abnormal signs of bleeding to staff- Avoid use of toothpicks and dental floss- Use electric shaver for shaving- Use soft bristle tooth brush- Limit straining and forceful nose blowing  Outcome: Progressing     Problem: SKIN/TISSUE INTEGRITY - ADULT  Goal: Skin integrity remains intact  Description: INTERVENTIONS- Assess and document risk factors for pressure ulcer development- Assess and document skin integrity- Monitor for areas of redness and/or skin breakdown- Initiate interventions, skin care algorithm/standards of care as needed  Outcome: Progressing     Problem: GENITOURINARY - ADULT  Goal: Absence of urinary retention  Description: INTERVENTIONS:- Assess patient’s ability to void and empty bladder- Monitor intake/output and perform bladder scan as needed- Follow urinary retention protocol/standard of care- Consider collaborating with pharmacy to review patient's medication profile- Implement strategies to promote bladder emptying  Outcome: Progressing

## 2025-04-07 NOTE — PAYOR COMM NOTE
--------------  ADMISSION REVIEW  4/5-4/6   Payor: [facility]  Subscriber #:  [id number]  Authorization Number: [id number]    Admit date: 4/5/25  Admit time:  3:29 PM       REVIEW DOCUMENTATION:    ED Provider Notes signed by Baldemar Tolliver MD at 4/5/2025  2:06 PM    Patient Seen in: [facility] Emergency Department    History     Chief Complaint   Patient presents with    Shortness Of Breath     Stated Complaint: trouble breathing, cancer pt     HPI    54-year-old female with past medical history of metastatic ovarian cancer on adjuvant chemotherapy presenting for evaluation with ongoing and worsening shortness of breath since recent thoracentesis several weeks ago with acute worsening of symptoms over the past day associated with nausea/vomiting and cough.  No chest pain.  No fevers or chills.  No vomiting or diarrhea.  Arrives tachypneic, conversationally dyspneic and pale with HRof 130 and oxygenation of 89%.  EMR reviewed, 3/10/2025 PET scan notable for right pleural tissue nodularity concerning for metastases associated bilateral pleural effusions now status post 3/14 thoracentesis with 1300 mL drained. No prior history of similar.    Past Medical History:    Anxiety state    BRCA gene mutation negative in female    Negative 70 gene hereditary cancer panel ([employer]), x1 VUS, report in media tab    DEPRESSION    Depression    Esophageal reflux    HPV in female    Hypertriglyceridemia    OCD (obsessive compulsive disorder)    Ovarian cancer (HCC)    s/p NAC, surgery, adj chemo    Personal history of antineoplastic chemotherapy    PONV (postoperative nausea and vomiting)    PTSD (post-traumatic stress disorder)    SEASONAL ALLERGIES    Visual impairment    glasses    Vitamin B 12 deficiency    Vitamin D deficiency   Review of Systems : LIMITED BY CLINICAL CONDITION  Constitutional: As per HPI  Respiratory: (+) cough and shortness of breath.    Cardiovascular: Negative for chest pain and palpitations.    Gastrointestinal: (+) vomiting, negative for abdominal pain.    Positive for stated complaint: trouble breathing, cancer pt  Other systems are as noted in HPI.  Constitutional and vital signs reviewed.      All other systems reviewed and negative except as noted above.    [facility] and agreed except as otherwise stated in HPI.    Physical Exam     ED Triage Vitals [04/05/25 0936]   /79   Pulse (!) 130   Resp (!) 33   Temp 97.9 °F (36.6 °C)   Temp src Temporal   SpO2 (!) 89 %   O2 Device None (Room air)       Current:/79   Pulse (!) 130   Temp 97.9 °F (36.6 °C) (Temporal)   Resp (!) 33   Wt 77.1 kg   LMP 12/22/2020   SpO2 (!) 89%   BMI 26.62 kg/m²     Physical Exam   Constitutional: Chronically ill-appearing, tachypneic, pale.  HEENT: Dry MM.  Head: Normocephalic. Atraumatic.  Eyes: No injection.   Cardiovascular: Tachycardic.  Pulmonary/Chest: Tachypneic with diminished basilar aeration and conversationally dyspneic.  Abdominal: Soft. Nontender.  Musculoskeletal: No gross deformity. BLE without calf tenderness or edema.  Neurological: Alert.  Skin: Skin is warm.   Psychiatric: Cooperative.  Nursing note and vitals reviewed.  Labs Reviewed   CBC WITH DIFFERENTIAL WITH PLATELET - Abnormal; Notable for the following components:       Result Value    RBC 2.93 (*)     HGB 8.3 (*)     HCT 25.2 (*)     RDW-SD 48.0 (*)     RDW 17.9 (*)     All other components within normal limits   BASIC METABOLIC PANEL (8) - Abnormal; Notable for the following components:    Sodium 130 (*)     Potassium 3.4 (*)     Chloride 91 (*)     CO2 17.0 (*)     Anion Gap 22 (*)     BUN 37 (*)     Creatinine 1.67 (*)     BUN/CREA Ratio 22.2 (*)     Calcium, Total 7.9 (*)     eGFR-Cr 36 (*)     All other components within normal limits   PRO BETA NATRIURETIC PEPTIDE - Abnormal; Notable for the following components:    Pro-Beta Natriuretic Peptide 1,225 (*)     All other components within normal limits    EXPANDED BLOOD GAS, ARTERIAL - Abnormal; Notable for the following components:    ABG pH 7.50 (*)     ABG pCO2 21 (*)     ABG PO2 62 (*)     Potassium Blood Gas 3.2 (*)     Sodium Blood Gas 127 (*)     Lactic Acid (Blood Gas) 13.2 (*)     Total Hemoglobin 8.8 (*)     Oxygen Content 11.3 (*)     All other components within normal limits   LACTIC ACID, PLASMA - Abnormal; Notable for the following components:    Lactic Acid 11.6 (*)     All other components within normal limits   HEPATIC FUNCTION PANEL (7) - Abnormal; Notable for the following components:    AST 98 (*)     Alkaline Phosphatase 125 (*)     Total Protein 5.4 (*)     Albumin 3.0 (*)     All other components within normal limits   MANUAL DIFFERENTIAL - Abnormal; Notable for the following components:    Neutrophil Absolute Manual 7.78 (*)     Lymphocyte Absolute Manual 0.24 (*)     Monocyte Absolute Manual 0.00 (*)     Metamyelocyte Absolute Manual 0.08 (*)     RBC Morphology See morphology below (*)     All other components within normal limits   TROPONIN I HIGH SENSITIVITY - Normal   PTT, ACTIVATED - Normal   MAGNESIUM - Normal   TSH W REFLEX TO FREE T4 - Normal   LIPASE - Normal   SCAN SLIDE   LACTIC ACID REFLEX POST POSTIVE   TYPE AND SCREEN    Narrative:     The following orders were created for panel order Type and screen.  Procedure                               Abnormality         Status                     ---------                               -----------         ------                     ABORH (Blood Type)[[id number]]                               Final result               Antibody Screen[[id number]]                                  Final result                 Please view results for these tests on the individual orders.   ABORH (BLOOD TYPE)   ANTIBODY SCREEN   RAINBOW DRAW LAVENDER   RAINBOW DRAW LIGHT GREEN   RAINBOW DRAW BLUE   BLOOD CULTURE   BLOOD CULTURE     XR CHEST AP PORTABLE  (CPT=71045)  Result Date: 4/5/2025  PROCEDURE: XR CHEST  AP PORTABLE  (CPT=71045)  COMPARISON: [facility], XR CHEST AP PORTABLE (CPT=71045), 8/15/2024, 1:30 AM.  INDICATIONS: Difficulty in breathing.  Findings and impression:  Normal heart size.  Well-positioned port a catheter.  Moderate right basilar opacity and effusion  Small left basilar opacity and effusion  No pneumothorax Finalized by (CST): Rodrigo Caldera MD on 4/05/2025 at 11:51 AM          CT CHEST PE AORTA (IV ONLY) (CPT=71260)  Result Date: 4/5/2025  PROCEDURE: CT CHEST PE AORTA (IV ONLY) (CPT=71260)  COMPARISON: None.  INDICATIONS: trouble breathing, cancer pt  TECHNIQUE: CT images of the chest were obtained with non-ionic intravenous contrast material.  Automated exposure control for dose reduction was used. Adjustment of the mA and/or kV was done based on the patient's size. Use of iterative reconstruction technique for dose reduction was used. Dose information is transmitted to the ACR (American College of Radiology) NRDR (National Radiology Data Registry) which includes the Dose Index Registry.  FINDINGS:  No acute aortic syndrome  No PE  Normal heart size.  No pericardial effusion.  No coronary calcification  Esophagus is filled with fluid from the GE junction to the thoracic inlet and dilated to 3.5 centimeter  Small volume left free-flowing effusion.  Moderate volume right effusion loculated at the lung base  Numerous subcentimeter anterior mediastinal and right cardiophrenic angle lymph nodes  Hanna catheter lower SVC  Passive bibasilar atelectasis due to effusions  3 millimeter micro nodule in the right upper lobe  Upper abdomen shows moderate fluid throughout the stomach  Impression:  No PE  Moderate volume loculated right effusion.  Small free-flowing left effusion.  Passive atelectasis due to effusion  Moderate fluid-filled stomach and diffuse fluid-filled dilated intrathoracic esophagus Finalized by (CST): Rodrigo Caldera MD on 4/05/2025 at 10:50 AM         EKG    Rate, intervals  and axes as noted on EKG Report.  Rate: 134  Rhythm: Sinus Rhythm  Reading: sinus tach 134 without CHELITA as independently interpreted by myself     EKG    Rate, intervals and axes as noted on EKG Report.  Rate: 173   Rhythm: SVT/AF   Reading: SVT/ without CHELITA as independently interpreted by myself        Sepsis Reassessment Note    /66   Pulse 78   Temp 97.9 °F (36.6 °C) (Temporal)   Resp (!) 41   Wt 77.1 kg   LMP 12/22/2020   SpO2 98%   BMI 26.62 kg/m²   I completed the sepsis reassessment at 1103  Cardiac:  Regularity: Regular  Rate: Tachycardic  Heart Sounds: S1,S2    Lungs:   Right: Diminished  Left: Diminished    Peripheral Pulses:  Radial: Right 1+ or Left 1+      Capillary Refill:  <3 Secs    Skin:  Temp/Moisture: Warm and Dry  Color: Normal  ED Course as of 04/05/25 1342  ------------------------------------------------------------  Time: 04/05 1001  Comment: Bedside US without obvious effusion or RV strain, CT chest pending PIV placement.  ------------------------------------------------------------  Time: 04/05 1019  Comment: ABG as noted with patient in CT BL (R>L) pleural effusion noted without obvious PE.  ------------------------------------------------------------  Time: 04/05 1033  Comment: Case d/w [facility] intensivist/pulmonary Dr. Sherman and hospitalist Dr. Bates for admission; improved on return from CT with IVF/vasopressor therapy and BiPAP.  ------------------------------------------------------------  Time: 04/05 1114  Comment: Resting comfortably, clinically improving with meds/NIPPV, pressor requirement as noted with patient/family updated regarding findings/admission plan of care.  ------------------------------------------------------------  Time: 04/05 1236  Comment: BP as noted with increasing vasopressor requirement, patient resting comfortably with family/hospitalist/intensivist updated.       MDM   DIFFERENTIAL DIAGNOSIS: After history and physical exam differential  diagnosis includes but is not limited to aspiration, saddle/massive PE, effusion/empyema, sepsis, bacteremia, cardiogenic shock.    Pulse ox: (!) 89%:Abnormal and Improved after treatment on NC/BiPAP, as independently interpreted by myself    Cardiac Monitor Interpretation:   Pulse Readings from Last 1 Encounters:   04/05/25 (!) 130   , sinus,      Medical Decision Making  Evaluation for nausea/vomiting associate cough for past week in setting of being status post right sided thoracentesis with patent scan demonstrating pleural nodularity in setting of prior DVT history on Eliquis without overt complaints aside from dyspnea/cough and with soft/nontender abdomen.  Patient noted to be tachycardic and hypoxic/hypotensive for which IV fluids/vasopressor therapy emergently initiated without obvious BSUS/POCUS evidence for RV strain/effusion with CT emergently obtained without obvious evidence for saddle pulmonary embolism/pneumothorax though with bilateral pleural effusions noted for which BiPAP thereafter initiated.  Lactic acidosis likely combination of hypoxia/hypotension, Unasyn initiated for possible likely aspiration in setting of CT demonstrating loculated effusion with consideration for empyema; case d/w [facility] hospitalist/intensivist Harriett Bates/Whit for admission and IR Dr. Holley with consideration for pleurex and will admit to CCU; ED course with paroxysmal AF/SVT requiring brief course of diltiazem, thereafter with conversion to NSR; vasopressin ordered as needed given uptitration in vasopressor therapy with intensivist/family/admitting service aware of same in addition to plan/consideration for Pleurex placement.    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     Details: 3/10/2025 PET scan with right pleural nodule, 3/14 CXR with right pleural effusion; 3/4/2025 paracentesisi fluid cytology, 5/7/2024 EKG report reviewed  Labs: ordered. Decision-making details documented in ED Course.  Radiology: ordered and independent interpretation performed. Decision-making details documented in ED Course.     Details: CXR and CT chest without obvious pneumothorax as independently interpreted by myself    ECG/medicine tests: ordered and independent interpretation performed. Decision-making details documented in ED Course.  Discussion of management or test interpretation with external provider(s): Case d/w [facility] hospitalist/intensivist Harriett Bates/Whit, IR Dr. Holley    Risk  Prescription drug management.  Decision regarding hospitalization.  Minor surgery with identified risk factors.    Critical Care  Total time providing critical care: 77 minutes      A total of 77 minutes of critical care time (exclusive of billable procedures) was administered to manage the patient's critical lab values, unstable vital signs, respiratory instability, and cardiovascular instability due to her metastatic ovarian cancer complicated  by recurrent pleural effusion, acute hypoxemic respiratory failure, hypotension, lactic acidosis/acute kidney injury.  This involved direct patient intervention, complex decision making, and/or extensive discussions with the patient, family, and clinical staff.      I was wearing at minimum a facemask and eye protection throughout this encounter with handwashing performed prior and after patient evaluation without personal hand/facial/oropharyngeal contact and gloves worn throughout encounter. See note and/or contact this provider for further PPE details.      We recommend that you schedule follow up care with a primary care provider within the next three months to obtain basic health screening including reassessment of your blood pressure.      You had elevated blood pressure today and you need to follow up with your doctor for a repeat blood pressure check and further discussion of lifestyle modifications that include Weight Reduction - Dietary Sodium Restriction - Increased Physical Activity and Moderation in alcohol (ETOH) Consumption. If possible check your pressure at home and keep a blood pressure log to bring to your physician.  Disposition and Plan     Clinical Impression:  1. Disseminated ovarian cancer, left (HCC)    2. Acute hypoxemic respiratory failure (HCC)    3. Hypotension, unspecified hypotension type    4. Recurrent pleural effusion    5. Nausea and vomiting in adult    6. 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 MEGAN (acute kidney injury)    8. Lactic acidosis    9. 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 Sister and spouse at bedside who help with history. Sp most recent para last week- since ongoing progression of symptoms. Here noted tachycardic in afib with rates to 140s started on diltiazem gtt also on heparin gtt, tachypneic, hypoxic/ hyperpapneic on bipap, pressures to 50-70s systolic given 0.9NS bolus then was to have LR bolus started on levephed and vaso gtts since seen. On unasyn as well. Lactate 13 to 11.6 on repeat Cr 1.67 from bsl 0.7 (1.01 earlier in March but bsl closer to 0.7),       OBJECTIVE:  /66   Pulse 78   Temp 97.9 °F (36.6 °C) (Temporal)   Resp (!) 41   Wt 169 lb 15.6 oz (77.1 kg)   LMP 12/22/2020   SpO2 98%   BMI 26.62 kg/m²     GEN: mod distress, on bipap  NECK: supple,  trachea midline  HEENT- HC/AT  CV: rtachycardic irr irr, + anasarca  LUNGS: dim bl R > L tachypneic on bipap  ABL soft, healed surg scar non tender per fam improved ascites  EXT: + bl LE edema b/l , DP pulses 2+ b/l  Neuro: sensation intact, no focal deficits  SKIN- no rashes, no lesion  PSYCH- tearful, anxious    54YOF w PMH sig for 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     SIRS v sepsis (HR, RR, WBC) with MSOF- shock with MEGAN/ lactic acidosis/afib w RVR, resp failure, LFT elevation  Acute respiratory failure with hypoxia/ hypercapnia  Dyspnea- multifactorial from below  Afib w RVR  Hypotension with lactic acidosis  Hyponatremia chronic   Hypokalemia  MEGAN Cr 1.67 from 1.01  3/21/25 0.75 2/28/25  Ovarian adenocarcinoma with peritoneal carcinomatosis dx Mar 2024 sp TAHBSO Aug 2024 on lynparza 300 mg BID w avastin  Recurrent malignant effusion sp thoracentesis 3/14/25 with 1300 cc removed  Recurrent malignant ascites sp multi giuliana (3/28/24 1.5L 4/16 4.4L, 3/4/25 5.2L 3/14/25 1.7L)  BR  LLE DVT dx 3/21/25  Anemia suspected med related  BRCA 1 + somatic not genetic mutation  ACOSTA/ MDD  Hypertriglyceridemia  Chronic Back pain        Sepsis v SIRS severe w shock/ MSOF (MEGAN/lactic acidosis, resp failure, af RVR, LFT elevation)  - unasyn started in ER - agree - aspiration coverage w recent bilious emesis- continue  - f/u cx  - resp  BP support- full measures at this time  - on levophed /vaso gtts  - d/w Dr Tolliver and Dr Lundberg will see  - currently on bipap     Dyspnea, Acute resp failure w hypoxia/ hypercapnia  - suspect multifactorial from recurrent malignant effusion/ ascites and now w severe lactic acidosis   - also high risk for PE as well but already on AC since March 21 (DVT dx) -on heparin gtt here  - inc pBNP c/w overload- tap as able BP and labs preclude diuresis  - also w hg drop 8.3 from 9.6 on 3/21- follow may need pRBC/ further eval pending course  - ddx includes infection- on abx coverage as noted   - d/w Dr Tolliver IR and pulm consulted- last thora 3/14 and last para 3/28 (has had 6)- inc risk for procedure since on full dose AC  - currently on bipap and in GOC discussions patient /family aware risk of needing intubation      Afib w RVR  - new dx suspect from above  - started on diltiazem gtt here- now on pressor support- MEGAN/ LFT limiting use dig or amio  - notified Dr Donis cards for in house consult  - was on AC prior to admission started on heparin gtt here  - echo when stable/ able pending course  - tele monitoring     BRCA 1 + (somatic non genetic mutation) Ovarian adenocarcinoma w peritoneal carcinomatosis and malignant recurrent effusion/ ascites  - dx March 2024  sees Luana Serrano onc  - sp TAHBSO Aug 2024 w [facility]oudhary at [facility]  - sp multi lines treatment most recently on lynparza 300 mg BID w avastin  - text message out to Dr Serrano to notify of admission / hopefully help facilitate GOC if needed     MEGAN w hyponatremia, severe lactic acidosis  - fsuspect pre-renal fm sepsis +/- recent N/V and poor po intkae  - given boluses in ER doubt if able to tolerate bicarb gtt  - follow avoid nephrotoxics may need renal cs if worsening pending overall course     LFT elevation  - suspect from above as well  - trend     Anemia  - most recent bsl 9.6 3/21/25 with onc notes mentioning anemia possible related to lynparza  - acute drop and w dyspnea/ pressures and current full measure will need to follow closely- serial hg,   - transfuse/ further evaluation if dropping  - obtain w/u     LLE DVT  - dx 3/21/25 on w/u bl LE edema  - switched to heparin gtt in house was on eliquis     ACOSTA/ MDD  - home meds = venlafaxine and ativan PRN follows w psych outpatient  - requesting anxiety meds in ER- reviewed high risk of intubation w any sedating med given at this time  - fam proving support/ non pharmacologic measures at bedside     Chronic back pain  - hold home tramadol     ACP/ GOC- discussed with patient, spouse and sister at bedside. hcPOA - patient's daughter Ami paperwork here. Code status and prognosis discussed. Reviewed that she is at risk of intubation and asked if intubation/ resuscitation measures in line with her wishes. Patient/ family overwhelmed and quite tearful- at this time remain undecided and aware that we will pursue full measures unless they opt to pursue DNR/DNI. Also informed that I will discuss with their oncologist if able (text message sent) and have additional doctors on the team follow up to further address. Palliative consutled by ER as well. D/w Dr Tolliver and DR Sherman. 16 min     Dispo ICU        4/5 pulm  Assessment/Plan:  Acute respiratory failure - from  possible aspiration and recurrent MPE  - cont bipap, tolerating currently, but remains at risk for intubation  - precedex gtt prn  Septic Shock - suspected PNA  - s/p IVF resuscitation  - wean pressors as able  - trend LA to clear  - broaden to zosyn/vanc  - f/u cultures. PCT elevated in the context of active malignancy  MPE  - needs thoracentesis, last eliquis this morning and currently on heparin gtt  - reviewed risks of bleeding vs resp failure at length with family at bedside. They favor waiting for anticoagulation risk to resolve before proceeding with a thoracentesis. They understand the risk of further decompensation and intubation.   - will hold heparin at MN and readdress in the morning  - at this point, there is a small loculated component so would favor a diagnostic thora prior to consideration of an indwelling pleural catheter  DVT  - on eliquis, last dose this AM. Currently on heparin gtt in ED which we will hold  - given the anticipation of frequent interruption in anticoagulation due to procedures, would be reasonable to consider and IVC filter  Anemia - she is close to her basline  -  no overt bleeding, monitor  Afib w/RVR  - per cars  Ovarian adenocarcimona, metastatic  - per outpt onc  FEN  - NPO  Ppx  - SCT and anticoagulation as above  Dispo - full code. Family declined to address code status. Reviewed with Dr. Bates.  - ICU, at risk for decompnesation.         4/5 Critical Care     Called for blood in emesis.     Awake alert on bipap.  Nausea resolved after vomiting coffee ground emesis.     BP 95/58 (BP Location: Left arm)   Pulse 109   Temp 98.8 °F (37.1 °C) (Temporal)   Resp (!) 41   Wt 177 lb 7.5 oz (80.5 kg)   LMP 12/22/2020   SpO2 98%   BMI 27.80 kg/m²       CV S1 S2 regular tachy  Lungs decreased BS R>L  Abdomen soft non tender  Ext trace LE edema  Skin pale warm dry     A/P Metastatic ovarian Ca w/ sepsis possible aspiration PNA and recurrent malignant pleural effusion.  H/o DVT  Eliquis was held and transitioned to IV Heparin in anticipation of thoracentesis.     Called w/ nausea and small volume hematemesis.  IV Heparin stopped.  She received Compazine w/ resolution of nausea. RT came to bedside and changed BiPAP to HFNC given risk of aspiration.  She is maintaining Sa02 97% on 3 liters though tachypneic w/ RR mid 30s.     H/H sent, Protonix 40 mg IV BID added.  I updated Dr. Barba on call for Dr. Sherman.  I also spoke to Dr. Bates.     I did speak w/ patient and family at bedside regarding code status that has been addressed by Harriett Sherman  and Paulo.  She was undecided after the conversations and still undecided about intubation mechanical ventilation.  3 adult children at bedside including POA daughter Ami.  I reviewed that she is currently maintaining good 02 sat on NC but concern if she has recurrent vomiting or worsening respiratory status may progress to need for intubation.  One of family will be here overnight.     Meseret Rodríguez NP  Critical Care  40 minutes     Addendum:  Hemoglobin 7.5 will repeat in 6 hours and AM.           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 Noted to be in afib wih RVR with rates in the 140's bpm.     Assessment/Plan:     Afib with RVR- now in sinus tach  Shock, likely septic on levophed  Ovarian adenocarcinoma  Acute hypoxic respiratory failure- likely 2/2 recurrent malignant effusion. No edema noted on CT or CXR.  MEGAN with hyponatremia  LLE DVT- on Eliquis at home     - AF with RVR likely 2/2 septic shock. She is now back in NSR after volume resuscitation and stabilization.   - While acute ill on pressors, would not use rate control agents in order to allow for some degree of compensatory  tachycardia.  Can do IV dig 250 mcg for rates > 160 bpm.   - Tele  - Echo pending        4/6    Episode hematemesis / coffee ground emesis yesterday no further episodes, hg drop heparin gtt held  Remains tachycardic tachypneic  2/2 cx from admission K pneumo blood culte  MRSA pending   1 sister at bedside (has 7 siblings)  On 4L      OBJECTIVE:  Temp:  [97 °F (36.1 °C)-98.8 °F (37.1 °C)] 98.7 °F (37.1 °C)  Pulse:  [] 104  Resp:  [20-43] 32  BP: ()/(43-85) 98/58  SpO2:  [88 %-100 %] 97 %  FiO2 (%):  [30 %-40 %] 30 %  Lab 04/05/25  1018 04/05/25  1718 04/05/25  2359 04/06/25  0500   WBC 8.1  --   --  4.3   HGB 8.3* 7.5* 7.1* 7.4*   MCV 86.0  --   --  84.0   .0  --   --  292.0   BAND 10  --   --   --               Recent Labs   Lab 04/05/25  1019 04/06/25  0500   * 135*   K 3.4* 3.6  3.6   CL 91* 100   CO2 17.0* 24.0   BUN 37* 41*   CREATSERUM 1.67* 1.12*   CA 7.9* 6.8*   MG 1.6 2.1   GLU 84 125*         Scheduled Medications    potassium chloride  40 mEq Intravenous Once    vancomycin  15 mg/kg Intravenous Q24H    piperacillin-tazobactam  4.5 g Intravenous Q8H    pantoprazole  40 mg Intravenous Q12H         Medication Infusions    [Held by provider] continuous dose heparin Stopped (04/05/25 1653)    dexmedetomidine      norepinephrine 8 mcg/min (04/06/25 0600)    vasopressin (Vasostrict) 20 Units in sodium chloride 0.9% 100 mL infusion for septic shock 0.03 Units/min (04/06/25 0803)              4/6 Critical Care Progress Note   Assessment / Plan:  Acute respiratory failure - from possible aspiration and recurrent MPE  - bipap prn, wean O2 as able  - precedex gtt prn  Septic Shock - suspected PNA  - s/p IVF resuscitation  - wean pressors as able  - LA downtrended  - broadened to zosyn/vanc 4/5  - f/u cultures, GNR in blood cultures.  MPE  - needs thoracentesis, last eliquis 4/5 morning and currently on heparin gtt  - reviewed risks of bleeding vs resp failure at length with family at bedside.  They favor waiting for anticoagulation risk to resolve before proceeding with a thoracentesis. They understand the risk of further decompensation and intubation, however she is improved clinically this morning.  - have held heparin gtt  - at this point, there is a small loculated component and positive blood cultures so would favor a diagnostic thora prior to consideration of an indwelling pleural catheter vs small bore chest tube  DVT  - on eliquis, last dose 4/5 AM. Was on heparin gtt, now held  - given the anticipation of frequent interruption in anticoagulation due to procedures, would be reasonable to consider and IVC filter. Check LE US  MEGAN w/hyponateremia  - improved with resuscitation  Anemia - she is close to her basline  -  uptrending monitor  Afib w/RVR  - per cars  Ovarian adenocarcimona, metastatic  - per outpt onc  Hematemesis - small volume, no recurrence  - IV PPI  - holding anticoagulation  FEN  - NPO  Ppx  - SCD only  Dispo - full code. Family declined to address code status. Reviewed with Dr. Bates.  - ICU, at risk for decompnesation  - [facility] invensivists will assume care 4 AM.         ID  Expand All Collapse All    [facility]  part of [facility] Infectious Disease Consult           Yue Malin Patient Status:  Inpatient    1970 MRN [id number]   Location [facility] 2W/SW Attending Carolina Bates MD   Hosp Day # 1 PCP Diana Black MD      Reason for Consultation:  To help with infection and treatment, requested by Dr. Bates,      History of Present Illness:  Yue Malin is a 54 year old female admitted to [facility] on  weakness and SOB,   Significant hx of   - BRCA 1+ in the tumour only,   - Metastatic Ovarian Ca with peritoneal carcinomatosis diagnosed in 2024, S/P TAHBSO followed by multiple courses of chemotherapy, With recurrent malignant ascites, pleural effusion, s/p multiple paracenteses ( last one a week ago), malignant R  effusion s/p thoracentesis 3/14/25,  - LLE DVT, On anticoagulation,   Has been increasingly weak at home with N/ Vomiting, Poor PO intake and worsening SOB,  Also has abdominal pain which is at baseline,   PORT in the chest unchanged,   Admitted to ICU, On pressors,Blood cul are now growing GNR in 2/2 bottles,    On IV Unasyn then changed to IV Zosyn, ID is asked to help with infection and treatment,         History:  Past Medical History       Past Medical History:    Anxiety state    BRCA gene mutation negative in female     Negative 70 gene hereditary cancer panel ([employer]), x1 VUS, report in media tab    DEPRESSION    Depression    Esophageal reflux    HPV in female    Hypertriglyceridemia    OCD (obsessive compulsive disorder)    Ovarian cancer (HCC)     s/p NAC, surgery, adj chemo    Personal history of antineoplastic chemotherapy    PONV (postoperative nausea and vomiting)    PTSD (post-traumatic stress disorder)    SEASONAL ALLERGIES    Visual impairment     glasses    Vitamin B 12 deficiency    Vitamin D deficiency         Past Surgical History         Past Surgical History:   Procedure Laterality Date             x3    Implant left   1999    Implant left   2009     implants replaced    Implant right   1999    Implant right   2009     implants replaced    Other surgical history    &     breast implants, deflation    Other surgical history        carpal tunnel R hand    Total abdom hysterectomy         2024         Family History         Family History   Problem Relation Age of Onset    Breast Cancer Mother 50    Diabetes Father      Heart Disorder Father      Other (Other) Father           rheumatoid arthritis    Thyroid Cancer Sister 30    Breast Cancer Sister 52          at 52y dt cancer    Stroke Paternal Grandfather 50    Breast Cancer Paternal Aunt           dx >50y    Ovarian Cancer Paternal Cousin Female           dx 40s    Cancer Maternal Uncle           lung cancer dx  >50y          reports that she has never smoked. She has never used smokeless tobacco. She reports that she does not currently use alcohol. She reports that she does not use drugs.     Allergies:  [Allergies]    [Allergies]        Allergen Reactions    Hydrocodone NAUSEA AND VOMITING       agitation    Adhesive Tape RASH        Medications:    Current Hospital Medications      Current Facility-Administered Medications:     digoxin (Lanoxin) 250 MCG/ML injection 250 mcg, 250 mcg, Intravenous, Once PRN    [Held by provider] heparin (Porcine) 23890 units/250mL infusion PE/DVT/THROMBUS CONTINUOUS, 200-3,000 Units/hr, Intravenous, Continuous    acetaminophen (Tylenol Extra Strength) tab 500 mg, 500 mg, Oral, Q4H PRN    ondansetron (Zofran) 4 MG/2ML injection 4 mg, 4 mg, Intravenous, Q6H PRN    prochlorperazine (Compazine) 10 MG/2ML injection 5 mg, 5 mg, Intravenous, Q8H PRN    polyethylene glycol (PEG 3350) (Miralax) 17 g oral packet 17 g, 17 g, Oral, Daily PRN    sennosides (Senokot) tab 17.2 mg, 17.2 mg, Oral, Nightly PRN    bisacodyl (Dulcolax) 10 MG rectal suppository 10 mg, 10 mg, Rectal, Daily PRN    fleet enema (Fleet) rectal enema 133 mL, 1 enema, Rectal, Once PRN    vancomycin (Vancocin) 1.25 g in sodium chloride 0.9% 250mL IVPB premix, 15 mg/kg, Intravenous, Q24H    [COMPLETED] piperacillin-tazobactam (Zosyn) 4.5 g in dextrose 5% 100 mL IVPB-ADDV, 4.5 g, Intravenous, Once **FOLLOWED BY** piperacillin-tazobactam (Zosyn) 4.5 g in dextrose 5% 100 mL IVPB-ADDV, 4.5 g, Intravenous, Q8H    pantoprazole (Protonix) 40 mg in sodium chloride 0.9% PF 10 mL IV push, 40 mg, Intravenous, Q12H    norepinephrine (Levophed) 32 mg in dextrose 5% 250 mL infusion, 0.5-50 mcg/min, Intravenous, Continuous    vasopressin (Vasostrict) 20 Units in sodium chloride 0.9% 100 mL infusion for septic shock, 0.01-0.03 Units/min, Intravenous, Continuous        Review of Systems:   Constitutional: Negative for anorexia, chills, fatigue, fevers,  malaise, night sweats and weight loss.  Eyes: Negative for visual disturbance, irritation and redness.  Ears, nose, mouth, throat, and face: Negative for hearing loss, tinnitus, nasal congestion, snoring, sore throat, hoarseness and voice change.  Respiratory: Negative for cough, sputum, hemoptysis, chest pain, wheezing, dyspnea on exertion, or stridor.  Cardiovascular: Negative for chest pain, palpitations, irregular heart beats, syncope, fatigue, orthopnea, paroxysmal nocturnal dyspnea, lower extremity edema.  Gastrointestinal: Negative for dysphagia, odynophagia, reflux symptoms, nausea, vomiting, change in bowel habits, diarrhea, constipation and abdominal pain.  Integument/breast: Negative for rash, skin lesions, and pruritus.  Hematologic/lymphatic: Negative for easy bruising, bleeding, and lymphadenopathy.  Musculoskeletal: Negative for myalgias, arthralgias, muscle weakness.  Neurological: Negative for headaches, dizziness, seizures, memory problems, trouble swallowing, speech problems, gait problems and weakness.  Behavioral/Psych: Negative for active tobacco use.  Endocrine: No history of of diabetes, thyroid disorder.  All other review of systems are negative.     Vital signs in last 24 hours:  Patient Vitals for the past 24 hrs:    BP Temp Temp src Pulse Resp SpO2 Weight   04/06/25 1100 95/62 -- -- 92 (!) 27 98 % --   04/06/25 1000 92/60 -- -- 99 (!) 29 97 % --   04/06/25 0900 (!) 85/50 -- -- 100 (!) 27 97 % --   04/06/25 0800 98/58 98.7 °F (37.1 °C) Temporal 104 (!) 32 97 % --   04/06/25 0722 101/57 -- -- 99 (!) 35 94 % --   04/06/25 0630 101/56 -- -- 105 (!) 27 96 % --   04/06/25 0600 92/55 -- -- 111 (!) 34 95 % --   04/06/25 0530 101/64 -- -- (!) 121 (!) 34 94 % --   04/06/25 0510 -- -- -- -- -- -- 179 lb 0.2 oz (81.2 kg)   04/06/25 0500 104/63 -- -- 116 (!) 32 95 % --   04/06/25 0458 106/72 -- -- 114 25 96 % --   04/06/25 0449 -- -- -- 120 20 93 % --   04/06/25 0442 -- -- -- (!) 144 (!) 28 (!) 89 %  --   04/06/25 0435 119/62 98 °F (36.7 °C) Temporal (!) 144 26 90 % --   04/06/25 0430 -- -- -- (!) 146 22 (!) 88 % --   04/06/25 0428 105/85 -- -- (!) 143 26 94 % --   04/06/25 0400 111/61 97.2 °F (36.2 °C) Temporal 94 (!) 33 98 % --   04/06/25 0300 107/60 -- -- 90 26 98 % --   04/06/25 0230 105/58 -- -- 89 (!) 29 96 % --   04/06/25 0228 110/58 -- -- 88 (!) 30 95 % --   04/06/25 0200 106/64 -- -- 89 (!) 30 98 % --   04/06/25 0100 107/63 -- -- 90 (!) 32 96 % --   04/06/25 0000 92/60 97 °F (36.1 °C) Temporal 101 (!) 40 97 % --   04/05/25 2330 102/60 -- -- 90 (!) 29 97 % --   04/05/25 2300 111/58 -- -- 90 (!) 27 99 % --   04/05/25 2230 102/66 -- -- 91 (!) 36 98 % --   04/05/25 2200 106/63 -- -- 91 (!) 30 98 % --   04/05/25 2130 103/70 -- -- 90 (!) 35 98 % --   04/05/25 2100 104/73 -- -- 90 (!) 31 100 % --   04/05/25 2000 107/61 98.3 °F (36.8 °C) Temporal 91 (!) 36 100 % --   04/05/25 1900 111/73 -- -- 103 (!) 36 97 % --   04/05/25 1830 91/60 -- -- 94 (!) 37 100 % --   04/05/25 1800 104/73 -- -- 101 (!) 37 98 % --   04/05/25 1705 -- -- -- -- -- 98 % --   04/05/25 1700 109/61 -- -- 109 (!) 37 98 % --   04/05/25 1600 95/58 -- -- 109 (!) 41 98 % --   04/05/25 1551 -- -- -- -- -- -- 177 lb 7.5 oz (80.5 kg)   04/05/25 1545 103/61 98.8 °F (37.1 °C) Temporal 109 (!) 41 96 % --   04/05/25 1535 -- -- -- -- -- 100 % --   04/05/25 1445 103/64 -- -- 107 (!) 38 98 % --   04/05/25 1430 98/53 -- -- 107 (!) 40 100 % --   04/05/25 1345 92/67 -- -- 106 (!) 32 100 % --         Physical Exam:               General: alert, cooperative, oriented.  No respiratory distress.               Head: Normocephalic, without obvious abnormality, atraumatic.               Eyes: Conjunctivae/corneas clear.  No scleral icterus.  No conjunctival                 hemorrhage.               Nose: Nares normal.               Throat: Lips, mucosa, and tongue normal.  No thrush noted.               Neck: Soft, supple neck; trachea midline, no adenopathy, no  thyromegaly.               Lungs: CTAB, normal and equal bilateral chest rise               Chest wall: No tenderness or deformity.               Heart: Regular rate and rhythm, normal S1S2, no murmur.               Abdomen: soft, non-tender, non-distended, no masses, no guarding, no                      rebound, positive BS.               Extremity: no edema, no cyanosis               Skin: No rashes or lesions.               Neurological: Alert, interactive, no focal deficits     Labs:        Lab Results   Component Value Date     WBC 5.1 04/06/2025     HGB 6.5 04/06/2025     HCT 20.5 04/06/2025     .0 04/06/2025     CREATSERUM 1.12 04/06/2025     BUN 41 04/06/2025      04/06/2025     K 3.6 04/06/2025     K 3.6 04/06/2025      04/06/2025     CO2 24.0 04/06/2025      04/06/2025     CA 6.8 04/06/2025     PTT 94.1 04/05/2025     MG 2.1 04/06/2025        Radiology:  CT- No PE, Moderate volume loculated right effusion.  Small free-flowing left effusion.  Passive atelectasis due to effusion      CT a month ago- 1. Enhancing omental metastases with marked increase in simple ascitic fluid.   2. Bilateral pleural effusions. Some loculation on the right is suspected in some additional   enhancing soft tissue in the right costophrenic angle may represent pleural-based metastatic   disease.   3. Single low density lesion in the left hepatic lobe likely represents a cyst and is stable since   the prior.         Cultures:  Reviewed,      Assessment and Plan:     1.  Severe Sepsis with Gram negative Bacteremia: in an immunocompromised host,  - Blood cul with GNR in 2/2 bottles on admission,   - presenting symptoms of SOB, N/ V and loss of apatite, no worsening abd pain, no abn LFTs,  - UA is bland,   - Lfts will be trended, no tenderness specifically in RUQ,   - CT with Moderate volume loculated right effusion.  Small free-flowing left effusion.  Passive atelectasis due to effusion.  - PORT site is  clean,   - CT of abd done a month ago reviewed,   - Possible sources of infections can be Line vs infected Pleural effusion, Rule out SBP causing N/V and loss of apatite,   - On IV Zosyn, pharm to dose, also on IV Vanc, will dc IV Vanc if cul stay negative,   - Repeat blood cul,      2.    Metastatic Ovarian Ca with Malignant pleural effusion, Peritoneal carcinomatosis,   - per PCP,      3.     Significant anemia: due to above,      4.     Disposition: in house, a middle aged immunocompromised female with severe sepsis, gram negative bacteremia,   - Follow Pending cul,   - Continue IV Zosyn, pharm to dose,   - Continue IV Vanc will dc if cul stay negative,   - repeat blood cul,   - may need repeat CT abd- not a order at this point,   - Follow thoracentesis and paracentesis results,            MEDICATIONS ADMINISTERED IN LAST 1 DAY:  Transfuse RBC       Date Action Dose Route User    4/6/2025 1352 New Bag (none) Intravenous Erinn Pizarro RN          norepinephrine (Levophed) 32 mg in dextrose 5% 250 mL infusion       Date Action Dose Route User    4/6/2025 1930 Rate/Dose Change 5 mcg/min Intravenous Chasidy Alex RN    4/6/2025 1828 Rate/Dose Change 10 mcg/min Intravenous Erinn Pizarro RN    4/6/2025 1746 Rate/Dose Change 8 mcg/min Intravenous Erinn Pizarro RN          pantoprazole (Protonix) 40 mg in sodium chloride 0.9% PF 10 mL IV push       Date Action Dose Route User    4/7/2025 0600 Given 40 mg Intravenous Chasidy Alex RN    4/6/2025 1741 Given 40 mg Intravenous Erinn Pizarro RN          phenylephrine (Arnaldo-Synephrine) 100 mg in sodium chloride 0.9% 250 mL infusion       Date Action Dose Route User    4/7/2025 0600 Rate/Dose Verify 100 mcg/min Intravenous Chasidy Alex RN    4/7/2025 0500 Rate/Dose Verify 100 mcg/min Intravenous Chasidy Alex RN    4/7/2025 0400 Rate/Dose Verify 100 mcg/min Intravenous Chasidy Alex RN    4/7/2025 0300 Rate/Dose Verify 100 mcg/min  Intravenous Chasidy Alex RN    4/7/2025 0200 Rate/Dose Verify 100 mcg/min Intravenous Chasidy Alex RN    4/7/2025 0100 Rate/Dose Verify 100 mcg/min Intravenous Chasidy Alex RN    4/7/2025 0000 Rate/Dose Verify 100 mcg/min Intravenous Chasidy Alex RN    4/6/2025 2148 Rate/Dose Verify 100 mcg/min Intravenous Chasidy Alex RN    4/6/2025 2000 Rate/Dose Verify 100 mcg/min Intravenous Chasidy Alex RN    4/6/2025 1945 New Bag 100 mcg/min Intravenous Chasidy Alex, RN          piperacillin-tazobactam (Zosyn) 4.5 g in dextrose 5% 100 mL IVPB-ADDV       Date Action Dose Route User    4/7/2025 0451 New Bag 4.5 g Intravenous Chasidy Alex RN    4/6/2025 1949 New Bag 4.5 g Intravenous Chasidy Alex RN          potassium chloride 40 mEq in 250mL sodium chloride 0.9% IVPB premix       Date Action Dose Route User    4/7/2025 0700 New Bag 40 mEq Intravenous Chasidy Alex RN          sodium chloride 0.9% infusion       Date Action Dose Route User    4/6/2025 1354 New Bag (none) Intravenous Erinn Pizarro RN          vancomycin (Vancocin) 1.25 g in sodium chloride 0.9% 250mL IVPB premix       Date Action Dose Route User    4/6/2025 1629 New Bag 1,250 mg Intravenous Erinn Pizarro RN          vasopressin (Vasostrict) 20 Units in sodium chloride 0.9% 100 mL infusion for septic shock       Date Action Dose Route User    4/7/2025 0600 Rate/Dose Verify 0.03 Units/min Intravenous Chasidy Alex RN    4/7/2025 0500 Rate/Dose Verify 0.03 Units/min Intravenous Chasidy Alex RN    4/7/2025 0431 New Bag 0.03 Units/min Intravenous Rosa Salinas RN    4/7/2025 0200 Rate/Dose Verify 0.03 Units/min Intravenous Chasidy Alex RN    4/7/2025 0100 Rate/Dose Verify 0.03 Units/min Intravenous Chasidy Alex RN    4/7/2025 0000 Rate/Dose Verify 0.03 Units/min Chasidy Coelho RN    4/6/2025 2148 Rate/Dose Verify 0.03 Units/min Chasidy Coelho,  RN    4/6/2025 2000 Rate/Dose Verify 0.03 Units/min Intravenous Chasidy Alex, RN    4/6/2025 1741 New Bag 0.03 Units/min Intravenous Erinn Pizarro RN            Vitals (last day)       Date/Time Temp Pulse Resp BP SpO2 Weight O2 Device O2 Flow Rate (L/min) [facility]    04/07/25 0900 -- 65 27 98/68 98 % -- -- -- AV    04/07/25 0800 98 °F (36.7 °C) 61 25 98/65 98 % -- High flow nasal cannula 4 L/min AV    04/07/25 0700 -- 64 24 100/66 97 % -- -- -- AV    04/07/25 0600 -- 64 29 104/64 98 % -- High flow nasal cannula 4 L/min     04/07/25 0500 -- 70 24 92/62 98 % -- High flow nasal cannula 4 L/min     04/07/25 0400 98.2 °F (36.8 °C) 79 24 95/60 96 % -- High flow nasal cannula 4 L/min     04/07/25 0300 -- 65 31 101/64 97 % -- High flow nasal cannula 4 L/min     04/07/25 0200 -- 62 21 94/63 97 % -- High flow nasal cannula 4 L/min     04/07/25 0100 -- 69 27 97/65 96 % -- High flow nasal cannula 4 L/min     04/07/25 0000 97.9 °F (36.6 °C) 68 24 99/57 98 % -- High flow nasal cannula 4 L/min     04/06/25 2300 -- 67 24 95/64 97 % -- High flow nasal cannula 4 L/min     04/06/25 2200 -- 77 34 98/52 97 % -- High flow nasal cannula 4 L/min     04/06/25 2100 -- 71 23 102/64 97 % -- High flow nasal cannula 4 L/min     04/06/25 2030 -- 74 29 102/64 98 % -- -- --     04/06/25 2000 98 °F (36.7 °C) 75 26 110/71 96 % -- High flow nasal cannula 4 L/min     04/06/25 1930 -- 66 32 100/68 97 % -- High flow nasal cannula 4 L/min     04/06/25 1810 -- 75 30 88/64 98 % -- High flow nasal cannula 4 L/min     04/06/25 1800 -- 81 33 105/61 94 % -- High flow nasal cannula 4 L/min     04/06/25 1745 -- 74 29 135/78 98 % -- High flow nasal cannula 4 L/min     04/06/25 1700 -- 83 33 109/70 99 % -- High flow nasal cannula 4 L/min     04/06/25 1600 98.7 °F (37.1 °C) 80 36 108/70 97 % -- High flow nasal cannula 4 L/min     04/06/25 1500 -- 83 27 97/71 97 % -- High flow nasal cannula 4 L/min     04/06/25 1415 98 °F  (36.7 °C) 88 35 102/67 98 % -- High flow nasal cannula 4 L/min     04/06/25 1405 97.6 °F (36.4 °C) 90 36 102/67 97 % -- High flow nasal cannula 3 L/min     04/06/25 1400 -- 89 33 102/69 97 % -- High flow nasal cannula 3 L/min     04/06/25 1350 97 °F (36.1 °C) 78 24 105/65 97 % -- High flow nasal cannula 4 L/min     04/06/25 1300 -- 87 27 102/68 99 % -- High flow nasal cannula 4 L/min     04/06/25 1200 98.5 °F (36.9 °C) 88 38 90/60 96 % -- High flow nasal cannula 4 L/min     04/06/25 1100 -- 92 27 95/62 98 % -- High flow nasal cannula 4 L/min     04/06/25 1000 -- 99 29 92/60 97 % -- High flow nasal cannula 4 L/min     04/06/25 0900 -- 100 27 85/50 97 % -- High flow nasal cannula 4 L/min     04/06/25 0800 98.7 °F (37.1 °C) 104 32 98/58 97 % -- High flow nasal cannula 4 L/min     04/06/25 0722 -- 99 35 101/57 94 % -- High flow nasal cannula 4 L/min     04/06/25 0630 -- 105 27 101/56 96 % -- -- 4 L/min     04/06/25 0600 -- 111 34 92/55 95 % -- High flow nasal cannula 6 L/min     04/06/25 0530 -- 121 34 101/64 94 % -- -- 7 L/min     04/06/25 0510 -- -- -- -- -- 179 lb 0.2 oz (81.2 kg) -- --     04/06/25 0500 -- 116 32 104/63 95 % -- High flow nasal cannula 8 L/min     04/06/25 0458 -- 114 25 106/72 96 % -- High flow nasal cannula 8 L/min     04/06/25 0449 -- 120 20 -- 93 % -- -- --     04/06/25 0442 -- 144 28 -- 89 % -- High flow nasal cannula 10 L/min     04/06/25 0435 98 °F (36.7 °C) 144 26 119/62 90 % -- High flow nasal cannula 10 L/min     04/06/25 0430 -- 146 22 -- 88 % -- High flow nasal cannula 8 L/min     04/06/25 0428 -- 143 26 105/85 94 % -- -- --     04/06/25 0400 97.2 °F (36.2 °C) 94 33 111/61 98 % -- High flow nasal cannula 3 L/min     04/06/25 0300 -- 90 26 107/60 98 % -- High flow nasal cannula 3 L/min     04/06/25 0230 -- 89 29 105/58 96 % -- -- --     04/06/25 0228 -- 88 30 110/58 95 % -- -- --     04/06/25 0200 -- 89 30 106/64 98 % -- High flow nasal  cannula 3 L/min KH    04/06/25 0100 -- 90 32 107/63 96 % -- High flow nasal cannula 3 L/min KH    04/06/25 0000 97 °F (36.1 °C) 101 40 92/60 97 % -- High flow nasal cannula 3 L/min                    Blood Transfusion Record       Product Unit Status Volume Start End            Transfuse RBC       25  [id number]  [id number] Completed 04/06/25 1614 500 mL 04/06/25 1352 04/06/25 1600

## 2025-04-07 NOTE — PROGRESS NOTES
[facility]  [facility]  Cardiology Progress Note    Yue Malin Patient Status:  Inpatient    1970 MRN [id number]   Location [facility] 2W/SW Attending Carolina Bates MD   Hosp Day # 2 PCP Diana Black MD       Impression:  Afib with RVR- now in sinus tach  Shock, likely septic- requiring phenyl and vaso  Ovarian adenocarcinoma  Acute hypoxic respiratory failure- likely 2/2 recurrent malignant effusion. No edema noted on CT or CXR.  MEGAN with hyponatremia  LLE DVT- on Eliquis at home     - AF with RVR likely 2/2 septic shock. She is now back in NSR after volume resuscitation and stabilization.   - While acute ill on pressors, would not use rate control agents in order to allow for some degree of compensatory tachycardia.  Recommend phenylephrine over levophed for pressor support to decrease AF rates.  - Can do IV dig 250 mcg for rates > 160 bpm.   - Tele- in NSR today. Had some RVR last night when levophed was uptitrated.  - Echo reviewed- low-normal LVEF, normal RVSP, L-pleural effusion, normal LA.     Subjective:   Doing okay, no CV complaints. Had episode of AF with RVR yesterday when levo was uptitrated. Since levo switched to phenylephrine, she is back in NSR. No further instance of afib.    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)    MEGAN (acute kidney injury)    Lactic acidosis    Bandemia    Disseminated ovarian cancer, left (HCC)    Palliative care by specialist       Objective:   Temp: 98.2 °F (36.8 °C)  Pulse: 67  Resp: 25  BP: 103/68    Intake/Output:     Intake/Output Summary (Last 24 hours) at 2025 1329  Last data  filed at 4/7/2025 1200  Gross per 24 hour   Intake 1942.61 ml   Output 2125 ml   Net -182.39 ml       Last 3 Weights   04/07/25 1000 172 lb 6.4 oz (78.2 kg)   04/06/25 0510 179 lb 0.2 oz (81.2 kg)   04/05/25 1551 177 lb 7.5 oz (80.5 kg)   04/05/25 0936 169 lb 15.6 oz (77.1 kg)   10/08/24 1840 170 lb (77.1 kg)   08/20/24 2201 178 lb 6.4 oz (80.9 kg)   08/13/24 0549 173 lb 6.4 oz (78.7 kg)   08/02/24 1128 180 lb (81.6 kg)       Tele: NSR    Physical Exam:    General: awake, alert, oriented x 3, no acute distress  HEENT: at/nc, perrl, eomi  Neck: No JVD, carotids 2+ no bruits.  Cardiac: Regular rate and rhythm, S1, S2 normal, no murmur, rub or gallop.  Lungs: Clear without wheezes, rales, rhonchi or dullness.  Normal excursions and effort.  Abdomen: Soft, non-tender, non-distended, normal bowel sounds   Extremities: Without clubbing, cyanosis or edema.  Peripheral pulses are 2+.  Neurologic: Alert and oriented, normal affect.  Psych: normal mood and affect  Skin: Warm and dry.        Laboratory/Data:    Labs:         Recent Labs   Lab 04/05/25  1018 04/05/25  1718 04/06/25  1153 04/06/25  1813 04/07/25  0004 04/07/25  0516 04/07/25  1221   WBC 8.1   < > 5.1 4.6 4.0 3.9* 3.8*   HGB 8.3*   < > 6.5* 7.8* 7.3* 7.4* 7.3*   MCV 86.0   < > 86.9 87.4 86.3 86.5 87.0   .0   < > 254.0 234.0 217.0 204.0 203.0   BAND 10  --   --   --  17 7  --     < > = values in this interval not displayed.       Recent Labs   Lab 04/05/25  1019 04/06/25  0500 04/06/25  1813 04/07/25  0516   * 135* 140 139   K 3.4* 3.6  3.6 3.4* 3.2*  3.2*   CL 91* 100 105 104   CO2 17.0* 24.0 25.0 26.0   BUN 37* 41* 38* 34*   CREATSERUM 1.67* 1.12* 0.82 0.69   CA 7.9* 6.8* 7.0* 6.9*   MG 1.6 2.1 2.2  --    GLU 84 125* 115* 98       Recent Labs   Lab 04/05/25  1019   ALT 39   AST 98*   ALB 3.0*       No results for input(s): \"TROP\" in the last 168 hours.    Allergies:   Allergies[1]    Medications:  Current Facility-Administered Medications    Medication Dose Route Frequency    phenylephrine (Arnaldo-Synephrine) 100 mg in sodium chloride 0.9% 250 mL infusion   mcg/min Intravenous Continuous    [Held by provider] heparin (Porcine) 35523 units/250mL infusion PE/DVT/THROMBUS CONTINUOUS  200-3,000 Units/hr Intravenous Continuous    acetaminophen (Tylenol Extra Strength) tab 500 mg  500 mg Oral Q4H PRN    ondansetron (Zofran) 4 MG/2ML injection 4 mg  4 mg Intravenous Q6H PRN    prochlorperazine (Compazine) 10 MG/2ML injection 5 mg  5 mg Intravenous Q8H PRN    polyethylene glycol (PEG 3350) (Miralax) 17 g oral packet 17 g  17 g Oral Daily PRN    sennosides (Senokot) tab 17.2 mg  17.2 mg Oral Nightly PRN    bisacodyl (Dulcolax) 10 MG rectal suppository 10 mg  10 mg Rectal Daily PRN    fleet enema (Fleet) rectal enema 133 mL  1 enema Rectal Once PRN    piperacillin-tazobactam (Zosyn) 4.5 g in dextrose 5% 100 mL IVPB-ADDV  4.5 g Intravenous Q8H    pantoprazole (Protonix) 40 mg in sodium chloride 0.9% PF 10 mL IV push  40 mg Intravenous Q12H    norepinephrine (Levophed) 32 mg in dextrose 5% 250 mL infusion  0.5-50 mcg/min Intravenous Continuous    vasopressin (Vasostrict) 20 Units in sodium chloride 0.9% 100 mL infusion for septic shock  0.01-0.03 Units/min Intravenous Continuous       Jean Donis MD  4/7/2025  1:29 PM         [1]   Allergies  Allergen Reactions    Hydrocodone NAUSEA AND VOMITING     agitation    Adhesive Tape RASH

## 2025-04-07 NOTE — PLAN OF CARE
Problem: CARDIOVASCULAR - ADULT  Goal: Absence of cardiac arrhythmias or at baseline  Description: INTERVENTIONS:- Continuous cardiac monitoring, monitor vital signs, obtain 12 lead EKG if indicated- Evaluate effectiveness of antiarrhythmic and heart rate control medications as ordered- Initiate emergency measures for life threatening arrhythmias- Monitor electrolytes and administer replacement therapy as ordered  Outcome: Progressing     Problem: RESPIRATORY - ADULT  Goal: Achieves optimal ventilation and oxygenation  Description: INTERVENTIONS:- Assess for changes in respiratory status- Assess for changes in mentation and behavior- Position to facilitate oxygenation and minimize respiratory effort- Oxygen supplementation based on oxygen saturation or ABGs- Provide Smoking Cessation handout, if applicable- Encourage broncho-pulmonary hygiene including cough, deep breathe, Incentive Spirometry- Assess the need for suctioning and perform as needed- Assess and instruct to report SOB or any respiratory difficulty- Respiratory Therapy support as indicated- Manage/alleviate anxiety- Monitor for signs/symptoms of CO2 retention  Outcome: Progressing     Problem: GASTROINTESTINAL - ADULT  Goal: Minimal or absence of nausea and vomiting  Description: INTERVENTIONS:- Maintain adequate hydration with IV or PO as ordered and tolerated- Nasogastric tube to low intermittent suction as ordered- Evaluate effectiveness of ordered antiemetic medications- Provide nonpharmacologic comfort measures as appropriate- Advance diet as tolerated, if ordered- Obtain nutritional consult as needed- Evaluate fluid balance  Outcome: Progressing     Problem: GENITOURINARY - ADULT  Goal: Absence of urinary retention  Description: INTERVENTIONS:- Assess patient’s ability to void and empty bladder- Monitor intake/output and perform bladder scan as needed- Follow urinary retention protocol/standard of care- Consider collaborating with pharmacy to  review patient's medication profile- Implement strategies to promote bladder emptying  Outcome: Progressing     Problem: SKIN/TISSUE INTEGRITY - ADULT  Goal: Skin integrity remains intact  Description: INTERVENTIONS- Assess and document risk factors for pressure ulcer development- Assess and document skin integrity- Monitor for areas of redness and/or skin breakdown- Initiate interventions, skin care algorithm/standards of care as needed  Outcome: Progressing     Problem: HEMATOLOGIC - ADULT  Goal: Maintains hematologic stability  Description: INTERVENTIONS- Assess for signs and symptoms of bleeding or hemorrhage- Monitor labs and vital signs for trends- Administer supportive blood products/factors, fluids and medications as ordered and appropriate- Administer supportive blood products/factors as ordered and appropriate  Outcome: Progressing  Goal: Free from bleeding injury  Description: (Example usage: patient with low platelets)INTERVENTIONS:- Avoid intramuscular injections, enemas and rectal medication administration- Ensure safe mobilization of patient- Hold pressure on venipuncture sites to achieve adequate hemostasis- Assess for signs and symptoms of internal bleeding- Monitor lab trends- Patient is to report abnormal signs of bleeding to staff- Avoid use of toothpicks and dental floss- Use electric shaver for shaving- Use soft bristle tooth brush- Limit straining and forceful nose blowing  Outcome: Progressing     Problem: Patient Centered Care  Goal: Patient preferences are identified and integrated in the patient's plan of care  Description: Interventions:- What would you like us to know as we care for you? - Provide timely, complete, and accurate information to patient/family- Incorporate patient and family knowledge, values, beliefs, and cultural backgrounds into the planning and delivery of care- Encourage patient/family to participate in care and decision-making at the level they choose- [place] patient  and family perspectives and choices  Outcome: Progressing

## 2025-04-07 NOTE — PROGRESS NOTES
[facility] Hospitalist Progress Note     PCP: Diana Black MD    Chief Complaint: follow-up      SUBJECTIVE:  Hg to 6.5 yesterday gave 1 u PRBC up to 7.3 to 7.4 kept npo off heparin gtt on iv ppi  Started phenylephrine gtt inc HR with vaso gtt  Remains on 4L O2   4/5 and 4/6 2/2 cx +   Seen w daughter Ami at bedside hoping to eat no more coffee ground emesis no dark stools    OBJECTIVE:  Temp:  [97 °F (36.1 °C)-98.7 °F (37.1 °C)] 98.2 °F (36.8 °C)  Pulse:  [] 64  Resp:  [21-38] 24  BP: ()/(50-78) 100/66  SpO2:  [94 %-99 %] 97 %    Intake/Output:    Intake/Output Summary (Last 24 hours) at 4/7/2025 0841  Last data filed at 4/7/2025 0600  Gross per 24 hour   Intake 1592.61 ml   Output 1600 ml   Net -7.39 ml       Last 3 Weights   04/06/25 0510 179 lb 0.2 oz (81.2 kg)   04/05/25 1551 177 lb 7.5 oz (80.5 kg)   04/05/25 0936 169 lb 15.6 oz (77.1 kg)   10/08/24 1840 170 lb (77.1 kg)   08/20/24 2201 178 lb 6.4 oz (80.9 kg)   08/13/24 0549 173 lb 6.4 oz (78.7 kg)   08/02/24 1128 180 lb (81.6 kg)       Exam  Gen: awake,   Pulm:  less tachypneic dim  CV: tachy reg +Bl le edema  Abd: Abdomen distended non tender +BS  MSK: Full range of motion in extremities,   Skin: no rashes or lesions  NEURO- no focal decificts    Data Review:       Labs:     Recent Labs   Lab 04/05/25  1018 04/05/25  1718 04/06/25  0500 04/06/25  1153 04/06/25  1813 04/07/25  0004 04/07/25  0516   WBC 8.1  --  4.3 5.1 4.6 4.0 3.9*   HGB 8.3*   < > 7.4* 6.5* 7.8* 7.3* 7.4*   MCV 86.0  --  84.0 86.9 87.4 86.3 86.5   .0  --  292.0 254.0 234.0 217.0 204.0   BAND 10  --   --   --   --  17 7    < > = values in this interval not displayed.       Recent Labs   Lab 04/05/25  1019 04/06/25  0500 04/06/25  1813 04/07/25  0516   * 135* 140 139   K 3.4* 3.6  3.6 3.4* 3.2*  3.2*   CL 91* 100 105 104   CO2 17.0* 24.0 25.0 26.0   BUN 37* 41* 38* 34*   CREATSERUM 1.67* 1.12* 0.82 0.69   CA 7.9* 6.8* 7.0* 6.9*   MG 1.6 2.1 2.2  --    GLU 84 125* 115*  98       Recent Labs   Lab 04/05/25  1019   ALT 39   AST 98*   ALB 3.0*       No results for input(s): \"PGLU\" in the last 168 hours.    No results for input(s): \"TROP\" in the last 168 hours.      Meds:      potassium chloride  40 mEq Intravenous Once    piperacillin-tazobactam  4.5 g Intravenous Q8H    pantoprazole  40 mg Intravenous Q12H      phenylephrine 100 mcg/min (04/07/25 0600)    [Held by provider] continuous dose heparin Stopped (04/05/25 1653)    norepinephrine Stopped (04/06/25 2000)    vasopressin (Vasostrict) 20 Units in sodium chloride 0.9% 100 mL infusion for septic shock 0.03 Units/min (04/07/25 0600)       acetaminophen    ondansetron    prochlorperazine    polyethylene glycol (PEG 3350)    sennosides    bisacodyl    fleet enema      ASSESSMENT AND PLAN    54YOF w PMH sig for 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     sepsis (HR, RR, WBC) with MSOF- shock with MEGAN/ lactic acidosis/afib w RVR, resp failure, LFT elevation  Acute respiratory failure with hypoxia/ hypercapnia  Dyspnea- multifactorial from below  Afib w RVR  Hypotension with lactic acidosis  Hypokalemia  MEGAN Cr 1.67 from 1.01 3/21/25 0.75 2/28/25  Ovarian adenocarcinoma with peritoneal carcinomatosis dx Mar 2024 sp TAHBSO Aug 2024 on lynparza 300 mg BID w avastin  Recurrent malignant effusion sp thoracentesis 3/14/25 with 1300 cc removed  Recurrent malignant ascites sp multi giuliana (3/28/24 1.5L 4/16 4.4L, 3/4/25 5.2L 3/14/25 1.7L)  BR  LLE DVT dx 3/21/25  Anemia suspected med related  BRCA 1 + somatic not genetic mutation  ACOSTA/  MDD  Hypertriglyceridemia  Chronic Back pain        Septic shock  - on admit severe w shock w MSOF (sever lactic acidosis on admit, MEGAN/ LFT, resp failure, afib w RVR)  - sources: bacteremia- both bottles 4/5 and 4/6 + initial K pneumo f/u with GNR and GPR  - suspect lung primary +/- GI/ gyn primary.   - unasyn on admit for aspiration broadened to zosyn/ vanc- today zosyn only ID on  [ ] f/u CXR done  4/7 d/w Dr Story will tap lung as able send for diagnostic   - f/u cx data  - f/u CT abd pelvis  - echo (done for afib) no vegetations   [ ] if bacteremia ongoing will need to discuss port removal and or JESSE if stable  - remains req pressor support wean as able switched to phenylephrine to help w tachycardia     Acute resp failure w hypoxia/ hypercapnia  - on admit: fm pleural effusion/ anasarca, PNA/sepsis w severe acidosis on admit, ddx also includes PE w rec dx LLE DVT and cancer underlying  - bipap on admit weaning as able  - see individual acute issues (infection, effusion/ VTE)   - pulm on     R Pleural effusion  - sp tap 3/14 + malignant  - recurrent  [ ] d/w Dr Story small loculation - will do bedside tap US guided today and send for diagnostic   - f/u fluid studies    Acute on chronic anemia   - last bsl 9.6 on 3/21/25 per onc from lynparza  - here down to 6.5 on 4/6/25 pm- with episode coffee ground emesis and on full AC  - held AC started IV PPI  - given pRBC 4/6 w holley response  [ ] no recurrent overt s/sx bleeding- will attempt to start clear liquids  [ ] cont off AC for now  [ ] monitor hgs    Acute VTE  - + LLE DVT dx 3/21/25 (w/u bl LE swelling)  - repeat US here negative- cont to hold AC for now w hg drop concern re possible bleed  [ ] d/w DR Story- does agree eventual IVC filter would be of benefit however will hold for now w ongoing bacteremia    Afib w RVR  - reactive from septic shock but afib new dx  - improved but did have some RVR o/n switched from levophed to phenylephrine gtt  - seen by cards,  echo done- ok  [ ] w improved Cr if needed rec dig 250 iv if HR > 160.  - cont on tele        BRCA 1 + (somatic non genetic mutation) Ovarian adenocarcinoma w peritoneal carcinomatosis and malignant recurrent effusion/ ascites  - dx March 2024 sees Luana Serrano onc  - sp TAHBSO Aug 2024 w S [facility] at [facility]  - sp multi lines treatment most recently on lynparza 300 mg BID w avastin  - notified Dr Serrano on admit  - sp multi paracentesis some discussion outpatient regarding pleurx       MEGAN w hyponatremia, severe lactic acidosis  - on admit now all improved  - suspect from sepsis and N/V just prior to admit  - follow BMP    Hypokalemia  - repletion       LFT elevation  - suspect from above as well  - trend- recheck labs        ACOSTA/ MDD  - home meds = venlafaxine and ativan PRN follows w psych outpatient  - requesting anxiety meds resume if safe pressures/ bp allowed   - fam proving support/ non pharmacologic measures at bedside     Chronic back pain  - hold home tramadol     ACP/ GOC- discussed with patient, spouse and sister at bedside. hcPOA - patient's daughter Ami on admit full code at this time palliative on cs holley in house holley eval     Dispo ICU  Carolina Bates MD  [facility] Hospitalist  [phone]  4/6/2025  8:53 AM

## 2025-04-07 NOTE — PROGRESS NOTES
[facility]  part of [facility] Infectious Disease  Progress Note    Yue Malin Patient Status:  Inpatient    1970 MRN [id number]   Location [facility] 2W/SW Attending Carolina Bates MD   Hosp Day # 2 PCP Diana Black MD     Subjective:  Patient seen/examined.  Clinical course reviewed.  Admitted with acute gram negative sepsis in the setting of immunocompromise and metastatic ovarian cancer.    Objective:  Blood pressure 100/66, pulse 64, temperature 98.2 °F (36.8 °C), temperature source Temporal, resp. rate 24, weight 179 lb 0.2 oz (81.2 kg), last menstrual period 2020, SpO2 97%, not currently breastfeeding.    Intake/Output:    Intake/Output Summary (Last 24 hours) at 2025 0815  Last data filed at 2025 0600  Gross per 24 hour   Intake 1842.61 ml   Output 1600 ml   Net 242.61 ml       Physical Exam:  General: Awake, alert, non-tox, NAD.  HEENT:  Oropharynx clear, trachea ML.  Heart: RRR S1S2 no murmurs.  Lungs: Essentially CTA b/l, no rhonchi, rales, wheezes.  Abdomen: Soft, NT/ND.  BS present.  No organomegaly.  Extremity: No edema.  Neurological: No focal deficits.  Derm:  Warm, dry, free from rashes.    Lab Data Review:  Lab Results   Component Value Date    WBC 3.9 2025    HGB 7.4 2025    HCT 21.8 2025    .0 2025    CREATSERUM 0.69 2025    BUN 34 2025     2025    K 3.2 2025    K 3.2 2025     2025    CO2 26.0 2025    GLU 98 2025    CA 6.9 2025    MG 2.2 2025      Cultures:  2/2 blood cultures +, one set with klebsiella 2nd set pending    Repeat blood cultures negative thus far    MRSA swab negative    Radiology:  Reviewed    Antibiotics Reviewed:  Vancomycin  Zosyn    Assessment and Plan:    Acute gram negative sepsis in this immunocompromised patient presenting with SOB, N/V, and loss of appetite  - 2/2 blood cultures positive,  1 set with klebsiella confirmed, 2nd set pending ID (polymicrobial process possible)  - Source related to occult intraabdominal focus vs. Primary/secondary infected port (no gross s/s of infection) vs. Pulmonary vs. Other  - UA bland  - IV zosyn and vancomycin ongoing, will discontinue vancomycin    2.  Metastatic ovarian cancer with malignant pleural effusion and peritoneal carcinomatosis  - Suspect gut translocation of bacteria as etiology of events    3.  Anemia, leukopenia due to the above    4.  Disposition - inpatient.  Continue IV zosyn.  OK to discontinue vancomycin.  Patient should have a CT abdomen/pelvis to evaluate for occult focus of infection in the context of her GNR sepsis.  Patient cannot tolerate p.o. contrast so we will do this without contrast.  Possible aspiration of pleural fluid anticipated as well - please send for cultures in addition to cytology.  Trending temps and WBCs.  Duration of IV antibiotics to be determined.  D/w patient.    Karen Thomason DO, [facility] Infectious Disease  [phone]    4/7/2025  8:15 AM

## 2025-04-07 NOTE — PROGRESS NOTES
; However, [facility]  part of [facility]    Progress Note      Assessment and Plan:   1.  Respiratory failure-the patient has been treated for aspiration pneumonia and has a known moderate partially loculated right malignant pleural effusion.  I think it reasonable to proceed to repeat thoracentesis at this juncture.    Recommendations: Ultrasound-guided right-sided thoracentesis.    2.  Metastatic ovarian cancer-follow-up oncology.    3.  MEGAN-improved    4.  History of DVT-repeat ultrasound negative.  However, the patient has bled with full anticoagulation.  Considering the recent bleed and recent DVT, I agree with proceeding to IVC filter.    Recommendations: IVC filter would wait temporarily with Klebsiella bacteremia.    5.  A-fib with RVR-better controlled now.    6.  Klebsiella bacteremia-source uncertain.    Recommendations: Continue Zosyn and await sensitivities.        Subjective:   Yue Malin is a(n) 54 year old female who is breathing about the same    Objective:   Blood pressure 103/68, pulse 67, temperature 98.2 °F (36.8 °C), resp. rate 25, height 5' 7\" (1.702 m), weight 172 lb 6.4 oz (78.2 kg), last menstrual period 12/22/2020, SpO2 100%, not currently breastfeeding.    Physical Exam alert white female  HEENT examination is unremarkable with pupils equal round and reactive to light and accommodation.   Neck without adenopathy, thyromegaly, JVD nor bruit.   Lungs diminished with right basilar rattle to auscultation and percussion.  Cardiac regular rate and rhythm no murmur.   Abdomen nontender, without hepatosplenomegaly and no mass appreciable.   Extremities without clubbing cyanosis nor edema.   Neurologic grossly intact with symmetric tone and strength and reflex.  Skin without gross abnormality     Results:     Lab Results   Component Value Date    WBC 3.8 04/07/2025    HGB 7.3 04/07/2025    HCT 22.1 04/07/2025    .0 04/07/2025    CREATSERUM 0.69 04/07/2025    BUN  34 04/07/2025     04/07/2025    K 3.2 04/07/2025    K 3.2 04/07/2025     04/07/2025    CO2 26.0 04/07/2025    GLU 98 04/07/2025    CA 6.9 04/07/2025    MG 2.2 04/06/2025     Chest x-ray-moderate right greater than left pleural effusion    Caleb Story MD  Medical Director, Critical Care, [facility]  Medical Director, [facility]  Pager: [phone]

## 2025-04-08 ENCOUNTER — APPOINTMENT (OUTPATIENT)
Dept: ULTRASOUND IMAGING | Facility: HOSPITAL | Age: 55
End: 2025-04-08
Attending: INTERNAL MEDICINE
Payer: COMMERCIAL

## 2025-04-08 ENCOUNTER — APPOINTMENT (OUTPATIENT)
Dept: GENERAL RADIOLOGY | Facility: HOSPITAL | Age: 55
End: 2025-04-08
Attending: INTERNAL MEDICINE
Payer: COMMERCIAL

## 2025-04-08 ENCOUNTER — APPOINTMENT (OUTPATIENT)
Dept: ULTRASOUND IMAGING | Facility: HOSPITAL | Age: 55
End: 2025-04-08
Attending: CLINICAL NURSE SPECIALIST
Payer: COMMERCIAL

## 2025-04-08 LAB
ALBUMIN SERPL-MCNC: 2.8 G/DL (ref 3.2–4.8)
ALBUMIN/GLOB SERPL: 1.2 {RATIO} (ref 1–2)
ALP LIVER SERPL-CCNC: 87 U/L
ALT SERPL-CCNC: 46 U/L
ANION GAP SERPL CALC-SCNC: 5 MMOL/L (ref 0–18)
AST SERPL-CCNC: 56 U/L (ref ?–34)
BACTERIA BLD CULT: POSITIVE
BASOPHILS # BLD AUTO: 0.01 X10(3) UL (ref 0–0.2)
BASOPHILS # BLD AUTO: 0.01 X10(3) UL (ref 0–0.2)
BASOPHILS # BLD AUTO: 0.02 X10(3) UL (ref 0–0.2)
BASOPHILS # BLD AUTO: 0.02 X10(3) UL (ref 0–0.2)
BASOPHILS NFR BLD AUTO: 0.3 %
BASOPHILS NFR BLD AUTO: 0.5 %
BASOPHILS NFR BLD AUTO: 0.7 %
BASOPHILS NFR BLD AUTO: 0.8 %
BASOPHILS NFR PLR: 0 %
BASOPHILS NFR PRT: 0 %
BILIRUB SERPL-MCNC: 0.4 MG/DL (ref 0.3–1.2)
BLACTX-M ISLT/SPM QL: NOT DETECTED
BUN BLD-MCNC: 22 MG/DL (ref 9–23)
BUN/CREAT SERPL: 37.3 (ref 10–20)
CALCIUM BLD-MCNC: 7.4 MG/DL (ref 8.7–10.4)
CHLORIDE SERPL-SCNC: 106 MMOL/L (ref 98–112)
CO2 SERPL-SCNC: 28 MMOL/L (ref 21–32)
COLOR FLD: YELLOW
COLOR FLD: YELLOW
CREAT BLD-MCNC: 0.59 MG/DL
DEPRECATED RDW RBC AUTO: 47.4 FL (ref 35.1–46.3)
DEPRECATED RDW RBC AUTO: 48.6 FL (ref 35.1–46.3)
DEPRECATED RDW RBC AUTO: 48.7 FL (ref 35.1–46.3)
DEPRECATED RDW RBC AUTO: 48.8 FL (ref 35.1–46.3)
EGFRCR SERPLBLD CKD-EPI 2021: 106 ML/MIN/1.73M2 (ref 60–?)
EOSINOPHIL # BLD AUTO: 0 X10(3) UL (ref 0–0.7)
EOSINOPHIL # BLD AUTO: 0.01 X10(3) UL (ref 0–0.7)
EOSINOPHIL NFR BLD AUTO: 0 %
EOSINOPHIL NFR BLD AUTO: 0.3 %
EOSINOPHIL NFR BLD AUTO: 0.4 %
EOSINOPHIL NFR BLD AUTO: 0.5 %
EOSINOPHIL NFR PLR: 0 %
EOSINOPHIL NFR PRT: 0 %
ERYTHROCYTE [DISTWIDTH] IN BLOOD BY AUTOMATED COUNT: 17 % (ref 11–15)
ERYTHROCYTE [DISTWIDTH] IN BLOOD BY AUTOMATED COUNT: 17.3 % (ref 11–15)
ERYTHROCYTE [DISTWIDTH] IN BLOOD BY AUTOMATED COUNT: 17.3 % (ref 11–15)
ERYTHROCYTE [DISTWIDTH] IN BLOOD BY AUTOMATED COUNT: 17.4 % (ref 11–15)
GLOBULIN PLAS-MCNC: 2.3 G/DL (ref 2–3.5)
GLUCOSE BLD-MCNC: 104 MG/DL (ref 70–99)
GLUCOSE PLR-MCNC: 66 MG/DL
HCT VFR BLD AUTO: 21.4 %
HCT VFR BLD AUTO: 21.5 %
HCT VFR BLD AUTO: 22.4 %
HCT VFR BLD AUTO: 22.5 %
HGB BLD-MCNC: 7.1 G/DL
HGB BLD-MCNC: 7.2 G/DL
HGB BLD-MCNC: 7.6 G/DL
HGB BLD-MCNC: 7.7 G/DL
IMM GRANULOCYTES # BLD AUTO: 0.02 X10(3) UL (ref 0–1)
IMM GRANULOCYTES # BLD AUTO: 0.04 X10(3) UL (ref 0–1)
IMM GRANULOCYTES # BLD AUTO: 0.06 X10(3) UL (ref 0–1)
IMM GRANULOCYTES # BLD AUTO: 0.06 X10(3) UL (ref 0–1)
IMM GRANULOCYTES NFR BLD: 1.1 %
IMM GRANULOCYTES NFR BLD: 1.4 %
IMM GRANULOCYTES NFR BLD: 1.9 %
IMM GRANULOCYTES NFR BLD: 2.5 %
INR BLD: 0.96 (ref 0.8–1.2)
K PNEUMON DNA BLD POS QL NAA+NON-PROBE: DETECTED
LDH FLD L TO P-CCNC: 219 U/L
LYMPHOCYTES # BLD AUTO: 0.58 X10(3) UL (ref 1–4)
LYMPHOCYTES # BLD AUTO: 0.61 X10(3) UL (ref 1–4)
LYMPHOCYTES # BLD AUTO: 0.69 X10(3) UL (ref 1–4)
LYMPHOCYTES # BLD AUTO: 0.71 X10(3) UL (ref 1–4)
LYMPHOCYTES NFR BLD AUTO: 22.8 %
LYMPHOCYTES NFR BLD AUTO: 24 %
LYMPHOCYTES NFR BLD AUTO: 24.4 %
LYMPHOCYTES NFR BLD AUTO: 32.3 %
LYMPHOCYTES NFR PLR: 85 %
LYMPHOCYTES NFR PRT: 23 %
MCH RBC QN AUTO: 28.5 PG (ref 26–34)
MCH RBC QN AUTO: 29.2 PG (ref 26–34)
MCH RBC QN AUTO: 29.4 PG (ref 26–34)
MCH RBC QN AUTO: 29.6 PG (ref 26–34)
MCHC RBC AUTO-ENTMCNC: 33 G/DL (ref 31–37)
MCHC RBC AUTO-ENTMCNC: 33.6 G/DL (ref 31–37)
MCHC RBC AUTO-ENTMCNC: 33.9 G/DL (ref 31–37)
MCHC RBC AUTO-ENTMCNC: 34.2 G/DL (ref 31–37)
MCV RBC AUTO: 86.2 FL
MCV RBC AUTO: 86.3 FL
MCV RBC AUTO: 86.5 FL
MCV RBC AUTO: 87.3 FL
MONOCYTES # BLD AUTO: 0.06 X10(3) UL (ref 0.1–1)
MONOCYTES # BLD AUTO: 0.07 X10(3) UL (ref 0.1–1)
MONOCYTES # BLD AUTO: 0.07 X10(3) UL (ref 0.1–1)
MONOCYTES # BLD AUTO: 0.09 X10(3) UL (ref 0.1–1)
MONOCYTES NFR BLD AUTO: 2.5 %
MONOCYTES NFR BLD AUTO: 2.9 %
MONOCYTES NFR BLD AUTO: 2.9 %
MONOCYTES NFR BLD AUTO: 3.2 %
MONOS+MACROS NFR PLR: 2 %
MONOS+MACROS NFR PRT: 67 %
NEUTROPHILS # BLD AUTO: 1.18 X10 (3) UL (ref 1.5–7.7)
NEUTROPHILS # BLD AUTO: 1.18 X10(3) UL (ref 1.5–7.7)
NEUTROPHILS # BLD AUTO: 1.69 X10 (3) UL (ref 1.5–7.7)
NEUTROPHILS # BLD AUTO: 1.69 X10(3) UL (ref 1.5–7.7)
NEUTROPHILS # BLD AUTO: 2 X10 (3) UL (ref 1.5–7.7)
NEUTROPHILS # BLD AUTO: 2 X10(3) UL (ref 1.5–7.7)
NEUTROPHILS # BLD AUTO: 2.24 X10 (3) UL (ref 1.5–7.7)
NEUTROPHILS # BLD AUTO: 2.24 X10(3) UL (ref 1.5–7.7)
NEUTROPHILS # PRT: 1 /CUMM (ref ?–250)
NEUTROPHILS NFR BLD AUTO: 62.4 %
NEUTROPHILS NFR BLD AUTO: 69.8 %
NEUTROPHILS NFR BLD AUTO: 70.6 %
NEUTROPHILS NFR BLD AUTO: 71.8 %
NEUTROPHILS NFR FLD: 10 %
NEUTROPHILS NFR PLR: 13 %
OSMOLALITY SERPL CALC.SUM OF ELEC: 292 MOSM/KG (ref 275–295)
PLATELET # BLD AUTO: 179 10(3)UL (ref 150–450)
PLATELET # BLD AUTO: 191 10(3)UL (ref 150–450)
PLATELET # BLD AUTO: 195 10(3)UL (ref 150–450)
PLATELET # BLD AUTO: 198 10(3)UL (ref 150–450)
POTASSIUM SERPL-SCNC: 3.6 MMOL/L (ref 3.5–5.1)
POTASSIUM SERPL-SCNC: 3.6 MMOL/L (ref 3.5–5.1)
PROT PLR-MCNC: 3 G/DL
PROT SERPL-MCNC: 5.1 G/DL (ref 5.7–8.2)
PROTHROMBIN TIME: 13.4 SECONDS (ref 11.6–14.8)
RBC # BLD AUTO: 2.45 X10(6)UL
RBC # BLD AUTO: 2.49 X10(6)UL
RBC # BLD AUTO: 2.6 X10(6)UL
RBC # BLD AUTO: 2.6 X10(6)UL
RBC # FLD: 306 /CUMM (ref ?–1)
RBC # FLD: 8178 /CUMM (ref ?–1)
SODIUM SERPL-SCNC: 139 MMOL/L (ref 136–145)
TOTAL CELLS COUNTED FLD: 100
TOTAL CELLS COUNTED FLD: 100
TOTAL CELLS COUNTED PLR: 418 /CUMM (ref ?–1)
TOTAL CELLS COUNTED PRT: 10 /CUMM (ref ?–1)
TURBIDITY CSF QL: CLEAR
WBC # BLD AUTO: 1.9 X10(3) UL (ref 4–11)
WBC # BLD AUTO: 2.4 X10(3) UL (ref 4–11)
WBC # BLD AUTO: 2.8 X10(3) UL (ref 4–11)
WBC # BLD AUTO: 3.1 X10(3) UL (ref 4–11)
WBC # PLR: 418 /CUMM
WBC # PRT: 10 /CUMM

## 2025-04-08 PROCEDURE — 49083 ABD PARACENTESIS W/IMAGING: CPT | Performed by: CLINICAL NURSE SPECIALIST

## 2025-04-08 PROCEDURE — 0W993ZZ DRAINAGE OF RIGHT PLEURAL CAVITY, PERCUTANEOUS APPROACH: ICD-10-PCS | Performed by: INTERNAL MEDICINE

## 2025-04-08 PROCEDURE — 32555 ASPIRATE PLEURA W/ IMAGING: CPT | Performed by: INTERNAL MEDICINE

## 2025-04-08 PROCEDURE — 99232 SBSQ HOSP IP/OBS MODERATE 35: CPT | Performed by: INTERNAL MEDICINE

## 2025-04-08 PROCEDURE — 71045 X-RAY EXAM CHEST 1 VIEW: CPT | Performed by: INTERNAL MEDICINE

## 2025-04-08 PROCEDURE — 0W9G3ZZ DRAINAGE OF PERITONEAL CAVITY, PERCUTANEOUS APPROACH: ICD-10-PCS | Performed by: NURSE PRACTITIONER

## 2025-04-08 RX ORDER — ALBUMIN (HUMAN) 12.5 G/50ML
SOLUTION INTRAVENOUS
Status: COMPLETED
Start: 2025-04-08 | End: 2025-04-08

## 2025-04-08 RX ORDER — LIDOCAINE HYDROCHLORIDE 40 MG/ML
SOLUTION TOPICAL
Status: DISPENSED
Start: 2025-04-08 | End: 2025-04-09

## 2025-04-08 RX ORDER — ALBUMIN (HUMAN) 12.5 G/50ML
100 SOLUTION INTRAVENOUS AS NEEDED
Status: ACTIVE | OUTPATIENT
Start: 2025-04-08 | End: 2025-04-09

## 2025-04-08 RX ORDER — LORAZEPAM 0.5 MG/1
0.5 TABLET ORAL ONCE AS NEEDED
Status: COMPLETED | OUTPATIENT
Start: 2025-04-08 | End: 2025-04-08

## 2025-04-08 NOTE — CM/SW NOTE
04/08/25 1100   CM/SW Referral Data   Referral Source    Reason for Referral Discharge planning   Informant Daughter;HCPOA/Surrogate   Medical Hx   Does patient have an established PCP? Yes  (Diana Black)   Patient Info   Patient's Current Mental Status at Time of Assessment Confused or unable to complete assessment   Patient's Home Environment House   Number of Levels in Home 1   Number of Stair in Home 6 CHELITA   Patient lives with Alone   Patient Status Prior to Admission   Independent with ADLs and Mobility Yes   Services in place prior to admission DME/Supplies at home   Type of DME/Supplies Straight Cane;Standard Walker;Wheelchair;Shower Chair;Raised Toilet Seat;Grab Bars   Discharge Needs   Anticipated D/C needs To be determined     Pt discussed during nursing rounds. Dx metastatic ovarian CA, recurrent malignant effusions, thoracentesis today, 4L (RA baseline). Home alone, though pt's son is home from school and will stay with mom at home at dc per daughter Ami who is HCPOA. Independent w/cane or walker at baseline. Pt has all required DME in place at home exception of home O2 which has never been needed prior to this admission. PT/OT evals will be needed for dc recommendation once appropriate.    4/9/2025 at 1700: Pt now on RA. Anticipated therapy need: Home with Home Healthcare. Patient will consider HH at [facility]. HH referrals sent in LALITHA, F2F entered. List of accepting agencies will be needed for choice if patient is agreeable to HH at [facility].     Patient will transfer to medical floor once bed is available.    Plan: Home w/family support and HH pending pt is agreeable to HH, agency choice, and medical clearance.    / to remain available for support and/or discharge planning.     MAISHA Romano    [phone]

## 2025-04-08 NOTE — PAYOR COMM NOTE
4/8  --------------  CONTINUED STAY REVIEW    Payor: [employer]  Subscriber #:  [id number]  Authorization Number: [id number]    Admit date: 4/5/25  Admit time:  3:29 PM    REVIEW DOCUMENTATION:  4/8    Hg holding above 7 no s/sx blood loss started clears  Leukopenic  Sp US guided thora 1300 ml   Repeating bcx this am w persistent GNR bacteremia, CT a/p reviewed        Gen: awake,   Pulm:  less tachypneic dim  CV: tachy reg +Bl le edema  Abd: Abdomen distended non tender +BS  MSK: Full range of motion in extremities,   Skin: no rashes or lesions  NEURO- no focal decificts     Data Review:       Labs:                 Recent Labs   Lab 04/05/25  1018 04/05/25  1718 04/07/25  0004 04/07/25  0516 04/07/25  1221 04/07/25  1800 04/08/25  0000 04/08/25  0538   WBC 8.1   < > 4.0 3.9* 3.8* 3.4* 3.1* 2.8*   HGB 8.3*   < > 7.3* 7.4* 7.3* 7.3* 7.1* 7.2*   MCV 86.0   < > 86.3 86.5 87.0 86.1 86.3 87.3   .0   < > 217.0 204.0 203.0 204.0 198.0 195.0   BAND 10  --  17 7  --   --   --   --     < > = values in this interval not displayed.                 Recent Labs   Lab 04/05/25  1019 04/06/25  0500 04/06/25  1813 04/07/25  0516 04/08/25  0538   * 135* 140 139 139   K 3.4* 3.6  3.6 3.4* 3.2*  3.2* 3.6  3.6   CL 91* 100 105 104 106   CO2 17.0* 24.0 25.0 26.0 28.0   BUN 37* 41* 38* 34* 22   CREATSERUM 1.67* 1.12* 0.82 0.69 0.59   CA 7.9* 6.8* 7.0* 6.9* 7.4*   MG 1.6 2.1 2.2  --   --    GLU 84 125* 115* 98 104*       Lab 04/05/25  1019 04/08/25  0538   ALT 39 46   AST 98* 56*   ALB 3.0* 2.8*        Scheduled Medications    piperacillin-tazobactam  4.5 g Intravenous Q8H    pantoprazole  40 mg Intravenous Q12H         Medication Infusions    phenylephrine 80 mcg/min (04/08/25 0650)    [Held by provider] continuous dose heparin Stopped (04/05/25 1653)    norepinephrine Stopped (04/06/25 2000)    vasopressin (Vasostrict) 20 Units in sodium chloride 0.9% 100 mL infusion for septic shock 0.03 Units/min (04/07/25 7958)            54YOF w PMH sig for 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     sepsis (HR, RR, WBC) with MSOF- shock with MEGAN/ lactic acidosis/afib w RVR, resp failure, LFT elevation  Acute respiratory failure with hypoxia/ hypercapnia  Dyspnea- multifactorial from below  Afib w RVR  Hypotension with lactic acidosis  Hypokalemia  MEGAN Cr 1.67 from 1.01 3/21/25 0.75 2/28/25  Ovarian adenocarcinoma with peritoneal carcinomatosis dx Mar 2024 sp TAHBSO Aug 2024 on lynparza 300 mg BID w avastin  Recurrent malignant effusion sp thoracentesis 3/14/25 with 1300 cc removed  Recurrent malignant ascites sp multi giuliana (3/28/24 1.5L 4/16 4.4L, 3/4/25 5.2L 3/14/25 1.7L)  BR  LLE DVT dx 3/21/25  Anemia suspected med related  BRCA 1 + somatic not genetic mutation  ACOSTA/ MDD  Hypertriglyceridemia  Chronic Back pain        Septic shock  - on admit severe w shock w MSOF (sever lactic acidosis on admit, MEGAN/ LFT, resp failure, afib w RVR)  - sources: bacteremia- both bottles 4/5 and 4/6 + initial K pneumo f/u with GNR and GPR  - suspect lung primary +/- GI/ gyn primary.   - unasyn on admit for aspiration broadened to zosyn/ vanc MRSA neg > zosyn only  - 4/7 sp US guided thora - fu results  - 4/7 CT a/p multi poss sources noted  - 4/8 obtaining repeat bcx w persistent GNR bacteremia (4/5 + K pneumo)  - echo no vegetations (done for afib) may need JESSE if persistent bacteremia  - has port  - pressor support per ICU  - abx per ID           Acute resp failure w hypoxia/ hypercapnia  - on admit: fm pleural effusion/ anasarca, PNA/sepsis w  severe acidosis on admit, ddx also includes PE w rec dx LLE DVT and cancer underlying  - bipap on admit weaning as able  - see individual acute issues (infection, effusion/ VTE)   - pulm on      R Pleural effusion  - sp tap 3/14 + malignant  - recurrent  - sp pulm done US guided 4/8 am d/w DR Story 1.3L cloudy yellow fluid   - f/u fluid studies     Acute on chronic anemia   - last bsl 9.6 on 3/21/25 per onc from lynparza  - here down to 6.5 on 4/6/25 pm- with episode coffee ground emesis and on full AC  - held AC started IV PPI  - given pRBC 4/6 w holley response  - on clears hg holding- adv as tolerated  [ ] cont off AC for now  [ ] monitor hgs holding above 7     Leukopenia  - slow downtrend- follow for now     Acute VTE  - + LLE DVT dx 3/21/25 (w/u bl LE swelling)  - repeat US here negative- cont to hold AC for now w hg drop concern re possible bleed  [ ] d/w DR Story- does agree eventual IVC filter would be of benefit however will hold for now w ongoing bacteremia     Afib w RVR  - reactive from septic shock but afib new dx  - improved but did have some RVR o/n switched from levophed to phenylephrine gtt  - seen by cards, echo done- ok  [ ] w improved Cr if needed rec dig 250 iv if HR > 160.  - cont on tele         BRCA 1 + (somatic non genetic mutation) Ovarian adenocarcinoma w peritoneal carcinomatosis and malignant recurrent effusion/ ascites  - dx March 2024 sees Luana Serrano onc  - sp TAHBSO Aug 2024 w WALLY Choudhary at [facility]  - sp multi lines treatment most recently on lynparza 300 mg BID w avastin  - notified Dr Serrano on admit  - sp multi paracentesis some discussion outpatient regarding pleurx        MEGAN w hyponatremia, severe lactic acidosis  - on admit now all improved  - suspect from sepsis and N/V just prior to admit  - follow BMP     Hypokalemia  - repletion        LFT elevation  - suspect from above as well  - trend- recheck labs         ACOSTA/ MDD  - home meds = venlafaxine and ativan PRN follows w psych  outpatient  - requesting anxiety meds resume if safe pressures/ bp allowed   - fam proving support/ non pharmacologic measures at bedside     Chronic back pain  - hold home tramadol     ACP/ GOC- discussed with patient, spouse and sister at bedside. hcPOA - patient's daughter Ami on admit full code at this time palliative on cs holley in house holley eval    MEDICATIONS ADMINISTERED IN LAST 1 DAY:  pantoprazole (Protonix) 40 mg in sodium chloride 0.9% PF 10 mL IV push       Date Action Dose Route User    4/8/2025 0531 Given 40 mg Intravenous Charla Gabriel, RN    4/7/2025 1751 Given 40 mg Intravenous Daniela Fung RN          phenylephrine (Arnaldo-Synephrine) 100 mg in sodium chloride 0.9% 250 mL infusion       Date Action Dose Route User    4/8/2025 0905 Rate/Dose Change 65 mcg/min Intravenous Amira Pedersen RN    4/8/2025 0847 Rate/Dose Change 70 mcg/min Intravenous Amira Pedersen RN    4/8/2025 0824 Rate/Dose Change 75 mcg/min Intravenous Amira Pedersen, RN    4/8/2025 0650 New Bag 80 mcg/min Intravenous Charla Gabriel, RN    4/8/2025 0645 Rate/Dose Change 80 mcg/min Intravenous Charla Gabriel, RN    4/8/2025 0537 Rate/Dose Change 90 mcg/min Intravenous Charla Gabriel, RN          piperacillin-tazobactam (Zosyn) 4.5 g in dextrose 5% 100 mL IVPB-ADDV       Date Action Dose Route User    4/8/2025 1341 New Bag 4.5 g Intravenous Amira Pedersen RN    4/8/2025 0530 New Bag 4.5 g Intravenous Charla Gabriel, RN    4/7/2025 2033 New Bag 4.5 g Intravenous Charla Gabriel, CORBY          potassium chloride 40 mEq in 250mL sodium chloride 0.9% IVPB premix       Date Action Dose Route User    4/8/2025 1036 New Bag 40 mEq Intravenous Amira Pedersen RN          vasopressin (Vasostrict) 20 Units in sodium chloride 0.9% 100 mL infusion for septic shock       Date Action Dose Route User    4/8/2025 1207 Rate/Dose Change 0.01 Units/min Intravenous Amira Pedersen, RN     4/8/2025 1026 Rate/Dose Change 0.02 Units/min Intravenous Amira Pedersen RN    4/7/2025 2355 New Bag 0.03 Units/min Intravenous Charla Gabriel, RN            Vitals (last day)       Date/Time Temp Pulse Resp BP SpO2 Weight O2 Device O2 Flow Rate (L/min) [facility]    04/08/25 1354 -- 80 29 107/65 93 % -- Nasal cannula 3 L/min     04/08/25 1300 -- 78 16 105/69 97 % -- Nasal cannula 3 L/min     04/08/25 1207 97.2 °F (36.2 °C) 87 28 119/72 96 % -- Nasal cannula 3 L/min     04/08/25 1100 -- 82 18 103/68 100 % -- High flow nasal cannula 4 L/min     04/08/25 0900 98.4 °F (36.9 °C) 62 31 116/77 100 % 173 lb 8 oz (78.7 kg) -- --     04/08/25 0700 -- 68 17 113/75 98 % -- -- --     04/08/25 0600 -- 60 24 109/77 99 % -- High flow nasal cannula 4 L/min CT    04/08/25 0500 -- 53 23 112/70 99 % -- High flow nasal cannula 4 L/min CT    04/08/25 0400 -- 59 21 107/69 98 % -- High flow nasal cannula 4 L/min CT    04/08/25 0300 -- 70 23 116/68 98 % -- High flow nasal cannula 4 L/min CT    04/08/25 0200 -- 66 24 103/69 100 % -- High flow nasal cannula 4 L/min CT    04/08/25 0100 -- 66 24 109/68 99 % -- High flow nasal cannula 4 L/min CT    04/08/25 0000 98 °F (36.7 °C) 62 25 101/64 100 % -- High flow nasal cannula 4 L/min CT    04/07/25 2300 -- 62 17 110/69 99 % -- High flow nasal cannula 4 L/min CT    04/07/25 2200 -- 62 29 103/65 100 % -- High flow nasal cannula 4 L/min CT    04/07/25 2100 -- 60 17 107/70 100 % -- High flow nasal cannula 4 L/min CT    04/07/25 2000 98.2 °F (36.8 °C) 68 24 107/65 100 % -- High flow nasal cannula 4 L/min CT    04/07/25 1900 -- 64 24 110/71 100 % -- -- --     04/07/25 1800 -- 68 16 107/70 100 % -- -- --     04/07/25 1730 -- 69 24 114/70 97 % -- -- --     04/07/25 1600 98.3 °F (36.8 °C) 67 24 101/71 100 % -- High flow nasal cannula 4 L/min     04/07/25 1500 -- 67 20 100/71 97 % -- -- --     04/07/25 1400 -- 59 26 106/69 100 % -- -- --     04/07/25 1300 -- 69 20 109/71 100 % --  -- -- AV    04/07/25 1200 98.2 °F (36.8 °C) 67 25 103/68 100 % -- High flow nasal cannula 4 L/min AV    04/07/25 1100 -- 63 24 101/67 100 % -- -- -- AV    04/07/25 1000 -- 64 25 106/69 100 % 172 lb 6.4 oz (78.2 kg) -- -- AV    04/07/25 0900 -- 65 27 98/68 98 % -- -- -- AV    04/07/25 0800 98 °F (36.7 °C) 61 25 98/65 98 % -- High flow nasal cannula 4 L/min AV    04/07/25 0700 -- 64 24 100/66 97 % -- -- -- AV    04/07/25 0600 -- 64 29 104/64 98 % -- High flow nasal cannula 4 L/min [facility]    04/07/25 0500 -- 70 24 92/62 98 % -- High flow nasal cannula 4 L/min     04/07/25 0400 98.2 °F (36.8 °C) 79 24 95/60 96 % -- High flow nasal cannula 4 L/min [facility]    04/07/25 0300 -- 65 31 101/64 97 % -- High flow nasal cannula 4 L/min K[facility]    04/07/25 0200 -- 62 21 94/63 97 % -- High flow nasal cannula 4 L/min K[facility]    04/07/25 0100 -- 69 27 97/65 96 % -- High flow nasal cannula 4 L/min K[facility]    04/07/25 0000 97.9 °F (36.6 °C) 68 24 99/57 98 % -- High flow nasal cannula 4 L/min K[facility]          CIWA Scores (since admission)       None          Blood Transfusion Record       Product Unit Status Volume Start End            Transfuse RBC       25  [id number]  [id number] Completed 04/06/25 1614 500 mL 04/06/25 1352 04/06/25 1600

## 2025-04-08 NOTE — PLAN OF CARE
Pt alert, oriented. Vitals stable. Off vasopressin. Weaning off phenylephrine. Tolerating 2L nasal canula. Paracentesis and thoracentesis done today. Repeat blood cultures. Hemoglobin uptrending.   Problem: CARDIOVASCULAR - ADULT  Goal: Absence of cardiac arrhythmias or at baseline  Description: INTERVENTIONS:- Continuous cardiac monitoring, monitor vital signs, obtain 12 lead EKG if indicated- Evaluate effectiveness of antiarrhythmic and heart rate control medications as ordered- Initiate emergency measures for life threatening arrhythmias- Monitor electrolytes and administer replacement therapy as ordered  Outcome: Progressing  Goal: Maintains optimal cardiac output and hemodynamic stability  Description: INTERVENTIONS:- Monitor vital signs, rhythm, and trends- Monitor for bleeding, hypotension and signs of decreased cardiac output- Evaluate effectiveness of vasoactive medications to optimize hemodynamic stability- Monitor arterial and/or venous puncture sites for bleeding and/or hematoma- Assess quality of pulses, skin color and temperature- Assess for signs of decreased coronary artery perfusion - ex. Angina- Evaluate fluid balance, assess for edema, trend weights  Outcome: Progressing     Problem: RESPIRATORY - ADULT  Goal: Achieves optimal ventilation and oxygenation  Description: INTERVENTIONS:- Assess for changes in respiratory status- Assess for changes in mentation and behavior- Position to facilitate oxygenation and minimize respiratory effort- Oxygen supplementation based on oxygen saturation or ABGs- Provide Smoking Cessation handout, if applicable- Encourage broncho-pulmonary hygiene including cough, deep breathe, Incentive Spirometry- Assess the need for suctioning and perform as needed- Assess and instruct to report SOB or any respiratory difficulty- Respiratory Therapy support as indicated- Manage/alleviate anxiety- Monitor for signs/symptoms of CO2 retention  Outcome: Progressing     Problem:  GASTROINTESTINAL - ADULT  Goal: Minimal or absence of nausea and vomiting  Description: INTERVENTIONS:- Maintain adequate hydration with IV or PO as ordered and tolerated- Nasogastric tube to low intermittent suction as ordered- Evaluate effectiveness of ordered antiemetic medications- Provide nonpharmacologic comfort measures as appropriate- Advance diet as tolerated, if ordered- Obtain nutritional consult as needed- Evaluate fluid balance  Outcome: Progressing  Goal: Maintains or returns to baseline bowel function  Description: INTERVENTIONS:- Assess bowel function- Maintain adequate hydration with IV or PO as ordered and tolerated- Evaluate effectiveness of GI medications- Encourage mobilization and activity- Obtain nutritional consult as needed- Establish a toileting routine/schedule- Consider collaborating with pharmacy to review patient's medication profile  Outcome: Progressing  Goal: Maintains adequate nutritional intake (undernourished)  Description: INTERVENTIONS:- Monitor percentage of each meal consumed- Identify factors contributing to decreased intake, treat as appropriate- Assist with meals as needed- Monitor I&O, WT and lab values- Obtain nutritional consult as needed- Optimize oral hygiene and moisture- Encourage food from home; allow for food preferences- Enhance eating environment  Outcome: Progressing  Goal: Achieves appropriate nutritional intake (bariatric)  Description: INTERVENTIONS:- Monitor for over-consumption- Identify factors contributing to increased intake, treat as appropriate- Monitor I&O, WT and lab values- Obtain nutritional consult as needed- Evaluate psychosocial factors contributing to over-consumption  Outcome: Progressing     Problem: SKIN/TISSUE INTEGRITY - ADULT  Goal: Skin integrity remains intact  Description: INTERVENTIONS- Assess and document risk factors for pressure ulcer development- Assess and document skin integrity- Monitor for areas of redness and/or skin  breakdown- Initiate interventions, skin care algorithm/standards of care as needed  Outcome: Progressing     Problem: HEMATOLOGIC - ADULT  Goal: Maintains hematologic stability  Description: INTERVENTIONS- Assess for signs and symptoms of bleeding or hemorrhage- Monitor labs and vital signs for trends- Administer supportive blood products/factors, fluids and medications as ordered and appropriate- Administer supportive blood products/factors as ordered and appropriate  Outcome: Progressing  Goal: Free from bleeding injury  Description: (Example usage: patient with low platelets)INTERVENTIONS:- Avoid intramuscular injections, enemas and rectal medication administration- Ensure safe mobilization of patient- Hold pressure on venipuncture sites to achieve adequate hemostasis- Assess for signs and symptoms of internal bleeding- Monitor lab trends- Patient is to report abnormal signs of bleeding to staff- Avoid use of toothpicks and dental floss- Use electric shaver for shaving- Use soft bristle tooth brush- Limit straining and forceful nose blowing  Outcome: Progressing

## 2025-04-08 NOTE — IMAGING NOTE
1430 Met pt in her room, daughter Ami present     Hx taken, procedure explained, no questions, pt is familiar with procedure     1434 Scouts taken by Radha     Patient previously consented by staff RN.    Pt to get albumin 25% 25 grams yes, per APN order    1451 KARL JOHNSON, here to access- scanning completed and reviewed.      1455 See VS Flowsheet for all readings    PLATELETS = 195  PT= 13.4  INR= 0.96    1454 Timeout taken    1459 Chloro prep as skin prep, sterile drape applied, lidocaine 1% 10 milligrams per ml from kit was given for anesthetic affect 10 ml total given.  Incision made with scalpel.    1504 5 Indonesian 10 cm Yueh catheter placed RIGHT abdomen    Fluid aspirated for labs: YES cytology, cell count, cultures    1504 Catheter connected to vacuum bottle tubing, clear yellow fluid return.     1515 Draining completed.     Patient re-scanned, total amount drained 470 ml.  Drain was dc'd.       Pressure to site for 5 minutes.  Area was cleaned, steri-strips to site, covered with gauze & transparent dressing.     Instructions & report to CORBY Collier

## 2025-04-08 NOTE — PROGRESS NOTES
[facility]  Key Cardiology  Cardiology Progress Note    Yue Malin Patient Status:  Inpatient    1970 MRN [id number]   Location [facility] 2W/SW Attending Carolina Bates MD   Hosp Day # 3 PCP Diana Black MD       Impression:  Afib with RVR- now in sinus rhythm  Shock, likely septic- requiring phenyl. Off vaso.  Ovarian adenocarcinoma  Acute hypoxic respiratory failure- likely 2/2 recurrent malignant effusion. No edema noted on CT or CXR.  MEGAN with hyponatremia  LLE DVT- on Eliquis at home     - AF with RVR likely 2/2 septic shock. She is now back in NSR after volume resuscitation and stabilization.   - While acute ill on pressors, would not use rate control agents in order to allow for some degree of compensatory tachycardia.  Recommend phenylephrine over levophed for pressor support to decrease AF rates.  - Can do IV dig 250 mcg for AF rates > 160 bpm.   - Tele- in NSR today. Had some RVR when levophed was uptitrated, but now has been in NSR since she was switched off levo.  - Echo reviewed- low-normal LVEF, normal RVSP, L-pleural effusion, normal LA.     Cardiology will sign off at this time. Please call with additional questions.    Subjective:   Doing okay, no CV complaints. Continues in NSR.     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)    MEGAN (acute kidney injury)    Lactic acidosis    Bandemia    Disseminated ovarian cancer, left (HCC)    Palliative care by specialist       Objective:   Temp: 97.2 °F (36.2 °C)  Pulse: 81  Resp: 28  BP: 109/61    Intake/Output:     Intake/Output Summary (Last 24 hours) at 2025 1512  Last  data filed at 4/8/2025 1353  Gross per 24 hour   Intake 1655 ml   Output 775 ml   Net 880 ml       Last 3 Weights   04/08/25 0900 173 lb 8 oz (78.7 kg)   04/07/25 1000 172 lb 6.4 oz (78.2 kg)   04/06/25 0510 179 lb 0.2 oz (81.2 kg)   04/05/25 1551 177 lb 7.5 oz (80.5 kg)   04/05/25 0936 169 lb 15.6 oz (77.1 kg)   10/08/24 1840 170 lb (77.1 kg)   08/20/24 2201 178 lb 6.4 oz (80.9 kg)   08/13/24 0549 173 lb 6.4 oz (78.7 kg)   08/02/24 1128 180 lb (81.6 kg)       Tele: NSR    Physical Exam:    General: awake, alert, oriented x 3, no acute distress  HEENT: at/nc, perrl, eomi  Neck: No JVD, carotids 2+ no bruits.  Cardiac: Regular rate and rhythm, S1, S2 normal, no murmur, rub or gallop.  Lungs: Clear without wheezes, rales, rhonchi or dullness.  Normal excursions and effort.  Abdomen: Soft, non-tender, non-distended, normal bowel sounds   Extremities: Without clubbing, cyanosis or edema.  Peripheral pulses are 2+.  Neurologic: Alert and oriented, normal affect.  Psych: normal mood and affect  Skin: Warm and dry.        Laboratory/Data:    Labs:         Recent Labs   Lab 04/05/25  1018 04/05/25  1718 04/07/25  0004 04/07/25  0516 04/07/25  1221 04/07/25  1800 04/08/25  0000 04/08/25  0538 04/08/25  1141   WBC 8.1   < > 4.0 3.9* 3.8* 3.4* 3.1* 2.8* 2.4*   HGB 8.3*   < > 7.3* 7.4* 7.3* 7.3* 7.1* 7.2* 7.7*   MCV 86.0   < > 86.3 86.5 87.0 86.1 86.3 87.3 86.5   .0   < > 217.0 204.0 203.0 204.0 198.0 195.0 191.0   BAND 10  --  17 7  --   --   --   --   --    INR  --   --   --   --   --   --   --   --  0.96    < > = values in this interval not displayed.       Recent Labs   Lab 04/05/25  1019 04/06/25  0500 04/06/25  1813 04/07/25  0516 04/08/25  0538   * 135* 140 139 139   K 3.4* 3.6  3.6 3.4* 3.2*  3.2* 3.6  3.6   CL 91* 100 105 104 106   CO2 17.0* 24.0 25.0 26.0 28.0   BUN 37* 41* 38* 34* 22   CREATSERUM 1.67* 1.12* 0.82 0.69 0.59   CA 7.9* 6.8* 7.0* 6.9* 7.4*   MG 1.6 2.1 2.2  --   --    GLU 84 125* 115* 98  104*       Recent Labs   Lab 04/05/25  1019 04/08/25  0538   ALT 39 46   AST 98* 56*   ALB 3.0* 2.8*       No results for input(s): \"TROP\" in the last 168 hours.    Allergies:   Allergies[1]    Medications:  Current Facility-Administered Medications   Medication Dose Route Frequency    lidocaine (Xylocaine) 4 % external solution        albumin human (Albumin) 25% injection        phenylephrine (Arnaldo-Synephrine) 100 mg in sodium chloride 0.9% 250 mL infusion   mcg/min Intravenous Continuous    [Held by provider] heparin (Porcine) 55657 units/250mL infusion PE/DVT/THROMBUS CONTINUOUS  200-3,000 Units/hr Intravenous Continuous    acetaminophen (Tylenol Extra Strength) tab 500 mg  500 mg Oral Q4H PRN    ondansetron (Zofran) 4 MG/2ML injection 4 mg  4 mg Intravenous Q6H PRN    prochlorperazine (Compazine) 10 MG/2ML injection 5 mg  5 mg Intravenous Q8H PRN    polyethylene glycol (PEG 3350) (Miralax) 17 g oral packet 17 g  17 g Oral Daily PRN    sennosides (Senokot) tab 17.2 mg  17.2 mg Oral Nightly PRN    bisacodyl (Dulcolax) 10 MG rectal suppository 10 mg  10 mg Rectal Daily PRN    fleet enema (Fleet) rectal enema 133 mL  1 enema Rectal Once PRN    piperacillin-tazobactam (Zosyn) 4.5 g in dextrose 5% 100 mL IVPB-ADDV  4.5 g Intravenous Q8H    pantoprazole (Protonix) 40 mg in sodium chloride 0.9% PF 10 mL IV push  40 mg Intravenous Q12H    norepinephrine (Levophed) 32 mg in dextrose 5% 250 mL infusion  0.5-50 mcg/min Intravenous Continuous    vasopressin (Vasostrict) 20 Units in sodium chloride 0.9% 100 mL infusion for septic shock  0.01-0.03 Units/min Intravenous Continuous       Jean Donis MD  Interventional Cardiology  04/08/25           [1]   Allergies  Allergen Reactions    Hydrocodone NAUSEA AND VOMITING     agitation    Adhesive Tape RASH

## 2025-04-08 NOTE — PROGRESS NOTES
[facility] Hospitalist Progress Note     PCP: Diana Black MD    Chief Complaint: follow-up      SUBJECTIVE:  Hg holding above 7 no s/sx blood loss started clears  Leukopenic  Sp US guided thora 1300 ml   Repeating bcx this am w persistent GNR bacteremia, CT a/p reviewed     OBJECTIVE:  Temp:  [98 °F (36.7 °C)-98.3 °F (36.8 °C)] 98 °F (36.7 °C)  Pulse:  [53-70] 60  Resp:  [16-29] 24  BP: ()/(64-77) 109/77  SpO2:  [97 %-100 %] 99 %    Intake/Output:    Intake/Output Summary (Last 24 hours) at 4/8/2025 0809  Last data filed at 4/8/2025 0600  Gross per 24 hour   Intake 1300 ml   Output 1425 ml   Net -125 ml       Last 3 Weights   04/07/25 1000 172 lb 6.4 oz (78.2 kg)   04/06/25 0510 179 lb 0.2 oz (81.2 kg)   04/05/25 1551 177 lb 7.5 oz (80.5 kg)   04/05/25 0936 169 lb 15.6 oz (77.1 kg)   10/08/24 1840 170 lb (77.1 kg)   08/20/24 2201 178 lb 6.4 oz (80.9 kg)   08/13/24 0549 173 lb 6.4 oz (78.7 kg)   08/02/24 1128 180 lb (81.6 kg)       Exam  Gen: awake,   Pulm:  less tachypneic dim  CV: tachy reg +Bl le edema  Abd: Abdomen distended non tender +BS  MSK: Full range of motion in extremities,   Skin: no rashes or lesions  NEURO- no focal decificts    Data Review:       Labs:     Recent Labs   Lab 04/05/25  1018 04/05/25  1718 04/07/25  0004 04/07/25  0516 04/07/25  1221 04/07/25  1800 04/08/25  0000 04/08/25  0538   WBC 8.1   < > 4.0 3.9* 3.8* 3.4* 3.1* 2.8*   HGB 8.3*   < > 7.3* 7.4* 7.3* 7.3* 7.1* 7.2*   MCV 86.0   < > 86.3 86.5 87.0 86.1 86.3 87.3   .0   < > 217.0 204.0 203.0 204.0 198.0 195.0   BAND 10  --  17 7  --   --   --   --     < > = values in this interval not displayed.       Recent Labs   Lab 04/05/25  1019 04/06/25  0500 04/06/25  1813 04/07/25  0516 04/08/25  0538   * 135* 140 139 139   K 3.4* 3.6  3.6 3.4* 3.2*  3.2* 3.6  3.6   CL 91* 100 105 104 106   CO2 17.0* 24.0 25.0 26.0 28.0   BUN 37* 41* 38* 34* 22   CREATSERUM 1.67* 1.12* 0.82 0.69 0.59   CA 7.9* 6.8* 7.0* 6.9* 7.4*   MG 1.6 2.1  2.2  --   --    GLU 84 125* 115* 98 104*       Recent Labs   Lab 04/05/25  1019 04/08/25  0538   ALT 39 46   AST 98* 56*   ALB 3.0* 2.8*       No results for input(s): \"PGLU\" in the last 168 hours.    No results for input(s): \"TROP\" in the last 168 hours.      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     sepsis (HR, RR, WBC) with MSOF- shock with MEGAN/ lactic acidosis/afib w RVR, resp failure, LFT elevation  Acute respiratory failure with hypoxia/ hypercapnia  Dyspnea- multifactorial from below  Afib w RVR  Hypotension with lactic acidosis  Hypokalemia  MEGAN Cr 1.67 from 1.01 3/21/25 0.75 2/28/25  Ovarian adenocarcinoma with peritoneal carcinomatosis dx Mar 2024 sp TAHBSO Aug 2024 on lynparza 300 mg BID w avastin  Recurrent malignant effusion sp thoracentesis 3/14/25 with 1300 cc removed  Recurrent malignant ascites sp multi giuliana (3/28/24 1.5L 4/16 4.4L, 3/4/25 5.2L 3/14/25 1.7L)  BR  LLE DVT dx 3/21/25  Anemia suspected med related  BRCA 1 + somatic not genetic  mutation  ACOSTA/ MDD  Hypertriglyceridemia  Chronic Back pain        Septic shock  - on admit severe w shock w MSOF (sever lactic acidosis on admit, MEGAN/ LFT, resp failure, afib w RVR)  - sources: bacteremia- both bottles 4/5 and 4/6 + initial K pneumo f/u with GNR and GPR  - suspect lung primary +/- GI/ gyn primary.   - unasyn on admit for aspiration broadened to zosyn/ vanc MRSA neg > zosyn only  - 4/7 sp US guided thora - fu results  - 4/7 CT a/p multi poss sources noted  - 4/8 obtaining repeat bcx w persistent GNR bacteremia (4/5 + K pneumo)  - echo no vegetations (done for afib) may need JESSE if persistent bacteremia  - has port  - pressor support per ICU  - abx per ID        Acute resp failure w hypoxia/ hypercapnia  - on admit: fm pleural effusion/ anasarca, PNA/sepsis w severe acidosis on admit, ddx also includes PE w rec dx LLE DVT and cancer underlying  - bipap on admit weaning as able  - see individual acute issues (infection, effusion/ VTE)   - pulm on     R Pleural effusion  - sp tap 3/14 + malignant  - recurrent  - sp pulm done US guided 4/8 am d/w DR Story 1.3L cloudy yellow fluid   - f/u fluid studies    Acute on chronic anemia   - last bsl 9.6 on 3/21/25 per onc from lynparza  - here down to 6.5 on 4/6/25 pm- with episode coffee ground emesis and on full AC  - held AC started IV PPI  - given pRBC 4/6 w holley response  - on clears hg holding- adv as tolerated  [ ] cont off AC for now  [ ] monitor hgs holding above 7    Leukopenia  - slow downtrend- follow for now    Acute VTE  - + LLE DVT dx 3/21/25 (w/u bl LE swelling)  - repeat US here negative- cont to hold AC for now w hg drop concern re possible bleed  [ ] d/w DR Story- does agree eventual IVC filter would be of benefit however will hold for now w ongoing bacteremia    Afib w RVR  - reactive from septic shock but afib new dx  - improved but did have some RVR o/n switched from levophed to phenylephrine gtt  - seen by cards, echo done- ok  [ ] w  improved Cr if needed rec dig 250 iv if HR > 160.  - cont on tele        BRCA 1 + (somatic non genetic mutation) Ovarian adenocarcinoma w peritoneal carcinomatosis and malignant recurrent effusion/ ascites  - dx March 2024 sees Luana Serrano onc  - sp TAHBSO Aug 2024 w W[facility]oudhary at [facility]  - sp multi lines treatment most recently on lynparza 300 mg BID w avastin  - notified Dr Serrano on admit  - sp multi paracentesis some discussion outpatient regarding pleurx       MEGAN w hyponatremia, severe lactic acidosis  - on admit now all improved  - suspect from sepsis and N/V just prior to admit  - follow BMP    Hypokalemia  - repletion       LFT elevation  - suspect from above as well  - trend- recheck labs        ACOSTA/ MDD  - home meds = venlafaxine and ativan PRN follows w psych outpatient  - requesting anxiety meds resume if safe pressures/ bp allowed   - fam proving support/ non pharmacologic measures at bedside     Chronic back pain  - hold home tramadol     ACP/ GOC- discussed with patient, spouse and sister at bedside. hcPOA - patient's daughter Ami on admit full code at this time palliative on cs holley in house holley eval     Dispo ICU  Carolina Bates MD  [facility] Hospitalist  [phone]  4/6/2025  8:53 AM

## 2025-04-08 NOTE — PALLIATIVE CARE NOTE
Family/patient politely declining further palliative care service visits.     I discussed with sister Arpita outside the room: there are several siblings and have discussed end of life care and advanced directives with Yue and also between Yue and her POA.      I will sign off but remain available for any further needs.     Gian Valdez MD  Palliative Care Services  [facility] Ph: [phone]

## 2025-04-08 NOTE — PLAN OF CARE
Problem: CARDIOVASCULAR - ADULT  Goal: Absence of cardiac arrhythmias or at baseline  Description: INTERVENTIONS:- Continuous cardiac monitoring, monitor vital signs, obtain 12 lead EKG if indicated- Evaluate effectiveness of antiarrhythmic and heart rate control medications as ordered- Initiate emergency measures for life threatening arrhythmias- Monitor electrolytes and administer replacement therapy as ordered  Outcome: Progressing  Goal: Maintains optimal cardiac output and hemodynamic stability  Description: INTERVENTIONS:- Monitor vital signs, rhythm, and trends- Monitor for bleeding, hypotension and signs of decreased cardiac output- Evaluate effectiveness of vasoactive medications to optimize hemodynamic stability- Monitor arterial and/or venous puncture sites for bleeding and/or hematoma- Assess quality of pulses, skin color and temperature- Assess for signs of decreased coronary artery perfusion - ex. Angina- Evaluate fluid balance, assess for edema, trend weights  Outcome: Progressing     Problem: RESPIRATORY - ADULT  Goal: Achieves optimal ventilation and oxygenation  Description: INTERVENTIONS:- Assess for changes in respiratory status- Assess for changes in mentation and behavior- Position to facilitate oxygenation and minimize respiratory effort- Oxygen supplementation based on oxygen saturation or ABGs- Provide Smoking Cessation handout, if applicable- Encourage broncho-pulmonary hygiene including cough, deep breathe, Incentive Spirometry- Assess the need for suctioning and perform as needed- Assess and instruct to report SOB or any respiratory difficulty- Respiratory Therapy support as indicated- Manage/alleviate anxiety- Monitor for signs/symptoms of CO2 retention  Outcome: Progressing     Problem: GASTROINTESTINAL - ADULT  Goal: Minimal or absence of nausea and vomiting  Description: INTERVENTIONS:- Maintain adequate hydration with IV or PO as ordered and tolerated- Nasogastric tube to low  intermittent suction as ordered- Evaluate effectiveness of ordered antiemetic medications- Provide nonpharmacologic comfort measures as appropriate- Advance diet as tolerated, if ordered- Obtain nutritional consult as needed- Evaluate fluid balance  Outcome: Progressing  Goal: Maintains or returns to baseline bowel function  Description: INTERVENTIONS:- Assess bowel function- Maintain adequate hydration with IV or PO as ordered and tolerated- Evaluate effectiveness of GI medications- Encourage mobilization and activity- Obtain nutritional consult as needed- Establish a toileting routine/schedule- Consider collaborating with pharmacy to review patient's medication profile  Outcome: Progressing     Problem: GENITOURINARY - ADULT  Goal: Absence of urinary retention  Description: INTERVENTIONS:- Assess patient’s ability to void and empty bladder- Monitor intake/output and perform bladder scan as needed- Follow urinary retention protocol/standard of care- Consider collaborating with pharmacy to review patient's medication profile- Implement strategies to promote bladder emptying  Outcome: Progressing     Problem: SKIN/TISSUE INTEGRITY - ADULT  Goal: Skin integrity remains intact  Description: INTERVENTIONS- Assess and document risk factors for pressure ulcer development- Assess and document skin integrity- Monitor for areas of redness and/or skin breakdown- Initiate interventions, skin care algorithm/standards of care as needed  Outcome: Progressing     Problem: HEMATOLOGIC - ADULT  Goal: Maintains hematologic stability  Description: INTERVENTIONS- Assess for signs and symptoms of bleeding or hemorrhage- Monitor labs and vital signs for trends- Administer supportive blood products/factors, fluids and medications as ordered and appropriate- Administer supportive blood products/factors as ordered and appropriate  Outcome: Progressing  Goal: Free from bleeding injury  Description: (Example usage: patient with low  platelets)INTERVENTIONS:- Avoid intramuscular injections, enemas and rectal medication administration- Ensure safe mobilization of patient- Hold pressure on venipuncture sites to achieve adequate hemostasis- Assess for signs and symptoms of internal bleeding- Monitor lab trends- Patient is to report abnormal signs of bleeding to staff- Avoid use of toothpicks and dental floss- Use electric shaver for shaving- Use soft bristle tooth brush- Limit straining and forceful nose blowing  Outcome: Progressing

## 2025-04-08 NOTE — PROGRESS NOTES
Palliative Care Progress Note    Yue Mittal Dea Patient Status:  Inpatient    1970 MRN [id number]   Location [facility] 2W/SW Attending Carolina Bates MD   Hosp Day # 3 PCP Diana Black MD     Date of Consult: 2025  Patient seen at: [facility] Inpatient    Reason for Consultation: Consult requested for goals of care and care coordination.    Subjective     S: Pressor requirement decreased. Had thoracentesis. No complaints now.      Review of Systems: Palliative Care symptom needs assessed:     Denies dypsnea.   Denies cough or lightheadedness/dizziness.   No current pain issues.   Increasing appetite  Denies n/v   No constipation/diarrhea issues.   Hx of depression/anxiety     Palliative Care Social History:   Marital Status: Single  Children: Yes  Living Situation Prior to Admit: Home  Occupational History: Working  Personal: She is an . 3 adult children. Lives in [place] with 2 cats.     Spiritual  Yue Malin NA    requested: No    Medical History: obtained from [facility]  Past Medical History:    Anxiety state    BRCA gene mutation negative in female    Negative 70 gene hereditary cancer panel ([employer]), x1 VUS, report in media tab    DEPRESSION    Depression    Esophageal reflux    HPV in female    Hypertriglyceridemia    OCD (obsessive compulsive disorder)    Ovarian cancer (HCC)    s/p NAC, surgery, adj chemo    Personal history of antineoplastic chemotherapy    PONV (postoperative nausea and vomiting)    PTSD (post-traumatic stress disorder)    SEASONAL ALLERGIES    Visual impairment    glasses    Vitamin B 12 deficiency    Vitamin D deficiency     Past Surgical History:   Procedure Laterality Date          x3    Implant left  1999    Implant left  2009    implants replaced    Implant right      Implant right  2009    implants replaced    Other surgical history   &    breast implants, deflation    Other surgical history       carpal tunnel R hand    Total abdom hysterectomy      2024       Family History: obtained from [facility]  Family History   Problem Relation Age of Onset    Breast Cancer Mother 50    Diabetes Father     Heart Disorder Father     Other (Other) Father         rheumatoid arthritis    Thyroid Cancer Sister 30    Breast Cancer Sister 52         at 52y dt cancer    Stroke Paternal Grandfather 50    Breast Cancer Paternal Aunt         dx >50y    Ovarian Cancer Paternal Cousin Female         dx 40s    Cancer Maternal Uncle         lung cancer dx >50y       Allergies:  Allergies[1]    Medications:     Current Facility-Administered Medications:     potassium chloride 40 mEq in 250mL sodium chloride 0.9% IVPB premix, 40 mEq, Intravenous, Once    phenylephrine (Arnaldo-Synephrine) 100 mg in sodium chloride 0.9% 250 mL infusion,  mcg/min, Intravenous, Continuous    [Held by provider] heparin (Porcine) 96314 units/250mL infusion PE/DVT/THROMBUS CONTINUOUS, 200-3,000 Units/hr, Intravenous, Continuous    acetaminophen (Tylenol Extra Strength) tab 500 mg, 500 mg, Oral, Q4H PRN    ondansetron (Zofran) 4 MG/2ML injection 4 mg, 4 mg, Intravenous, Q6H PRN    prochlorperazine (Compazine) 10 MG/2ML injection 5 mg, 5 mg, Intravenous, Q8H PRN    polyethylene glycol (PEG 3350) (Miralax) 17 g oral packet 17 g, 17 g, Oral, Daily PRN    sennosides (Senokot) tab 17.2 mg, 17.2 mg, Oral, Nightly PRN    bisacodyl (Dulcolax) 10 MG rectal suppository 10 mg, 10 mg, Rectal, Daily PRN    fleet enema (Fleet) rectal enema 133 mL, 1 enema, Rectal, Once PRN    [COMPLETED] piperacillin-tazobactam (Zosyn) 4.5 g in dextrose 5% 100 mL IVPB-ADDV, 4.5 g, Intravenous, Once **FOLLOWED BY** piperacillin-tazobactam (Zosyn) 4.5 g in dextrose 5% 100 mL IVPB-ADDV, 4.5 g, Intravenous, Q8H    pantoprazole (Protonix) 40 mg in sodium chloride 0.9% PF 10 mL IV push, 40 mg, Intravenous, Q12H    norepinephrine (Levophed) 32 mg in dextrose 5% 250 mL infusion, 0.5-50  mcg/min, Intravenous, Continuous    vasopressin (Vasostrict) 20 Units in sodium chloride 0.9% 100 mL infusion for septic shock, 0.01-0.03 Units/min, Intravenous, Continuous  No current outpatient medications on file.         Palliative Performance Scale: 50 %  % Ambulation Activity Level Self-Care Intake Consciousness   100 Full  Normal  No Disease Full Normal Full   90 Full  Normal  Some Disease Full Normal Full   80 Full  Normal w/effort  Some Disease Full Normal or reduced Full   70 Reduced  Can't Perform Job  Some Disease Full Normal or reduced Full   60 Reduced  Can't Perform Hobby   Significant Disease Occ Assist Normal or reduced Full or confused   50 Mainly sit/lie Can't do any work  Extensive Disease Partial Assist Normal or reduced Full or confused   40 Mainly in bed Can't do any work  Extensive Disease Mainly Assist Normal or reduced Full or confused   30 Bed Bound Can't do any work  Extensive Disease Max Assist  Total Care Reduced  Drowsy/confused   20 Bed Bound Can't do any work  Extensive Disease Max Assist  Total Care Minimal  Drowsy/confused   10 Bed Bound Can't do any work  Extensive Disease Max Assist  Total Care Mouth Care  Drowsy/confused   0 Death        Objective      Vital Signs:  Blood pressure 103/68, pulse 82, temperature 98.4 °F (36.9 °C), temperature source Temporal, resp. rate 18, height 5' 7\" (1.702 m), weight 173 lb 8 oz (78.7 kg), last menstrual period 12/22/2020, SpO2 100%, not currently breastfeeding.  Body mass index is 27.17 kg/m².  Non-verbal signs of pain present: No    Physical Exam:  General: Alert, awake and in no  apparent respiratory distress.  HEENT: MMM. No obvious focal deficits.   Cardiac: RRR  Lungs: Normal effort on RA  Abdomen: Soft, non-distended  Extremities:  mild Edema present  Skin: Warm and dry.    Hematology:  Lab Results   Component Value Date    WBC 2.8 (L) 04/08/2025    HGB 7.2 (L) 04/08/2025    HCT 21.4 (L) 04/08/2025    .0 04/08/2025        Coags:  Lab Results   Component Value Date    PTT 94.1 (H) 04/05/2025       Chemistry:  Lab Results   Component Value Date    CREATSERUM 0.59 04/08/2025    BUN 22 04/08/2025     04/08/2025    K 3.6 04/08/2025    K 3.6 04/08/2025     04/08/2025    CO2 28.0 04/08/2025     (H) 04/08/2025    CA 7.4 (L) 04/08/2025    ALB 2.8 (L) 04/08/2025    ALKPHO 87 04/08/2025    BILT 0.4 04/08/2025    TP 5.1 (L) 04/08/2025    AST 56 (H) 04/08/2025    ALT 46 04/08/2025    MG 2.2 04/06/2025       Imaging:  XR CHEST AP PORTABLE  (CPT=71045)    Result Date: 4/8/2025  CONCLUSION:  1. Bilateral pleural effusions and associated basilar atelectasis, with or without superimposed pneumonia.  2. Lesser incidental findings as above.     Dictated by (CST): Suresh Wills MD on 4/08/2025 at 10:43 AM     Finalized by (CST): Suresh Wills MD on 4/08/2025 at 10:44 AM          US THORACENTESIS GUIDED RIGHT (CPT=32555)    Result Date: 4/8/2025   Sonography support was provided.  Images demonstrate large right pleural effusion. Please see separate report for additional details.    elm-remote     Dictated by (CST): Emanuel Sandoval MD on 4/08/2025 at 8:32 AM     Finalized by (CST): Emanuel Sandoval MD on 4/08/2025 at 8:39 AM          XR CHEST AP PORTABLE  (CPT=71045)    Result Date: 4/8/2025  CONCLUSION: No new abnormality.  Stable pulmonary edema, bilateral pleural effusions, and bibasilar opacities.    elm-remote     Dictated by (CST): Emanuel Sandoval MD on 4/08/2025 at 8:08 AM     Finalized by (CST): Emanuel Sandoval MD on 4/08/2025 at 8:09 AM          CT ABDOMEN+PELVIS(CPT=74176)    Result Date: 4/7/2025  CONCLUSION:  1. History of metastatic left ovarian carcinoma with peritoneal carcinomatosis.  Moderate to large volume intra-abdominal ascites is new since comparison abdominal CT imaging from August, 2024 and may be malignant in nature. Ascitic fluid demonstrates peripheral/peritoneal thickening; therefore, the possibility of  coexistent spontaneous bacterial peritonitis is raised.  Mild stranding in the central small bowel mesentery is nonspecific, but raises suspicion for peritoneal carcinomatosis. 2. Scattered mildly dilated and fluid-filled proximal small bowel loops, but with no discrete transition point.  Findings suggest either an infectious/inflammatory enteritis or a generalized adynamic ileus.  No definite high-grade small bowel obstruction.  Mild long segment proximal colonic wall thickening probably relates to a coexistent infectious/inflammatory colitis. 3. Stable postoperative changes of the distal colon with associated anastomosis. 4. Stable probable small left hepatic lobe cyst. 5. Mild nonspecific gallbladder luminal distention, but with no CT findings of acute cholecystitis. 6. Nonspecific fluid distention of the distal esophagus, please correlate for reflux or other etiologies of esophagitis. 7. Enlarged anterior mediastinal/prevascular space lymph nodes could be metastatic. 8. Loculated moderate bilateral pleural effusions, which may be malignant.  Associated dependent airspace disease in both lower lobes, which is favored to represent compressive atelectasis.  Small 6 mm right upper lobe lung nodules are unchanged since recent prior chest CT. 9. Anasarca. 10. Partially imaged bilateral breast implants. 11. Lesser incidental findings as above.     Dictated by (CST): Michael Rogers MD on 4/07/2025 at 5:58 PM     Finalized by (CST): Michael Rogers MD on 4/07/2025 at 6:13 PM          XR CHEST AP PORTABLE  (CPT=71045)    Result Date: 4/7/2025  CONCLUSION:  1. Borderline cardiomegaly.  Tortuous aorta. 2. Right central venous Port-A-Cath with the tip in the SVC.  No pneumothorax 3. Bilateral perihilar lower lobe mixed airspace opacification has progressed. 4. Small to moderate bilateral effusions, worse on the right redemonstrated with slight improvement on the right side and similar findings on the left.     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     Advanced Care Planning  Deferred today for first meeting. Has discussed that her quality of life is more important that quantity. There has been hesitation to discuss further, I will explore.    She has HC-POA with her Daughter Ami Persaud    3.     Serious Illness Conversation:   Code Status: Full  Yue is feeling fine, coping with being captive to her illness and the ICU.   Sister Arpita is in town visiting, and all 3 adult kids at bedside.   We discussed a little bit about snf care, needs at home.  I will continue to provide emotional support.    Palliative Care Follow Up: Palliative care team will Continue to follow while inpatient    Thank you for allowing Palliative Care services to participate in the care of Yue Malin.    A total of 40 minutes were spent on this visit, which included all of the following: direct face to face contact, history taking, physical examination, and >50% was spent counseling and coordinating care.    Gian  MD Courtney  Palliative Care Services  [facility] [phone]    Note to patient:  The 21st Century Cures Act makes medical notes like these available to patients in the interest of transparency. However, be advised this is a medical document. It is intended as peer to peer communication. It is written in medical language and may contain abbreviations or verbiage that are unfamiliar. It may appear blunt or direct. Medical documents are intended to carry relevant information, facts as evident, and the clinical opinion of the practitioner.           [1]   Allergies  Allergen Reactions    Hydrocodone NAUSEA AND VOMITING     agitation    Adhesive Tape RASH

## 2025-04-08 NOTE — PROGRESS NOTES
[facility]  part of [facility] Infectious Disease  Progress Note    Yue Malin Patient Status:  Inpatient    1970 MRN [id number]   Location [facility] 2W/SW Attending Carolina Bates MD   Hosp Day # 3 PCP Diana Black MD     Subjective:  Patient seen/examined.  Up in bed s/p thoracentesis procedure.  1300cc of slightly cloudy fluid removed and sent for studies.  CT reviewed.    Objective:  Blood pressure 103/68, pulse 82, temperature 98.4 °F (36.9 °C), temperature source Temporal, resp. rate 18, height 5' 7\" (1.702 m), weight 173 lb 8 oz (78.7 kg), last menstrual period 2020, SpO2 100%, not currently breastfeeding.    Intake/Output:    Intake/Output Summary (Last 24 hours) at 2025 1123  Last data filed at 2025 1036  Gross per 24 hour   Intake 1600 ml   Output 1425 ml   Net 175 ml       Physical Exam:  General: Awake, alert, non-tox, NAD.  HEENT:  Oropharynx clear, trachea ML.  Heart: RRR S1S2 no murmurs.  Lungs: Essentially CTA b/l, no rhonchi, rales, wheezes.  Abdomen: Soft, NT/ND.  BS present.  No organomegaly.  Extremity: No edema.  Neurological: No focal deficits.  Derm:  Warm, dry, free from rashes.    Lab Data Review:  Lab Results   Component Value Date    WBC 2.8 2025    HGB 7.2 2025    HCT 21.4 2025    .0 2025    CREATSERUM 0.59 2025    BUN 22 2025     2025    K 3.6 2025    K 3.6 2025     2025    CO2 28.0 2025     2025    CA 7.4 2025    ALB 2.8 2025    ALKPHO 87 2025    BILT 0.4 2025    TP 5.1 2025    AST 56 2025    ALT 46 2025      Cultures:  2/2 blood cultures + klebsiella 25  2/2 blood cultures + klebsiella 25    Blood cultures 25 pending, negative thus far     MRSA swab negative     Radiology:  CONCLUSION:   1. History of metastatic left ovarian carcinoma with peritoneal  carcinomatosis.  Moderate to large volume intra-abdominal ascites is new since comparison abdominal CT imaging from August, 2024 and may be malignant in nature. Ascitic fluid demonstrates   peripheral/peritoneal thickening; therefore, the possibility of coexistent spontaneous bacterial peritonitis is raised.  Mild stranding in the central small bowel mesentery is nonspecific, but raises suspicion for peritoneal carcinomatosis.   2. Scattered mildly dilated and fluid-filled proximal small bowel loops, but with no discrete transition point.  Findings suggest either an infectious/inflammatory enteritis or a generalized adynamic ileus.  No definite high-grade small bowel   obstruction.  Mild long segment proximal colonic wall thickening probably relates to a coexistent infectious/inflammatory colitis.   3. Stable postoperative changes of the distal colon with associated anastomosis.   4. Stable probable small left hepatic lobe cyst.   5. Mild nonspecific gallbladder luminal distention, but with no CT findings of acute cholecystitis.   6. Nonspecific fluid distention of the distal esophagus, please correlate for reflux or other etiologies of esophagitis.   7. Enlarged anterior mediastinal/prevascular space lymph nodes could be metastatic.   8. Loculated moderate bilateral pleural effusions, which may be malignant.  Associated dependent airspace disease in both lower lobes, which is favored to represent compressive atelectasis.  Small 6 mm right upper lobe lung nodules are unchanged since   recent prior chest CT.   9. Anasarca.   10. Partially imaged bilateral breast implants.   11. Lesser incidental findings as above.        Antibiotics Reviewed:  Zosyn     Assessment and Plan:     Acute gram negative sepsis in this immunocompromised patient presenting with SOB, N/V, and loss of appetite  - 2/2 blood cultures positive 4/5/25 and 4/6/25 for klebsiella spp, repeats pending   - Source related to occult intraabdominal focus  vs. Primary/secondary infected port (no gross s/s of infection) vs. Pulmonary vs. Other  - s/p thoracentesis 4/8/25 with 1300cc removed, studies pending  - CT reviewed with ascites and concern for possible peritonitis  - UA bland  - IV zosyn ongoing     2.  Metastatic ovarian cancer with malignant pleural effusion and peritoneal carcinomatosis  - Suspect gut translocation of bacteria as etiology of events     3.  Anemia, leukopenia due to the above     4.  Disposition - inpatient.  Continue IV zosyn as Rx.   Although 4 sets of blood cultures are positive, no gross s/s mediport involvement.  If further sets are positive we may need to consider removal.  F/u pending thoracentesis studies.  May need paracentesis studies as well.  For now will treat as bacteremia due to peritonitis with consideration of antibiotic suppression after treatment.  Trending temps and WBCs.  Duration of IV antibiotics to be determined.  D/w patient.     Karen Thomason DO, [facility] Infectious Disease  [phone]    4/8/2025  11:23 AM

## 2025-04-08 NOTE — PROCEDURES
[facility]  part of [facility]     Procedure Note  25    Yue Malin Patient Status:  Inpatient    1970 MRN [id number]   Location [facility] 2W/SW Attending Carolina Bates MD   Hosp Day # 3 PCP Diana Black MD     Procedure: Right sided ultrasound-guided thoracentesis    Pre-Procedure Diagnosis: Pleural effusion    Post-Procedure Diagnosis: 1300 cc of slightly cloudy yellow fluid aspirated    Anesthesia:  Local    Finding and procedure: The right posterior axillary line was cleaned, draped, anesthetized and 1300 cc of slightly cloudy fluid was aspirated.  Chest x-ray is pending.    Specimens: Sent    Blood Loss: None    Tourniquet Time: None  Complications: None  Drains: None    Secondary Diagnosis: None    Caleb Story MD  Medical Director, Critical Care, [facility]  Medical Director, [facility]  Pager: [phone]

## 2025-04-09 ENCOUNTER — APPOINTMENT (OUTPATIENT)
Dept: GENERAL RADIOLOGY | Facility: HOSPITAL | Age: 55
End: 2025-04-09
Attending: INTERNAL MEDICINE
Payer: COMMERCIAL

## 2025-04-09 LAB
ALBUMIN SERPL-MCNC: 2.9 G/DL (ref 3.2–4.8)
ALBUMIN/GLOB SERPL: 1.4 {RATIO} (ref 1–2)
ALP LIVER SERPL-CCNC: 88 U/L (ref 41–108)
ALT SERPL-CCNC: 29 U/L (ref 10–49)
ANION GAP SERPL CALC-SCNC: 9 MMOL/L (ref 0–18)
AST SERPL-CCNC: 29 U/L (ref ?–34)
BASOPHILS # BLD AUTO: 0 X10(3) UL (ref 0–0.2)
BASOPHILS # BLD AUTO: 0.01 X10(3) UL (ref 0–0.2)
BASOPHILS NFR BLD AUTO: 0 %
BASOPHILS NFR BLD AUTO: 0.5 %
BASOPHILS NFR BLD AUTO: 0.6 %
BASOPHILS NFR BLD AUTO: 0.6 %
BILIRUB SERPL-MCNC: 0.5 MG/DL (ref 0.3–1.2)
BUN BLD-MCNC: 13 MG/DL (ref 9–23)
BUN/CREAT SERPL: 22.8 (ref 10–20)
CALCIUM BLD-MCNC: 7.6 MG/DL (ref 8.7–10.4)
CHLORIDE SERPL-SCNC: 106 MMOL/L (ref 98–112)
CO2 SERPL-SCNC: 26 MMOL/L (ref 21–32)
CREAT BLD-MCNC: 0.57 MG/DL (ref 0.55–1.02)
DEPRECATED RDW RBC AUTO: 48.3 FL (ref 35.1–46.3)
DEPRECATED RDW RBC AUTO: 49.6 FL (ref 35.1–46.3)
DEPRECATED RDW RBC AUTO: 49.7 FL (ref 35.1–46.3)
DEPRECATED RDW RBC AUTO: 51 FL (ref 35.1–46.3)
EGFRCR SERPLBLD CKD-EPI 2021: 107 ML/MIN/1.73M2 (ref 60–?)
EOSINOPHIL # BLD AUTO: 0.01 X10(3) UL (ref 0–0.7)
EOSINOPHIL # BLD AUTO: 0.02 X10(3) UL (ref 0–0.7)
EOSINOPHIL NFR BLD AUTO: 0.5 %
EOSINOPHIL NFR BLD AUTO: 0.6 %
EOSINOPHIL NFR BLD AUTO: 0.6 %
EOSINOPHIL NFR BLD AUTO: 1.1 %
ERYTHROCYTE [DISTWIDTH] IN BLOOD BY AUTOMATED COUNT: 17.6 % (ref 11–15)
ERYTHROCYTE [DISTWIDTH] IN BLOOD BY AUTOMATED COUNT: 17.8 % (ref 11–15)
ERYTHROCYTE [DISTWIDTH] IN BLOOD BY AUTOMATED COUNT: 18 % (ref 11–15)
ERYTHROCYTE [DISTWIDTH] IN BLOOD BY AUTOMATED COUNT: 18 % (ref 11–15)
GLOBULIN PLAS-MCNC: 2.1 G/DL (ref 2–3.5)
GLUCOSE BLD-MCNC: 110 MG/DL (ref 70–99)
HCT VFR BLD AUTO: 21.7 % (ref 35–48)
HCT VFR BLD AUTO: 22.1 %
HCT VFR BLD AUTO: 22.5 % (ref 35–48)
HCT VFR BLD AUTO: 23 % (ref 35–48)
HGB BLD-MCNC: 7.1 G/DL (ref 12–16)
HGB BLD-MCNC: 7.3 G/DL
HGB BLD-MCNC: 7.6 G/DL (ref 12–16)
HGB BLD-MCNC: 7.7 G/DL (ref 12–16)
IMM GRANULOCYTES # BLD AUTO: 0.01 X10(3) UL (ref 0–1)
IMM GRANULOCYTES # BLD AUTO: 0.03 X10(3) UL (ref 0–1)
IMM GRANULOCYTES # BLD AUTO: 0.03 X10(3) UL (ref 0–1)
IMM GRANULOCYTES # BLD AUTO: 0.04 X10(3) UL (ref 0–1)
IMM GRANULOCYTES NFR BLD: 0.6 %
IMM GRANULOCYTES NFR BLD: 1.6 %
IMM GRANULOCYTES NFR BLD: 1.7 %
IMM GRANULOCYTES NFR BLD: 2.5 %
LYMPHOCYTES # BLD AUTO: 0.51 X10(3) UL (ref 1–4)
LYMPHOCYTES # BLD AUTO: 0.61 X10(3) UL (ref 1–4)
LYMPHOCYTES # BLD AUTO: 0.65 X10(3) UL (ref 1–4)
LYMPHOCYTES # BLD AUTO: 0.71 X10(3) UL (ref 1–4)
LYMPHOCYTES NFR BLD AUTO: 31.5 %
LYMPHOCYTES NFR BLD AUTO: 32.6 %
LYMPHOCYTES NFR BLD AUTO: 36.1 %
LYMPHOCYTES NFR BLD AUTO: 40.6 %
MCH RBC QN AUTO: 28.2 PG (ref 26–34)
MCH RBC QN AUTO: 28.4 PG (ref 26–34)
MCH RBC QN AUTO: 28.5 PG (ref 26–34)
MCH RBC QN AUTO: 29.2 PG (ref 26–34)
MCHC RBC AUTO-ENTMCNC: 32.7 G/DL (ref 31–37)
MCHC RBC AUTO-ENTMCNC: 33 G/DL (ref 31–37)
MCHC RBC AUTO-ENTMCNC: 33.5 G/DL (ref 31–37)
MCHC RBC AUTO-ENTMCNC: 33.8 G/DL (ref 31–37)
MCV RBC AUTO: 85.2 FL (ref 80–100)
MCV RBC AUTO: 86 FL
MCV RBC AUTO: 86.1 FL (ref 80–100)
MCV RBC AUTO: 86.5 FL (ref 80–100)
MONOCYTES # BLD AUTO: 0.09 X10(3) UL (ref 0.1–1)
MONOCYTES # BLD AUTO: 0.1 X10(3) UL (ref 0.1–1)
MONOCYTES # BLD AUTO: 0.1 X10(3) UL (ref 0.1–1)
MONOCYTES # BLD AUTO: 0.11 X10(3) UL (ref 0.1–1)
MONOCYTES NFR BLD AUTO: 5.3 %
MONOCYTES NFR BLD AUTO: 5.6 %
MONOCYTES NFR BLD AUTO: 5.6 %
MONOCYTES NFR BLD AUTO: 6.3 %
MRSA DNA SPEC QL NAA+PROBE: NEGATIVE
NEUTROPHILS # BLD AUTO: 0.89 X10 (3) UL (ref 1.5–7.7)
NEUTROPHILS # BLD AUTO: 0.89 X10(3) UL (ref 1.5–7.7)
NEUTROPHILS # BLD AUTO: 0.97 X10 (3) UL (ref 1.5–7.7)
NEUTROPHILS # BLD AUTO: 0.97 X10(3) UL (ref 1.5–7.7)
NEUTROPHILS # BLD AUTO: 1 X10 (3) UL (ref 1.5–7.7)
NEUTROPHILS # BLD AUTO: 1 X10(3) UL (ref 1.5–7.7)
NEUTROPHILS # BLD AUTO: 1.11 X10 (3) UL (ref 1.5–7.7)
NEUTROPHILS # BLD AUTO: 1.11 X10(3) UL (ref 1.5–7.7)
NEUTROPHILS NFR BLD AUTO: 50.8 %
NEUTROPHILS NFR BLD AUTO: 55.4 %
NEUTROPHILS NFR BLD AUTO: 59.5 %
NEUTROPHILS NFR BLD AUTO: 59.8 %
OSMOLALITY SERPL CALC.SUM OF ELEC: 293 MOSM/KG (ref 275–295)
PLATELET # BLD AUTO: 141 10(3)UL (ref 150–450)
PLATELET # BLD AUTO: 144 10(3)UL (ref 150–450)
PLATELET # BLD AUTO: 159 10(3)UL (ref 150–450)
PLATELET # BLD AUTO: 179 10(3)UL (ref 150–450)
PLATELET MORPHOLOGY: NORMAL
POTASSIUM SERPL-SCNC: 3.4 MMOL/L (ref 3.5–5.1)
POTASSIUM SERPL-SCNC: 3.4 MMOL/L (ref 3.5–5.1)
PROT SERPL-MCNC: 5 G/DL (ref 5.7–8.2)
RBC # BLD AUTO: 2.52 X10(6)UL (ref 3.8–5.3)
RBC # BLD AUTO: 2.57 X10(6)UL
RBC # BLD AUTO: 2.6 X10(6)UL (ref 3.8–5.3)
RBC # BLD AUTO: 2.7 X10(6)UL (ref 3.8–5.3)
SODIUM SERPL-SCNC: 141 MMOL/L (ref 136–145)
WBC # BLD AUTO: 1.6 X10(3) UL (ref 4–11)
WBC # BLD AUTO: 1.8 X10(3) UL (ref 4–11)
WBC # BLD AUTO: 1.8 X10(3) UL (ref 4–11)
WBC # BLD AUTO: 1.9 X10(3) UL (ref 4–11)

## 2025-04-09 PROCEDURE — 99233 SBSQ HOSP IP/OBS HIGH 50: CPT | Performed by: INTERNAL MEDICINE

## 2025-04-09 PROCEDURE — 71045 X-RAY EXAM CHEST 1 VIEW: CPT | Performed by: INTERNAL MEDICINE

## 2025-04-09 RX ORDER — POTASSIUM CHLORIDE 1.5 G/1.58G
40 POWDER, FOR SOLUTION ORAL EVERY 4 HOURS
Status: DISCONTINUED | OUTPATIENT
Start: 2025-04-09 | End: 2025-04-09

## 2025-04-09 RX ORDER — MIDODRINE HYDROCHLORIDE 5 MG/1
5 TABLET ORAL 3 TIMES DAILY
Status: DISCONTINUED | OUTPATIENT
Start: 2025-04-09 | End: 2025-04-13

## 2025-04-09 RX ORDER — LORAZEPAM 0.5 MG/1
0.5 TABLET ORAL 3 TIMES DAILY PRN
Status: DISCONTINUED | OUTPATIENT
Start: 2025-04-09 | End: 2025-04-14

## 2025-04-09 RX ORDER — LORAZEPAM 0.5 MG/1
0.5 TABLET ORAL ONCE AS NEEDED
Status: COMPLETED | OUTPATIENT
Start: 2025-04-09 | End: 2025-04-09

## 2025-04-09 RX ORDER — POTASSIUM CHLORIDE 1500 MG/1
40 TABLET, EXTENDED RELEASE ORAL EVERY 4 HOURS
Status: DISCONTINUED | OUTPATIENT
Start: 2025-04-09 | End: 2025-04-09

## 2025-04-09 NOTE — OCCUPATIONAL THERAPY NOTE
OCCUPATIONAL THERAPY EVALUATION - INPATIENT     Room Number: 223/223-A  Evaluation Date: 4/9/2025  Type of Evaluation: Initial  Presenting Problem: Ovarian CA; mets    Physician Order: IP Consult to Occupational Therapy  Reason for Therapy: ADL/IADL Dysfunction and Discharge Planning    OCCUPATIONAL THERAPY ASSESSMENT   Patient is a 54 year old female admitted 4/5/2025 for disseminated ovarian cancer; SOB; s/p thoracentesis 4/8; hx of LLE DVT,A-fib .  Prior to admission, patient's baseline is assist from family with IADLS; ambulatory with no device but owns RW at home; typically Mod I with self care.  Patient is currently functioning below baseline with self care; below baseline for fxl mobiltiy.  Patient is requiring up to Mod A with self care; Min A for bed mobility and transfers  as a result of the following impairments: decreased functional strength, decreased functional reach, decreased endurance, pain, decreased muscular endurance, and medical status. Occupational Therapy will continue to follow for duration of hospitalization.    Patient will benefit from continued skilled OT Services at discharge to promote prior level of function and safety with additional support and return home with home health OT.    PLAN DURING HOSPITALIZATION  OT Device Recommendations: TBD  OT Treatment Plan: Balance activities, Energy conservation/work simplification techniques, ADL training, Functional transfer training, Endurance training, Patient/Family education, Patient/Family training, Compensatory technique education     OCCUPATIONAL THERAPY MEDICAL/SOCIAL HISTORY   Problem List  Principal Problem:    Disseminated ovarian cancer, left (HCC)  Active Problems:    Acute hypoxemic respiratory failure (HCC)    Hypotension, unspecified hypotension type    Recurrent pleural effusion    Nausea and vomiting in adult    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)    MEGAN (acute kidney injury)    Lactic acidosis    Bandemia     Palliative care by specialist    HOME SITUATION  Type of Home: House  Home Layout: One level  Lives With: Family  Toilet and Equipment: Standard height toilet  Shower/Tub and Equipment: Tub-shower combo  Drives: Yes  Patient Regularly Uses: Glasses    SUBJECTIVE  My kids have been helping me  My son is living with me    OCCUPATIONAL THERAPY EXAMINATION      OBJECTIVE  Precautions: Bed/chair alarm  Fall Risk: High fall risk      PAIN ASSESSMENT  Rating: Unable to rate  Location: generalized  Management Techniques: Activity promotion      COGNITION  Alert, following commands, appears lethargic and withdrawn; flat affect  Communication: Able to make all needs known    Behavioral/Emotional/Social: Overall cooperative; hesitant for OOB activities 2/2 generalized weakness and pain     RANGE OF MOTION   Upper extremity ROM is within functional limits     STRENGTH ASSESSMENT  Upper extremity strength is within functional limits     COORDINATION  Gross Motor: WFL   Fine Motor: WFL     ACTIVITIES OF DAILY LIVING ASSESSMENT  AM-PAC ‘6-Clicks’ Inpatient Daily Activity Short Form  How much help from another person does the patient currently need…  -   Putting on and taking off regular lower body clothing?: A Lot  -   Bathing (including washing, rinsing, drying)?: A Lot  -   Toileting, which includes using toilet, bedpan or urinal? : A Lot  -   Putting on and taking off regular upper body clothing?: A Little  -   Taking care of personal grooming such as brushing teeth?: None  -   Eating meals?: None    AM-PAC Score:  Score: 17  Approx Degree of Impairment: 50.11%  Standardized Score (AM-PAC Scale): 37.26  CMS Modifier (G-Code): CK    FUNCTIONAL TRANSFER ASSESSMENT  Sit to Stand: Edge of Bed  Edge of Bed: Minimal Assist    BED MOBILITY  Rolling: Minimal Assist  Supine to Sit : Minimal Assist  Sit to Supine (OT): Minimal Assist  Scooting: Min A    BALANCE ASSESSMENT  Static Sitting: Stand-by Assist  Static Standing: Minimal  Assist    FUNCTIONAL ADL ASSESSMENT  Eating: Supervision  Grooming Seated: Supervision  Bathing Seated: Minimal Assist  UB Dressing Seated: Stand-by Assist  LB Dressing Seated: Moderate Assist  Toileting Seated: Moderate Assist       Skilled Therapy Provided: Pt rcvd in bed; son present for evaluation; pt care coordinated with PT; pt only tolerating bed to chair at this time and requiring increased time and therapeutic rest breaks to complete; pt discussing with OT that everything at home has been taking more time to complete but she has been managing most of her basic self care; supportive son assisting with IADLS; she has two other adult children who she reports can assist; overall Min A for mobility; increased assist for dynamic/mobility related ADLs; Provided assist to don socks at EOB; will benefit from energy conservation training, however, pt not receptive to this much education during this evaluation; pt tolerated up to chair with assist and left with all needs in reach; Continue skilled occupational therapy while IP to maximize patient function and increase patient participation, safety, and independence with basic ADL and everyday activities.       EDUCATION PROVIDED  Patient Education : Role of Occupational Therapy; Plan of Care; Discharge Recommendations; Functional Transfer Techniques; Fall Prevention; Posture/Positioning; Energy Conservation  Patient's Response to Education: Verbalized Understanding    The patient's Approx Degree of Impairment: 50.11% has been calculated based on documentation in the [facility] '6 clicks' Inpatient Daily Activity Short Form.  Research supports that patients with this level of impairment may benefit from HH.  Final disposition will be made by interdisciplinary medical team.     Patient End of Session: Up in chair, Needs met, Call light within reach, RN aware of session/findings, Hospital anti-slip socks, All patient questions and concerns addressed    OT Goals  Patients self  stated goal is: to go home     Patient will complete functional transfer with supervision  Comment:     Patient will complete toileting with distant supervision  Comment:     Patient will tolerate standing for 3-5 minutes in prep for adls with supervision   Comment:    Patient will complete item retrieval with supervision   Comment:          Goals  on:   Frequency: 3-5x week    Patient Evaluation Complexity Level:   Occupational Profile/Medical History HIGH - Extensive review of history including review of medical or therapy records    Specific performance deficits impacting engagement in ADL/IADL HIGH  5+ performance deficits    Client Assessment/Performance Deficits HIGH - Comorbidities and significant modifications of tasks    Clinical Decision Making HIGH - Analysis of occupational profile, comprehensive assessments, multiple treatment options    Overall Complexity HIGH     OT Session Time: 24 minutes  Self-Care Home Management: 0 minutes  Therapeutic Activity: 24 minutes

## 2025-04-09 NOTE — DIETARY NOTE
BRIEF NUTRITION:     Pt screened at no nutritional risk on admission MST by RN however noted report of decreased appetite/intake in H&P. RD reviewed chart and pt visited. Pt sleeping soundly at time of visit however multiple family members at bedside. Nutritional status discussed with DTR. Wt appears stable per EMR and confirmed by DTR. Wt down 3% from admission with noted fluid losses from thoracentesis and paracentesis. DTR reports decreased appetite/intake however pt has been supplementing meals at home with Ensure Shakes. Pt appears well nourished on visual exam. Good intake of meal trays this admission. Receptive to oral nutrition supplements (ONS). RD added Ensure Enlive chocolate at 10am and 2pm daily. Pt remains screened at no nutritional risk. Will follow up in 5-7 days to determine need for nutritional assessment and further nutritional interventions if intake is sub optimal. Please consult nutrition services if earlier intervention is indicated.      Rut Lopez MS, RD, LDN, CNSC  [id number]

## 2025-04-09 NOTE — PLAN OF CARE
Problem: CARDIOVASCULAR - ADULT  Goal: Absence of cardiac arrhythmias or at baseline  Description: INTERVENTIONS:- Continuous cardiac monitoring, monitor vital signs, obtain 12 lead EKG if indicated- Evaluate effectiveness of antiarrhythmic and heart rate control medications as ordered- Initiate emergency measures for life threatening arrhythmias- Monitor electrolytes and administer replacement therapy as ordered  Outcome: Progressing  Goal: Maintains optimal cardiac output and hemodynamic stability  Description: INTERVENTIONS:- Monitor vital signs, rhythm, and trends- Monitor for bleeding, hypotension and signs of decreased cardiac output- Evaluate effectiveness of vasoactive medications to optimize hemodynamic stability- Monitor arterial and/or venous puncture sites for bleeding and/or hematoma- Assess quality of pulses, skin color and temperature- Assess for signs of decreased coronary artery perfusion - ex. Angina- Evaluate fluid balance, assess for edema, trend weights  Outcome: Progressing     Problem: RESPIRATORY - ADULT  Goal: Achieves optimal ventilation and oxygenation  Description: INTERVENTIONS:- Assess for changes in respiratory status- Assess for changes in mentation and behavior- Position to facilitate oxygenation and minimize respiratory effort- Oxygen supplementation based on oxygen saturation or ABGs- Provide Smoking Cessation handout, if applicable- Encourage broncho-pulmonary hygiene including cough, deep breathe, Incentive Spirometry- Assess the need for suctioning and perform as needed- Assess and instruct to report SOB or any respiratory difficulty- Respiratory Therapy support as indicated- Manage/alleviate anxiety- Monitor for signs/symptoms of CO2 retention  Outcome: Progressing     Problem: GASTROINTESTINAL - ADULT  Goal: Minimal or absence of nausea and vomiting  Description: INTERVENTIONS:- Maintain adequate hydration with IV or PO as ordered and tolerated- Nasogastric tube to low  intermittent suction as ordered- Evaluate effectiveness of ordered antiemetic medications- Provide nonpharmacologic comfort measures as appropriate- Advance diet as tolerated, if ordered- Obtain nutritional consult as needed- Evaluate fluid balance  Outcome: Progressing  Goal: Maintains or returns to baseline bowel function  Description: INTERVENTIONS:- Assess bowel function- Maintain adequate hydration with IV or PO as ordered and tolerated- Evaluate effectiveness of GI medications- Encourage mobilization and activity- Obtain nutritional consult as needed- Establish a toileting routine/schedule- Consider collaborating with pharmacy to review patient's medication profile  Outcome: Progressing  Goal: Maintains adequate nutritional intake (undernourished)  Description: INTERVENTIONS:- Monitor percentage of each meal consumed- Identify factors contributing to decreased intake, treat as appropriate- Assist with meals as needed- Monitor I&O, WT and lab values- Obtain nutritional consult as needed- Optimize oral hygiene and moisture- Encourage food from home; allow for food preferences- Enhance eating environment  Outcome: Progressing     Problem: GENITOURINARY - ADULT  Goal: Absence of urinary retention  Description: INTERVENTIONS:- Assess patient’s ability to void and empty bladder- Monitor intake/output and perform bladder scan as needed- Follow urinary retention protocol/standard of care- Consider collaborating with pharmacy to review patient's medication profile- Implement strategies to promote bladder emptying  Outcome: Progressing     Problem: SKIN/TISSUE INTEGRITY - ADULT  Goal: Skin integrity remains intact  Description: INTERVENTIONS- Assess and document risk factors for pressure ulcer development- Assess and document skin integrity- Monitor for areas of redness and/or skin breakdown- Initiate interventions, skin care algorithm/standards of care as needed  Outcome: Progressing     Problem: HEMATOLOGIC -  ADULT  Goal: Maintains hematologic stability  Description: INTERVENTIONS- Assess for signs and symptoms of bleeding or hemorrhage- Monitor labs and vital signs for trends- Administer supportive blood products/factors, fluids and medications as ordered and appropriate- Administer supportive blood products/factors as ordered and appropriate  Outcome: Progressing  Goal: Free from bleeding injury  Description: (Example usage: patient with low platelets)INTERVENTIONS:- Avoid intramuscular injections, enemas and rectal medication administration- Ensure safe mobilization of patient- Hold pressure on venipuncture sites to achieve adequate hemostasis- Assess for signs and symptoms of internal bleeding- Monitor lab trends- Patient is to report abnormal signs of bleeding to staff- Avoid use of toothpicks and dental floss- Use electric shaver for shaving- Use soft bristle tooth brush- Limit straining and forceful nose blowing  Outcome: Progressing

## 2025-04-09 NOTE — PROGRESS NOTES
; However, [facility]  part of [facility]    Progress Note      Assessment and Plan:   1.  Respiratory failure-small to modest bilateral pleural effusions which are malignant.  The pleural fluid cytology from the most recent tap is still pending.    Recommendations: May need further thoracenteses in the future.  Will await cytology.  Pleurx catheter is another therapeutic consideration.    2.  Metastatic ovarian cancer-follow-up oncology.  Status post paracentesis and will await cultures.    3.  MEGAN-improved    4.  History of DVT-repeat ultrasound negative.  However, the patient has bled with full anticoagulation.  Considering the recent bleed and recent DVT, I agree with proceeding to IVC filter.    Recommendations: IVC filter would wait temporarily with Klebsiella bacteremia.    5.  A-fib with RVR-better controlled now.    6.  Klebsiella bacteremia-source uncertain.  The paracentesis shows only 10 white blood cells in the fluid making bacterial peritonitis unlikely to be the source of the bacteremia.  The organism is pansensitive except to ampicillin.    Recommendations: Continue Zosyn.        Subjective:   Yue Malin is a(n) 54 year old female who is breathing a little better status postthoracentesis    Objective:   Blood pressure 100/56, pulse 78, temperature 97.1 °F (36.2 °C), temperature source Temporal, resp. rate (!) 27, height 5' 7\" (1.702 m), weight 173 lb 8 oz (78.7 kg), last menstrual period 12/22/2020, SpO2 100%, not currently breastfeeding.    Physical Exam alert white female  HEENT examination is unremarkable with pupils equal round and reactive to light and accommodation.   Neck without adenopathy, thyromegaly, JVD nor bruit.   Lungs diminished with right basilar rattle to auscultation and percussion.  Cardiac regular rate and rhythm no murmur.   Abdomen nontender, without hepatosplenomegaly and no mass appreciable.   Extremities without clubbing cyanosis nor edema.    Neurologic grossly intact with symmetric tone and strength and reflex.  Skin without gross abnormality     Results:     Lab Results   Component Value Date    WBC 1.8 04/09/2025    HGB 7.6 04/09/2025    HCT 22.5 04/09/2025    .0 04/09/2025    CREATSERUM 0.57 04/09/2025    BUN 13 04/09/2025     04/09/2025    K 3.4 04/09/2025    K 3.4 04/09/2025     04/09/2025    CO2 26.0 04/09/2025     04/09/2025    CA 7.6 04/09/2025    ALB 2.9 04/09/2025    ALKPHO 88 04/09/2025    BILT 0.5 04/09/2025    TP 5.0 04/09/2025    AST 29 04/09/2025    ALT 29 04/09/2025     Chest x-ray-modest right and small left pleural effusions    Caleb Story MD  Medical Director, Critical Care, [facility]  Medical Director, [facility]  Pager: [phone]

## 2025-04-09 NOTE — PHYSICAL THERAPY NOTE
PHYSICAL THERAPY EVALUATION - INPATIENT     Room Number: 223/223-A  Evaluation Date: 4/9/2025  Type of Evaluation: Initial   Physician Order: PT Eval and Treat    Presenting Problem: disseminated ovarian cancer     Reason for Therapy: Mobility Dysfunction and Discharge Planning    PHYSICAL THERAPY ASSESSMENT   Patient is a 54 year old female admitted 4/5/2025 for disseminated ovarian cancer.  Prior to admission, patient's baseline is independent, increased fatigue this date.  Patient is currently functioning below baseline with bed mobility, transfers, and gait.  Patient is requiring minimal assist as a result of the following impairments: decreased functional strength, decreased endurance/aerobic capacity, and medical status.  Physical Therapy will continue to follow for duration of hospitalization.    Patient will benefit from continued skilled PT Services at discharge to promote prior level of function and safety with additional support and return home with home health PT.    PLAN DURING HOSPITALIZATION  Nursing Mobility Recommendation : 1 Assist     PT Treatment Plan: Bed mobility, Endurance, Energy conservation, Family education, Patient education, Gait training, Strengthening, Transfer training, Balance training  Rehab Potential : Fair  Frequency (Obs): 5x/week     PHYSICAL THERAPY MEDICAL/SOCIAL HISTORY   History related to current admission: MEGAN     Problem List  Principal Problem:    Disseminated ovarian cancer, left (HCC)  Active Problems:    Acute hypoxemic respiratory failure (HCC)    Hypotension, unspecified hypotension type    Recurrent pleural effusion    Nausea and vomiting in adult    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)    MEGAN (acute kidney injury)    Lactic acidosis    Bandemia    Palliative care by specialist      HOME SITUATION  Type of Home: House  Home Layout: One level  Stairs to Enter : 5   Railing: Yes              Lives With: Family    Drives: Yes   Patient Regularly Uses: Glasses      Prior Level of [place]: independent     SUBJECTIVE  \"I'm tired today.\"     PHYSICAL THERAPY EXAMINATION   OBJECTIVE  Precautions: Bed/chair alarm  Fall Risk: High fall risk    WEIGHT BEARING RESTRICTION       PAIN ASSESSMENT     Location: body aches and abdominal pain unrated       COGNITION  Overall Cognitive Status:  WFL - within functional limits    BALANCE  Static Sitting: Good  Dynamic Sitting: Fair +  Static Standing: Fair  Dynamic Standing: Fair -    AM-PAC '6-Clicks' INPATIENT SHORT FORM - BASIC MOBILITY  How much difficulty does the patient currently have...  Patient Difficulty: Turning over in bed (including adjusting bedclothes, sheets and blankets)?: A Little   Patient Difficulty: Sitting down on and standing up from a chair with arms (e.g., wheelchair, bedside commode, etc.): A Little   Patient Difficulty: Moving from lying on back to sitting on the side of the bed?: A Little   How much help from another person does the patient currently need...   Help from Another: Moving to and from a bed to a chair (including a wheelchair)?: A Little   Help from Another: Need to walk in hospital room?: A Little   Help from Another: Climbing 3-5 steps with a railing?: A Lot     AM-PAC Score:  Raw Score: 17   Approx Degree of Impairment: 50.57%   Standardized Score (AM-PAC Scale): 42.13   CMS Modifier (G-Code): CK    FUNCTIONAL ABILITY STATUS  Functional Mobility/Gait Assessment  Gait Assistance: Minimum assistance  Distance (ft): 3'  Assistive Device:  (HHA)  Pattern: Shuffle  Rolling: minimal assist  Supine to Sit: minimal assist  Sit to Supine: minimal assist  Sit to Stand: minimal assist    Exercise/Education Provided:  Bed mobility  Body mechanics  Functional activity tolerated  Gait training  Transfer training    The patient's Approx Degree of Impairment: 50.57% has been calculated based on documentation in the [facility] '6 clicks' Inpatient Basic Mobility Short Form.  Research supports that patients with  this level of impairment may benefit from HUNTER.  Final disposition will be made by interdisciplinary medical team.    Patient End of Session: Up in chair, Needs met, Call light within reach, RN aware of session/findings, All patient questions and concerns addressed, Alarm set, Family present, Discussed recommendations with /    CURRENT GOALS  Goals to be met by: 5/1  Patient Goal Patient's self-stated goal is: unstated    Goal #1 Patient is able to demonstrate supine - sit EOB @ level: supervision     Goal #1   Current Status    Goal #2 Patient is able to demonstrate transfers Sit to/from Stand at assistance level: supervision with walker - rolling     Goal #2  Current Status    Goal #3 Patient is able to ambulate 150 feet with assist device: walker - rolling at assistance level: supervision   Goal #3   Current Status            Goal #5 Patient to demonstrate independence with home activity/exercise instructions provided to patient in preparation for discharge.   Goal #5   Current Status    Goal #6    Goal #6  Current Status      Patient Evaluation Complexity Level:  History Moderate - 1 or 2 personal factors and/or co-morbidities   Examination of body systems Moderate - addressing a total of 3 or more elements   Clinical Presentation  Moderate - Evolving   Clinical Decision Making  Moderate Complexity     Therapeutic Activity:  11 minutes

## 2025-04-09 NOTE — PROGRESS NOTES
[facility]  part of [facility] Infectious Disease  Progress Note    Yue Malin Patient Status:  Inpatient    1970 MRN [id number]   Location [facility] 2W/SW Attending Carolina Bates MD   Hosp Day # 4 PCP Diana Black MD     Subjective:  Patient seen/examined.  She is up in bed in NAD.  No F/C. No new issues.      Objective:  Blood pressure 100/56, pulse 78, temperature 97.1 °F (36.2 °C), temperature source Temporal, resp. rate (!) 27, height 5' 7\" (1.702 m), weight 173 lb 8 oz (78.7 kg), last menstrual period 2020, SpO2 100%, not currently breastfeeding.    Intake/Output:    Intake/Output Summary (Last 24 hours) at 2025 1134  Last data filed at 2025 0600  Gross per 24 hour   Intake 1224 ml   Output 2470 ml   Net -1246 ml       Physical Exam:  General: Awake, alert, non-tox, NAD.  HEENT:  Oropharynx clear, trachea ML.  Heart: RRR S1S2 no murmurs.  Lungs: Decreased at bases.  Abdomen: Mild distention.  Extremity: No edema.  Neurological: No focal deficits.  Derm:  Warm, dry, free from rashes.    Lab Data Review:  Lab Results   Component Value Date    WBC 1.6 2025    HGB 7.1 2025    HCT 21.7 2025    .0 2025    CREATSERUM 0.57 2025    BUN 13 2025     2025    K 3.4 2025    K 3.4 2025     2025    CO2 26.0 2025     2025    CA 7.6 2025    ALB 2.9 2025    ALKPHO 88 2025    BILT 0.5 2025    TP 5.0 2025    AST 29 2025    ALT 29 2025    INR 0.96 2025        Cultures:  2/2 blood cultures + klebsiella 25  2/2 blood cultures + klebsiella 25     Blood cultures 25 pending, negative thus far     MRSA swab negative     Radiology:  CONCLUSION:   1. History of metastatic left ovarian carcinoma with peritoneal carcinomatosis.  Moderate to large volume intra-abdominal ascites is new since comparison  abdominal CT imaging from August, 2024 and may be malignant in nature. Ascitic fluid demonstrates   peripheral/peritoneal thickening; therefore, the possibility of coexistent spontaneous bacterial peritonitis is raised.  Mild stranding in the central small bowel mesentery is nonspecific, but raises suspicion for peritoneal carcinomatosis.   2. Scattered mildly dilated and fluid-filled proximal small bowel loops, but with no discrete transition point.  Findings suggest either an infectious/inflammatory enteritis or a generalized adynamic ileus.  No definite high-grade small bowel   obstruction.  Mild long segment proximal colonic wall thickening probably relates to a coexistent infectious/inflammatory colitis.   3. Stable postoperative changes of the distal colon with associated anastomosis.   4. Stable probable small left hepatic lobe cyst.   5. Mild nonspecific gallbladder luminal distention, but with no CT findings of acute cholecystitis.   6. Nonspecific fluid distention of the distal esophagus, please correlate for reflux or other etiologies of esophagitis.   7. Enlarged anterior mediastinal/prevascular space lymph nodes could be metastatic.   8. Loculated moderate bilateral pleural effusions, which may be malignant.  Associated dependent airspace disease in both lower lobes, which is favored to represent compressive atelectasis.  Small 6 mm right upper lobe lung nodules are unchanged since   recent prior chest CT.   9. Anasarca.   10. Partially imaged bilateral breast implants.   11. Lesser incidental findings as above.        Antibiotics Reviewed:  Zosyn     Assessment and Plan:     Acute gram negative sepsis in this immunocompromised patient presenting with SOB, N/V, and loss of appetite  - 2/2 blood cultures positive 4/5/25 and 4/6/25 for klebsiella spp, repeats pending   - Source related to occult intraabdominal focus vs. Primary/secondary infected port (no gross s/s of infection) vs. Pulmonary vs. Other  -  s/p thoracentesis 4/8/25 with 1300cc removed, cultures negative  - CT reviewed with ascites and concern for possible peritonitis, s/p paracentesis without criteria for SBP, cultures negative thus far  - UA nisha  - IV zosyn ongoing     2.  Metastatic ovarian cancer with malignant pleural effusion and peritoneal carcinomatosis  - Suspect gut translocation of bacteria as etiology of events     3.  Anemia, leukopenia due to the above     4.  Disposition - inpatient.  Continue IV zosyn as Rx.   Although 4 sets of blood cultures are positive, no gross s/s mediport involvement.  Repeat blood cultures remain negative.  Trending temps and WBCs.  We should be able to step down to p.o. antibiotics when ready for discharge to complete a total 14 day course.  Will follow.    Karen Thomason DO, [facility] Infectious Disease  [phone]    4/9/2025  11:34 AM

## 2025-04-09 NOTE — PLAN OF CARE
Problem: CARDIOVASCULAR - ADULT  Goal: Absence of cardiac arrhythmias or at baseline  Description: INTERVENTIONS:- Continuous cardiac monitoring, monitor vital signs, obtain 12 lead EKG if indicated- Evaluate effectiveness of antiarrhythmic and heart rate control medications as ordered- Initiate emergency measures for life threatening arrhythmias- Monitor electrolytes and administer replacement therapy as ordered  Outcome: Progressing  Goal: Maintains optimal cardiac output and hemodynamic stability  Description: INTERVENTIONS:- Monitor vital signs, rhythm, and trends- Monitor for bleeding, hypotension and signs of decreased cardiac output- Evaluate effectiveness of vasoactive medications to optimize hemodynamic stability- Monitor arterial and/or venous puncture sites for bleeding and/or hematoma- Assess quality of pulses, skin color and temperature- Assess for signs of decreased coronary artery perfusion - ex. Angina- Evaluate fluid balance, assess for edema, trend weights  Outcome: Progressing     Problem: RESPIRATORY - ADULT  Goal: Achieves optimal ventilation and oxygenation  Description: INTERVENTIONS:- Assess for changes in respiratory status- Assess for changes in mentation and behavior- Position to facilitate oxygenation and minimize respiratory effort- Oxygen supplementation based on oxygen saturation or ABGs- Provide Smoking Cessation handout, if applicable- Encourage broncho-pulmonary hygiene including cough, deep breathe, Incentive Spirometry- Assess the need for suctioning and perform as needed- Assess and instruct to report SOB or any respiratory difficulty- Respiratory Therapy support as indicated- Manage/alleviate anxiety- Monitor for signs/symptoms of CO2 retention  Outcome: Progressing     Problem: GASTROINTESTINAL - ADULT  Goal: Minimal or absence of nausea and vomiting  Description: INTERVENTIONS:- Maintain adequate hydration with IV or PO as ordered and tolerated- Nasogastric tube to low  intermittent suction as ordered- Evaluate effectiveness of ordered antiemetic medications- Provide nonpharmacologic comfort measures as appropriate- Advance diet as tolerated, if ordered- Obtain nutritional consult as needed- Evaluate fluid balance  Outcome: Progressing  Goal: Maintains or returns to baseline bowel function  Description: INTERVENTIONS:- Assess bowel function- Maintain adequate hydration with IV or PO as ordered and tolerated- Evaluate effectiveness of GI medications- Encourage mobilization and activity- Obtain nutritional consult as needed- Establish a toileting routine/schedule- Consider collaborating with pharmacy to review patient's medication profile  Outcome: Progressing     Problem: GENITOURINARY - ADULT  Goal: Absence of urinary retention  Description: INTERVENTIONS:- Assess patient’s ability to void and empty bladder- Monitor intake/output and perform bladder scan as needed- Follow urinary retention protocol/standard of care- Consider collaborating with pharmacy to review patient's medication profile- Implement strategies to promote bladder emptying  Outcome: Progressing     Problem: SKIN/TISSUE INTEGRITY - ADULT  Goal: Skin integrity remains intact  Description: INTERVENTIONS- Assess and document risk factors for pressure ulcer development- Assess and document skin integrity- Monitor for areas of redness and/or skin breakdown- Initiate interventions, skin care algorithm/standards of care as needed  Outcome: Progressing     Problem: HEMATOLOGIC - ADULT  Goal: Maintains hematologic stability  Description: INTERVENTIONS- Assess for signs and symptoms of bleeding or hemorrhage- Monitor labs and vital signs for trends- Administer supportive blood products/factors, fluids and medications as ordered and appropriate- Administer supportive blood products/factors as ordered and appropriate  Outcome: Progressing  Goal: Free from bleeding injury  Description: (Example usage: patient with low  platelets)INTERVENTIONS:- Avoid intramuscular injections, enemas and rectal medication administration- Ensure safe mobilization of patient- Hold pressure on venipuncture sites to achieve adequate hemostasis- Assess for signs and symptoms of internal bleeding- Monitor lab trends- Patient is to report abnormal signs of bleeding to staff- Avoid use of toothpicks and dental floss- Use electric shaver for shaving- Use soft bristle tooth brush- Limit straining and forceful nose blowing  Outcome: Progressing     Problem: Patient Centered Care  Goal: Patient preferences are identified and integrated in the patient's plan of care  Description: Interventions:- What would you like us to know as we care for you? - Provide timely, complete, and accurate information to patient/family- Incorporate patient and family knowledge, values, beliefs, and cultural backgrounds into the planning and delivery of care- Encourage patient/family to participate in care and decision-making at the level they choose- Honor patient and family perspectives and choices  Outcome: Progressing

## 2025-04-09 NOTE — PROGRESS NOTES
[facility] Hospitalist Progress Note     PCP: Diana Black MD    Chief Complaint: follow-up      SUBJECTIVE:  Off pressors, wbc cont downtrend, afebrile, sp para 420 ml off fam at bedside    OBJECTIVE:  Temp:  [97.1 °F (36.2 °C)-98.8 °F (37.1 °C)] 97.1 °F (36.2 °C)  Pulse:  [] 91  Resp:  [16-34] 23  BP: ()/(53-72) 108/54  SpO2:  [92 %-100 %] 92 %    Intake/Output:    Intake/Output Summary (Last 24 hours) at 4/9/2025 0920  Last data filed at 4/9/2025 0600  Gross per 24 hour   Intake 1524 ml   Output 2470 ml   Net -946 ml       Last 3 Weights   04/08/25 0900 173 lb 8 oz (78.7 kg)   04/07/25 1000 172 lb 6.4 oz (78.2 kg)   04/06/25 0510 179 lb 0.2 oz (81.2 kg)   04/05/25 1551 177 lb 7.5 oz (80.5 kg)   04/05/25 0936 169 lb 15.6 oz (77.1 kg)   10/08/24 1840 170 lb (77.1 kg)   08/20/24 2201 178 lb 6.4 oz (80.9 kg)   08/13/24 0549 173 lb 6.4 oz (78.7 kg)   08/02/24 1128 180 lb (81.6 kg)       Exam  Gen: awake, frail  Pulm: dim bl R > L less tachypneic  CV: tachy reg +Bl le edema  Abd: Abdomen distended non tender +BS  MSK: Full range of motion in extremities,   Skin: no rashes or lesions port site non tender/ clean  Psych anxious affect    Data Review:       Labs:     Recent Labs   Lab 04/05/25  1018 04/05/25  1718 04/07/25  0004 04/07/25  0516 04/07/25  1221 04/08/25  0538 04/08/25  1141 04/08/25  1807 04/09/25  0009 04/09/25  0525   WBC 8.1   < > 4.0 3.9*   < > 2.8* 2.4* 1.9* 1.9* 1.6*   HGB 8.3*   < > 7.3* 7.4*   < > 7.2* 7.7* 7.6* 7.3* 7.1*   MCV 86.0   < > 86.3 86.5   < > 87.3 86.5 86.2 86.0 86.1   .0   < > 217.0 204.0   < > 195.0 191.0 179.0 179.0 159.0   BAND 10  --  17 7  --   --   --   --   --   --    INR  --   --   --   --   --   --  0.96  --   --   --     < > = values in this interval not displayed.       Recent Labs   Lab 04/05/25  1019 04/06/25  0500 04/06/25  1813 04/07/25  0516 04/08/25  0538 04/09/25  0525   * 135* 140 139 139 141   K 3.4* 3.6  3.6 3.4* 3.2*  3.2* 3.6  3.6 3.4*   3.4*   CL 91* 100 105 104 106 106   CO2 17.0* 24.0 25.0 26.0 28.0 26.0   BUN 37* 41* 38* 34* 22 13   CREATSERUM 1.67* 1.12* 0.82 0.69 0.59 0.57   CA 7.9* 6.8* 7.0* 6.9* 7.4* 7.6*   MG 1.6 2.1 2.2  --   --   --    GLU 84 125* 115* 98 104* 110*       Recent Labs   Lab 04/05/25  1019 04/08/25  0538 04/09/25  0525   ALT 39 46 29   AST 98* 56* 29   ALB 3.0* 2.8* 2.9*       No results for input(s): \"PGLU\" in the last 168 hours.    No results for input(s): \"TROP\" in the last 168 hours.      Meds:      potassium chloride  40 mEq Intravenous Once    piperacillin-tazobactam  4.5 g Intravenous Q8H    pantoprazole  40 mg Intravenous Q12H      phenylephrine Stopped (04/08/25 1911)    [Held by provider] continuous dose heparin Stopped (04/05/25 1653)    norepinephrine Stopped (04/06/25 2000)    vasopressin (Vasostrict) 20 Units in sodium chloride 0.9% 100 mL infusion for septic shock Stopped (04/08/25 1341)       LORazepam    albumin human    acetaminophen    ondansetron    prochlorperazine    polyethylene glycol (PEG 3350)    sennosides    bisacodyl    fleet enema      ASSESSMENT AND PLAN    54YOF w PMH sig for 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     sepsis (HR, RR, WBC) with MSOF- shock with MEGAN/ lactic acidosis/afib w RVR, resp failure, LFT elevation  Acute respiratory failure with hypoxia/ hypercapnia  Dyspnea- multifactorial from below  Afib w RVR  Hypotension with lactic acidosis  Hypokalemia  MEGAN Cr 1.67 from 1.01 3/21/25 0.75 2/28/25  Ovarian adenocarcinoma with peritoneal carcinomatosis dx Mar 2024 heavenly MCCARTHY  Aug 2024 on lynparza 300 mg BID w avastin  Recurrent malignant effusion sp thoracentesis 3/14/25 with 1300 cc removed  Recurrent malignant ascites sp multi giuliana (3/28/24 1.5L 4/16 4.4L, 3/4/25 5.2L 3/14/25 1.7L)  BR  LLE DVT dx 3/21/25  Anemia suspected med related  BRCA 1 + somatic not genetic mutation  ACOSTA/ MDD  Hypertriglyceridemia  Chronic Back pain        Septic shock,   Klebsiella pneumoniae lung v gut source  - on admit severe w shock w MSOF (sever lactic acidosis on admit, MEGAN/ LFT, resp failure, afib w RVR)  - sources: bacteremia- both bottles 4/5 and 4/6 + initial K pneumo f/u with GNR and GPR  - suspect lung primary +/- GI/ gyn primary.   - unasyn on admit for aspiration broadened to zosyn/ vanc MRSA neg > zosyn only  - 4/5 and 4/6 both bcx + Klebsiella pneumoniae  - 4/7 sp US guided thora - fu results  - 4/7 CT a/p multi poss sources noted  - 4/8 obtaining repeat bcx w persistent GNR bacteremia (4/5 + K pneumo)  - echo no vegetations (done for afib) may need JESSE if persistent bacteremia  - has port  - pressor support per ICU weaned 4/8  - abx per ID        Acute resp failure w hypoxia/ hypercapnia  - on admit: fm pleural effusion/ anasarca, PNA/sepsis w severe acidosis on admit, ddx also includes PE w rec dx LLE DVT and cancer underlying  - bipap on admit now improved  - addressing contributing causes- infection, effusion/ anararca, VTE  - pulm on   [ ] on 2L but d/w RN patient asking for O2 for subjective dyspnea removed O2 before she worked with PT - O2 for hypoxia only    R Pleural effusion  - sp tap 3/14 + malignant  - recurrent  - sp pulm done US guided 4/8 am d/w DR Story 1.3L cloudy yellow fluid   - f/u fluid studies    Acute on chronic anemia   - last bsl 9.6 on 3/21/25 per onc from lynparza  - here down to 6.5 on 4/6/25 pm- with episode coffee ground emesis and on full AC  - held AC started IV PPI  - given pRBC 4/6 w holley response  - on clears hg holding- adv as tolerated  [ ] cont off AC for  now  [ ] monitor hgs holding above 7    Leukopenia  - slow downtrend- follow for now  [ ] ddx sepsis (but clincally improved) v  med related possibly abx if cont to worsen may cs hem onc in house to weigh in or trial off possible culprit    Acute VTE  - + LLE DVT dx 3/21/25 (w/u bl LE swelling)  - repeat US here negative- cont to hold AC for now w hg drop concern re possible bleed  [ ] d/w DR Story- does agree eventual IVC filter would be of benefit however will hold for now w ongoing bacteremia    Afib w RVR  - reactive from septic shock but afib new dx  - improved but did have some RVR o/n switched from levophed to phenylephrine gtt  - seen by cards, echo done- ok  [ ] w improved Cr if needed rec dig 250 iv if HR > 160.  - cont on tele        BRCA 1 + (somatic non genetic mutation) Ovarian adenocarcinoma w peritoneal carcinomatosis and malignant recurrent effusion/ ascites  - dx March 2024 sees Luana Serrano onc  - sp TAHBSO Aug 2024 w WALLY Choudhary at [facility]  - sp multi lines treatment most recently on lynparza 300 mg BID w avastin  - notified Dr Serrano on admit  - sp multi paracentesis some discussion outpatient regarding pleurx  - 4/8 repeat paracentesis done in house 420 ml off       MEGAN w hyponatremia, severe lactic acidosis  - on admit now all improved  - suspect from sepsis and N/V just prior to admit  - follow BMP    Hypokalemia  - repletion       LFT elevation  - suspect from above as well  - trend- recheck labs        ACOSTA/ MDD  - home meds = venlafaxine and ativan PRN follows w psych outpatient  - requesting anxiety meds resume if safe pressures/ bp allowed restarted at lower dose monitor if tolerating  - fam proving support/ non pharmacologic measures at bedside     Chronic back pain  - hold home tramadol     ACP/ GOC- discussed with patient, spouse and sister at bedside. hcPOA - patient's daughter Ami - full code / full  measures at this time      Dispo josue RN if remaining ok off levophed ok for transfer today,  PT OT meaghan Bates MD  [facility] Hospitalist  [phone]  4/6/2025  8:53 AM

## 2025-04-10 ENCOUNTER — APPOINTMENT (OUTPATIENT)
Dept: GENERAL RADIOLOGY | Facility: HOSPITAL | Age: 55
End: 2025-04-10
Attending: INTERNAL MEDICINE
Payer: COMMERCIAL

## 2025-04-10 LAB
ALBUMIN SERPL-MCNC: 3.2 G/DL (ref 3.2–4.8)
ALBUMIN/GLOB SERPL: 1.5 {RATIO} (ref 1–2)
ALP LIVER SERPL-CCNC: 99 U/L (ref 41–108)
ALT SERPL-CCNC: 24 U/L (ref 10–49)
ANION GAP SERPL CALC-SCNC: 8 MMOL/L (ref 0–18)
AST SERPL-CCNC: 24 U/L (ref ?–34)
BASOPHILS # BLD AUTO: 0.01 X10(3) UL (ref 0–0.2)
BASOPHILS # BLD AUTO: 0.03 X10(3) UL (ref 0–0.2)
BASOPHILS # BLD: 0.02 X10(3) UL (ref 0–0.2)
BASOPHILS NFR BLD AUTO: 0.5 %
BASOPHILS NFR BLD AUTO: 1 %
BASOPHILS NFR BLD: 1 %
BILIRUB SERPL-MCNC: 0.6 MG/DL (ref 0.3–1.2)
BUN BLD-MCNC: 9 MG/DL (ref 9–23)
BUN/CREAT SERPL: 16.1 (ref 10–20)
CALCIUM BLD-MCNC: 8 MG/DL (ref 8.7–10.4)
CHLORIDE SERPL-SCNC: 106 MMOL/L (ref 98–112)
CO2 SERPL-SCNC: 25 MMOL/L (ref 21–32)
CREAT BLD-MCNC: 0.56 MG/DL (ref 0.55–1.02)
DEPRECATED RDW RBC AUTO: 49.5 FL (ref 35.1–46.3)
DEPRECATED RDW RBC AUTO: 51.8 FL (ref 35.1–46.3)
DEPRECATED RDW RBC AUTO: 52.2 FL (ref 35.1–46.3)
EGFRCR SERPLBLD CKD-EPI 2021: 108 ML/MIN/1.73M2 (ref 60–?)
EOSINOPHIL # BLD AUTO: 0.02 X10(3) UL (ref 0–0.7)
EOSINOPHIL # BLD AUTO: 0.03 X10(3) UL (ref 0–0.7)
EOSINOPHIL # BLD: 0 X10(3) UL (ref 0–0.7)
EOSINOPHIL NFR BLD AUTO: 1 %
EOSINOPHIL NFR BLD AUTO: 1 %
EOSINOPHIL NFR BLD: 0 %
ERYTHROCYTE [DISTWIDTH] IN BLOOD BY AUTOMATED COUNT: 17.9 % (ref 11–15)
ERYTHROCYTE [DISTWIDTH] IN BLOOD BY AUTOMATED COUNT: 18.2 % (ref 11–15)
ERYTHROCYTE [DISTWIDTH] IN BLOOD BY AUTOMATED COUNT: 18.3 % (ref 11–15)
GLOBULIN PLAS-MCNC: 2.2 G/DL (ref 2–3.5)
GLUCOSE BLD-MCNC: 107 MG/DL (ref 70–99)
HCT VFR BLD AUTO: 22.6 % (ref 35–48)
HCT VFR BLD AUTO: 23 % (ref 35–48)
HCT VFR BLD AUTO: 25.2 % (ref 35–48)
HGB BLD-MCNC: 7.6 G/DL (ref 12–16)
HGB BLD-MCNC: 7.7 G/DL (ref 12–16)
HGB BLD-MCNC: 8.5 G/DL (ref 12–16)
IMM GRANULOCYTES # BLD AUTO: 0.01 X10(3) UL (ref 0–1)
IMM GRANULOCYTES # BLD AUTO: 0.03 X10(3) UL (ref 0–1)
IMM GRANULOCYTES NFR BLD: 0.5 %
IMM GRANULOCYTES NFR BLD: 1 %
LYMPHOCYTES # BLD AUTO: 0.73 X10(3) UL (ref 1–4)
LYMPHOCYTES # BLD AUTO: 1.38 X10(3) UL (ref 1–4)
LYMPHOCYTES NFR BLD AUTO: 38 %
LYMPHOCYTES NFR BLD AUTO: 45.8 %
LYMPHOCYTES NFR BLD: 0.7 X10(3) UL (ref 1–4)
LYMPHOCYTES NFR BLD: 34 %
MCH RBC QN AUTO: 28.7 PG (ref 26–34)
MCH RBC QN AUTO: 28.8 PG (ref 26–34)
MCH RBC QN AUTO: 29.2 PG (ref 26–34)
MCHC RBC AUTO-ENTMCNC: 33.5 G/DL (ref 31–37)
MCHC RBC AUTO-ENTMCNC: 33.6 G/DL (ref 31–37)
MCHC RBC AUTO-ENTMCNC: 33.7 G/DL (ref 31–37)
MCV RBC AUTO: 85.4 FL (ref 80–100)
MCV RBC AUTO: 85.8 FL (ref 80–100)
MCV RBC AUTO: 86.9 FL (ref 80–100)
METAMYELOCYTES # BLD: 0.02 X10(3) UL (ref ?–0.01)
METAMYELOCYTES NFR BLD: 1 %
MONOCYTES # BLD AUTO: 0.15 X10(3) UL (ref 0.1–1)
MONOCYTES # BLD AUTO: 0.2 X10(3) UL (ref 0.1–1)
MONOCYTES # BLD: 0.14 X10(3) UL (ref 0.1–1)
MONOCYTES NFR BLD AUTO: 6.6 %
MONOCYTES NFR BLD AUTO: 7.8 %
MONOCYTES NFR BLD: 7 %
NEUTROPHILS # BLD AUTO: 1 X10 (3) UL (ref 1.5–7.7)
NEUTROPHILS # BLD AUTO: 1 X10 (3) UL (ref 1.5–7.7)
NEUTROPHILS # BLD AUTO: 1 X10(3) UL (ref 1.5–7.7)
NEUTROPHILS # BLD AUTO: 1.34 X10 (3) UL (ref 1.5–7.7)
NEUTROPHILS # BLD AUTO: 1.34 X10(3) UL (ref 1.5–7.7)
NEUTROPHILS NFR BLD AUTO: 44.6 %
NEUTROPHILS NFR BLD AUTO: 52.2 %
NEUTROPHILS NFR BLD: 42 %
NEUTS BAND NFR BLD: 14 %
NEUTS HYPERSEG # BLD: 1.12 X10(3) UL (ref 1.5–7.7)
NRBC BLD MANUAL-RTO: 1 % (ref ?–1)
OSMOLALITY SERPL CALC.SUM OF ELEC: 287 MOSM/KG (ref 275–295)
PLATELET # BLD AUTO: 123 10(3)UL (ref 150–450)
PLATELET # BLD AUTO: 128 10(3)UL (ref 150–450)
PLATELET # BLD AUTO: 131 10(3)UL (ref 150–450)
PLATELET MORPHOLOGY: NORMAL
POTASSIUM SERPL-SCNC: 3.8 MMOL/L (ref 3.5–5.1)
POTASSIUM SERPL-SCNC: 3.8 MMOL/L (ref 3.5–5.1)
PROT SERPL-MCNC: 5.4 G/DL (ref 5.7–8.2)
RBC # BLD AUTO: 2.6 X10(6)UL (ref 3.8–5.3)
RBC # BLD AUTO: 2.68 X10(6)UL (ref 3.8–5.3)
RBC # BLD AUTO: 2.95 X10(6)UL (ref 3.8–5.3)
SODIUM SERPL-SCNC: 139 MMOL/L (ref 136–145)
TOTAL CELLS COUNTED BLD: 100
VARIANT LYMPHS NFR BLD MANUAL: 1 % (ref ?–4)
WBC # BLD AUTO: 1.9 X10(3) UL (ref 4–11)
WBC # BLD AUTO: 2 X10(3) UL (ref 4–11)
WBC # BLD AUTO: 3 X10(3) UL (ref 4–11)

## 2025-04-10 PROCEDURE — 71045 X-RAY EXAM CHEST 1 VIEW: CPT | Performed by: INTERNAL MEDICINE

## 2025-04-10 RX ORDER — POTASSIUM CHLORIDE 14.9 MG/ML
20 INJECTION INTRAVENOUS ONCE
Status: COMPLETED | OUTPATIENT
Start: 2025-04-10 | End: 2025-04-10

## 2025-04-10 RX ORDER — POTASSIUM CHLORIDE 1500 MG/1
40 TABLET, EXTENDED RELEASE ORAL ONCE
Status: DISCONTINUED | OUTPATIENT
Start: 2025-04-10 | End: 2025-04-10

## 2025-04-10 RX ORDER — VANCOMYCIN HYDROCHLORIDE 125 MG/1
125 CAPSULE ORAL DAILY
Status: DISCONTINUED | OUTPATIENT
Start: 2025-04-10 | End: 2025-04-14

## 2025-04-10 NOTE — PHYSICAL THERAPY NOTE
Chart reviewed and attempted treatment pt declined therapy at this time. Pt was encouraged to AMB with nursing staff later. Reported to the RN on the status of th pt.

## 2025-04-10 NOTE — PAYOR COMM NOTE
--------------  CONTINUED STAY REVIEW----REQUESTING ADDITIONAL DAY 4/10      Payor: [employer]  Subscriber #:  [id number]  Authorization Number: [id number]    Admit date: 4/5/25  Admit time:  3:29 PM    REVIEW DOCUMENTATION:          Date of Service: 4/10/2025  2:52 PM     Signed         [facility] Department Hospitalist Progress Note      CC: Hospital Follow up     PCP: Diana Black MD         Assessment/Plan:      Principal Problem:    Disseminated ovarian cancer, left (HCC)  Active Problems:    Acute hypoxemic respiratory failure (HCC)    Hypotension, unspecified hypotension type    Recurrent pleural effusion    Nausea and vomiting in adult    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 diagnosed March 2024 sp TAHBSO sp multiple courses treatment currently on lynparza, associated recurrent malignant ascites sp multiple paracenteses, malignant R effusion sp thoracentesis 3/14/25 (1.3L removed), bl LE swelling and LLE DVT dx 3/21/25 on AC here with septic shock with MSOF- MEGAN, lactic acidosis LFT elevation, afib w RVR, acute resp failure with hypoxia/ hypercapnia on bipap      Septic shock,   Klebsiella pneumoniae lung v gut source  - on admit severe w shock w MSOF (sever lactic acidosis on admit, MEGAN/ LFT, resp failure, afib w RVR)  - sources: bacteremia- both bottles 4/5 and 4/6 + initial K pneumo  - unasyn on admit for aspiration broadened to zosyn/ vanc MRSA neg > zosyn only  - 4/5 and 4/6 both bcx + Klebsiella pneumoniae  - 4/7 sp US guided thora - fu results-> neg   - 4/7 CT a/p multi poss sources noted  - 4/8 obtaining repeat bcx w persistent GNR bacteremia (4/5 + K pneumo)-> so far NGTD  - echo no vegetations (done for afib)   - has port, n  - off pressors   - abx per ID-> zosyn with likely transition to oral  when appropriate       Acute resp failure w hypoxia/ hypercapnia  - on admit: fm pleural effusion/ anasarca, PNA/sepsis w severe acidosis on admit,   - now on RA  - CT neg for PE, lower ext dopplers neg for DVT     R Pleural effusion  - sp tap 3/14 + malignant  - recurrent  - sp pulm done US guided 4/8 am d/w DR Story 1.3L cloudy yellow fluid   - f/u fluid studies     Acute on chronic anemia   - last bsl 9.6 on 3/21/25 per onc from lynparza  - here down to 6.5 on 4/6/25 pm- with episode coffee ground emesis and on full AC  - held AC started IV PPI  - given pRBC 4/6 w holley response  - on clears hg holding- adv as tolerated  - hgb now 8.5      Leukopenia  - improving      Acute VTE  - + LLE DVT dx 3/21/25 (w/u bl LE swelling)  - repeat US here negative- cont to hold AC for now w hg drop concern re possible bleed  - CT neg for PE on 4/5  - IVC filter mentioned given GI bleeding with AC, defer to pulm        Afib w RVR  - reactive from septic shock but afib new dx  - improved but did have some RVR o/n switched from levophed to phenylephrine gtt  - seen by cards, echo done- ok  [ ] w improved Cr if needed rec dig 250 iv if HR > 160.  - cont on tele       BRCA 1 + (somatic non genetic mutation) Ovarian adenocarcinoma w peritoneal carcinomatosis and malignant recurrent effusion/ ascites  - dx March 2024 sees Luana Serrano onc  - sp TAHBSO Aug 2024 w WALLY [facility]oudhary at [facility]  - sp multi lines treatment most recently on lynparza 300 mg BID w avastin  - notified Dr Serrano on admit  - sp multi paracentesis some discussion outpatient regarding pleurx  - 4/8 repeat paracentesis done in house 420 ml off        MEGAN w hyponatremia, severe lactic acidosis  - on admit now all improved  - suspect from sepsis and N/V just prior to admit  - follow BMP     Hypokalemia  - repletion        LFT elevation  - suspect from above as well  - trend- recheck labs         ACOSTA/ MDD  - home meds = venlafaxine and ativan PRN follows w psych outpatient  -  requesting anxiety meds resume if safe pressures/ bp allowed restarted at lower dose monitor if tolerating  - fam proving support/ non pharmacologic measures at bedside     Chronic back pain  - hold home tramadol     ACP/ GOC- discussed with patient, spouse and sister at bedside. hcPOA - patient's daughter Ami - full code / full  measures at this time      FN:  IVF: none  Diet: adv diet      Prophylaxis:   DVT with SCD, hold AC given poss GI bleeding      Dispo: pending clinical course   Code status:  FULL     Discussed with family at bedside      Questions/concerns were discussed with patient and/or family by bedside.     Thank You,  Sriram Martel MD     Hospitalist with [facility]      Subjective:      No CP, SOB, or N/V.  Feeling better, feels weak, but overall improved     OBJECTIVE:     Blood pressure 111/70, pulse 94, temperature 100 °F (37.8 °C), temperature source Oral, resp. rate 18, height 5' 7\" (1.702 m), weight 173 lb 8 oz (78.7 kg), last menstrual period 12/22/2020, SpO2 98%, not currently breastfeeding.     Temp:  [97.3 °F (36.3 °C)-100 °F (37.8 °C)] 100 °F (37.8 °C)  Pulse:  [] 94  Resp:  [18-35] 18  BP: (106-119)/(67-77) 111/70  SpO2:  [93 %-100 %] 98 %        Intake/Output:     Intake/Output Summary (Last 24 hours) at 4/10/2025 1452  Last data filed at 4/10/2025 1335      Gross per 24 hour   Intake 300 ml   Output 771 ml   Net -471 ml              Last 3 Weights   04/08/25 0900 173 lb 8 oz (78.7 kg)   04/07/25 1000 172 lb 6.4 oz (78.2 kg)   04/06/25 0510 179 lb 0.2 oz (81.2 kg)   04/05/25 1551 177 lb 7.5 oz (80.5 kg)   04/05/25 0936 169 lb 15.6 oz (77.1 kg)   10/08/24 1840 170 lb (77.1 kg)   08/20/24 2201 178 lb 6.4 oz (80.9 kg)   08/13/24 0549 173 lb 6.4 oz (78.7 kg)   08/02/24 1128 180 lb (81.6 kg)         Exam    Gen: No acute distress, alert and oriented x3, no focal neurologic deficits  Heent: NC AT, neck supple  Pulm: Lungs clear,    CV: Heart with regular rate and rhythm,    Abd:  Abdomen soft, nontender, nondistended,    MSK: no clubbing, no cyanosis  Skin: no rashes or lesions           Data Review:       Labs:            Recent Labs   Lab 04/10/25  0016 04/10/25  0559 04/10/25  1344   RBC 2.68* 2.60* 2.95*   HGB 7.7* 7.6* 8.5*   HCT 23.0* 22.6* 25.2*   MCV 85.8 86.9 85.4   MCH 28.7 29.2 28.8   MCHC 33.5 33.6 33.7   RDW 17.9* 18.2* 18.3*   NEPRELIM 1.00* 1.00* 1.34*   WBC 1.9* 2.0* 3.0*   .0* 131.0* 128.0*                  Recent Labs   Lab 04/08/25  0538 04/09/25  0525 04/10/25  0559   * 110* 107*   BUN 22 13 9   CREATSERUM 0.59 0.57 0.56   EGFRCR 106 107 108   CA 7.4* 7.6* 8.0*    141 139   K 3.6  3.6 3.4*  3.4* 3.8  3.8    106 106   CO2 28.0 26.0 25.0                Recent Labs   Lab 04/05/25  1019 04/08/25  0538 04/09/25  0525 04/10/25  0559   ALT 39 46 29 24   AST 98* 56* 29 24   ALB 3.0* 2.8* 2.9* 3.2            Imaging:  XR CHEST AP PORTABLE  (CPT=71045)  Result Date: 4/10/2025  CONCLUSION:   No significant change in the bilateral pleural effusions with associated bibasilar opacities.    Dictated by (CST): Benoit Serra MD on 4/10/2025 at 7:38 AM     Finalized by (CST): Benoit Serra MD on 4/10/2025 at 7:41 AM           US PARACENTESIS W IMAGING (CPT=49083)  Result Date: 4/9/2025  CONCLUSION: Paracentesis as described.    Dictated by (CST): Denise Gee APN on 4/09/2025 at 7:59 AM     Finalized by (CST): Denise Gee APN on 4/09/2025 at 7:59 AM           XR CHEST AP PORTABLE  (CPT=71045)  Result Date: 4/9/2025  CONCLUSION:   Stable small bilateral pleural effusions with patchy bibasilar right greater than left opacity.    Dictated by (CST): Marco Antonio Kumar MD on 4/09/2025 at 7:25 AM     Finalized by (CST): Marco Antonio Kumar MD on 4/09/2025 at 7:26 AM           XR CHEST AP PORTABLE  (CPT=71045)  Result Date: 4/8/2025  CONCLUSION:  1. Bilateral pleural effusions and associated basilar atelectasis, with or without superimposed pneumonia.  2.  Lesser incidental findings as above.     Dictated by (CST): Suresh Wills MD on 4/08/2025 at 10:43 AM     Finalized by (CST): Suresh Wills MD on 4/08/2025 at 10:44 AM           US THORACENTESIS GUIDED RIGHT (CPT=32555)  Result Date: 4/8/2025   Sonography support was provided.  Images demonstrate large right pleural effusion. Please see separate report for additional details.    elm-remote     Dictated by (CST): Emanule Sandoval MD on 4/08/2025 at 8:32 AM     Finalized by (CST): Emanuel Sandoval MD on 4/08/2025 at 8:39 AM           XR CHEST AP PORTABLE  (CPT=71045)  Result Date: 4/8/2025  CONCLUSION: No new abnormality.  Stable pulmonary edema, bilateral pleural effusions, and bibasilar opacities.    elm-remote     Dictated by (CST): Emanuel Sandoval MD on 4/08/2025 at 8:08 AM     Finalized by (CST): Emanuel Sandoval MD on 4/08/2025 at 8:09 AM           CT ABDOMEN+PELVIS(CPT=74176)  Result Date: 4/7/2025  CONCLUSION:  1. History of metastatic left ovarian carcinoma with peritoneal carcinomatosis.  Moderate to large volume intra-abdominal ascites is new since comparison abdominal CT imaging from August, 2024 and may be malignant in nature. Ascitic fluid demonstrates peripheral/peritoneal thickening; therefore, the possibility of coexistent spontaneous bacterial peritonitis is raised.  Mild stranding in the central small bowel mesentery is nonspecific, but raises suspicion for peritoneal carcinomatosis. 2. Scattered mildly dilated and fluid-filled proximal small bowel loops, but with no discrete transition point.  Findings suggest either an infectious/inflammatory enteritis or a generalized adynamic ileus.  No definite high-grade small bowel obstruction.  Mild long segment proximal colonic wall thickening probably relates to a coexistent infectious/inflammatory colitis. 3. Stable postoperative changes of the distal colon with associated anastomosis. 4. Stable probable small left hepatic lobe cyst. 5. Mild nonspecific  gallbladder luminal distention, but with no CT findings of acute cholecystitis. 6. Nonspecific fluid distention of the distal esophagus, please correlate for reflux or other etiologies of esophagitis. 7. Enlarged anterior mediastinal/prevascular space lymph nodes could be metastatic. 8. Loculated moderate bilateral pleural effusions, which may be malignant.  Associated dependent airspace disease in both lower lobes, which is favored to represent compressive atelectasis.  Small 6 mm right upper lobe lung nodules are unchanged since recent prior chest CT. 9. Anasarca. 10. Partially imaged bilateral breast implants. 11. Lesser incidental findings as above.     Dictated by (CST): Michael Rogers MD on 4/07/2025 at 5:58 PM     Finalized by (CST): Michael Rogers MD on 4/07/2025 at 6:13 PM                 Meds:      [Scheduled Medications]    [Scheduled Medications]   vancomycin  125 mg Oral Daily    potassium chloride  20 mEq Intravenous Once    midodrine  5 mg Oral TID    piperacillin-tazobactam  4.5 g Intravenous Q8H    pantoprazole  40 mg Intravenous Q12H     [Medication Infusions]    [Medication Infusions]   [Held by provider] continuous dose heparin Stopped (04/05/25 1653)                           MEDICATIONS ADMINISTERED IN LAST 1 DAY:  LORazepam (Ativan) tab 0.5 mg       Date Action Dose Route User    4/10/2025 0021 Given 0.5 mg Oral Goldberg, Rachel, RN    4/9/2025 1734 Given 0.5 mg Oral Codie High RN          midodrine (ProAmatine) tab 5 mg       Date Action Dose Route User    4/10/2025 1144 Given 5 mg Oral Lizette Flowers RN    4/10/2025 0525 Given 5 mg Oral Goldberg, Rachel, RN    4/9/2025 1716 Given 5 mg Oral Codie High RN          pantoprazole (Protonix) 40 mg in sodium chloride 0.9% PF 10 mL IV push       Date Action Dose Route User    4/10/2025 0525 Given 40 mg Intravenous Goldberg, Rachel, RN    4/9/2025 1716 Given 40 mg Intravenous Codie High RN           piperacillin-tazobactam (Zosyn) 4.5 g in dextrose 5% 100 mL IVPB-ADDV       Date Action Dose Route User    4/10/2025 1142 New Bag 4.5 g Intravenous Lizette Flowers RN    4/10/2025 0525 New Bag 4.5 g Intravenous Goldberg, Rachel, RN    4/9/2025 2015 New Bag 4.5 g Intravenous Rosa Salinas RN          vancomycin (Vancocin) cap 125 mg       Date Action Dose Route User    4/10/2025 1144 Given 125 mg Oral Lizette Flowers RN            Vitals (last day)       Date/Time Temp Pulse Resp BP SpO2 Weight O2 Device O2 Flow Rate (L/min) [facility]    04/10/25 1128 -- 94 18 111/70 98 % -- None (Room air) --     04/10/25 0520 -- -- -- 118/77 -- -- -- --     04/10/25 0451 100 °F (37.8 °C) 114 22 119/69 94 % -- -- --     04/10/25 0451 -- -- -- -- -- -- None (Room air) --     04/10/25 0001 98.5 °F (36.9 °C) 107 25 115/68 93 % -- None (Room air) --     04/09/25 2300 -- 101 22 114/70 94 % -- -- --     04/09/25 2100 -- 102 35 -- 97 % -- -- --     04/09/25 2000 99.3 °F (37.4 °C) 101 34 107/76 97 % -- None (Room air) --     04/09/25 1900 -- 96 34 -- 100 % -- -- --     04/09/25 1600 97.3 °F (36.3 °C) 83 31 106/67 98 % -- None (Room air) -- [facility]    04/09/25 1200 98.4 °F (36.9 °C) 90 37 111/58 97 % -- None (Room air) -- [facility]    04/09/25 1000 -- 78 27 100/56 100 % -- Nasal cannula 2 L/min [facility]    04/09/25 0900 -- 91 23 108/54 92 % -- Nasal cannula 2 L/min [facility]    04/09/25 0800 97.1 °F (36.2 °C) 81 26 97/59 100 % -- Nasal cannula 2 L/min [facility]    04/09/25 0700 -- 75 26 101/60 100 % -- Nasal cannula 2 L/min [facility]    04/09/25 0600 -- 91 29 102/60 100 % -- Nasal cannula 2 L/min CT    04/09/25 0500 -- 81 19 101/59 100 % -- Nasal cannula 2 L/min CT    04/09/25 0400 98.8 °F (37.1 °C) 84 26 109/59 96 % -- Nasal cannula 2 L/min CT    04/09/25 0300 -- 81 24 98/53 98 % -- Nasal cannula 2 L/min CT    04/09/25 0200 -- 77 25 96/57 100 % -- Nasal cannula 2 L/min CT    04/09/25 0100 -- 92 24 103/63 100 % -- Nasal cannula 2 L/min CT    04/09/25  0000 98.7 °F (37.1 °C) 78 30 103/54 99 % -- Nasal cannula 2 L/min CT          CIWA Scores (since admission)       None          Blood Transfusion Record       Product Unit Status Volume Start End            Transfuse RBC       25  [id number]  [id number] Completed 04/06/25 1614 500 mL 04/06/25 1352 04/06/25 1600

## 2025-04-10 NOTE — PROGRESS NOTES
Double RN skin check done prior to transfer off Unit. Skin check performed by two RNS prior to departure from unit.    Wounds are as follows: none.    Will remain available for any further questions or concerns.        Report given to receiving RN. Pt and family updated on transfer. Belongings packed and bedside. Tele order placed and request sent to central tele. VSS. Tele box placed on patient and confirmed with central tele. Pt escorted via transport and family member to 460.

## 2025-04-10 NOTE — PROGRESS NOTES
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 diagnosed March 2024 sp TAHBSO sp multiple courses treatment currently on lynparza, associated recurrent malignant ascites sp multiple paracenteses, malignant R effusion sp thoracentesis 3/14/25 (1.3L removed), bl LE swelling and LLE DVT dx 3/21/25 on AC here with septic shock with MSOF- MEGAN, lactic acidosis LFT elevation, afib w RVR, acute resp failure with hypoxia/ hypercapnia on bipap     Septic shock,   Klebsiella pneumoniae lung v gut source  - on admit severe w shock w MSOF (sever lactic acidosis on admit, MEGAN/ LFT, resp failure, afib w RVR)  - sources: bacteremia- both bottles 4/5 and 4/6 + initial K pneumo  - unasyn on admit for aspiration broadened to zosyn/ vanc MRSA neg > zosyn only  - 4/5 and 4/6 both bcx + Klebsiella pneumoniae  - 4/7 sp US guided thora - fu results-> neg   - 4/7 CT a/p multi poss sources noted  - 4/8 obtaining repeat bcx w persistent GNR bacteremia (4/5 + K pneumo)-> so far NGTD  - echo no vegetations (done for afib)   - has port, n  - off pressors   - abx per ID-> zosyn with likely transition to oral when appropriate       Acute resp failure w hypoxia/ hypercapnia  - on admit: fm pleural effusion/ anasarca, PNA/sepsis w severe acidosis on admit,   - now on RA  - CT neg for PE, lower ext dopplers neg for DVT     R Pleural effusion  - sp tap 3/14 + malignant  - recurrent  - sp pulm done US guided  4/8 am d/w DR Story 1.3L cloudy yellow fluid   - f/u fluid studies     Acute on chronic anemia   - last bsl 9.6 on 3/21/25 per onc from lynparza  - here down to 6.5 on 4/6/25 pm- with episode coffee ground emesis and on full AC  - held AC started IV PPI  - given pRBC 4/6 w holley response  - on clears hg holding- adv as tolerated  - hgb now 8.5      Leukopenia  - improving      Acute VTE  - + LLE DVT dx 3/21/25 (w/u bl LE swelling)  - repeat US here negative- cont to hold AC for now w hg drop concern re possible bleed  - CT neg for PE on 4/5  - IVC filter mentioned given GI bleeding with AC, defer to pulm       Afib w RVR  - reactive from septic shock but afib new dx  - improved but did have some RVR o/n switched from levophed to phenylephrine gtt  - seen by cards, echo done- ok  [ ] w improved Cr if needed rec dig 250 iv if HR > 160.  - cont on tele       BRCA 1 + (somatic non genetic mutation) Ovarian adenocarcinoma w peritoneal carcinomatosis and malignant recurrent effusion/ ascites  - dx March 2024 sees Luana Serrano onc  - sp TAHBSO Aug 2024 w WALLY Choudhary at [facility]  - sp multi lines treatment most recently on lynparza 300 mg BID w avastin  - notified Dr Serrano on admit  - sp multi paracentesis some discussion outpatient regarding pleurx  - 4/8 repeat paracentesis done in house 420 ml off        MEGAN w hyponatremia, severe lactic acidosis  - on admit now all improved  - suspect from sepsis and N/V just prior to admit  - follow BMP     Hypokalemia  - repletion        LFT elevation  - suspect from above as well  - trend- recheck labs         ACOSTA/ MDD  - home meds = venlafaxine and ativan PRN follows w psych outpatient  - requesting anxiety meds resume if safe pressures/ bp allowed restarted at lower dose monitor if tolerating  - fam proving support/ non pharmacologic measures at bedside     Chronic back pain  - hold home tramadol     ACP/ GOC- discussed with patient, spouse and sister at bedside. hcPOA - patient's daughter  Ami - full code / full  measures at this time     FN:  IVF: none  Diet: adv diet     Prophylaxis:   DVT with SCD, hold AC given poss GI bleeding     Dispo: pending clinical course   Code status:  FULL    Discussed with family at bedside     Questions/concerns were discussed with patient and/or family by bedside.    Thank You,  Sriram Martel MD    Hospitalist with [facility]     Subjective:     No CP, SOB, or N/V.  Feeling better, feels weak, but overall improved    OBJECTIVE:    Blood pressure 111/70, pulse 94, temperature 100 °F (37.8 °C), temperature source Oral, resp. rate 18, height 5' 7\" (1.702 m), weight 173 lb 8 oz (78.7 kg), last menstrual period 12/22/2020, SpO2 98%, not currently breastfeeding.    Temp:  [97.3 °F (36.3 °C)-100 °F (37.8 °C)] 100 °F (37.8 °C)  Pulse:  [] 94  Resp:  [18-35] 18  BP: (106-119)/(67-77) 111/70  SpO2:  [93 %-100 %] 98 %      Intake/Output:    Intake/Output Summary (Last 24 hours) at 4/10/2025 1452  Last data filed at 4/10/2025 1335  Gross per 24 hour   Intake 300 ml   Output 771 ml   Net -471 ml       Last 3 Weights   04/08/25 0900 173 lb 8 oz (78.7 kg)   04/07/25 1000 172 lb 6.4 oz (78.2 kg)   04/06/25 0510 179 lb 0.2 oz (81.2 kg)   04/05/25 1551 177 lb 7.5 oz (80.5 kg)   04/05/25 0936 169 lb 15.6 oz (77.1 kg)   10/08/24 1840 170 lb (77.1 kg)   08/20/24 2201 178 lb 6.4 oz (80.9 kg)   08/13/24 0549 173 lb 6.4 oz (78.7 kg)   08/02/24 1128 180 lb (81.6 kg)       Exam    Gen: No acute distress, alert and oriented x3, no focal neurologic deficits  Heent: NC AT, neck supple  Pulm: Lungs clear,    CV: Heart with regular rate and rhythm,    Abd: Abdomen soft, nontender, nondistended,    MSK: no clubbing, no cyanosis  Skin: no rashes or lesions         Data Review:       Labs:     Recent Labs   Lab 04/10/25  0016 04/10/25  0559 04/10/25  1344   RBC 2.68* 2.60* 2.95*   HGB 7.7* 7.6* 8.5*   HCT 23.0* 22.6* 25.2*   MCV 85.8 86.9 85.4   MCH 28.7 29.2 28.8   MCHC 33.5 33.6 33.7    RDW 17.9* 18.2* 18.3*   NEPRELIM 1.00* 1.00* 1.34*   WBC 1.9* 2.0* 3.0*   .0* 131.0* 128.0*         Recent Labs   Lab 04/08/25  0538 04/09/25  0525 04/10/25  0559   * 110* 107*   BUN 22 13 9   CREATSERUM 0.59 0.57 0.56   EGFRCR 106 107 108   CA 7.4* 7.6* 8.0*    141 139   K 3.6  3.6 3.4*  3.4* 3.8  3.8    106 106   CO2 28.0 26.0 25.0       Recent Labs   Lab 04/05/25  1019 04/08/25  0538 04/09/25  0525 04/10/25  0559   ALT 39 46 29 24   AST 98* 56* 29 24   ALB 3.0* 2.8* 2.9* 3.2         Imaging:  XR CHEST AP PORTABLE  (CPT=71045)  Result Date: 4/10/2025  CONCLUSION:   No significant change in the bilateral pleural effusions with associated bibasilar opacities.    Dictated by (CST): Benoit Serra MD on 4/10/2025 at 7:38 AM     Finalized by (CST): Benoit Serra MD on 4/10/2025 at 7:41 AM          US PARACENTESIS W IMAGING (CPT=49083)  Result Date: 4/9/2025  CONCLUSION: Paracentesis as described.    Dictated by (CST): Denise Gee APN on 4/09/2025 at 7:59 AM     Finalized by (CST): Denise Gee APN on 4/09/2025 at 7:59 AM          XR CHEST AP PORTABLE  (CPT=71045)  Result Date: 4/9/2025  CONCLUSION:   Stable small bilateral pleural effusions with patchy bibasilar right greater than left opacity.    Dictated by (CST): Marco Antonio Kumar MD on 4/09/2025 at 7:25 AM     Finalized by (CST): Marco Antonio Kumar MD on 4/09/2025 at 7:26 AM          XR CHEST AP PORTABLE  (CPT=71045)  Result Date: 4/8/2025  CONCLUSION:  1. Bilateral pleural effusions and associated basilar atelectasis, with or without superimposed pneumonia.  2. Lesser incidental findings as above.     Dictated by (CST): Suresh Wills MD on 4/08/2025 at 10:43 AM     Finalized by (CST): Suresh Wills MD on 4/08/2025 at 10:44 AM          US THORACENTESIS GUIDED RIGHT (CPT=32555)  Result Date: 4/8/2025   Sonography support was provided.  Images demonstrate large right pleural effusion. Please see separate report for  additional details.    elm-remote     Dictated by (CST): Emanuel Sandoval MD on 4/08/2025 at 8:32 AM     Finalized by (CST): Emanuel Sandoval MD on 4/08/2025 at 8:39 AM          XR CHEST AP PORTABLE  (CPT=71045)  Result Date: 4/8/2025  CONCLUSION: No new abnormality.  Stable pulmonary edema, bilateral pleural effusions, and bibasilar opacities.    elm-remote     Dictated by (CST): Emanuel Sandoval MD on 4/08/2025 at 8:08 AM     Finalized by (CST): Emanuel Sandoval MD on 4/08/2025 at 8:09 AM          CT ABDOMEN+PELVIS(CPT=74176)  Result Date: 4/7/2025  CONCLUSION:  1. History of metastatic left ovarian carcinoma with peritoneal carcinomatosis.  Moderate to large volume intra-abdominal ascites is new since comparison abdominal CT imaging from August, 2024 and may be malignant in nature. Ascitic fluid demonstrates peripheral/peritoneal thickening; therefore, the possibility of coexistent spontaneous bacterial peritonitis is raised.  Mild stranding in the central small bowel mesentery is nonspecific, but raises suspicion for peritoneal carcinomatosis. 2. Scattered mildly dilated and fluid-filled proximal small bowel loops, but with no discrete transition point.  Findings suggest either an infectious/inflammatory enteritis or a generalized adynamic ileus.  No definite high-grade small bowel obstruction.  Mild long segment proximal colonic wall thickening probably relates to a coexistent infectious/inflammatory colitis. 3. Stable postoperative changes of the distal colon with associated anastomosis. 4. Stable probable small left hepatic lobe cyst. 5. Mild nonspecific gallbladder luminal distention, but with no CT findings of acute cholecystitis. 6. Nonspecific fluid distention of the distal esophagus, please correlate for reflux or other etiologies of esophagitis. 7. Enlarged anterior mediastinal/prevascular space lymph nodes could be metastatic. 8. Loculated moderate bilateral pleural effusions, which may be malignant.  Associated  dependent airspace disease in both lower lobes, which is favored to represent compressive atelectasis.  Small 6 mm right upper lobe lung nodules are unchanged since recent prior chest CT. 9. Anasarca. 10. Partially imaged bilateral breast implants. 11. Lesser incidental findings as above.     Dictated by (CST): Michael Rogers MD on 4/07/2025 at 5:58 PM     Finalized by (CST): Michael Rogers MD on 4/07/2025 at 6:13 PM              Meds:     Scheduled Medications[1]  Medication Infusions[2]  PRN Medications[3]           [1]    vancomycin  125 mg Oral Daily    potassium chloride  20 mEq Intravenous Once    midodrine  5 mg Oral TID    piperacillin-tazobactam  4.5 g Intravenous Q8H    pantoprazole  40 mg Intravenous Q12H   [2]    [Held by provider] continuous dose heparin Stopped (04/05/25 6856)   [3]   LORazepam    acetaminophen    ondansetron    prochlorperazine    polyethylene glycol (PEG 3350)    sennosides    bisacodyl    fleet enema

## 2025-04-10 NOTE — PLAN OF CARE
Admitted from CCU. Report received from CORBY Lee. VSS. AxO4. On RA. Denies pain. Denies nausea/vomiting. Voiding per [facility]. + BM, incontinence care provided. IV abx continued.  Remote tele in place. Going home with family support ad HH pending medical clearance. Appropriate safety measures maintained, call light within reach, and frequent rounding.           Problem: CARDIOVASCULAR - ADULT  Goal: Absence of cardiac arrhythmias or at baseline  Description: INTERVENTIONS:- Continuous cardiac monitoring, monitor vital signs, obtain 12 lead EKG if indicated- Evaluate effectiveness of antiarrhythmic and heart rate control medications as ordered- Initiate emergency measures for life threatening arrhythmias- Monitor electrolytes and administer replacement therapy as ordered  Outcome: Progressing  Goal: Maintains optimal cardiac output and hemodynamic stability  Description: INTERVENTIONS:- Monitor vital signs, rhythm, and trends- Monitor for bleeding, hypotension and signs of decreased cardiac output- Evaluate effectiveness of vasoactive medications to optimize hemodynamic stability- Monitor arterial and/or venous puncture sites for bleeding and/or hematoma- Assess quality of pulses, skin color and temperature- Assess for signs of decreased coronary artery perfusion - ex. Angina- Evaluate fluid balance, assess for edema, trend weights  Outcome: Progressing     Problem: RESPIRATORY - ADULT  Goal: Achieves optimal ventilation and oxygenation  Description: INTERVENTIONS:- Assess for changes in respiratory status- Assess for changes in mentation and behavior- Position to facilitate oxygenation and minimize respiratory effort- Oxygen supplementation based on oxygen saturation or ABGs- Provide Smoking Cessation handout, if applicable- Encourage broncho-pulmonary hygiene including cough, deep breathe, Incentive Spirometry- Assess the need for suctioning and perform as needed- Assess and instruct to report SOB or any  respiratory difficulty- Respiratory Therapy support as indicated- Manage/alleviate anxiety- Monitor for signs/symptoms of CO2 retention  Outcome: Progressing     Problem: GASTROINTESTINAL - ADULT  Goal: Minimal or absence of nausea and vomiting  Description: INTERVENTIONS:- Maintain adequate hydration with IV or PO as ordered and tolerated- Nasogastric tube to low intermittent suction as ordered- Evaluate effectiveness of ordered antiemetic medications- Provide nonpharmacologic comfort measures as appropriate- Advance diet as tolerated, if ordered- Obtain nutritional consult as needed- Evaluate fluid balance  Outcome: Progressing  Goal: Maintains or returns to baseline bowel function  Description: INTERVENTIONS:- Assess bowel function- Maintain adequate hydration with IV or PO as ordered and tolerated- Evaluate effectiveness of GI medications- Encourage mobilization and activity- Obtain nutritional consult as needed- Establish a toileting routine/schedule- Consider collaborating with pharmacy to review patient's medication profile  Outcome: Progressing  Goal: Maintains adequate nutritional intake (undernourished)  Description: INTERVENTIONS:- Monitor percentage of each meal consumed- Identify factors contributing to decreased intake, treat as appropriate- Assist with meals as needed- Monitor I&O, WT and lab values- Obtain nutritional consult as needed- Optimize oral hygiene and moisture- Encourage food from home; allow for food preferences- Enhance eating environment  Outcome: Progressing  Goal: Achieves appropriate nutritional intake (bariatric)  Description: INTERVENTIONS:- Monitor for over-consumption- Identify factors contributing to increased intake, treat as appropriate- Monitor I&O, WT and lab values- Obtain nutritional consult as needed- Evaluate psychosocial factors contributing to over-consumption  Outcome: Progressing     Problem: GENITOURINARY - ADULT  Goal: Absence of urinary retention  Description:  INTERVENTIONS:- Assess patient’s ability to void and empty bladder- Monitor intake/output and perform bladder scan as needed- Follow urinary retention protocol/standard of care- Consider collaborating with pharmacy to review patient's medication profile- Implement strategies to promote bladder emptying  Outcome: Progressing     Problem: SKIN/TISSUE INTEGRITY - ADULT  Goal: Skin integrity remains intact  Description: INTERVENTIONS- Assess and document risk factors for pressure ulcer development- Assess and document skin integrity- Monitor for areas of redness and/or skin breakdown- Initiate interventions, skin care algorithm/standards of care as needed  Outcome: Progressing     Problem: Patient Centered Care  Goal: Patient preferences are identified and integrated in the patient's plan of care  Description: Interventions:- What would you like us to know as we care for you?- Provide timely, complete, and accurate information to patient/family- Incorporate patient and family knowledge, values, beliefs, and cultural backgrounds into the planning and delivery of care- Encourage patient/family to participate in care and decision-making at the level they choose- Honor patient and family perspectives and choices  Outcome: Progressing

## 2025-04-10 NOTE — CM/SW NOTE
CM met w/ pt to f/u on dc planning.    HHC list provided. Pt will review and call with choice prior to dc.    Plan  Home w/ HHC pending choice    / to remain available for support and/or discharge planning.     Do Haddad, RN    Ext [phone]

## 2025-04-10 NOTE — PROGRESS NOTES
[facility]  part of [facility] Infectious Disease  Progress Note    Yue Malin Patient Status:  Inpatient    1970 MRN [id number]   Location [facility] 4W/SW/SE Attending Sriram Martel MD   Hosp Day # 5 PCP Diana Black MD     Subjective:  Patient seen/examined.  Up in bed in NAD.  Biggest complaint is difficulty/painful swallowing.  She constantly craves cold water bu it has been difficult for her to get it down.    Objective:  Blood pressure 111/70, pulse 94, temperature 100 °F (37.8 °C), temperature source Oral, resp. rate 18, height 5' 7\" (1.702 m), weight 173 lb 8 oz (78.7 kg), last menstrual period 2020, SpO2 98%, not currently breastfeeding.    Intake/Output:    Intake/Output Summary (Last 24 hours) at 4/10/2025 1318  Last data filed at 4/10/2025 1133  Gross per 24 hour   Intake 604 ml   Output 1120 ml   Net -516 ml       Physical Exam:  General: Awake, alert, non-tox, NAD.  HEENT:  Oropharynx clear, trachea ML.  Heart: RRR S1S2 no murmurs.  Lungs: Essentially CTA b/l, no rhonchi, rales, wheezes.  Abdomen: Soft, NT/ND.  BS present.  No organomegaly.  Extremity: No edema.  Neurological: No focal deficits.  Derm:  Warm, dry, free from rashes.    Lab Data Review:  Lab Results   Component Value Date    WBC 2.0 04/10/2025    HGB 7.6 04/10/2025    HCT 22.6 04/10/2025    .0 04/10/2025    CREATSERUM 0.56 04/10/2025    BUN 9 04/10/2025     04/10/2025    K 3.8 04/10/2025    K 3.8 04/10/2025     04/10/2025    CO2 25.0 04/10/2025     04/10/2025    CA 8.0 04/10/2025    ALB 3.2 04/10/2025    ALKPHO 99 04/10/2025    BILT 0.6 04/10/2025    TP 5.4 04/10/2025    AST 24 04/10/2025    ALT 24 04/10/2025      Cultures:  2/2 blood cultures + klebsiella 25  2/2 blood cultures + klebsiella 25     Blood cultures 25 pending, negative thus far     MRSA swab negative     Radiology:  CONCLUSION:   1. History of metastatic left  ovarian carcinoma with peritoneal carcinomatosis.  Moderate to large volume intra-abdominal ascites is new since comparison abdominal CT imaging from August, 2024 and may be malignant in nature. Ascitic fluid demonstrates   peripheral/peritoneal thickening; therefore, the possibility of coexistent spontaneous bacterial peritonitis is raised.  Mild stranding in the central small bowel mesentery is nonspecific, but raises suspicion for peritoneal carcinomatosis.   2. Scattered mildly dilated and fluid-filled proximal small bowel loops, but with no discrete transition point.  Findings suggest either an infectious/inflammatory enteritis or a generalized adynamic ileus.  No definite high-grade small bowel   obstruction.  Mild long segment proximal colonic wall thickening probably relates to a coexistent infectious/inflammatory colitis.   3. Stable postoperative changes of the distal colon with associated anastomosis.   4. Stable probable small left hepatic lobe cyst.   5. Mild nonspecific gallbladder luminal distention, but with no CT findings of acute cholecystitis.   6. Nonspecific fluid distention of the distal esophagus, please correlate for reflux or other etiologies of esophagitis.   7. Enlarged anterior mediastinal/prevascular space lymph nodes could be metastatic.   8. Loculated moderate bilateral pleural effusions, which may be malignant.  Associated dependent airspace disease in both lower lobes, which is favored to represent compressive atelectasis.  Small 6 mm right upper lobe lung nodules are unchanged since   recent prior chest CT.   9. Anasarca.   10. Partially imaged bilateral breast implants.   11. Lesser incidental findings as above.        Antibiotics Reviewed:  Zosyn     Assessment and Plan:     Acute gram negative sepsis in this immunocompromised patient presenting with SOB, N/V, and loss of appetite  - 2/2 blood cultures positive 4/5/25 and 4/6/25 for klebsiella spp, repeats pending   - Source  related to occult intraabdominal focus vs. Primary/secondary infected port (no gross s/s of infection) vs. Pulmonary vs. Other  - s/p thoracentesis 4/8/25 with 1300cc removed, cultures negative  - CT reviewed with ascites and concern for possible peritonitis, s/p paracentesis without criteria for SBP, cultures negative thus far  - UA bland  - IV zosyn ongoing day #4 since last positive culture     2.  Metastatic ovarian cancer with malignant pleural effusion and peritoneal carcinomatosis  - Suspect gut translocation of bacteria as etiology of events without evidence of SBP     3.  Anemia, leukopenia due to the above     4.  Disposition - inpatient.  Continue IV zosyn as Rx.   Although 4 sets of blood cultures are positive, no gross s/s mediport involvement.  Repeat blood cultures remain negative.  Trending temps and WBCs.  Plan for eventual discharge on cefuroxime 500mg p.o. BID to complete a 14 day course as above.  Supportive care ongoing.  Will follow.    Karen Thomason DO, [facility] Infectious Disease  [phone]    4/10/2025  1:18 PM

## 2025-04-10 NOTE — PLAN OF CARE
Problem: CARDIOVASCULAR - ADULT  Goal: Absence of cardiac arrhythmias or at baseline  Description: INTERVENTIONS:- Continuous cardiac monitoring, monitor vital signs, obtain 12 lead EKG if indicated- Evaluate effectiveness of antiarrhythmic and heart rate control medications as ordered- Initiate emergency measures for life threatening arrhythmias- Monitor electrolytes and administer replacement therapy as ordered  Outcome: Progressing  Goal: Maintains optimal cardiac output and hemodynamic stability  Description: INTERVENTIONS:- Monitor vital signs, rhythm, and trends- Monitor for bleeding, hypotension and signs of decreased cardiac output- Evaluate effectiveness of vasoactive medications to optimize hemodynamic stability- Monitor arterial and/or venous puncture sites for bleeding and/or hematoma- Assess quality of pulses, skin color and temperature- Assess for signs of decreased coronary artery perfusion - ex. Angina- Evaluate fluid balance, assess for edema, trend weights  Outcome: Progressing     Problem: RESPIRATORY - ADULT  Goal: Achieves optimal ventilation and oxygenation  Description: INTERVENTIONS:- Assess for changes in respiratory status- Assess for changes in mentation and behavior- Position to facilitate oxygenation and minimize respiratory effort- Oxygen supplementation based on oxygen saturation or ABGs- Provide Smoking Cessation handout, if applicable- Encourage broncho-pulmonary hygiene including cough, deep breathe, Incentive Spirometry- Assess the need for suctioning and perform as needed- Assess and instruct to report SOB or any respiratory difficulty- Respiratory Therapy support as indicated- Manage/alleviate anxiety- Monitor for signs/symptoms of CO2 retention  Outcome: Progressing     Problem: GASTROINTESTINAL - ADULT  Goal: Minimal or absence of nausea and vomiting  Description: INTERVENTIONS:- Maintain adequate hydration with IV or PO as ordered and tolerated- Nasogastric tube to low  intermittent suction as ordered- Evaluate effectiveness of ordered antiemetic medications- Provide nonpharmacologic comfort measures as appropriate- Advance diet as tolerated, if ordered- Obtain nutritional consult as needed- Evaluate fluid balance  Outcome: Progressing  Goal: Maintains or returns to baseline bowel function  Description: INTERVENTIONS:- Assess bowel function- Maintain adequate hydration with IV or PO as ordered and tolerated- Evaluate effectiveness of GI medications- Encourage mobilization and activity- Obtain nutritional consult as needed- Establish a toileting routine/schedule- Consider collaborating with pharmacy to review patient's medication profile  Outcome: Progressing  Goal: Maintains adequate nutritional intake (undernourished)  Description: INTERVENTIONS:- Monitor percentage of each meal consumed- Identify factors contributing to decreased intake, treat as appropriate- Assist with meals as needed- Monitor I&O, WT and lab values- Obtain nutritional consult as needed- Optimize oral hygiene and moisture- Encourage food from home; allow for food preferences- Enhance eating environment  Outcome: Progressing  Goal: Achieves appropriate nutritional intake (bariatric)  Description: INTERVENTIONS:- Monitor for over-consumption- Identify factors contributing to increased intake, treat as appropriate- Monitor I&O, WT and lab values- Obtain nutritional consult as needed- Evaluate psychosocial factors contributing to over-consumption  Outcome: Progressing     Problem: GENITOURINARY - ADULT  Goal: Absence of urinary retention  Description: INTERVENTIONS:- Assess patient’s ability to void and empty bladder- Monitor intake/output and perform bladder scan as needed- Follow urinary retention protocol/standard of care- Consider collaborating with pharmacy to review patient's medication profile- Implement strategies to promote bladder emptying  Outcome: Progressing     Problem: SKIN/TISSUE INTEGRITY -  ADULT  Goal: Skin integrity remains intact  Description: INTERVENTIONS- Assess and document risk factors for pressure ulcer development- Assess and document skin integrity- Monitor for areas of redness and/or skin breakdown- Initiate interventions, skin care algorithm/standards of care as needed  Outcome: Progressing     Problem: HEMATOLOGIC - ADULT  Goal: Maintains hematologic stability  Description: INTERVENTIONS- Assess for signs and symptoms of bleeding or hemorrhage- Monitor labs and vital signs for trends- Administer supportive blood products/factors, fluids and medications as ordered and appropriate- Administer supportive blood products/factors as ordered and appropriate  Outcome: Progressing  Goal: Free from bleeding injury  Description: (Example usage: patient with low platelets)INTERVENTIONS:- Avoid intramuscular injections, enemas and rectal medication administration- Ensure safe mobilization of patient- Hold pressure on venipuncture sites to achieve adequate hemostasis- Assess for signs and symptoms of internal bleeding- Monitor lab trends- Patient is to report abnormal signs of bleeding to staff- Avoid use of toothpicks and dental floss- Use electric shaver for shaving- Use soft bristle tooth brush- Limit straining and forceful nose blowing  Outcome: Progressing     Problem: Patient Centered Care  Goal: Patient preferences are identified and integrated in the patient's plan of care  Description: Interventions:- What would you like us to know as we care for you? - Provide timely, complete, and accurate information to patient/family- Incorporate patient and family knowledge, values, beliefs, and cultural backgrounds into the planning and delivery of care- Encourage patient/family to participate in care and decision-making at the level they choose- Honor patient and family perspectives and choices  Outcome: Progressing     No acute changes today. Pt a&o, on RA. On tele monitoring, tachy w/ ambulation. No  c/o pain or nausea. Relatively poor appetite. Zosyn as abx coverage. Plans to transition to po on discharge. Up sba/walker- refusing pt/ot today. Plans for eventual discharge home w/ hhc. Family at bedside. Call light within reach, frequent rounding.

## 2025-04-11 ENCOUNTER — APPOINTMENT (OUTPATIENT)
Dept: ULTRASOUND IMAGING | Facility: HOSPITAL | Age: 55
End: 2025-04-11
Attending: INTERNAL MEDICINE
Payer: COMMERCIAL

## 2025-04-11 LAB
ANION GAP SERPL CALC-SCNC: 9 MMOL/L (ref 0–18)
ANTIBODY SCREEN: NEGATIVE
BASOPHILS # BLD AUTO: 0.01 X10(3) UL (ref 0–0.2)
BASOPHILS NFR BLD AUTO: 0.5 %
BUN BLD-MCNC: 10 MG/DL (ref 9–23)
BUN/CREAT SERPL: 18.5 (ref 10–20)
CALCIUM BLD-MCNC: 7.7 MG/DL (ref 8.7–10.4)
CHLORIDE SERPL-SCNC: 105 MMOL/L (ref 98–112)
CO2 SERPL-SCNC: 24 MMOL/L (ref 21–32)
CREAT BLD-MCNC: 0.54 MG/DL (ref 0.55–1.02)
DEPRECATED RDW RBC AUTO: 54 FL (ref 35.1–46.3)
EGFRCR SERPLBLD CKD-EPI 2021: 109 ML/MIN/1.73M2 (ref 60–?)
EOSINOPHIL # BLD AUTO: 0.04 X10(3) UL (ref 0–0.7)
EOSINOPHIL NFR BLD AUTO: 2 %
ERYTHROCYTE [DISTWIDTH] IN BLOOD BY AUTOMATED COUNT: 19 % (ref 11–15)
GLUCOSE BLD-MCNC: 113 MG/DL (ref 70–99)
HCT VFR BLD AUTO: 21.3 % (ref 35–48)
HCT VFR BLD AUTO: 21.6 % (ref 35–48)
HGB BLD-MCNC: 7 G/DL (ref 12–16)
HGB BLD-MCNC: 7.1 G/DL (ref 12–16)
IMM GRANULOCYTES # BLD AUTO: 0.02 X10(3) UL (ref 0–1)
IMM GRANULOCYTES NFR BLD: 1 %
LYMPHOCYTES # BLD AUTO: 0.71 X10(3) UL (ref 1–4)
LYMPHOCYTES NFR BLD AUTO: 35.9 %
MAGNESIUM SERPL-MCNC: 1.5 MG/DL (ref 1.6–2.6)
MCH RBC QN AUTO: 28.2 PG (ref 26–34)
MCHC RBC AUTO-ENTMCNC: 32.4 G/DL (ref 31–37)
MCV RBC AUTO: 87.1 FL (ref 80–100)
MONOCYTES # BLD AUTO: 0.21 X10(3) UL (ref 0.1–1)
MONOCYTES NFR BLD AUTO: 10.6 %
NEUTROPHILS # BLD AUTO: 0.99 X10 (3) UL (ref 1.5–7.7)
NEUTROPHILS # BLD AUTO: 0.99 X10(3) UL (ref 1.5–7.7)
NEUTROPHILS NFR BLD AUTO: 50 %
OSMOLALITY SERPL CALC.SUM OF ELEC: 286 MOSM/KG (ref 275–295)
PLATELET # BLD AUTO: 109 10(3)UL (ref 150–450)
PLATELETS.RETICULATED NFR BLD AUTO: 8.1 % (ref 0–7)
POTASSIUM SERPL-SCNC: 3.6 MMOL/L (ref 3.5–5.1)
POTASSIUM SERPL-SCNC: 3.6 MMOL/L (ref 3.5–5.1)
RBC # BLD AUTO: 2.48 X10(6)UL (ref 3.8–5.3)
RH BLOOD TYPE: NEGATIVE
SODIUM SERPL-SCNC: 138 MMOL/L (ref 136–145)
WBC # BLD AUTO: 2 X10(3) UL (ref 4–11)

## 2025-04-11 PROCEDURE — 93970 EXTREMITY STUDY: CPT | Performed by: INTERNAL MEDICINE

## 2025-04-11 RX ORDER — ACETAMINOPHEN 325 MG/1
650 TABLET ORAL ONCE
Status: COMPLETED | OUTPATIENT
Start: 2025-04-11 | End: 2025-04-11

## 2025-04-11 RX ORDER — BACLOFEN 5 MG/1
5 TABLET ORAL 3 TIMES DAILY PRN
Status: DISCONTINUED | OUTPATIENT
Start: 2025-04-11 | End: 2025-04-14

## 2025-04-11 RX ORDER — SODIUM CHLORIDE 9 MG/ML
INJECTION, SOLUTION INTRAVENOUS ONCE
Status: COMPLETED | OUTPATIENT
Start: 2025-04-11 | End: 2025-04-11

## 2025-04-11 RX ORDER — MAGNESIUM OXIDE 400 MG/1
800 TABLET ORAL ONCE
Status: DISCONTINUED | OUTPATIENT
Start: 2025-04-11 | End: 2025-04-11

## 2025-04-11 RX ORDER — FUROSEMIDE 10 MG/ML
20 INJECTION INTRAMUSCULAR; INTRAVENOUS ONCE
Status: DISCONTINUED | OUTPATIENT
Start: 2025-04-11 | End: 2025-04-11

## 2025-04-11 RX ORDER — POTASSIUM CHLORIDE 1500 MG/1
40 TABLET, EXTENDED RELEASE ORAL EVERY 4 HOURS
Status: DISCONTINUED | OUTPATIENT
Start: 2025-04-11 | End: 2025-04-11

## 2025-04-11 NOTE — PROGRESS NOTES
Pulmonary Progress Note     Assessment / Plan:  Acute respiratory failure - due to volume overload  - on RA  MPEs - s/p right thoracentesis with 1.3L of fluid drained on 4/8/25  - monitor for now; may need PleurX  DVT - diagnosed 3/21/25  - resume anticoagulation pending stabilization of hemoglobin. If unable to resume anticoagulation will place IVC filter.  Klebsiella bacteremia  - abx per ID  Metastatic ovarian cancer  - per oncology  Dispo  - will follow peripherally over the weekend    Discussed with family at bedside.     Discussed with Dr. Martel      Subjective:  Denies dyspnea    Objective:  Vitals:    04/11/25 0700 04/11/25 0920 04/11/25 1129 04/11/25 1147   BP:  108/57  114/72   BP Location:  Right arm  Right arm   Pulse:  89 (!) 147 98   Resp:  18  18   Temp:  97.3 °F (36.3 °C) 97.9 °F (36.6 °C)    TempSrc:  Oral     SpO2:  97%  96%   Weight: 176 lb 6.4 oz (80 kg)      Height:         Physical Exam:  General: no apparent distress, conversant  Skin: no rash, ulcers or subcutaneous nodules  Eyes: anicteric sclerae, moist conjunctivae  Head, ears, nose, throat: atraumatic, oropharynx clear with moist mucous membranes  Neck: trachea midline with no thyromegaly  Heart: regular rate and rhythm, no murmurs / rubs / gallops  Lungs: diminished breath sounds at the bases  Extremities: no edema or cyanosis  Psych: interactive, answering questions appropriately, appropriate affect    Medications:  Reviewed in EMR    Lab Data:  Reviewed in EMR    Imaging:  I independently visualized all relevant chest imaging in PACS and agree with radiology interpretation except where noted.

## 2025-04-11 NOTE — OCCUPATIONAL THERAPY NOTE
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    Patient is requiring min/ moderate assist for adls as a result of the following impairments:  fatigue .    Patient continues to function below baseline with adls and functional mobility.  Next session anticipate patient to progress upper body dressing, lower body dressing, grooming, and functional standing tolerance.  Occupational Therapy will continue to follow patient for duration of hospitalization.    Patient continues to benefit from continued skilled OT services: at discharge to promote prior level of function and safety with additional support and return home with home health OT.     PLAN DURING HOSPITALIZATION  OT Device Recommendations: TBD  OT Treatment Plan: Compensatory technique education, Patient/Family training, Patient/Family education, Endurance training, Energy conservation/work simplification techniques     SUBJECTIVE  I am just so tired  I just want to go home    OBJECTIVE  Precautions: Bed/chair alarm    PAIN ASSESSMENT  Rating: -- (pt offering no c/o pain)  Location: generalized  Management Techniques: Activity promotion    ACTIVITY TOLERANCE  Limited by fatigue    O2 SATURATIONS  Activity on room air    ACTIVITIES OF DAILY LIVING ASSESSMENT  AM-PAC ‘6-Clicks’ Inpatient Daily Activity Short Form  How much help from another person does  the patient currently need…  -   Putting on and taking off regular lower body clothing?: A Lot  -   Bathing (including washing, rinsing, drying)?: A Lot  -   Toileting, which includes using toilet, bedpan or urinal? : A Lot  -   Putting on and taking off regular upper body clothing?: A Little  -   Taking care of personal grooming such as brushing teeth?: None  -   Eating meals?: None    AM-PAC Score:  Score: 17  Approx Degree of Impairment: 50.11%  Standardized Score (AM-PAC Scale): 37.26  CMS Modifier (G-Code): CK    FUNCTIONAL ADL ASSESSMENT  Eating: independent  Grooming: setup assist  UB Dressing: min assist  LB Dressing: mod assist  Toileting: na    Skilled Therapy Provided:  RN contacted prior to start of care. Treatment coordinated w/ PT. Pt agreeable to participation in therapy. Pt received in bed, alert and oriented x 4;son and daughter at bedside. Pt currently requires mod a to manage socks in preparation for oob activity. Pt transferred supine<>sit at eob w/ supervision. Pt performing sit<>stand transfers w/ supervision and rw. Pt ambulating into the keith w/ rw and close supervision;curtis slow but no lob noted. Activity tolerance limited due to fatigue. Importance of frequent position changes and ambulation w/ nursing staff stressed     At end of session pt returned to bed and left w/ all needs in reach;son and daughter at bedside.      EDUCATION PROVIDED  Patient Education : Plan of Care; Functional Transfer Techniques; Posture/Positioning; Energy Conservation  Patient's Response to Education: Verbalized Understanding; Returned Demonstration    The patient's Approx Degree of Impairment: 50.11% has been calculated based on documentation in the [facility] '6 clicks' Inpatient Daily Activity Short Form.  Research supports that patients with this level of impairment may benefit from return to home w/ HH.  Final disposition will be made by interdisciplinary medical team.    Patient End of Session: In bed, Needs  met, Call light within reach, RN aware of session/findings, All patient questions and concerns addressed, Family present    OT Goals:   Patient will complete functional transfer with supervision  Comment: Goal Met    Patient will complete toileting with distant supervision  Comment: na    Patient will tolerate standing for 3-5 minutes in prep for adls with supervision   Comment:pt tolerated static standing w/ rw < 2 min due to fatigue    Patient will complete item retrieval with supervision   Comment:lisa          Goals  on: 25  Frequency: 3-5x week    Therapeutic Activity: 15 minutes

## 2025-04-11 NOTE — PROGRESS NOTES
Pulmonary Progress Note     Assessment / Plan:  Acute respiratory failure - due to volume overload  - on RA  MPEs - s/p right thoracentesis with 1.3L of fluid drained on 4/8/25  - monitor for now; may need PleurX  DVT - diagnosed 3/21/25  - repeat LE ultrasound pending  - resume anticoagulation pending stabilization of hemoglobin. If unable to resume anticoagulation will place IVC filter.  Klebsiella bacteremia  - abx per ID  Metastatic ovarian cancer  - per oncology  Dispo  - will follow    Discussed with family at bedside.     Discussed with Dr. Martel      Subjective:  Transferred out of ICU. Denies dyspnea.    Objective:  Vitals:    04/11/25 0700 04/11/25 0920 04/11/25 1129 04/11/25 1147   BP:  108/57  114/72   BP Location:  Right arm  Right arm   Pulse:  89 (!) 147 98   Resp:  18  18   Temp:  97.3 °F (36.3 °C) 97.9 °F (36.6 °C)    TempSrc:  Oral     SpO2:  97%  96%   Weight: 176 lb 6.4 oz (80 kg)      Height:         Physical Exam:  General: no apparent distress, conversant  Skin: no rash, ulcers or subcutaneous nodules  Eyes: anicteric sclerae, moist conjunctivae  Head, ears, nose, throat: atraumatic, oropharynx clear with moist mucous membranes  Neck: trachea midline with no thyromegaly  Heart: regular rate and rhythm, no murmurs / rubs / gallops  Lungs: diminished breath sounds at the bases  Extremities: no edema or cyanosis  Psych: interactive, answering questions appropriately, appropriate affect    Medications:  Reviewed in EMR    Lab Data:  Reviewed in EMR    Imaging:  I independently visualized all relevant chest imaging in PACS and agree with radiology interpretation except where noted.

## 2025-04-11 NOTE — PLAN OF CARE
Patient is AxO4. New onset fever this AM - 100.8. Tylenol given. MD notified. Repeat blood cultures ordered. On RA. Denies pain. Tolerating diet. Denies nausea/vomiting. Voiding per Purewick. + BM overnight.  IV abx continued.  Remote tele in place - can be tachy with exertion, MD aware. Ambulates using x1 assist and a walker. Going home with family support and HH pending medical clearance. Family at bedside. Appropriate safety measures maintained, call light within reach, and frequent rounding.      Problem: CARDIOVASCULAR - ADULT  Goal: Absence of cardiac arrhythmias or at baseline  Description: INTERVENTIONS:- Continuous cardiac monitoring, monitor vital signs, obtain 12 lead EKG if indicated- Evaluate effectiveness of antiarrhythmic and heart rate control medications as ordered- Initiate emergency measures for life threatening arrhythmias- Monitor electrolytes and administer replacement therapy as ordered  Outcome: Progressing  Goal: Maintains optimal cardiac output and hemodynamic stability  Description: INTERVENTIONS:- Monitor vital signs, rhythm, and trends- Monitor for bleeding, hypotension and signs of decreased cardiac output- Evaluate effectiveness of vasoactive medications to optimize hemodynamic stability- Monitor arterial and/or venous puncture sites for bleeding and/or hematoma- Assess quality of pulses, skin color and temperature- Assess for signs of decreased coronary artery perfusion - ex. Angina- Evaluate fluid balance, assess for edema, trend weights  Outcome: Progressing     Problem: RESPIRATORY - ADULT  Goal: Achieves optimal ventilation and oxygenation  Description: INTERVENTIONS:- Assess for changes in respiratory status- Assess for changes in mentation and behavior- Position to facilitate oxygenation and minimize respiratory effort- Oxygen supplementation based on oxygen saturation or ABGs- Provide Smoking Cessation handout, if applicable- Encourage broncho-pulmonary hygiene including  cough, deep breathe, Incentive Spirometry- Assess the need for suctioning and perform as needed- Assess and instruct to report SOB or any respiratory difficulty- Respiratory Therapy support as indicated- Manage/alleviate anxiety- Monitor for signs/symptoms of CO2 retention  Outcome: Progressing     Problem: GASTROINTESTINAL - ADULT  Goal: Minimal or absence of nausea and vomiting  Description: INTERVENTIONS:- Maintain adequate hydration with IV or PO as ordered and tolerated- Nasogastric tube to low intermittent suction as ordered- Evaluate effectiveness of ordered antiemetic medications- Provide nonpharmacologic comfort measures as appropriate- Advance diet as tolerated, if ordered- Obtain nutritional consult as needed- Evaluate fluid balance  Outcome: Progressing  Goal: Maintains or returns to baseline bowel function  Description: INTERVENTIONS:- Assess bowel function- Maintain adequate hydration with IV or PO as ordered and tolerated- Evaluate effectiveness of GI medications- Encourage mobilization and activity- Obtain nutritional consult as needed- Establish a toileting routine/schedule- Consider collaborating with pharmacy to review patient's medication profile  Outcome: Progressing  Goal: Maintains adequate nutritional intake (undernourished)  Description: INTERVENTIONS:- Monitor percentage of each meal consumed- Identify factors contributing to decreased intake, treat as appropriate- Assist with meals as needed- Monitor I&O, WT and lab values- Obtain nutritional consult as needed- Optimize oral hygiene and moisture- Encourage food from home; allow for food preferences- Enhance eating environment  Outcome: Progressing  Goal: Achieves appropriate nutritional intake (bariatric)  Description: INTERVENTIONS:- Monitor for over-consumption- Identify factors contributing to increased intake, treat as appropriate- Monitor I&O, WT and lab values- Obtain nutritional consult as needed- Evaluate psychosocial factors  contributing to over-consumption  Outcome: Progressing     Problem: GENITOURINARY - ADULT  Goal: Absence of urinary retention  Description: INTERVENTIONS:- Assess patient’s ability to void and empty bladder- Monitor intake/output and perform bladder scan as needed- Follow urinary retention protocol/standard of care- Consider collaborating with pharmacy to review patient's medication profile- Implement strategies to promote bladder emptying  Outcome: Progressing     Problem: SKIN/TISSUE INTEGRITY - ADULT  Goal: Skin integrity remains intact  Description: INTERVENTIONS- Assess and document risk factors for pressure ulcer development- Assess and document skin integrity- Monitor for areas of redness and/or skin breakdown- Initiate interventions, skin care algorithm/standards of care as needed  Outcome: Progressing     Problem: HEMATOLOGIC - ADULT  Goal: Maintains hematologic stability  Description: INTERVENTIONS- Assess for signs and symptoms of bleeding or hemorrhage- Monitor labs and vital signs for trends- Administer supportive blood products/factors, fluids and medications as ordered and appropriate- Administer supportive blood products/factors as ordered and appropriate  Outcome: Progressing  Goal: Free from bleeding injury  Description: (Example usage: patient with low platelets)INTERVENTIONS:- Avoid intramuscular injections, enemas and rectal medication administration- Ensure safe mobilization of patient- Hold pressure on venipuncture sites to achieve adequate hemostasis- Assess for signs and symptoms of internal bleeding- Monitor lab trends- Patient is to report abnormal signs of bleeding to staff- Avoid use of toothpicks and dental floss- Use electric shaver for shaving- Use soft bristle tooth brush- Limit straining and forceful nose blowing  Outcome: Progressing     Problem: Patient Centered Care  Goal: Patient preferences are identified and integrated in the patient's plan of care  Description:  Interventions:- What would you like us to know as we care for you?- Provide timely, complete, and accurate information to patient/family- Incorporate patient and family knowledge, values, beliefs, and cultural backgrounds into the planning and delivery of care- Encourage patient/family to participate in care and decision-making at the level they choose- Honor patient and family perspectives and choices  Outcome: Progressing

## 2025-04-11 NOTE — PLAN OF CARE
Yue is alert and oriented x 4. She is up with an assist of 1 with a walker. Patient voiding freely via the purewick. 1 BM today. No complaints of pain or nausea. No fevers. Family at bedside. Patient eating very little of meals. Hgb 7.1 Patient to receive 1 unit of PRBCs in hopes of having a more stable hemoglobin during anticoagulation therapy tomorrow. Waiting for type and screen to come back to deliver blood. Call light within reach.  Problem: CARDIOVASCULAR - ADULT  Goal: Absence of cardiac arrhythmias or at baseline  Description: INTERVENTIONS:- Continuous cardiac monitoring, monitor vital signs, obtain 12 lead EKG if indicated- Evaluate effectiveness of antiarrhythmic and heart rate control medications as ordered- Initiate emergency measures for life threatening arrhythmias- Monitor electrolytes and administer replacement therapy as ordered  Outcome: Progressing  Goal: Maintains optimal cardiac output and hemodynamic stability  Description: INTERVENTIONS:- Monitor vital signs, rhythm, and trends- Monitor for bleeding, hypotension and signs of decreased cardiac output- Evaluate effectiveness of vasoactive medications to optimize hemodynamic stability- Monitor arterial and/or venous puncture sites for bleeding and/or hematoma- Assess quality of pulses, skin color and temperature- Assess for signs of decreased coronary artery perfusion - ex. Angina- Evaluate fluid balance, assess for edema, trend weights  Outcome: Progressing     Problem: RESPIRATORY - ADULT  Goal: Achieves optimal ventilation and oxygenation  Description: INTERVENTIONS:- Assess for changes in respiratory status- Assess for changes in mentation and behavior- Position to facilitate oxygenation and minimize respiratory effort- Oxygen supplementation based on oxygen saturation or ABGs- Provide Smoking Cessation handout, if applicable- Encourage broncho-pulmonary hygiene including cough, deep breathe, Incentive Spirometry- Assess the need for  suctioning and perform as needed- Assess and instruct to report SOB or any respiratory difficulty- Respiratory Therapy support as indicated- Manage/alleviate anxiety- Monitor for signs/symptoms of CO2 retention  Outcome: Progressing     Problem: GASTROINTESTINAL - ADULT  Goal: Minimal or absence of nausea and vomiting  Description: INTERVENTIONS:- Maintain adequate hydration with IV or PO as ordered and tolerated- Nasogastric tube to low intermittent suction as ordered- Evaluate effectiveness of ordered antiemetic medications- Provide nonpharmacologic comfort measures as appropriate- Advance diet as tolerated, if ordered- Obtain nutritional consult as needed- Evaluate fluid balance  Outcome: Progressing  Goal: Maintains or returns to baseline bowel function  Description: INTERVENTIONS:- Assess bowel function- Maintain adequate hydration with IV or PO as ordered and tolerated- Evaluate effectiveness of GI medications- Encourage mobilization and activity- Obtain nutritional consult as needed- Establish a toileting routine/schedule- Consider collaborating with pharmacy to review patient's medication profile  Outcome: Progressing  Goal: Maintains adequate nutritional intake (undernourished)  Description: INTERVENTIONS:- Monitor percentage of each meal consumed- Identify factors contributing to decreased intake, treat as appropriate- Assist with meals as needed- Monitor I&O, WT and lab values- Obtain nutritional consult as needed- Optimize oral hygiene and moisture- Encourage food from home; allow for food preferences- Enhance eating environment  Outcome: Progressing  Goal: Achieves appropriate nutritional intake (bariatric)  Description: INTERVENTIONS:- Monitor for over-consumption- Identify factors contributing to increased intake, treat as appropriate- Monitor I&O, WT and lab values- Obtain nutritional consult as needed- Evaluate psychosocial factors contributing to over-consumption  Outcome: Progressing     Problem:  GENITOURINARY - ADULT  Goal: Absence of urinary retention  Description: INTERVENTIONS:- Assess patient’s ability to void and empty bladder- Monitor intake/output and perform bladder scan as needed- Follow urinary retention protocol/standard of care- Consider collaborating with pharmacy to review patient's medication profile- Implement strategies to promote bladder emptying  Outcome: Progressing     Problem: SKIN/TISSUE INTEGRITY - ADULT  Goal: Skin integrity remains intact  Description: INTERVENTIONS- Assess and document risk factors for pressure ulcer development- Assess and document skin integrity- Monitor for areas of redness and/or skin breakdown- Initiate interventions, skin care algorithm/standards of care as needed  Outcome: Progressing     Problem: HEMATOLOGIC - ADULT  Goal: Maintains hematologic stability  Description: INTERVENTIONS- Assess for signs and symptoms of bleeding or hemorrhage- Monitor labs and vital signs for trends- Administer supportive blood products/factors, fluids and medications as ordered and appropriate- Administer supportive blood products/factors as ordered and appropriate  Outcome: Progressing  Goal: Free from bleeding injury  Description: (Example usage: patient with low platelets)INTERVENTIONS:- Avoid intramuscular injections, enemas and rectal medication administration- Ensure safe mobilization of patient- Hold pressure on venipuncture sites to achieve adequate hemostasis- Assess for signs and symptoms of internal bleeding- Monitor lab trends- Patient is to report abnormal signs of bleeding to staff- Avoid use of toothpicks and dental floss- Use electric shaver for shaving- Use soft bristle tooth brush- Limit straining and forceful nose blowing  Outcome: Progressing     Problem: Patient Centered Care  Goal: Patient preferences are identified and integrated in the patient's plan of care  Description: Interventions:- What would you like us to know as we care for you? - Provide  timely, complete, and accurate information to patient/family- Incorporate patient and family knowledge, values, beliefs, and cultural backgrounds into the planning and delivery of care- Encourage patient/family to participate in care and decision-making at the level they choose- Honor patient and family perspectives and choices  Outcome: Progressing

## 2025-04-11 NOTE — PROGRESS NOTES
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 diagnosed March 2024 sp TAHBSO sp multiple courses treatment currently on lynparza, associated recurrent malignant ascites sp multiple paracenteses, malignant R effusion sp thoracentesis 3/14/25 (1.3L removed), bl LE swelling and LLE DVT dx 3/21/25 on AC here with septic shock with MSOF- MEGAN, lactic acidosis LFT elevation, afib w RVR, acute resp failure with hypoxia/ hypercapnia on bipap     Septic shock,   Klebsiella pneumoniae lung v gut source  - on admit severe w shock w MSOF (sever lactic acidosis on admit, MEGAN/ LFT, resp failure, afib w RVR)  - sources: bacteremia- both bottles 4/5 and 4/6 + initial K pneumo  - unasyn on admit for aspiration broadened to zosyn/ vanc MRSA neg > zosyn only  - 4/5 and 4/6 both bcx + Klebsiella pneumoniae  - 4/7 sp US guided thora - fu results-> neg   - 4/7 CT a/p multi poss sources noted  - 4/8 obtaining repeat bcx w persistent GNR bacteremia (4/5 + K pneumo)-> so far NGTD  - echo no vegetations (done for afib)   - has port,   - off pressors   - abx per ID-> zosyn with likely transition to oral when appropriate  - fever 100.9 this am (4/11) repeat Bcx ordered        Acute resp failure w hypoxia/ hypercapnia  - on admit: fm pleural effusion/ anasarca, PNA/sepsis w severe acidosis on admit,   - now on RA  - CT neg for PE, lower ext dopplers neg for DVT (repeated on 4/11)     R Pleural  effusion  Malignant ascites   - sp tap 3/14 + malignant  - recurrent  - sp pulm done US guided 4/8 am d/w DR Story 1.3L cloudy yellow fluid   - f/u fluid studies-> positive for malignancy for both  - paracentesis on 4/9 -> positive for malignancy       Acute on chronic anemia   - last bsl 9.6 on 3/21/25 per onc from lynparza  - here down to 6.5 on 4/6/25 pm- with episode coffee ground emesis and on full AC  - held AC started IV PPI  - given pRBC 4/6 w holley response  - diet advanced  - hgb up and down, no signs of bleeding, no melena   - repeat H/H     Acute VTE  - + LLE DVT dx 3/21/25 (w/u bl LE swelling)  - repeat US here negative- cont to hold AC for now w hg drop concern re possible bleed  - CT neg for PE on 4/5  - IVC filter mentioned given poss GI bleeding  - discussed with Hematology (dr. Serrano) and pulmonary  - plan to hold off on IVC filter, and resume AC with heparin drip if hgb stable, as patient is high risk for DVT/PE off AC given active malignancy and we have not identified bleeding, if GI bleeding noted will need to consult GI     Afib w RVR  - reactive from septic shock but afib new dx  - improved but did have some RVR o/n switched from levophed to phenylephrine gtt  - seen by cards, echo done- ok  - cont on tele      Leukopenia  - improving      BRCA 1 + (somatic non genetic mutation) Ovarian adenocarcinoma w peritoneal carcinomatosis and malignant recurrent effusion/ ascites  - dx March 2024 sees Luana Serrano onc  - sp TAHBSO Aug 2024 w WALLY Choudhary at [facility]  - sp multi lines treatment most recently on lynparza 300 mg BID w avastin  - notified Dr Serrano on admit, updated on 4/11  - sp multi paracentesis some discussion outpatient regarding pleurx  - 4/8 repeat paracentesis done in house        MEGAN w hyponatremia, severe lactic acidosis  - on admit now all improved  - suspect from sepsis and N/V just prior to admit  - follow BMP     Hypokalemia  - repletion        LFT elevation  - suspect from above as  well  - trend- recheck labs         ACOSTA/ MDD  - home meds = venlafaxine and ativan PRN follows w psych outpatient  - requesting anxiety meds resume if safe pressures/ bp allowed restarted at lower dose monitor if tolerating  - fam proving support/ non pharmacologic measures at bedside     Chronic back pain  - hold home tramadol     ACP/ GOC- discussed with patient, spouse and sister at bedside. hcPOA - patient's daughter Ami - full code / full  measures at this time     FN:  IVF: none  Diet: adv diet     Prophylaxis:   DVT with SCD, resume heparin drip if hgb stable     Dispo: pending clinical course   Code status:  FULL    Discussed with family at bedside     Questions/concerns were discussed with patient and/or family by bedside.    Thank You,  Sriram Martel MD    Hospitalist with [facility]     Subjective:     No CP, SOB, or N/V.  Feeling better, feels weak, but overall improved    OBJECTIVE:    Blood pressure 108/57, pulse (!) 147, temperature 97.3 °F (36.3 °C), temperature source Oral, resp. rate 18, height 5' 7\" (1.702 m), weight 176 lb 6.4 oz (80 kg), last menstrual period 12/22/2020, SpO2 97%, not currently breastfeeding.    Temp:  [97.3 °F (36.3 °C)-100.9 °F (38.3 °C)] 97.3 °F (36.3 °C)  Pulse:  [] 147  Resp:  [18-22] 18  BP: (105-111)/(57-68) 108/57  SpO2:  [96 %-97 %] 97 %      Intake/Output:    Intake/Output Summary (Last 24 hours) at 4/11/2025 1147  Last data filed at 4/10/2025 2001  Gross per 24 hour   Intake 440 ml   Output 151 ml   Net 289 ml       Last 3 Weights   04/11/25 0700 176 lb 6.4 oz (80 kg)   04/08/25 0900 173 lb 8 oz (78.7 kg)   04/07/25 1000 172 lb 6.4 oz (78.2 kg)   04/06/25 0510 179 lb 0.2 oz (81.2 kg)   04/05/25 1551 177 lb 7.5 oz (80.5 kg)   04/05/25 0936 169 lb 15.6 oz (77.1 kg)   10/08/24 1840 170 lb (77.1 kg)   08/20/24 2201 178 lb 6.4 oz (80.9 kg)   08/13/24 0549 173 lb 6.4 oz (78.7 kg)   08/02/24 1128 180 lb (81.6 kg)       Exam    Gen: No acute distress, alert and  oriented x3, no focal neurologic deficits  Heent: NC AT, neck supple  Pulm: Lungs clear,    CV: Heart with regular rate and rhythm,    Abd: Abdomen soft, nontender, nondistended,    MSK: no clubbing, no cyanosis  Skin: no rashes or lesions         Data Review:       Labs:     Recent Labs   Lab 04/10/25  0559 04/10/25  1344 04/11/25  0514   RBC 2.60* 2.95* 2.48*   HGB 7.6* 8.5* 7.0*   HCT 22.6* 25.2* 21.6*   MCV 86.9 85.4 87.1   MCH 29.2 28.8 28.2   MCHC 33.6 33.7 32.4   RDW 18.2* 18.3* 19.0*   NEPRELIM 1.00* 1.34* 0.99*   WBC 2.0* 3.0* 2.0*   .0* 128.0* 109.0*         Recent Labs   Lab 04/09/25  0525 04/10/25  0559 04/11/25  0514   * 107* 113*   BUN 13 9 10   CREATSERUM 0.57 0.56 0.54*   EGFRCR 107 108 109   CA 7.6* 8.0* 7.7*    139 138   K 3.4*  3.4* 3.8  3.8 3.6  3.6    106 105   CO2 26.0 25.0 24.0       Recent Labs   Lab 04/05/25  1019 04/08/25  0538 04/09/25  0525 04/10/25  0559   ALT 39 46 29 24   AST 98* 56* 29 24   ALB 3.0* 2.8* 2.9* 3.2         Imaging:  US VENOUS DOPPLER LEG BILAT - DIAG IMG (CPT=93970)  Result Date: 4/11/2025  CONCLUSION:   No evidence of right or left lower extremity deep venous thrombosis.   elm-remote  Dictated by (CST): Michael Rogers MD on 4/11/2025 at 10:55 AM     Finalized by (CST): Michael Rogers MD on 4/11/2025 at 10:56 AM          XR CHEST AP PORTABLE  (CPT=71045)  Result Date: 4/10/2025  CONCLUSION:   No significant change in the bilateral pleural effusions with associated bibasilar opacities.    Dictated by (CST): Benoit Serra MD on 4/10/2025 at 7:38 AM     Finalized by (CST): Benoit Serra MD on 4/10/2025 at 7:41 AM          US PARACENTESIS W IMAGING (CPT=49083)  Result Date: 4/9/2025  CONCLUSION: Paracentesis as described.    Dictated by (CST): Denise Gee APN on 4/09/2025 at 7:59 AM     Finalized by (CST): Denise Gee APN on 4/09/2025 at 7:59 AM          XR CHEST AP PORTABLE  (CPT=71045)  Result Date: 4/9/2025  CONCLUSION:    Stable small bilateral pleural effusions with patchy bibasilar right greater than left opacity.    Dictated by (CST): Marco Antonio Kumar MD on 4/09/2025 at 7:25 AM     Finalized by (CST): Marco Antonio Kumar MD on 4/09/2025 at 7:26 AM              Meds:     Scheduled Medications[1]  Medication Infusions[2]  PRN Medications[3]           [1]    piperacillin-tazobactam  3.375 g Intravenous Q8H    potassium chloride  40 mEq Intravenous Once    magnesium sulfate  4 g Intravenous Once    vancomycin  125 mg Oral Daily    midodrine  5 mg Oral TID    pantoprazole  40 mg Intravenous Q12H   [2]    [Held by provider] continuous dose heparin Stopped (04/05/25 4653)   [3]   baclofen    LORazepam    acetaminophen    ondansetron    prochlorperazine    polyethylene glycol (PEG 3350)    sennosides    bisacodyl    fleet enema

## 2025-04-11 NOTE — PHYSICAL THERAPY NOTE
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      Patient is requiring supervision and stand-by assist as a result of the following impairments: decreased functional strength, decreased endurance/aerobic capacity, decreased muscular endurance, and medical status.     Patient continues to function below baseline with bed mobility, transfers, gait, stair negotiation, standing prolonged periods, and performing household tasks.  Next session anticipate patient to progress bed mobility, transfers, gait, and stair negotiation.  Physical Therapy will continue to follow patient for duration of hospitalization.    Patient continues to benefit from continued skilled PT services: at discharge to promote prior level of function and safety with additional support and return home with home health PT.    PLAN DURING HOSPITALIZATION  Nursing Mobility Recommendation : 1 Assist     PT Treatment Plan: Bed mobility, Endurance, Energy conservation, Family education, Patient education, Gait training, Strengthening, Transfer training, Balance training  Frequency (Obs): 5x/week     SUBJECTIVE  I'm just so tired     OBJECTIVE  Precautions: Bed/chair alarm      PAIN ASSESSMENT   Rating:  (denies pain)  Location: body aches and abdominal pain unrated       BALANCE  Static Sitting: Good  Dynamic Sitting: Fair +  Static Standing: Fair  Dynamic  Standing: Fair -       AM-PAC '6-Clicks' INPATIENT SHORT FORM - BASIC MOBILITY  How much difficulty does the patient currently have...  Patient Difficulty: Turning over in bed (including adjusting bedclothes, sheets and blankets)?: A Little   Patient Difficulty: Sitting down on and standing up from a chair with arms (e.g., wheelchair, bedside commode, etc.): A Little   Patient Difficulty: Moving from lying on back to sitting on the side of the bed?: A Little   How much help from another person does the patient currently need...   Help from Another: Moving to and from a bed to a chair (including a wheelchair)?: A Little   Help from Another: Need to walk in hospital room?: A Little   Help from Another: Climbing 3-5 steps with a railing?: A Little     AM-PAC Score:  Raw Score: 18   Approx Degree of Impairment: 46.58%   Standardized Score (AM-PAC Scale): 43.63   CMS Modifier (G-Code): CK    FUNCTIONAL ABILITY STATUS  Functional Mobility/Gait Assessment  Gait Assistance:  (SBA)  Distance (ft): 30 ft  Assistive Device: Rolling walker  Pattern: Shuffle (slow pace, guarded posture)  Rolling: supervision  Supine to Sit: supervision  Sit to Supine: supervision  Sit to Stand: supervision to RW    Skilled Therapy Provided: Pt rec'd in bed with family at bedside, agreeable to therapy, identified by name and . Coordinated session with OT to maximize pt outcomes. Pt reporting being very fatigued, denies pain. Pt able to perform bed mobility and STS transfers with Supervision and RW, ambulated short distance with RW and SBA, demos slow but steady gait, limited by fatigue and medical status. Pt back in bed and encouraged to ambulate additional times today for carryover to functional strength with ADLs.       The patient's Approx Degree of Impairment: 46.58% has been calculated based on documentation in the [facility] '6 clicks' Inpatient Daily Activity Short Form.  Research supports that patients with this level of impairment may  benefit from HHPT.  Final disposition will be made by interdisciplinary medical team.    Patient End of Session: In bed, Needs met, Call light within reach, RN aware of session/findings, Family present    CURRENT GOALS   Goals to be met by: 5/1  Patient Goal Patient's self-stated goal is: unstated    Goal #1 Patient is able to demonstrate supine - sit EOB @ level: supervision      Goal #1   Current Status  goal met 4/11/25   Goal #2 Patient is able to demonstrate transfers Sit to/from Stand at assistance level: supervision with walker - rolling      Goal #2  Current Status  goal met 4/11/25   Goal #3 Patient is able to ambulate 150 feet with assist device: walker - rolling at assistance level: supervision   Goal #3   Current Status  in progress               Goal #5 Patient to demonstrate independence with home activity/exercise instructions provided to patient in preparation for discharge.   Goal #5   Current Status  ongoing    Goal #6     Goal #6  Current Status       Gait Training: 15 minutes

## 2025-04-11 NOTE — PROGRESS NOTES
[facility]  part of [facility] Infectious Disease Progress Note    Yue Malin Patient Status:  Inpatient    1970 MRN [id number]   Location [facility] 4W/SW/SE Attending Sriram Martel MD   Hosp Day # 6 PCP Diana Black MD     Subjective:  Pt feeling better.    Objective:    Allergies:  Allergies[1]    Medications:  Current Hospital Medications[2]    Physical Exam:  General: Alert, orientated x3.  Cooperative.  No apparent distress.  Vital Signs:  Blood pressure 108/57, pulse 89, temperature 97.3 °F (36.3 °C), temperature source Oral, resp. rate 18, height 170.2 cm (5' 7\"), weight 176 lb 6.4 oz (80 kg), last menstrual period 2020, SpO2 97%, not currently breastfeeding.   Temp (24hrs), Av °F (37.2 °C), Min:97.3 °F (36.3 °C), Max:100.9 °F (38.3 °C)      HEENT: Exam is unremarkable.  Without scleral icterus.  Mucous membranes are moist. PERRLA.  Oropharynx is clear.  Neck: No tenderness to palpitation.  Full range of motion to flexion and extension, lateral rotation and lateral flexion of cervical spine.  No JVD. Supple.   Lungs: Clear to auscultation bilaterally.  Cardiac: Regular rate and rhythm. No murmur.  Abdomen:  Soft, non-distended, non-tender, with no rebound or guarding.   Extremities:  No lower extremity edema noted.  Without clubbing or cyanosis.    Skin: Normal texture and turgor.  Neurologic: Cranial nerves are grossly intact.  Motor strength and sensory examination is grossly normal.  No focal neurologic deficit.    Labs:  Lab Results   Component Value Date    WBC 2.0 2025    HGB 7.0 2025    HCT 21.6 2025    .0 2025    CREATSERUM 0.54 2025    BUN 10 2025     2025    K 3.6 2025    K 3.6 2025     2025    CO2 24.0 2025     2025    CA 7.7 2025    MG 1.5 2025         Assessment/Plan:    1.  Gram negative sepsis  -blood culture with klebsiella on  4/5 and 4/6, repeat on 4/8 NGTD  -likely GI source vs pulmonary vs other  -CT with ascites and possible peritonitis s/p paracentesis, fluid not consistent with infection, cultures NGTD  -R pleural effusion s/p thoracentesis on 4/8, cultures NG  -UA clean  -on IV zosyn d#5  2.  Hx of metastatic ovarian CA with malignant pleural effusion and peritoneal carcinomatosis  3.  Dispo  -anticipate dc on PO    If you have any questions or concerns please call [facility] Infectious Disease at [phone].     Martin Manzano, APRN         [1]   Allergies  Allergen Reactions    Hydrocodone NAUSEA AND VOMITING     agitation    Adhesive Tape RASH   [2]   Current Facility-Administered Medications:     [COMPLETED] piperacillin-tazobactam (Zosyn) 4.5 g in dextrose 5% 100 mL IVPB-ADDV, 4.5 g, Intravenous, Once **FOLLOWED BY** piperacillin-tazobactam (Zosyn) 3.375 g in dextrose 5% 100 mL IVPB-ADDV, 3.375 g, Intravenous, Q8H    potassium chloride 40 mEq in 250mL sodium chloride 0.9% IVPB premix, 40 mEq, Intravenous, Once    magnesium sulfate 4 g/100mL IVPB premix 4 g, 4 g, Intravenous, Once    baclofen (Lioresal) tab 5 mg, 5 mg, Oral, TID PRN    vancomycin (Vancocin) cap 125 mg, 125 mg, Oral, Daily    LORazepam (Ativan) tab 0.5 mg, 0.5 mg, Oral, TID PRN    midodrine (ProAmatine) tab 5 mg, 5 mg, Oral, TID    [Held by provider] heparin (Porcine) 43991 units/250mL infusion PE/DVT/THROMBUS CONTINUOUS, 200-3,000 Units/hr, Intravenous, Continuous    acetaminophen (Tylenol Extra Strength) tab 500 mg, 500 mg, Oral, Q4H PRN    ondansetron (Zofran) 4 MG/2ML injection 4 mg, 4 mg, Intravenous, Q6H PRN    prochlorperazine (Compazine) 10 MG/2ML injection 5 mg, 5 mg, Intravenous, Q8H PRN    polyethylene glycol (PEG 3350) (Miralax) 17 g oral packet 17 g, 17 g, Oral, Daily PRN    sennosides (Senokot) tab 17.2 mg, 17.2 mg, Oral, Nightly PRN    bisacodyl (Dulcolax) 10 MG rectal suppository 10 mg, 10 mg, Rectal, Daily PRN    fleet enema (Fleet)  rectal enema 133 mL, 1 enema, Rectal, Once PRN    pantoprazole (Protonix) 40 mg in sodium chloride 0.9% PF 10 mL IV push, 40 mg, Intravenous, Q12H

## 2025-04-12 LAB
ANION GAP SERPL CALC-SCNC: 9 MMOL/L (ref 0–18)
APTT PPP: 29.4 SECONDS (ref 23–36)
APTT PPP: 70.4 SECONDS (ref 23–36)
BACTERIA BLD CULT: POSITIVE
BASOPHILS # BLD AUTO: 0.03 X10(3) UL (ref 0–0.2)
BASOPHILS NFR BLD AUTO: 1.1 %
BUN BLD-MCNC: 9 MG/DL (ref 9–23)
BUN/CREAT SERPL: 18 (ref 10–20)
CALCIUM BLD-MCNC: 8 MG/DL (ref 8.7–10.4)
CHLORIDE SERPL-SCNC: 105 MMOL/L (ref 98–112)
CO2 SERPL-SCNC: 23 MMOL/L (ref 21–32)
CREAT BLD-MCNC: 0.5 MG/DL (ref 0.55–1.02)
DEPRECATED RDW RBC AUTO: 49.9 FL (ref 35.1–46.3)
EGFRCR SERPLBLD CKD-EPI 2021: 111 ML/MIN/1.73M2 (ref 60–?)
EOSINOPHIL # BLD AUTO: 0.04 X10(3) UL (ref 0–0.7)
EOSINOPHIL NFR BLD AUTO: 1.5 %
ERYTHROCYTE [DISTWIDTH] IN BLOOD BY AUTOMATED COUNT: 18.8 % (ref 11–15)
GLUCOSE BLD-MCNC: 103 MG/DL (ref 70–99)
HCT VFR BLD AUTO: 26.9 % (ref 35–48)
HGB BLD-MCNC: 9.1 G/DL (ref 12–16)
IMM GRANULOCYTES # BLD AUTO: 0.03 X10(3) UL (ref 0–1)
IMM GRANULOCYTES NFR BLD: 1.1 %
LYMPHOCYTES # BLD AUTO: 1.35 X10(3) UL (ref 1–4)
LYMPHOCYTES NFR BLD AUTO: 49.1 %
MAGNESIUM SERPL-MCNC: 1.9 MG/DL (ref 1.6–2.6)
MCH RBC QN AUTO: 29.3 PG (ref 26–34)
MCHC RBC AUTO-ENTMCNC: 33.8 G/DL (ref 31–37)
MCV RBC AUTO: 86.5 FL (ref 80–100)
MONOCYTES # BLD AUTO: 0.32 X10(3) UL (ref 0.1–1)
MONOCYTES NFR BLD AUTO: 11.6 %
NEUTROPHILS # BLD AUTO: 0.98 X10 (3) UL (ref 1.5–7.7)
NEUTROPHILS # BLD AUTO: 0.98 X10(3) UL (ref 1.5–7.7)
NEUTROPHILS NFR BLD AUTO: 35.6 %
OSMOLALITY SERPL CALC.SUM OF ELEC: 283 MOSM/KG (ref 275–295)
PLATELET # BLD AUTO: 127 10(3)UL (ref 150–450)
POTASSIUM SERPL-SCNC: 3.6 MMOL/L (ref 3.5–5.1)
POTASSIUM SERPL-SCNC: 3.6 MMOL/L (ref 3.5–5.1)
RBC # BLD AUTO: 3.11 X10(6)UL (ref 3.8–5.3)
SODIUM SERPL-SCNC: 137 MMOL/L (ref 136–145)
WBC # BLD AUTO: 2.8 X10(3) UL (ref 4–11)

## 2025-04-12 RX ORDER — LORAZEPAM 0.5 MG/1
1 TABLET ORAL ONCE
Status: COMPLETED | OUTPATIENT
Start: 2025-04-12 | End: 2025-04-12

## 2025-04-12 RX ORDER — HEPARIN SODIUM AND DEXTROSE 10000; 5 [USP'U]/100ML; G/100ML
INJECTION INTRAVENOUS CONTINUOUS
Status: DISCONTINUED | OUTPATIENT
Start: 2025-04-12 | End: 2025-04-13

## 2025-04-12 RX ORDER — HEPARIN SODIUM AND DEXTROSE 10000; 5 [USP'U]/100ML; G/100ML
18 INJECTION INTRAVENOUS ONCE
Status: COMPLETED | OUTPATIENT
Start: 2025-04-12 | End: 2025-04-12

## 2025-04-12 RX ORDER — HEPARIN SODIUM 1000 [USP'U]/ML
80 INJECTION, SOLUTION INTRAVENOUS; SUBCUTANEOUS ONCE
Status: COMPLETED | OUTPATIENT
Start: 2025-04-12 | End: 2025-04-12

## 2025-04-12 NOTE — PROGRESS NOTES
Patient endorsed from [facility] for the last 4 hours of shift. No acute change. Continuing heparin drip at 14 ml/hr. Voiding freely. Call light in reach. Frequent rounding performed.

## 2025-04-12 NOTE — PLAN OF CARE
Problem: GASTROINTESTINAL - ADULT  Goal: Minimal or absence of nausea and vomiting  Description: INTERVENTIONS:- Maintain adequate hydration with IV or PO as ordered and tolerated- Nasogastric tube to low intermittent suction as ordered- Evaluate effectiveness of ordered antiemetic medications- Provide nonpharmacologic comfort measures as appropriate- Advance diet as tolerated, if ordered- Obtain nutritional consult as needed- Evaluate fluid balance  4/12/2025 0652 by Gain Girard, RN  Outcome: Progressing  4/12/2025 0651 by Gian Girard, RN  Outcome: Progressing  Goal: Maintains or returns to baseline bowel function  Description: INTERVENTIONS:- Assess bowel function- Maintain adequate hydration with IV or PO as ordered and tolerated- Evaluate effectiveness of GI medications- Encourage mobilization and activity- Obtain nutritional consult as needed- Establish a toileting routine/schedule- Consider collaborating with pharmacy to review patient's medication profile  Outcome: Progressing

## 2025-04-12 NOTE — PROGRESS NOTES
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 diagnosed March 2024 sp TAHBSO sp multiple courses treatment currently on lynparza, associated recurrent malignant ascites sp multiple paracenteses, malignant R effusion sp thoracentesis 3/14/25 (1.3L removed), bl LE swelling and LLE DVT dx 3/21/25 on AC here with septic shock with MSOF- MEGAN, lactic acidosis LFT elevation, afib w RVR, acute resp failure with hypoxia/ hypercapnia on bipap     Septic shock,   Klebsiella pneumoniae lung v gut source  - on admit severe w shock w MSOF (sever lactic acidosis on admit, MEGAN/ LFT, resp failure, afib w RVR)  - sources: bacteremia- both bottles 4/5 and 4/6 + initial K pneumo  - unasyn on admit for aspiration broadened to zosyn/ vanc MRSA neg > zosyn only  - 4/5 and 4/6 both bcx + Klebsiella pneumoniae  - 4/7 sp US guided thora - fu results-> neg   - 4/7 CT a/p multi poss sources noted  - 4/8 obtaining repeat bcx w persistent GNR bacteremia (4/5 + K pneumo)-> so far NGTD  - echo no vegetations (done for afib)   - has port,   - off pressors   - abx per ID-> zosyn with likely transition to oral when appropriate  - fever 100.9 (4/11) repeat Bcx ordered-> NGTD  - afebrile today         Acute resp failure w hypoxia/ hypercapnia  - on admit: fm pleural effusion/ anasarca, PNA/sepsis w severe acidosis on admit,   - now on RA  - CT neg for PE, lower ext dopplers neg for DVT (repeated on 4/11)      R Pleural effusion  Malignant ascites   - sp tap 3/14 + malignant  - recurrent  - sp pulm done US guided 4/8 am d/w DR Story 1.3L cloudy yellow fluid   - f/u fluid studies-> positive for malignancy for both  - paracentesis on 4/9 -> positive for malignancy       Acute on chronic anemia   - last bsl 9.6 on 3/21/25 per onc from lynparza  - here down to 6.5 on 4/6/25 pm- with episode coffee ground emesis and on full AC  - held AC started IV PPI  - given pRBC 4/6 w holley response  - diet advanced  - hgb up and down, no signs of bleeding, no melena   - repeat H/H-> hgb 7.1, gave 1 unit on 4/11 -> hgb 9.2   - monitor while on heparin drip     Acute VTE  - + LLE DVT dx 3/21/25 (w/u bl LE swelling)  - repeat US here negative- cont to hold AC for now w hg drop concern re possible bleed  - CT neg for PE on 4/5  - IVC filter mentioned given poss GI bleeding  - discussed with Hematology (dr. Serrano) and pulmonary  - plan to hold off on IVC filter, and resume AC with heparin drip as hgb stable, as patient is high risk for DVT/PE off AC given active malignancy and we have not identified bleeding, if GI bleeding noted will need to consult GI  - heparin drip to start 4/12, if no bleeding transition to oral eliquis in am     Afib w RVR  - reactive from septic shock but afib new dx  - improved but did have some RVR o/n switched from levophed to phenylephrine gtt  - seen by cards, echo done- ok  - cont on tele      Leukopenia  - improving      BRCA 1 + (somatic non genetic mutation) Ovarian adenocarcinoma w peritoneal carcinomatosis and malignant recurrent effusion/ ascites  - dx March 2024 sees Luana Serrano onc  - sp TAHBSO Aug 2024 w WALLY Choudhary at [facility]  - sp multi lines treatment most recently on lynparza 300 mg BID w avastin  - notified Dr Serrano on admit, updated on 4/11  - sp multi paracentesis some discussion outpatient regarding pleurx  - 4/8 repeat paracentesis done in house        MEGAN w hyponatremia, severe lactic acidosis  -  on admit now all improved  - suspect from sepsis and N/V just prior to admit  - follow BMP     Hypokalemia  - repletion        LFT elevation  - suspect from above as well  - trend- recheck labs         ACOSTA/ MDD  - home meds = venlafaxine and ativan PRN follows w psych outpatient  - requesting anxiety meds resume if safe pressures/ bp allowed restarted at lower dose monitor if tolerating  - fam proving support/ non pharmacologic measures at bedside     Chronic back pain  - hold home tramadol     ACP/ GOC- discussed with patient, spouse and sister at bedside. hcPOA - patient's daughter Ami - full code / full  measures at this time     FN:  IVF: none  Diet: adv diet     Prophylaxis:   DVT with SCD, heparin drip    Dispo: pending clinical course   Code status:  FULL    Discussed with family at bedside     Questions/concerns were discussed with patient and/or family by bedside.    Thank You,  Sriram Martel MD    Hospitalist with [facility]     Subjective:     No CP, SOB, or N/V.  Feeling better, feels weak, but overall improved    OBJECTIVE:    Blood pressure 119/74, pulse 99, temperature 98.2 °F (36.8 °C), temperature source Oral, resp. rate 20, height 5' 7\" (1.702 m), weight 176 lb 6.4 oz (80 kg), last menstrual period 12/22/2020, SpO2 96%, not currently breastfeeding.    Temp:  [97.9 °F (36.6 °C)-99.4 °F (37.4 °C)] 98.2 °F (36.8 °C)  Pulse:  [] 99  Resp:  [16-20] 20  BP: (111-121)/(59-74) 119/74  SpO2:  [96 %-98 %] 96 %      Intake/Output:    Intake/Output Summary (Last 24 hours) at 4/12/2025 1109  Last data filed at 4/12/2025 0616  Gross per 24 hour   Intake 475.5 ml   Output 900 ml   Net -424.5 ml       Last 3 Weights   04/11/25 0700 176 lb 6.4 oz (80 kg)   04/08/25 0900 173 lb 8 oz (78.7 kg)   04/07/25 1000 172 lb 6.4 oz (78.2 kg)   04/06/25 0510 179 lb 0.2 oz (81.2 kg)   04/05/25 1551 177 lb 7.5 oz (80.5 kg)   04/05/25 0936 169 lb 15.6 oz (77.1 kg)   10/08/24 1840 170 lb (77.1 kg)   08/20/24 2201 178  lb 6.4 oz (80.9 kg)   08/13/24 0549 173 lb 6.4 oz (78.7 kg)   08/02/24 1128 180 lb (81.6 kg)       Exam    Gen: No acute distress, alert and oriented x3, no focal neurologic deficits  Heent: NC AT, neck supple  Pulm: Lungs clear,    CV: Heart with regular rate and rhythm,    Abd: Abdomen soft, nontender, nondistended,    MSK: no clubbing, no cyanosis  Skin: no rashes or lesions         Data Review:       Labs:     Recent Labs   Lab 04/10/25  1344 04/11/25  0514 04/11/25  1310 04/12/25  0619   RBC 2.95* 2.48*  --  3.11*   HGB 8.5* 7.0* 7.1* 9.1*   HCT 25.2* 21.6* 21.3* 26.9*   MCV 85.4 87.1  --  86.5   MCH 28.8 28.2  --  29.3   MCHC 33.7 32.4  --  33.8   RDW 18.3* 19.0*  --  18.8*   NEPRELIM 1.34* 0.99*  --  0.98*   WBC 3.0* 2.0*  --  2.8*   .0* 109.0*  --  127.0*         Recent Labs   Lab 04/10/25  0559 04/11/25  0514 04/12/25  0619   * 113* 103*   BUN 9 10 9   CREATSERUM 0.56 0.54* 0.50*   EGFRCR 108 109 111   CA 8.0* 7.7* 8.0*    138 137   K 3.8  3.8 3.6  3.6 3.6  3.6    105 105   CO2 25.0 24.0 23.0       Recent Labs   Lab 04/08/25  0538 04/09/25  0525 04/10/25  0559   ALT 46 29 24   AST 56* 29 24   ALB 2.8* 2.9* 3.2         Imaging:  US VENOUS DOPPLER LEG BILAT - DIAG IMG (CPT=93970)  Result Date: 4/11/2025  CONCLUSION:   No evidence of right or left lower extremity deep venous thrombosis.   elm-remote  Dictated by (CST): Michael Rogers MD on 4/11/2025 at 10:55 AM     Finalized by (CST): Michael Rogers MD on 4/11/2025 at 10:56 AM          XR CHEST AP PORTABLE  (CPT=71045)  Result Date: 4/10/2025  CONCLUSION:   No significant change in the bilateral pleural effusions with associated bibasilar opacities.    Dictated by (CST): Benoit Serra MD on 4/10/2025 at 7:38 AM     Finalized by (CST): Benoit Serra MD on 4/10/2025 at 7:41 AM              Meds:     Scheduled Medications[1]  Medication Infusions[2]  PRN Medications[3]           [1]    potassium chloride  40 mEq Intravenous Once     piperacillin-tazobactam  3.375 g Intravenous Q8H    vancomycin  125 mg Oral Daily    midodrine  5 mg Oral TID    pantoprazole  40 mg Intravenous Q12H   [2]    continuous dose heparin     [3]   baclofen    LORazepam    acetaminophen    ondansetron    prochlorperazine    polyethylene glycol (PEG 3350)    sennosides    bisacodyl    fleet enema

## 2025-04-12 NOTE — PROGRESS NOTES
[facility]  part of [facility] Infectious Disease  Progress Note    Yue Malin Patient Status:  Inpatient    1970 MRN [id number]   Location [facility] 4W/SW/SE Attending Sriram Martel MD   Hosp Day # 7 PCP Diana Black MD     Subjective:  Patient seen/examined.  Up in bed in NAD.  No F/C.  No new issues.  Continues to slowly improve.    Objective:  Blood pressure 119/74, pulse 99, temperature 98.2 °F (36.8 °C), temperature source Oral, resp. rate 20, height 5' 7\" (1.702 m), weight 176 lb 6.4 oz (80 kg), last menstrual period 2020, SpO2 96%, not currently breastfeeding.    Intake/Output:    Intake/Output Summary (Last 24 hours) at 2025 1038  Last data filed at 2025 0616  Gross per 24 hour   Intake 475.5 ml   Output 900 ml   Net -424.5 ml       Physical Exam:  General: Awake, alert, non-tox, NAD.  HEENT:  Oropharynx clear, trachea ML.  Heart: RRR S1S2 no murmurs.  Lungs: Essentially CTA b/l, no rhonchi, rales, wheezes.  Abdomen: Soft, NT/ND.  BS present.  No organomegaly.  Extremity: No edema.  Neurological: No focal deficits.  Derm:  Warm, dry, free from rashes.    Lab Data Review:  Lab Results   Component Value Date    WBC 2.8 2025    HGB 9.1 2025    HCT 26.9 2025    .0 2025    CREATSERUM 0.50 2025    BUN 9 2025     2025    K 3.6 2025    K 3.6 2025     2025    CO2 23.0 2025     2025    CA 8.0 2025    PTT 29.4 2025    MG 1.9 2025         Cultures:  2/2 blood cultures + klebsiella, eggerthella 25  2/2 blood cultures + klebsiella 25     Blood cultures 25 negative     MRSA swab negative     Radiology:  CONCLUSION:   1. History of metastatic left ovarian carcinoma with peritoneal carcinomatosis.  Moderate to large volume intra-abdominal ascites is new since comparison abdominal CT imaging from  and may  be malignant in nature. Ascitic fluid demonstrates   peripheral/peritoneal thickening; therefore, the possibility of coexistent spontaneous bacterial peritonitis is raised.  Mild stranding in the central small bowel mesentery is nonspecific, but raises suspicion for peritoneal carcinomatosis.   2. Scattered mildly dilated and fluid-filled proximal small bowel loops, but with no discrete transition point.  Findings suggest either an infectious/inflammatory enteritis or a generalized adynamic ileus.  No definite high-grade small bowel   obstruction.  Mild long segment proximal colonic wall thickening probably relates to a coexistent infectious/inflammatory colitis.   3. Stable postoperative changes of the distal colon with associated anastomosis.   4. Stable probable small left hepatic lobe cyst.   5. Mild nonspecific gallbladder luminal distention, but with no CT findings of acute cholecystitis.   6. Nonspecific fluid distention of the distal esophagus, please correlate for reflux or other etiologies of esophagitis.   7. Enlarged anterior mediastinal/prevascular space lymph nodes could be metastatic.   8. Loculated moderate bilateral pleural effusions, which may be malignant.  Associated dependent airspace disease in both lower lobes, which is favored to represent compressive atelectasis.  Small 6 mm right upper lobe lung nodules are unchanged since   recent prior chest CT.   9. Anasarca.   10. Partially imaged bilateral breast implants.   11. Lesser incidental findings as above.        Antibiotics Reviewed:  Zosyn     Assessment and Plan:     Acute gram negative sepsis in this immunocompromised patient presenting with SOB, N/V, and loss of appetite  - 2/2 blood cultures positive 4/5/25 and 4/6/25 for klebsiella spp, repeats pending   - Source related to occult intraabdominal focus vs. Primary/secondary infected port (no gross s/s of infection) vs. Pulmonary vs. Other  - s/p thoracentesis 4/8/25 with 1300cc removed,  cultures negative  - CT reviewed with ascites and concern for possible peritonitis, s/p paracentesis without criteria for SBP, cultures negative thus far  - UA bland  - IV zosyn ongoing day #5/14 since last positive culture     2.  Metastatic ovarian cancer with malignant pleural effusion and peritoneal carcinomatosis  - Suspect gut translocation of bacteria as etiology of events without evidence of SBP     3.  Anemia, leukopenia due to the above     4.  Disposition - inpatient.  Continue IV zosyn as Rx.   Although 4 sets of blood cultures are positive, no gross s/s mediport involvement.  Repeat blood cultures remain negative.  Trending temps and WBCs.  Plan for eventual discharge on cefuroxime 500mg p.o. BID to complete a 14 day course as above.  Supportive care ongoing.  Will follow.    Karen Thomason DO, [facility] Infectious Disease  [phone]    4/12/2025  10:38 AM

## 2025-04-12 NOTE — PLAN OF CARE
Patient alert and oriented, vitals stable. Denies pain. Tolerating diet. Continues with IV abx. Heparin drip initiated, next ptt draw at 5 pm. Up to chair and bathroom with standby assist and walker. Call light within reach, has been calling appropriately for assist.    Problem: CARDIOVASCULAR - ADULT  Goal: Absence of cardiac arrhythmias or at baseline  Description: INTERVENTIONS:- Continuous cardiac monitoring, monitor vital signs, obtain 12 lead EKG if indicated- Evaluate effectiveness of antiarrhythmic and heart rate control medications as ordered- Initiate emergency measures for life threatening arrhythmias- Monitor electrolytes and administer replacement therapy as ordered  Outcome: Progressing  Goal: Maintains optimal cardiac output and hemodynamic stability  Description: INTERVENTIONS:- Monitor vital signs, rhythm, and trends- Monitor for bleeding, hypotension and signs of decreased cardiac output- Evaluate effectiveness of vasoactive medications to optimize hemodynamic stability- Monitor arterial and/or venous puncture sites for bleeding and/or hematoma- Assess quality of pulses, skin color and temperature- Assess for signs of decreased coronary artery perfusion - ex. Angina- Evaluate fluid balance, assess for edema, trend weights  Outcome: Progressing     Problem: RESPIRATORY - ADULT  Goal: Achieves optimal ventilation and oxygenation  Description: INTERVENTIONS:- Assess for changes in respiratory status- Assess for changes in mentation and behavior- Position to facilitate oxygenation and minimize respiratory effort- Oxygen supplementation based on oxygen saturation or ABGs- Provide Smoking Cessation handout, if applicable- Encourage broncho-pulmonary hygiene including cough, deep breathe, Incentive Spirometry- Assess the need for suctioning and perform as needed- Assess and instruct to report SOB or any respiratory difficulty- Respiratory Therapy support as indicated- Manage/alleviate anxiety- Monitor for  signs/symptoms of CO2 retention  Outcome: Progressing     Problem: GASTROINTESTINAL - ADULT  Goal: Minimal or absence of nausea and vomiting  Description: INTERVENTIONS:- Maintain adequate hydration with IV or PO as ordered and tolerated- Nasogastric tube to low intermittent suction as ordered- Evaluate effectiveness of ordered antiemetic medications- Provide nonpharmacologic comfort measures as appropriate- Advance diet as tolerated, if ordered- Obtain nutritional consult as needed- Evaluate fluid balance  Outcome: Progressing  Goal: Maintains or returns to baseline bowel function  Description: INTERVENTIONS:- Assess bowel function- Maintain adequate hydration with IV or PO as ordered and tolerated- Evaluate effectiveness of GI medications- Encourage mobilization and activity- Obtain nutritional consult as needed- Establish a toileting routine/schedule- Consider collaborating with pharmacy to review patient's medication profile  Outcome: Progressing  Goal: Maintains adequate nutritional intake (undernourished)  Description: INTERVENTIONS:- Monitor percentage of each meal consumed- Identify factors contributing to decreased intake, treat as appropriate- Assist with meals as needed- Monitor I&O, WT and lab values- Obtain nutritional consult as needed- Optimize oral hygiene and moisture- Encourage food from home; allow for food preferences- Enhance eating environment  Outcome: Progressing  Goal: Achieves appropriate nutritional intake (bariatric)  Description: INTERVENTIONS:- Monitor for over-consumption- Identify factors contributing to increased intake, treat as appropriate- Monitor I&O, WT and lab values- Obtain nutritional consult as needed- Evaluate psychosocial factors contributing to over-consumption  Outcome: Progressing     Problem: GENITOURINARY - ADULT  Goal: Absence of urinary retention  Description: INTERVENTIONS:- Assess patient’s ability to void and empty bladder- Monitor intake/output and perform  bladder scan as needed- Follow urinary retention protocol/standard of care- Consider collaborating with pharmacy to review patient's medication profile- Implement strategies to promote bladder emptying  Outcome: Progressing     Problem: SKIN/TISSUE INTEGRITY - ADULT  Goal: Skin integrity remains intact  Description: INTERVENTIONS- Assess and document risk factors for pressure ulcer development- Assess and document skin integrity- Monitor for areas of redness and/or skin breakdown- Initiate interventions, skin care algorithm/standards of care as needed  Outcome: Progressing     Problem: HEMATOLOGIC - ADULT  Goal: Maintains hematologic stability  Description: INTERVENTIONS- Assess for signs and symptoms of bleeding or hemorrhage- Monitor labs and vital signs for trends- Administer supportive blood products/factors, fluids and medications as ordered and appropriate- Administer supportive blood products/factors as ordered and appropriate  Outcome: Progressing  Goal: Free from bleeding injury  Description: (Example usage: patient with low platelets)INTERVENTIONS:- Avoid intramuscular injections, enemas and rectal medication administration- Ensure safe mobilization of patient- Hold pressure on venipuncture sites to achieve adequate hemostasis- Assess for signs and symptoms of internal bleeding- Monitor lab trends- Patient is to report abnormal signs of bleeding to staff- Avoid use of toothpicks and dental floss- Use electric shaver for shaving- Use soft bristle tooth brush- Limit straining and forceful nose blowing  Outcome: Progressing     Problem: Patient Centered Care  Goal: Patient preferences are identified and integrated in the patient's plan of care  Description: Interventions:- What would you like us to know as we care for you? - Provide timely, complete, and accurate information to patient/family- Incorporate patient and family knowledge, values, beliefs, and cultural backgrounds into the planning and delivery of  care- Encourage patient/family to participate in care and decision-making at the level they choose- Honor patient and family perspectives and choices  Outcome: Progressing

## 2025-04-13 LAB
ALBUMIN SERPL-MCNC: 3 G/DL (ref 3.2–4.8)
ALBUMIN/GLOB SERPL: 1.3 {RATIO} (ref 1–2)
ALP LIVER SERPL-CCNC: 95 U/L (ref 41–108)
ALT SERPL-CCNC: 22 U/L (ref 10–49)
ANION GAP SERPL CALC-SCNC: 8 MMOL/L (ref 0–18)
APTT PPP: 55.6 SECONDS (ref 23–36)
AST SERPL-CCNC: 20 U/L (ref ?–34)
BASOPHILS # BLD AUTO: 0.02 X10(3) UL (ref 0–0.2)
BASOPHILS NFR BLD AUTO: 0.8 %
BILIRUB SERPL-MCNC: 0.4 MG/DL (ref 0.3–1.2)
BLOOD TYPE BARCODE: 9500
BUN BLD-MCNC: 7 MG/DL (ref 9–23)
BUN/CREAT SERPL: 13 (ref 10–20)
CALCIUM BLD-MCNC: 7.8 MG/DL (ref 8.7–10.4)
CHLORIDE SERPL-SCNC: 106 MMOL/L (ref 98–112)
CO2 SERPL-SCNC: 23 MMOL/L (ref 21–32)
CREAT BLD-MCNC: 0.54 MG/DL (ref 0.55–1.02)
DEPRECATED RDW RBC AUTO: 53.1 FL (ref 35.1–46.3)
EGFRCR SERPLBLD CKD-EPI 2021: 109 ML/MIN/1.73M2 (ref 60–?)
EOSINOPHIL # BLD AUTO: 0.02 X10(3) UL (ref 0–0.7)
EOSINOPHIL NFR BLD AUTO: 0.8 %
ERYTHROCYTE [DISTWIDTH] IN BLOOD BY AUTOMATED COUNT: 20.4 % (ref 11–15)
GLOBULIN PLAS-MCNC: 2.3 G/DL (ref 2–3.5)
GLUCOSE BLD-MCNC: 119 MG/DL (ref 70–99)
HCT VFR BLD AUTO: 25 % (ref 35–48)
HCT VFR BLD AUTO: 25.6 % (ref 35–48)
HGB BLD-MCNC: 8.2 G/DL (ref 12–16)
HGB BLD-MCNC: 8.3 G/DL (ref 12–16)
IMM GRANULOCYTES # BLD AUTO: 0.04 X10(3) UL (ref 0–1)
IMM GRANULOCYTES NFR BLD: 1.5 %
INR BLD: 1.06 (ref 0.8–1.2)
LYMPHOCYTES # BLD AUTO: 0.99 X10(3) UL (ref 1–4)
LYMPHOCYTES NFR BLD AUTO: 37.8 %
MAGNESIUM SERPL-MCNC: 1.6 MG/DL (ref 1.6–2.6)
MCH RBC QN AUTO: 28.9 PG (ref 26–34)
MCHC RBC AUTO-ENTMCNC: 33.2 G/DL (ref 31–37)
MCV RBC AUTO: 87.1 FL (ref 80–100)
MONOCYTES # BLD AUTO: 0.46 X10(3) UL (ref 0.1–1)
MONOCYTES NFR BLD AUTO: 17.6 %
NEUTROPHILS # BLD AUTO: 1.09 X10 (3) UL (ref 1.5–7.7)
NEUTROPHILS # BLD AUTO: 1.09 X10(3) UL (ref 1.5–7.7)
NEUTROPHILS NFR BLD AUTO: 41.5 %
OSMOLALITY SERPL CALC.SUM OF ELEC: 283 MOSM/KG (ref 275–295)
PLATELET # BLD AUTO: 163 10(3)UL (ref 150–450)
POTASSIUM SERPL-SCNC: 3.4 MMOL/L (ref 3.5–5.1)
POTASSIUM SERPL-SCNC: 3.4 MMOL/L (ref 3.5–5.1)
PROT SERPL-MCNC: 5.3 G/DL (ref 5.7–8.2)
PROTHROMBIN TIME: 14.4 SECONDS (ref 11.6–14.8)
RBC # BLD AUTO: 2.87 X10(6)UL (ref 3.8–5.3)
SODIUM SERPL-SCNC: 137 MMOL/L (ref 136–145)
UNIT VOLUME: 350 ML
WBC # BLD AUTO: 2.6 X10(3) UL (ref 4–11)

## 2025-04-13 RX ORDER — MIDODRINE HYDROCHLORIDE 5 MG/1
5 TABLET ORAL 3 TIMES DAILY PRN
Status: DISCONTINUED | OUTPATIENT
Start: 2025-04-13 | End: 2025-04-14

## 2025-04-13 RX ORDER — MAGNESIUM SULFATE HEPTAHYDRATE 40 MG/ML
2 INJECTION, SOLUTION INTRAVENOUS ONCE
Status: COMPLETED | OUTPATIENT
Start: 2025-04-13 | End: 2025-04-13

## 2025-04-13 NOTE — PLAN OF CARE
Problem: GASTROINTESTINAL - ADULT  Goal: Minimal or absence of nausea and vomiting  Description: INTERVENTIONS:- Maintain adequate hydration with IV or PO as ordered and tolerated- Nasogastric tube to low intermittent suction as ordered- Evaluate effectiveness of ordered antiemetic medications- Provide nonpharmacologic comfort measures as appropriate- Advance diet as tolerated, if ordered- Obtain nutritional consult as needed- Evaluate fluid balance  Outcome: Progressing  Goal: Maintains or returns to baseline bowel function  Description: INTERVENTIONS:- Assess bowel function- Maintain adequate hydration with IV or PO as ordered and tolerated- Evaluate effectiveness of GI medications- Encourage mobilization and activity- Obtain nutritional consult as needed- Establish a toileting routine/schedule- Consider collaborating with pharmacy to review patient's medication profile  Outcome: Progressing

## 2025-04-13 NOTE — PROGRESS NOTES
[facility]  part of [facility] Infectious Disease  Progress Note    Yue Malin Patient Status:  Inpatient    1970 MRN [id number]   Location [facility] 4W/SW/SE Attending Sriram Martel MD   Hosp Day # 8 PCP Diana Black MD     Subjective:  Patient seen/examined.  She is up in bed in NAD, comfortable.  No new overnight issues.  IV antibiotics ongoing.    Objective:  Blood pressure 108/78, pulse (!) 140, temperature 97.8 °F (36.6 °C), temperature source Oral, resp. rate 18, height 5' 7\" (1.702 m), weight 180 lb (81.6 kg), last menstrual period 2020, SpO2 95%, not currently breastfeeding.    Intake/Output:    Intake/Output Summary (Last 24 hours) at 2025 1100  Last data filed at 2025 1001  Gross per 24 hour   Intake 920 ml   Output 450 ml   Net 470 ml       Physical Exam:  General: Awake, alert, non-tox, NAD.  HEENT:  Oropharynx clear, trachea ML.  Heart: RRR S1S2 no murmurs.  Lungs: Essentially CTA b/l, no rhonchi, rales, wheezes.  Abdomen: Soft, NT/ND.  BS present.  No organomegaly.  Extremity: No edema.  Neurological: No focal deficits.  Derm:  Warm, dry, free from rashes.    Lab Data Review:  Lab Results   Component Value Date    WBC 2.6 2025    HGB 8.3 2025    HCT 25.0 2025    .0 2025    CREATSERUM 0.54 2025    BUN 7 2025     2025    K 3.4 2025    K 3.4 2025     2025    CO2 23.0 2025     2025    CA 7.8 2025    ALB 3.0 2025    ALKPHO 95 2025    BILT 0.4 2025    TP 5.3 2025    AST 20 2025    ALT 22 2025    PTT 55.6 2025    INR 1.06 2025    MG 1.6 2025        Cultures:  2/2 blood cultures + klebsiella, eggerthella 25  2/2 blood cultures + klebsiella 25     Blood cultures 25 negative     MRSA swab negative     Radiology:  CONCLUSION:   1. History of metastatic left  ovarian carcinoma with peritoneal carcinomatosis.  Moderate to large volume intra-abdominal ascites is new since comparison abdominal CT imaging from August, 2024 and may be malignant in nature. Ascitic fluid demonstrates   peripheral/peritoneal thickening; therefore, the possibility of coexistent spontaneous bacterial peritonitis is raised.  Mild stranding in the central small bowel mesentery is nonspecific, but raises suspicion for peritoneal carcinomatosis.   2. Scattered mildly dilated and fluid-filled proximal small bowel loops, but with no discrete transition point.  Findings suggest either an infectious/inflammatory enteritis or a generalized adynamic ileus.  No definite high-grade small bowel   obstruction.  Mild long segment proximal colonic wall thickening probably relates to a coexistent infectious/inflammatory colitis.   3. Stable postoperative changes of the distal colon with associated anastomosis.   4. Stable probable small left hepatic lobe cyst.   5. Mild nonspecific gallbladder luminal distention, but with no CT findings of acute cholecystitis.   6. Nonspecific fluid distention of the distal esophagus, please correlate for reflux or other etiologies of esophagitis.   7. Enlarged anterior mediastinal/prevascular space lymph nodes could be metastatic.   8. Loculated moderate bilateral pleural effusions, which may be malignant.  Associated dependent airspace disease in both lower lobes, which is favored to represent compressive atelectasis.  Small 6 mm right upper lobe lung nodules are unchanged since   recent prior chest CT.   9. Anasarca.   10. Partially imaged bilateral breast implants.   11. Lesser incidental findings as above.        Antibiotics Reviewed:  Zosyn     Assessment and Plan:     Acute gram negative sepsis in this immunocompromised patient presenting with SOB, N/V, and loss of appetite  - 2/2 blood cultures positive 4/5/25 and 4/6/25 for klebsiella spp, repeats pending   - Source  related to occult intraabdominal focus vs. Primary/secondary infected port (no gross s/s of infection) vs. Pulmonary vs. Other  - s/p thoracentesis 4/8/25 with 1300cc removed, cultures negative  - CT reviewed with ascites and concern for possible peritonitis, s/p paracentesis without criteria for SBP, cultures negative thus far  - UA bland  - IV zosyn ongoing day #6/14 since last positive culture     2.  Metastatic ovarian cancer with malignant pleural effusion and peritoneal carcinomatosis  - Suspect gut translocation of bacteria as etiology of events without evidence of SBP     3.  Anemia, leukopenia due to the above     4.  Disposition - stable from ID.  Continue IV zosyn as Rx.   Although 4 sets of blood cultures are positive, no gross s/s mediport involvement.  Repeat blood cultures remain negative.  Trending temps and WBCs.  Plan for eventual discharge on cefuroxime 500mg p.o. BID to complete a 14 day course as above.  Supportive care ongoing.  Will follow.    Karen Thomason DO, [facility] Infectious Disease  [phone]    4/13/2025  11:00 AM

## 2025-04-13 NOTE — PROGRESS NOTES
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 diagnosed March 2024 sp TAHBSO sp multiple courses treatment currently on lynparza, associated recurrent malignant ascites sp multiple paracenteses, malignant R effusion sp thoracentesis 3/14/25 (1.3L removed), bl LE swelling and LLE DVT dx 3/21/25 on AC here with septic shock with MSOF- MEGAN, lactic acidosis LFT elevation, afib w RVR, acute resp failure with hypoxia/ hypercapnia on bipap, found to have klebsiella bacteremia, clinically improved with antibiotics, BP improved, weaning midodrine, s/p paracentesis and thoracentesis, resp status back to baseline, increasing activity    Septic shock,   Klebsiella pneumoniae    - on admit severe w shock w MSOF (sever lactic acidosis on admit, MEGAN/ LFT, resp failure, afib w RVR)  - sources: bacteremia- both bottles 4/5 and 4/6 + initial K pneumo  - unasyn on admit for aspiration broadened to zosyn/ vanc MRSA neg > zosyn only  - 4/5 and 4/6 both bcx + Klebsiella pneumoniae  - 4/7 sp US guided thora - fu results-> neg   - 4/7 CT a/p multi poss sources noted  - 4/8 obtaining repeat bcx w persistent GNR bacteremia (4/5 + K pneumo)-> so far NGTD  - echo no vegetations (done for afib)   - has port,   - off pressors   - abx per ID-> zosyn with likely transition to oral when appropriate  - fever 100.9 (4/11) repeat Bcx ordered-> NGTD  - afebrile today     - BP improved stop Midodrine 4/13,  changed to prn,       Acute resp failure w hypoxia/ hypercapnia  - on admit: fm pleural effusion/ anasarca, PNA/sepsis w severe acidosis on admit,   - now on RA  - s./p thoracentesis on 4/8  - CT neg for PE, lower ext dopplers neg for DVT (repeated on 4/11)  - resp status improved  - may need repeat thora in future, patient declining CXR, will fu outpatient     R Pleural effusion  Malignant ascites   - sp tap 3/14 + malignant  - recurrent  - sp pulm done US guided 4/8 am d/w DR Story 1.3L cloudy yellow fluid   - f/u fluid studies-> positive for malignancy for both  - paracentesis on 4/9 -> positive for malignancy    - discussed repeat CXR to see if recurrent effusion noted, patient declined this, preferred to do as outpatient with oncology      Acute on chronic anemia   - last bsl 9.6 on 3/21/25 per onc from lynparza  - here down to 6.5 on 4/6/25 pm- with episode coffee ground emesis and on full AC  - held AC started IV PPI  - given pRBC 4/6 w holley response  - diet advanced  - hgb up and down, no signs of bleeding, no melena   - repeat H/H-> hgb 7.1, gave 1 unit on 4/11 -> hgb 9.2 -> 8.3  - monitor while on heparin drip, repeat later, and if stable then resume eliquis      Acute VTE  - + LLE DVT dx 3/21/25 (w/u bl LE swelling)  - repeat US here negative- initial AC held for hg drop concern for possible bleed  - CT neg for PE on 4/5  - IVC filter mentioned given poss GI bleeding  - discussed with Hematology (dr. Serrano) and pulmonary  - plan to hold off on IVC filter, and resume AC with heparin drip as hgb stable, as patient is high risk for DVT/PE off AC given active malignancy and we have not identified bleeding, if GI bleeding noted will need to consult GI  - heparin drip to started 4/12, hgb stable, no signs of bleeding repeat H/h today was stable, eliquis resumed, will monitor H/H,      Afib w RVR  - reactive from septic shock but afib new dx  - improved but did have some RVR o/n switched from levophed to  phenylephrine gtt  - seen by cards, echo done- ok  - cont on tele      Leukopenia  - improving      BRCA 1 + (somatic non genetic mutation) Ovarian adenocarcinoma w peritoneal carcinomatosis and malignant recurrent effusion/ ascites  - dx March 2024 sees Luana Serrano onc  - sp TAHBSO Aug 2024 w WALLY Choudhary at [facility]  - sp multi lines treatment most recently on lynparza 300 mg BID w avastin  - notified Dr Serrano on admit, updated on 4/11  - sp multi paracentesis some discussion outpatient regarding pleurx  - 4/8 repeat paracentesis done in house        MEGAN w hyponatremia, severe lactic acidosis  - on admit now all improved  - suspect from sepsis and N/V just prior to admit      Hypokalemia  - repletion      LFT elevation  - normalized       ACOSTA/ MDD  - home meds = venlafaxine and ativan PRN follows w psych outpatient  - fam proving support/ non pharmacologic measures at bedside     Chronic back pain  - hold home tramadol     ACP/ GOC  - Dr. Bates discussed with patient, spouse and sister at bedside. hcPOA - patient's daughter Ami - full code / full  measures at this time     FN:  IVF: none  Diet: adv diet     Prophylaxis:   DVT with SCD, heparin drip -> eliquis resumed this evening     Dispo: poss home on 4/14  Code status:  FULL    Discussed with family at bedside     Questions/concerns were discussed with patient and/or family by bedside.    Thank You,  Sriram Martel MD    Hospitalist with [facility]     Subjective:     No CP, SOB, or N/V.  Feeling better, walking more     OBJECTIVE:    Blood pressure 128/68, pulse 99, temperature 100.2 °F (37.9 °C), temperature source Oral, resp. rate 24, height 5' 7\" (1.702 m), weight 180 lb (81.6 kg), last menstrual period 12/22/2020, SpO2 93%, not currently breastfeeding.    Temp:  [97.6 °F (36.4 °C)-100.2 °F (37.9 °C)] 100.2 °F (37.9 °C)  Pulse:  [] 99  Resp:  [20-28] 24  BP: (105-128)/(68-78) 128/68  SpO2:  [93 %-97 %] 93 %      Intake/Output:    Intake/Output Summary  (Last 24 hours) at 4/13/2025 1008  Last data filed at 4/13/2025 1001  Gross per 24 hour   Intake 920 ml   Output 450 ml   Net 470 ml       Last 3 Weights   04/13/25 0542 180 lb (81.6 kg)   04/12/25 1143 174 lb 4 oz (79 kg)   04/11/25 0700 176 lb 6.4 oz (80 kg)   04/08/25 0900 173 lb 8 oz (78.7 kg)   04/07/25 1000 172 lb 6.4 oz (78.2 kg)   04/06/25 0510 179 lb 0.2 oz (81.2 kg)   04/05/25 1551 177 lb 7.5 oz (80.5 kg)   04/05/25 0936 169 lb 15.6 oz (77.1 kg)   10/08/24 1840 170 lb (77.1 kg)   08/20/24 2201 178 lb 6.4 oz (80.9 kg)   08/13/24 0549 173 lb 6.4 oz (78.7 kg)   08/02/24 1128 180 lb (81.6 kg)       Exam    Gen: No acute distress, alert and oriented x3, no focal neurologic deficits  Heent: NC AT, neck supple  Pulm: Lungs clear,    CV: Heart with regular rate and rhythm,    Abd: Abdomen soft, nontender, nondistended,    MSK: 2 + pitting edema bilaterally          Data Review:       Labs:     Recent Labs   Lab 04/11/25  0514 04/11/25  1310 04/12/25  0619 04/13/25  0635   RBC 2.48*  --  3.11* 2.87*   HGB 7.0* 7.1* 9.1* 8.3*   HCT 21.6* 21.3* 26.9* 25.0*   MCV 87.1  --  86.5 87.1   MCH 28.2  --  29.3 28.9   MCHC 32.4  --  33.8 33.2   RDW 19.0*  --  18.8* 20.4*   NEPRELIM 0.99*  --  0.98* 1.09*   WBC 2.0*  --  2.8* 2.6*   .0*  --  127.0* 163.0         Recent Labs   Lab 04/11/25  0514 04/12/25  0619 04/13/25  0635   * 103* 119*   BUN 10 9 7*   CREATSERUM 0.54* 0.50* 0.54*   EGFRCR 109 111 109   CA 7.7* 8.0* 7.8*    137 137   K 3.6  3.6 3.6  3.6 3.4*  3.4*    105 106   CO2 24.0 23.0 23.0       Recent Labs   Lab 04/08/25  0538 04/09/25  0525 04/10/25  0559 04/13/25  0635   ALT 46 29 24 22   AST 56* 29 24 20   ALB 2.8* 2.9* 3.2 3.0*         Imaging:  US VENOUS DOPPLER LEG BILAT - DIAG IMG (CPT=93970)  Result Date: 4/11/2025  CONCLUSION:   No evidence of right or left lower extremity deep venous thrombosis.   elm-remote  Dictated by (CST): Michael Rogers MD on 4/11/2025 at 10:55 AM      Finalized by (CST): Michael Rogers MD on 4/11/2025 at 10:56 AM              Meds:     Scheduled Medications[1]  Medication Infusions[2]  PRN Medications[3]           [1]    magnesium sulfate  2 g Intravenous Once    potassium chloride  40 mEq Intravenous Once    piperacillin-tazobactam  3.375 g Intravenous Q8H    vancomycin  125 mg Oral Daily    pantoprazole  40 mg Intravenous Q12H   [2]    continuous dose heparin 1,400 Units/hr (04/13/25 0252)   [3]   midodrine    baclofen    LORazepam    acetaminophen    ondansetron    prochlorperazine    polyethylene glycol (PEG 3350)    sennosides    bisacodyl    fleet enema

## 2025-04-13 NOTE — CM/SW NOTE
CM met with patient at bedside to follow up on home health agency choice.  Patient states she has not yet made a decision on HHA choice - requesting follow up prior to DC.    / to remain available for support and/or discharge planning.     Plan: Home with Home Healthcare - pending agency choice, medical clearance    Alexandrea Thakkar RN, BSN  Nurse   [phone]

## 2025-04-13 NOTE — SPIRITUAL CARE NOTE
Spiritual Care Visit Note    Patient Name: Yue Malin Date of Spiritual Care Visit: 25   : 1970 Primary Dx: Disseminated ovarian cancer, left (HCC)       Referred By: Referral From:     Spiritual Care Taxonomy:    Intended Effects: Demonstrate caring and concern    Methods: Offer emotional support    Interventions: Acknowledge current situation, Acknowledge response to difficult experience, Active listening, Ask guided questions, Explain  role, Provide hospitality    Visit Type/Summary:     - Spiritual Care: Consulted with RN prior to visit. Offered empathic listening and emotional support. Provided information regarding how to contact Spiritual Care and left a Spiritual Care information card.  remains available as needed for follow up.    Spiritual Care support can be requested via an Epic consult. For urgent/immediate needs, please contact the On Call  at: [place]: ext [phone]    Chaplain Resident, Beckie Taylor, PhD

## 2025-04-13 NOTE — PLAN OF CARE
Yue is alert and oriented x 4. Patient is voiding via the purewick. No BM reported today. Patient is assist of 1 with a walker. Tolerating meals. Hemoglobin is stable, heparin stopped, patient to start eliquis tonight. Magnesium and potassium replaced. IV zosyn given. Family at bedside. Call light within reach.  Problem: CARDIOVASCULAR - ADULT  Goal: Absence of cardiac arrhythmias or at baseline  Description: INTERVENTIONS:- Continuous cardiac monitoring, monitor vital signs, obtain 12 lead EKG if indicated- Evaluate effectiveness of antiarrhythmic and heart rate control medications as ordered- Initiate emergency measures for life threatening arrhythmias- Monitor electrolytes and administer replacement therapy as ordered  Outcome: Progressing  Goal: Maintains optimal cardiac output and hemodynamic stability  Description: INTERVENTIONS:- Monitor vital signs, rhythm, and trends- Monitor for bleeding, hypotension and signs of decreased cardiac output- Evaluate effectiveness of vasoactive medications to optimize hemodynamic stability- Monitor arterial and/or venous puncture sites for bleeding and/or hematoma- Assess quality of pulses, skin color and temperature- Assess for signs of decreased coronary artery perfusion - ex. Angina- Evaluate fluid balance, assess for edema, trend weights  Outcome: Progressing     Problem: RESPIRATORY - ADULT  Goal: Achieves optimal ventilation and oxygenation  Description: INTERVENTIONS:- Assess for changes in respiratory status- Assess for changes in mentation and behavior- Position to facilitate oxygenation and minimize respiratory effort- Oxygen supplementation based on oxygen saturation or ABGs- Provide Smoking Cessation handout, if applicable- Encourage broncho-pulmonary hygiene including cough, deep breathe, Incentive Spirometry- Assess the need for suctioning and perform as needed- Assess and instruct to report SOB or any respiratory difficulty- Respiratory Therapy support as  indicated- Manage/alleviate anxiety- Monitor for signs/symptoms of CO2 retention  Outcome: Progressing     Problem: GASTROINTESTINAL - ADULT  Goal: Minimal or absence of nausea and vomiting  Description: INTERVENTIONS:- Maintain adequate hydration with IV or PO as ordered and tolerated- Nasogastric tube to low intermittent suction as ordered- Evaluate effectiveness of ordered antiemetic medications- Provide nonpharmacologic comfort measures as appropriate- Advance diet as tolerated, if ordered- Obtain nutritional consult as needed- Evaluate fluid balance  Outcome: Progressing  Goal: Maintains or returns to baseline bowel function  Description: INTERVENTIONS:- Assess bowel function- Maintain adequate hydration with IV or PO as ordered and tolerated- Evaluate effectiveness of GI medications- Encourage mobilization and activity- Obtain nutritional consult as needed- Establish a toileting routine/schedule- Consider collaborating with pharmacy to review patient's medication profile  Outcome: Progressing  Goal: Maintains adequate nutritional intake (undernourished)  Description: INTERVENTIONS:- Monitor percentage of each meal consumed- Identify factors contributing to decreased intake, treat as appropriate- Assist with meals as needed- Monitor I&O, WT and lab values- Obtain nutritional consult as needed- Optimize oral hygiene and moisture- Encourage food from home; allow for food preferences- Enhance eating environment  Outcome: Progressing  Goal: Achieves appropriate nutritional intake (bariatric)  Description: INTERVENTIONS:- Monitor for over-consumption- Identify factors contributing to increased intake, treat as appropriate- Monitor I&O, WT and lab values- Obtain nutritional consult as needed- Evaluate psychosocial factors contributing to over-consumption  Outcome: Progressing     Problem: GENITOURINARY - ADULT  Goal: Absence of urinary retention  Description: INTERVENTIONS:- Assess patient’s ability to void and empty  bladder- Monitor intake/output and perform bladder scan as needed- Follow urinary retention protocol/standard of care- Consider collaborating with pharmacy to review patient's medication profile- Implement strategies to promote bladder emptying  Outcome: Progressing     Problem: SKIN/TISSUE INTEGRITY - ADULT  Goal: Skin integrity remains intact  Description: INTERVENTIONS- Assess and document risk factors for pressure ulcer development- Assess and document skin integrity- Monitor for areas of redness and/or skin breakdown- Initiate interventions, skin care algorithm/standards of care as needed  Outcome: Progressing     Problem: HEMATOLOGIC - ADULT  Goal: Maintains hematologic stability  Description: INTERVENTIONS- Assess for signs and symptoms of bleeding or hemorrhage- Monitor labs and vital signs for trends- Administer supportive blood products/factors, fluids and medications as ordered and appropriate- Administer supportive blood products/factors as ordered and appropriate  Outcome: Progressing  Goal: Free from bleeding injury  Description: (Example usage: patient with low platelets)INTERVENTIONS:- Avoid intramuscular injections, enemas and rectal medication administration- Ensure safe mobilization of patient- Hold pressure on venipuncture sites to achieve adequate hemostasis- Assess for signs and symptoms of internal bleeding- Monitor lab trends- Patient is to report abnormal signs of bleeding to staff- Avoid use of toothpicks and dental floss- Use electric shaver for shaving- Use soft bristle tooth brush- Limit straining and forceful nose blowing  Outcome: Progressing     Problem: Patient Centered Care  Goal: Patient preferences are identified and integrated in the patient's plan of care  Description: Interventions:- What would you like us to know as we care for you? - Provide timely, complete, and accurate information to patient/family- Incorporate patient and family knowledge, values, beliefs, and cultural  backgrounds into the planning and delivery of care- Encourage patient/family to participate in care and decision-making at the level they choose- Honor patient and family perspectives and choices  Outcome: Progressing

## 2025-04-14 VITALS
BODY MASS INDEX: 28.25 KG/M2 | OXYGEN SATURATION: 92 % | HEART RATE: 103 BPM | HEIGHT: 67 IN | DIASTOLIC BLOOD PRESSURE: 68 MMHG | WEIGHT: 180 LBS | RESPIRATION RATE: 22 BRPM | SYSTOLIC BLOOD PRESSURE: 121 MMHG | TEMPERATURE: 99 F

## 2025-04-14 LAB
ANION GAP SERPL CALC-SCNC: 8 MMOL/L (ref 0–18)
BASOPHILS # BLD AUTO: 0.05 X10(3) UL (ref 0–0.2)
BASOPHILS NFR BLD AUTO: 1.5 %
BUN BLD-MCNC: 8 MG/DL (ref 9–23)
BUN/CREAT SERPL: 15.1 (ref 10–20)
CALCIUM BLD-MCNC: 7.9 MG/DL (ref 8.7–10.4)
CHLORIDE SERPL-SCNC: 106 MMOL/L (ref 98–112)
CO2 SERPL-SCNC: 23 MMOL/L (ref 21–32)
CREAT BLD-MCNC: 0.53 MG/DL (ref 0.55–1.02)
DEPRECATED RDW RBC AUTO: 61.1 FL (ref 35.1–46.3)
EGFRCR SERPLBLD CKD-EPI 2021: 109 ML/MIN/1.73M2 (ref 60–?)
EOSINOPHIL # BLD AUTO: 0.02 X10(3) UL (ref 0–0.7)
EOSINOPHIL NFR BLD AUTO: 0.6 %
ERYTHROCYTE [DISTWIDTH] IN BLOOD BY AUTOMATED COUNT: 21 % (ref 11–15)
GLUCOSE BLD-MCNC: 110 MG/DL (ref 70–99)
HCT VFR BLD AUTO: 25.1 % (ref 35–48)
HGB BLD-MCNC: 8.2 G/DL (ref 12–16)
IMM GRANULOCYTES # BLD AUTO: 0.09 X10(3) UL (ref 0–1)
IMM GRANULOCYTES NFR BLD: 2.7 %
LYMPHOCYTES # BLD AUTO: 1.12 X10(3) UL (ref 1–4)
LYMPHOCYTES NFR BLD AUTO: 33.6 %
MAGNESIUM SERPL-MCNC: 1.8 MG/DL (ref 1.6–2.6)
MCH RBC QN AUTO: 29.9 PG (ref 26–34)
MCHC RBC AUTO-ENTMCNC: 32.7 G/DL (ref 31–37)
MCV RBC AUTO: 91.6 FL (ref 80–100)
MONOCYTES # BLD AUTO: 0.56 X10(3) UL (ref 0.1–1)
MONOCYTES NFR BLD AUTO: 16.8 %
NEUTROPHILS # BLD AUTO: 1.49 X10 (3) UL (ref 1.5–7.7)
NEUTROPHILS # BLD AUTO: 1.49 X10(3) UL (ref 1.5–7.7)
NEUTROPHILS NFR BLD AUTO: 44.8 %
OSMOLALITY SERPL CALC.SUM OF ELEC: 283 MOSM/KG (ref 275–295)
PHOSPHATE SERPL-MCNC: 2.7 MG/DL (ref 2.4–5.1)
PLATELET # BLD AUTO: 212 10(3)UL (ref 150–450)
POTASSIUM SERPL-SCNC: 3.5 MMOL/L (ref 3.5–5.1)
POTASSIUM SERPL-SCNC: 3.5 MMOL/L (ref 3.5–5.1)
RBC # BLD AUTO: 2.74 X10(6)UL (ref 3.8–5.3)
SODIUM SERPL-SCNC: 137 MMOL/L (ref 136–145)
WBC # BLD AUTO: 3.3 X10(3) UL (ref 4–11)

## 2025-04-14 RX ORDER — CEFUROXIME AXETIL 500 MG/1
500 TABLET ORAL 2 TIMES DAILY
Qty: 14 TABLET | Refills: 0 | Status: SHIPPED | OUTPATIENT
Start: 2025-04-14 | End: 2025-04-21

## 2025-04-14 RX ORDER — POTASSIUM CHLORIDE 1500 MG/1
40 TABLET, EXTENDED RELEASE ORAL EVERY 4 HOURS
Status: DISCONTINUED | OUTPATIENT
Start: 2025-04-14 | End: 2025-04-14

## 2025-04-14 RX ORDER — MAGNESIUM OXIDE 400 MG/1
400 TABLET ORAL ONCE
Status: COMPLETED | OUTPATIENT
Start: 2025-04-14 | End: 2025-04-14

## 2025-04-14 NOTE — PHYSICAL THERAPY NOTE
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      Patient is requiring stand-by assist and contact guard assist as a result of the following impairments: decreased functional strength, decreased endurance/aerobic capacity, impaired standing dynamic balance, decreased muscular endurance, and medical status.     Patient continues to function below baseline with bed mobility, transfers, gait, stair negotiation, maintaining seated position, standing prolonged periods, and performing household tasks.  Next session anticipate patient to progress bed mobility, transfers, gait, stair negotiation, maintaining seated position, standing prolonged periods, and performing household tasks.  Physical Therapy will continue to follow patient for duration of hospitalization.    Patient continues to benefit from continued skilled PT services: at discharge to promote prior level of function and safety with additional support and return home with home health PT.    PLAN DURING HOSPITALIZATION  Nursing Mobility Recommendation : 1 Assist     PT Treatment Plan: Bed mobility, Endurance, Energy conservation, Family education, Patient education, Gait training, Strengthening, Transfer training, Balance training  Frequency (Obs): 5x/week     SUBJECTIVE  \"I'm just frustrated\"     OBJECTIVE  Precautions: Bed/chair alarm    PAIN ASSESSMENT   Rating: Unable to  rate  Location: body aches and abdominal pain unrated       BALANCE  Static Sitting: Normal  Dynamic Sitting: Good  Static Standing: Fair  Dynamic Standing: Fair -    ACTIVITY TOLERANCE  Pulse: 103 (128bpm w activity)  Heart Rate Source: Monitor         O2 WALK  Oxygen Therapy  SPO2% on Room Air at Rest: 94  SPO2% Ambulation on Room Air: 93    AM-PAC '6-Clicks' INPATIENT SHORT FORM - BASIC MOBILITY  How much difficulty does the patient currently have...  Patient Difficulty: Turning over in bed (including adjusting bedclothes, sheets and blankets)?: A Little   Patient Difficulty: Sitting down on and standing up from a chair with arms (e.g., wheelchair, bedside commode, etc.): A Little   Patient Difficulty: Moving from lying on back to sitting on the side of the bed?: A Little   How much help from another person does the patient currently need...   Help from Another: Moving to and from a bed to a chair (including a wheelchair)?: A Little   Help from Another: Need to walk in hospital room?: A Little   Help from Another: Climbing 3-5 steps with a railing?: A Little     AM-PAC Score:  Raw Score: 18   Approx Degree of Impairment: 46.58%   Standardized Score (AM-PAC Scale): 43.63   CMS Modifier (G-Code): CK    FUNCTIONAL ABILITY STATUS  Functional Mobility/Gait Assessment  Gait Assistance: Contact guard assist (SBA)  Distance (ft): 50ft  Assistive Device: Rolling walker  Pattern: Shuffle (pt attempting to demonstrate independence with mobility and became unsteady. Cues to decrease speed to optimize safety. Cues for safe management of RW with turns and upright posture. .)  Supine to Sit: supervision  Sit to Stand: stand-by assist, cues for appropriate UE positioning with transitional movements. Cues for eccentric lowering from stand>sit position.    Skilled Therapy Provided: Patient received supine in bed with family present. RN approved activity. Therapist educated pt on POC and physiological benefits of mobilization. Cues  for pacing and energy conservation techniques. Patient agreeable to participate with encouragement. Expressed frustration for having to mobilize. Denies pain. *SpO2 on room air at rest: 94% with activity: 93%    The patient's Approx Degree of Impairment: 46.58% has been calculated based on documentation in the [facility] '6 clicks' Inpatient Daily Activity Short Form.  Research supports that patients with this level of impairment may benefit from home with home PT.  Final disposition will be made by interdisciplinary medical team.    THERAPEUTIC EXERCISES  Lower Extremity Ankle pumps     Position Sitting       Patient End of Session: Up in chair, Needs met, Call light within reach, RN aware of session/findings, Family present    CURRENT GOALS   Goals to be met by:   Patient Goal Patient's self-stated goal is: unstated    Goal #1 Patient is able to demonstrate supine - sit EOB @ level: supervision   Updated:   Patient is able to demonstrate supine<>sit with Mod I   Goal #1   Current Status  GOAL MET - updated   Goal #2 Patient is able to demonstrate transfers Sit to/from Stand at assistance level: supervision with walker - rolling      Goal #2  Current Status  progressing   Goal #3 Patient is able to ambulate 150 feet with assist device: walker - rolling at assistance level: supervision   Goal #3   Current Status  progressing   Goal #4 Patient to demonstrate independence with home activity/exercise instructions provided to patient in preparation for discharge.   Goal #4   Current Status  ongoing      Gait Trainin minutes  Therapeutic Activity: 10 minutes

## 2025-04-14 NOTE — DIETARY NOTE
BRIEF NUTRITION:     4/9/25 Pt screened at no nutritional risk on admission MST by RN however noted report of decreased appetite/intake in H&P. RD reviewed chart and pt visited. Pt sleeping soundly at time of visit however multiple family members at bedside. Nutritional status discussed with DTR. Wt appears stable per EMR and confirmed by DTR. Wt down 3% from admission with noted fluid losses from thoracentesis and paracentesis. DTR reports decreased appetite/intake however pt has been supplementing meals at home with Ensure Shakes. Pt appears well nourished on visual exam. Good intake of meal trays this admission. Receptive to oral nutrition supplements (ONS). RD added Ensure Enlive chocolate at 10am and 2pm daily. Pt remains screened at no nutritional risk. Will follow up in 5-7 days to determine need for nutritional assessment and further nutritional interventions if intake is sub optimal. Please consult nutrition services if earlier intervention is indicated.      Rut Lopez MS, RD, LDN, [facility]  [id number]    4/14/25 Update: following up with pt. Average intake for last 11 meals only 43%. Visited pt and she reports she gets food from home too--mac and cheese, grilled cheese to name a couple items.  Drinking 1 ensure enlive probably every other day. Pt is unclear what her wt was before cancer diagnosis, but she does not appear to have lost a lot of wt. True wt difficult to assess with ascites--fluid removals with paracentesis and thoracentesis.  Pt c/o's of difficulty eating large meals--discussed small frequent meals and ONS and food from home to supplement.  Continue no nutritional risk at this time, but f/u po intake prn if pt not DC soon.      Percent Meals Eaten (last 6 days)       Date/Time Percent Meals Eaten (%)    04/08/25 1353 15 %    04/08/25 1911 100 %     Percent Meals Eaten (%): grilled cheese at 04/08/25 1911    04/09/25 0937 80 %    04/09/25 1400 80 %    04/10/25 1100 25 %    04/10/25 1500 50 %     04/10/25 2001 50 %    04/12/25 1143 10 %    04/12/25 1300 15 %    04/13/25 1001 25 %    04/14/25 0811 25 %          Wt Readings from Last 10 Encounters:   04/13/25 81.6 kg (180 lb)   10/08/24 77.1 kg (170 lb)   08/20/24 80.9 kg (178 lb 6.4 oz)   01/05/21 86.2 kg (190 lb 1.6 oz)   11/22/17 85.7 kg (189 lb)   12/08/15 75.5 kg (166 lb 6.4 oz)   10/14/14 77 kg (169 lb 11.2 oz)   06/28/13 77.1 kg (170 lb)   10/22/12 76.2 kg (168 lb)   01/17/12 71.7 kg (158 lb)     Meche Henning RD, LDN   Clinical Dietitian [id number]

## 2025-04-14 NOTE — DISCHARGE SUMMARY
[facility]   INTERNAL MEDICINE DISCHARGE SUMMARY    Name:     Yue Malin  YOB: 1970  MRN:     [id number]    Admission Date:     4/5/2025 Discharge Date:     04/14/25      REASON FOR ADMISSION  Disseminated ovarian cancer, left (HCC)    Primary care physician: Diana Black MD   Discharging physician: Steven Hou MD     HOSPITAL COURSE  DISCHARGE DIAGNOSES  PROCEDURES   Ms. 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 diagnosed March 2024 sp TAHBSO sp multiple courses treatment currently on lynparza, associated recurrent malignant ascites sp multiple paracenteses, malignant R effusion sp thoracentesis 3/14/25 (1.3L removed), bl LE swelling and LLE DVT dx 3/21/25 on AC here with septic shock with MSOF- MEGAN, lactic acidosis LFT elevation, afib w RVR, acute resp failure with hypoxia/ hypercapnia on bipap, found to have klebsiella bacteremia, clinically improved with antibiotics, BP improved, weaning midodrine, s/p paracentesis and thoracentesis, resp status back to baseline, increasing activity     Septic shock,   Klebsiella pneumoniae    Immunocompromised state  - on admit severe w shock w MSOF (sever lactic acidosis on admit, MEGAN/ LFT, resp failure, afib w RVR)  - sources: bacteremia- both bottles 4/5 and 4/6 + initial K pneumo  - unasyn on admit for aspiration broadened to zosyn/ vanc MRSA neg > zosyn only  - 4/5 and 4/6 both bcx + Klebsiella pneumoniae  - 4/7 sp US guided thora - fu results-> neg   - 4/7 CT a/p multi poss sources noted  - 4/8 obtaining repeat bcx w persistent GNR bacteremia (4/5 + K pneumo)-> so far NGTD  - echo no vegetations (done for afib)   - has port,   - abx per ID-> zosyn, cefuroxime on DC      Acute resp failure w hypoxia/ hypercapnia  - on admit: fm pleural effusion/ anasarca, PNA/sepsis w severe acidosis on admit, S/p thora 4/8  - now on RA  - CT neg for PE, lower ext dopplers neg for DVT (repeated on  4/11)  - resp status improved, now on RA  [  ] OP onc f/u for malignant pleural effusions, PRN thora OP     R Pleural effusion  Malignant ascites   - sp tap 3/14 + malignant  - recurrent  - sp pulm done US guided 4/8 am d/w DR Story 1.3L cloudy yellow fluid   - f/u fluid studies-> positive for malignancy for both  - paracentesis on 4/9 -> positive for malignancy       Acute on chronic anemia   - last bsl 9.6 on 3/21/25 per onc from lynparza  - here down to 6.5 on 4/6/25 pm- with episode coffee ground emesis and on full AC  - held AC started IV PPI  - given pRBC 4/6 w holley response  - diet advanced  - hgb up and down, no signs of bleeding, no melena   - repeat H/H-> hgb 7.1, gave 1 unit on 4/11 -> hgb 9.2 -> 8.3  - monitor while on heparin drip, repeat later, and if stable then resume eliquis      Acute VTE  - + LLE DVT dx 3/21/25 (w/u bl LE swelling)  - repeat US here negative- initial AC held for hg drop concern for possible bleed  - CT neg for PE on 4/5  - IVC filter mentioned given poss GI bleeding  - discussed with Hematology (dr. Serrano) and pulmonary  - tolerated hep gtt, switched to eliquis      Afib w RVR  - reactive from septic shock but afib new dx  - improved but did have some RVR o/n switched from levophed to phenylephrine gtt  - seen by cards, echo done- ok  [  ] OP cards f/u      Leukopenia  - improving      BRCA 1 + (somatic non genetic mutation) Ovarian adenocarcinoma w peritoneal carcinomatosis and malignant recurrent effusion/ ascites  - dx March 2024 sees Luana Serrano onc  - sp TAHBSO Aug 2024 w WALLY Choudhary at [facility]  - sp multi lines treatment most recently on lynparza 300 mg BID w avastin  - notified Dr Serrano on admit, updated on 4/11  - sp multi paracentesis some discussion outpatient regarding pleurx  - 4/8 repeat paracentesis done in house        MEGAN w hyponatremia, severe lactic acidosis  - on admit now all improved  - suspect from sepsis and N/V just prior to admit      Hypokalemia  -  repletion      LFT elevation  - normalized       ACOSTA/ MDD  - home meds = venlafaxine and ativan PRN follows w psych outpatient  - fam proving support/ non pharmacologic measures at bedside     Chronic back pain  - hold home tramadol     ACP/ GOC  - Dr. Bates discussed with patient, spouse and sister at bedside. hcPOA - patient's daughter Ami - full code / full  measures at this time         CONSULTATIONS   IP CONSULT TO PULMONOLOGY  IP CONSULT TO INTERVENTIONAL RADIOLOGY  IP CONSULT PALLIATIVE CARE  IP CONSULT TO CARDIOLOGY  IP CONSULT TO PHARMACY  IP CONSULT TO INFECTIOUS DISEASE    IMPORTANT FOLLOW UP  Martin Manzano, APRN  [address]  [address]  [place] [address]  [phone]    Call  As needed    Luana Serrano MD  [address]  [address]  [facility] [id number] [phone]    Schedule an appointment as soon as possible for a visit in 3 day(s)  for hospital follow-up (please note, his address has changed)     MEDICATIONS AT DISCHARGE     Medication List        START taking these medications      cefuroxime 500 MG Tabs  Commonly known as: Ceftin  Take 1 tablet (500 mg total) by mouth 2 (two) times daily for 7 days.            CONTINUE taking these medications      acetaminophen 325 MG Tabs  Commonly known as: Tylenol     apixaban 5 MG Tabs  Commonly known as: Eliquis  Take 1 tablet (5 mg total) by mouth 2 (two) times daily.     baclofen 10 MG Tabs  Commonly known as: Lioresal     docusate sodium 100 MG Caps  Commonly known as: COLACE  Take 100 mg by mouth 2 (two) times daily.     famotidine 20 MG Tabs  Commonly known as: Pepcid     LORazepam 1 MG Tabs  Commonly known as: Ativan     Lynparza 150 MG Tabs  Generic drug: Olaparib               Where to Get Your Medications        These medications were sent to [id number] - [place], [place] - [address] [phone], [phone]  [address], [place] [facility] [address]      Phone: [phone]   apixaban 5 MG Tabs  cefuroxime 500 MG Tabs      PHYSICAL EXAMINATION  Vitals: /68 (BP Location: Right arm)   Pulse 103   Temp 98.5 °F (36.9 °C) (Oral)   Resp 22   Ht 5' 7\" (1.702 m)   Wt 180 lb (81.6 kg)   LMP 12/22/2020   SpO2 92%   BMI 28.19 kg/m²   Gen: A&Ox3, thin  Eyes: PERRL, normal conjunctivae  HENT: Normocephalic, trachea midline, moist mucous membranes  CV: RRR, no peripheral edema, port in place  Resp: CTAB, non-labored respirations, symmetric expansion  GI: Soft, NT, ND, no guarding  MSK:  No C/C/E, normal active/passive ROM in C-spine  Skin: No rashes  Neuro: CN 2-12 grossly intact     DISCHARGE CONDITION  Stable    DISCHARGE LOCATION  Home    Total time coordinating care: 32 minutes. (Hx, exam, chart review, discussing plan w/ patient/family, nursing/consultants). 50% of the time F2F for counseling, patient education, answering questions & coordination of care.    Patient's prior to admission medications were reviewed, all current medications reviewed. Patient's current and discharge medications have been reconciled and reviewed verbally with the patient and or family members.      Steven Hou MD   Internal Medicine - [facility]      Objective:    LABORATORY VALUES   Recent Labs   Lab 04/08/25  0538 04/09/25  0525 04/10/25  0559 04/11/25  0514 04/12/25  0619 04/13/25  0635 04/14/25  0655    141 139 138 137 137 137   K 3.6  3.6 3.4*  3.4* 3.8  3.8 3.6  3.6 3.6  3.6 3.4*  3.4* 3.5  3.5    106 106 105 105 106 106   CO2 28.0 26.0 25.0 24.0 23.0 23.0 23.0   BUN 22 13 9 10 9 7* 8*   CREATSERUM 0.59 0.57 0.56 0.54* 0.50* 0.54* 0.53*   ANIONGAP 5 9 8 9 9 8 8   * 110* 107* 113* 103* 119* 110*   CA 7.4* 7.6* 8.0* 7.7* 8.0* 7.8* 7.9*   PHOS  --   --   --   --   --   --  2.7   TP 5.1* 5.0* 5.4*  --   --  5.3*  --    ALB 2.8* 2.9* 3.2  --   --  3.0*  --    AST 56* 29 24  --   --  20  --    ALT 46 29 24  --   --  22  --    ALKPHO 87 88 99  --   --  95  --    BILT 0.4 0.5 0.6  --   --  0.4  --     EGFRCR 106 107 108 109 111 109 109    Recent Labs   Lab 04/10/25  1344 04/11/25  0514 04/11/25  1310 04/12/25  0619 04/13/25  0635 04/13/25  1227 04/14/25  0655   WBC 3.0* 2.0*  --  2.8* 2.6*  --  3.3*   HGB 8.5* 7.0* 7.1* 9.1* 8.3* 8.2* 8.2*   HCT 25.2* 21.6* 21.3* 26.9* 25.0* 25.6* 25.1*   .0* 109.0*  --  127.0* 163.0  --  212.0   MCV 85.4 87.1  --  86.5 87.1  --  91.6   MOPERCENT 6.6 10.6  --  11.6 17.6  --  16.8   EOPERCENT 1.0 2.0  --  1.5 0.8  --  0.6   BAPERCENT 1.0 0.5  --  1.1 0.8  --  1.5      IMAGING, REPORTS, AND DIAGNOSTICS  US VENOUS DOPPLER LEG BILAT - DIAG IMG (CPT=93970)  Result Date: 4/11/2025  CONCLUSION:   No evidence of right or left lower extremity deep venous thrombosis.   elm-remote  Dictated by (CST): Michael Rogers MD on 4/11/2025 at 10:55 AM     Finalized by (CST): Michael Rogers MD on 4/11/2025 at 10:56 AM          XR CHEST AP PORTABLE  (CPT=71045)  Result Date: 4/10/2025  CONCLUSION:   No significant change in the bilateral pleural effusions with associated bibasilar opacities.    Dictated by (CST): Benoit Serra MD on 4/10/2025 at 7:38 AM     Finalized by (CST): Benoit Serra MD on 4/10/2025 at 7:41 AM          US PARACENTESIS W IMAGING (CPT=49083)  Result Date: 4/9/2025  CONCLUSION: Paracentesis as described.    Dictated by (CST): Denise Gee APN on 4/09/2025 at 7:59 AM     Finalized by (CST): Denise Gee APN on 4/09/2025 at 7:59 AM          XR CHEST AP PORTABLE  (CPT=71045)  Result Date: 4/9/2025  CONCLUSION:   Stable small bilateral pleural effusions with patchy bibasilar right greater than left opacity.    Dictated by (CST): Marco Antonio Kumar MD on 4/09/2025 at 7:25 AM     Finalized by (CST): Marco Antonio Kumar MD on 4/09/2025 at 7:26 AM          XR CHEST AP PORTABLE  (CPT=71045)  Result Date: 4/8/2025  CONCLUSION:  1. Bilateral pleural effusions and associated basilar atelectasis, with or without superimposed pneumonia.  2. Lesser incidental findings as  above.     Dictated by (CST): Surseh Wills MD on 4/08/2025 at 10:43 AM     Finalized by (CST): Suresh Wills MD on 4/08/2025 at 10:44 AM          US THORACENTESIS GUIDED RIGHT (CPT=32555)  Result Date: 4/8/2025   Sonography support was provided.  Images demonstrate large right pleural effusion. Please see separate report for additional details.    elm-remote     Dictated by (CST): Emanuel Sandoval MD on 4/08/2025 at 8:32 AM     Finalized by (CST): Emanuel Sandoval MD on 4/08/2025 at 8:39 AM          XR CHEST AP PORTABLE  (CPT=71045)  Result Date: 4/8/2025  CONCLUSION: No new abnormality.  Stable pulmonary edema, bilateral pleural effusions, and bibasilar opacities.    elm-remote     Dictated by (CST): Emanuel Sandoval MD on 4/08/2025 at 8:08 AM     Finalized by (CST): Emanuel Sandoval MD on 4/08/2025 at 8:09 AM          CT ABDOMEN+PELVIS(CPT=74176)  Result Date: 4/7/2025  CONCLUSION:  1. History of metastatic left ovarian carcinoma with peritoneal carcinomatosis.  Moderate to large volume intra-abdominal ascites is new since comparison abdominal CT imaging from August, 2024 and may be malignant in nature. Ascitic fluid demonstrates peripheral/peritoneal thickening; therefore, the possibility of coexistent spontaneous bacterial peritonitis is raised.  Mild stranding in the central small bowel mesentery is nonspecific, but raises suspicion for peritoneal carcinomatosis. 2. Scattered mildly dilated and fluid-filled proximal small bowel loops, but with no discrete transition point.  Findings suggest either an infectious/inflammatory enteritis or a generalized adynamic ileus.  No definite high-grade small bowel obstruction.  Mild long segment proximal colonic wall thickening probably relates to a coexistent infectious/inflammatory colitis. 3. Stable postoperative changes of the distal colon with associated anastomosis. 4. Stable probable small left hepatic lobe cyst. 5. Mild nonspecific gallbladder luminal distention, but with  no CT findings of acute cholecystitis. 6. Nonspecific fluid distention of the distal esophagus, please correlate for reflux or other etiologies of esophagitis. 7. Enlarged anterior mediastinal/prevascular space lymph nodes could be metastatic. 8. Loculated moderate bilateral pleural effusions, which may be malignant.  Associated dependent airspace disease in both lower lobes, which is favored to represent compressive atelectasis.  Small 6 mm right upper lobe lung nodules are unchanged since recent prior chest CT. 9. Anasarca. 10. Partially imaged bilateral breast implants. 11. Lesser incidental findings as above.     Dictated by (CST): Michael Rogers MD on 4/07/2025 at 5:58 PM     Finalized by (CST): Michael Rogers MD on 4/07/2025 at 6:13 PM          XR CHEST AP PORTABLE  (CPT=71045)  Result Date: 4/7/2025  CONCLUSION:  1. Borderline cardiomegaly.  Tortuous aorta. 2. Right central venous Port-A-Cath with the tip in the SVC.  No pneumothorax 3. Bilateral perihilar lower lobe mixed airspace opacification has progressed. 4. Small to moderate bilateral effusions, worse on the right redemonstrated with slight improvement on the right side and similar findings on the left.     Dictated by (CST): Malcolm Moore MD on 4/07/2025 at 10:05 AM     Finalized by (CST): Malcolm Moore MD on 4/07/2025 at 10:09 AM

## 2025-04-14 NOTE — CM/SW NOTE
MDO for dc. CM called to f/u on [facility] choice    Pt declined [facility]    / to remain available for support and/or discharge planning.     Do Haddad RN    Ext [phone]

## 2025-04-14 NOTE — PLAN OF CARE
Orders received for patient discharge. Patient transported home with family. All discharge instructions were discussed with patient and/or family. Patient verbalizes understanding and agreeable to plan of care & follow-up plan as outlined in discharge summary / AVS and had no further questions regarding discharge instruction. All patient belongings were transported with patient/family at time of discharge.    Yue is aox4, tolerating diet, ambulating SBA, voiding via purewick, was re-educated on the importance of ambulating to the restroom and not relying on the external catheter, pt was upset at this, no bm. Denies pain. Denies nausea. Family at bedside. Port was decannulated. Call light within reach, calls appropriately. Safety precautions in place.     Problem: CARDIOVASCULAR - ADULT  Goal: Absence of cardiac arrhythmias or at baseline  Description: INTERVENTIONS:- Continuous cardiac monitoring, monitor vital signs, obtain 12 lead EKG if indicated- Evaluate effectiveness of antiarrhythmic and heart rate control medications as ordered- Initiate emergency measures for life threatening arrhythmias- Monitor electrolytes and administer replacement therapy as ordered  Outcome: Adequate for Discharge  Goal: Maintains optimal cardiac output and hemodynamic stability  Description: INTERVENTIONS:- Monitor vital signs, rhythm, and trends- Monitor for bleeding, hypotension and signs of decreased cardiac output- Evaluate effectiveness of vasoactive medications to optimize hemodynamic stability- Monitor arterial and/or venous puncture sites for bleeding and/or hematoma- Assess quality of pulses, skin color and temperature- Assess for signs of decreased coronary artery perfusion - ex. Angina- Evaluate fluid balance, assess for edema, trend weights  Outcome: Adequate for Discharge     Problem: RESPIRATORY - ADULT  Goal: Achieves optimal ventilation and oxygenation  Description: INTERVENTIONS:- Assess for changes in respiratory  status- Assess for changes in mentation and behavior- Position to facilitate oxygenation and minimize respiratory effort- Oxygen supplementation based on oxygen saturation or ABGs- Provide Smoking Cessation handout, if applicable- Encourage broncho-pulmonary hygiene including cough, deep breathe, Incentive Spirometry- Assess the need for suctioning and perform as needed- Assess and instruct to report SOB or any respiratory difficulty- Respiratory Therapy support as indicated- Manage/alleviate anxiety- Monitor for signs/symptoms of CO2 retention  Outcome: Adequate for Discharge     Problem: GASTROINTESTINAL - ADULT  Goal: Minimal or absence of nausea and vomiting  Description: INTERVENTIONS:- Maintain adequate hydration with IV or PO as ordered and tolerated- Nasogastric tube to low intermittent suction as ordered- Evaluate effectiveness of ordered antiemetic medications- Provide nonpharmacologic comfort measures as appropriate- Advance diet as tolerated, if ordered- Obtain nutritional consult as needed- Evaluate fluid balance  Outcome: Adequate for Discharge  Goal: Maintains or returns to baseline bowel function  Description: INTERVENTIONS:- Assess bowel function- Maintain adequate hydration with IV or PO as ordered and tolerated- Evaluate effectiveness of GI medications- Encourage mobilization and activity- Obtain nutritional consult as needed- Establish a toileting routine/schedule- Consider collaborating with pharmacy to review patient's medication profile  Outcome: Adequate for Discharge  Goal: Maintains adequate nutritional intake (undernourished)  Description: INTERVENTIONS:- Monitor percentage of each meal consumed- Identify factors contributing to decreased intake, treat as appropriate- Assist with meals as needed- Monitor I&O, WT and lab values- Obtain nutritional consult as needed- Optimize oral hygiene and moisture- Encourage food from home; allow for food preferences- Enhance eating environment  Outcome:  Adequate for Discharge  Goal: Achieves appropriate nutritional intake (bariatric)  Description: INTERVENTIONS:- Monitor for over-consumption- Identify factors contributing to increased intake, treat as appropriate- Monitor I&O, WT and lab values- Obtain nutritional consult as needed- Evaluate psychosocial factors contributing to over-consumption  Outcome: Adequate for Discharge     Problem: GENITOURINARY - ADULT  Goal: Absence of urinary retention  Description: INTERVENTIONS:- Assess patient’s ability to void and empty bladder- Monitor intake/output and perform bladder scan as needed- Follow urinary retention protocol/standard of care- Consider collaborating with pharmacy to review patient's medication profile- Implement strategies to promote bladder emptying  Outcome: Adequate for Discharge     Problem: SKIN/TISSUE INTEGRITY - ADULT  Goal: Skin integrity remains intact  Description: INTERVENTIONS- Assess and document risk factors for pressure ulcer development- Assess and document skin integrity- Monitor for areas of redness and/or skin breakdown- Initiate interventions, skin care algorithm/standards of care as needed  Outcome: Adequate for Discharge     Problem: HEMATOLOGIC - ADULT  Goal: Maintains hematologic stability  Description: INTERVENTIONS- Assess for signs and symptoms of bleeding or hemorrhage- Monitor labs and vital signs for trends- Administer supportive blood products/factors, fluids and medications as ordered and appropriate- Administer supportive blood products/factors as ordered and appropriate  Outcome: Adequate for Discharge  Goal: Free from bleeding injury  Description: (Example usage: patient with low platelets)INTERVENTIONS:- Avoid intramuscular injections, enemas and rectal medication administration- Ensure safe mobilization of patient- Hold pressure on venipuncture sites to achieve adequate hemostasis- Assess for signs and symptoms of internal bleeding- Monitor lab trends- Patient is to  report abnormal signs of bleeding to staff- Avoid use of toothpicks and dental floss- Use electric shaver for shaving- Use soft bristle tooth brush- Limit straining and forceful nose blowing  Outcome: Adequate for Discharge     Problem: Patient Centered Care  Goal: Patient preferences are identified and integrated in the patient's plan of care  Description: Interventions:- What would you like us to know as we care for you- Provide timely, complete, and accurate information to patient/family- Incorporate patient and family knowledge, values, beliefs, and cultural backgrounds into the planning and delivery of care- Encourage patient/family to participate in care and decision-making at the level they choose- Honor patient and family perspectives and choices  Outcome: Adequate for Discharge

## 2025-04-14 NOTE — PLAN OF CARE
Sepsis BPA fired this MD DANIEL notified, no new orders at this time.   Problem: HEMATOLOGIC - ADULT  Goal: Maintains hematologic stability  Description: INTERVENTIONS- Assess for signs and symptoms of bleeding or hemorrhage- Monitor labs and vital signs for trends- Administer supportive blood products/factors, fluids and medications as ordered and appropriate- Administer supportive blood products/factors as ordered and appropriate  Outcome: Progressing  Goal: Free from bleeding injury  Description: (Example usage: patient with low platelets)INTERVENTIONS:- Avoid intramuscular injections, enemas and rectal medication administration- Ensure safe mobilization of patient- Hold pressure on venipuncture sites to achieve adequate hemostasis- Assess for signs and symptoms of internal bleeding- Monitor lab trends- Patient is to report abnormal signs of bleeding to staff- Avoid use of toothpicks and dental floss- Use electric shaver for shaving- Use soft bristle tooth brush- Limit straining and forceful nose blowing  Outcome: Progressing

## 2025-04-14 NOTE — PROGRESS NOTES
Pulmonary Progress Note     Assessment / Plan:  Acute respiratory failure - due to pleural effusions  - on RA  MPEs - s/p right thoracentesis with 1.3L of fluid drained on 4/8/25  - monitor for now; may need PleurX  DVT - diagnosed 3/21/25  - DOAC  Klebsiella bacteremia  - abx per ID  Metastatic ovarian cancer  - per oncology  Dispo  - will follow peripherally. She prefers to follow-up with oncology for monitoring of her MPEs.      Subjective:  Denies dyspnea    Objective:  Vitals:    04/13/25 2031 04/14/25 0300 04/14/25 0433 04/14/25 0445   BP: 119/67   121/68   BP Location: Right arm   Right arm   Pulse:  111 (!) 144 110   Resp: (!) 28   22   Temp: 98.1 °F (36.7 °C)   98.5 °F (36.9 °C)   TempSrc: Oral   Oral   SpO2: 94%   92%   Weight:       Height:         Physical Exam:  General: no apparent distress, conversant  Skin: no rash, ulcers or subcutaneous nodules  Eyes: anicteric sclerae, moist conjunctivae  Head, ears, nose, throat: atraumatic, oropharynx clear with moist mucous membranes  Neck: trachea midline with no thyromegaly  Heart: regular rate and rhythm, no murmurs / rubs / gallops  Lungs: diminished breath sounds at the bases  Extremities: no edema or cyanosis  Psych: interactive, answering questions appropriately, appropriate affect    Medications:  Reviewed in EMR    Lab Data:  Reviewed in EMR    Imaging:  I independently visualized all relevant chest imaging in PACS and agree with radiology interpretation except where noted.

## 2025-04-14 NOTE — PROGRESS NOTES
[facility]  part of [facility] Infectious Disease Progress Note    Yue Malin Patient Status:  Inpatient    1970 MRN [id number]   Location [facility] 4W/SW/SE Attending Sriram Martel MD   Hosp Day # 9 PCP Diana Black MD     Subjective:  Pt feeling better and hoping for dc today.    Objective:    Allergies:  Allergies[1]    Medications:  Current Hospital Medications[2]    Physical Exam:  General: Alert, orientated x3.  Cooperative.  No apparent distress.  Vital Signs:  Blood pressure 121/68, pulse 103, temperature 98.5 °F (36.9 °C), temperature source Oral, resp. rate 22, height 170.2 cm (5' 7\"), weight 180 lb (81.6 kg), last menstrual period 2020, SpO2 92%, not currently breastfeeding.   Temp (24hrs), Av.1 °F (36.7 °C), Min:97.8 °F (36.6 °C), Max:98.5 °F (36.9 °C)      HEENT: Exam is unremarkable.  Without scleral icterus.  Mucous membranes are moist. PERRLA.  Oropharynx is clear.  Neck: No tenderness to palpitation.  Full range of motion to flexion and extension, lateral rotation and lateral flexion of cervical spine.  No JVD. Supple.   Lungs: Clear to auscultation bilaterally.  Cardiac: Regular rate and rhythm. No murmur.  Abdomen:  Soft, non-distended, non-tender, with no rebound or guarding.   Extremities:  No lower extremity edema noted.  Without clubbing or cyanosis.    Skin: Normal texture and turgor.  Neurologic: Cranial nerves are grossly intact.  Motor strength and sensory examination is grossly normal.  No focal neurologic deficit.    Labs:  Lab Results   Component Value Date    WBC 3.3 2025    HGB 8.2 2025    HCT 25.1 2025    .0 2025    CREATSERUM 0.53 2025    BUN 8 2025     2025    K 3.5 2025    K 3.5 2025     2025    CO2 23.0 2025     2025    CA 7.9 2025    MG 1.8 2025    PHOS 2.7 2025         Assessment/Plan:    1.  Gram  negative sepsis  -blood culture with klebsiella on 4/5 and 4/6, repeat on 4/8 NG and 4/11 NGTD  -likely GI source vs pulmonary vs other  -CT with ascites and possible peritonitis s/p paracentesis, fluid not consistent with infection, cultures NGTD  -R pleural effusion s/p thoracentesis on 4/8, cultures NG  -UA clean  -on IV zosyn d#7/14  2.  Hx of metastatic ovarian CA with malignant pleural effusion and peritoneal carcinomatosis  3.  Dispo  -dc on PO cefuroxime  -follow up PRN    If you have any questions or concerns please call [facility] Infectious Disease at [phone].     Martin Manzano, APRN         [1]   Allergies  Allergen Reactions    Hydrocodone NAUSEA AND VOMITING     agitation    Adhesive Tape RASH   [2]   Current Facility-Administered Medications:     potassium chloride 40 mEq in 250mL sodium chloride 0.9% IVPB premix, 40 mEq, Intravenous, Once    midodrine (ProAmatine) tab 5 mg, 5 mg, Oral, TID PRN    apixaban (Eliquis) tab 5 mg, 5 mg, Oral, BID    [COMPLETED] piperacillin-tazobactam (Zosyn) 4.5 g in dextrose 5% 100 mL IVPB-ADDV, 4.5 g, Intravenous, Once **FOLLOWED BY** piperacillin-tazobactam (Zosyn) 3.375 g in dextrose 5% 100 mL IVPB-ADDV, 3.375 g, Intravenous, Q8H    baclofen (Lioresal) tab 5 mg, 5 mg, Oral, TID PRN    vancomycin (Vancocin) cap 125 mg, 125 mg, Oral, Daily    LORazepam (Ativan) tab 0.5 mg, 0.5 mg, Oral, TID PRN    acetaminophen (Tylenol Extra Strength) tab 500 mg, 500 mg, Oral, Q4H PRN    ondansetron (Zofran) 4 MG/2ML injection 4 mg, 4 mg, Intravenous, Q6H PRN    prochlorperazine (Compazine) 10 MG/2ML injection 5 mg, 5 mg, Intravenous, Q8H PRN    polyethylene glycol (PEG 3350) (Miralax) 17 g oral packet 17 g, 17 g, Oral, Daily PRN    sennosides (Senokot) tab 17.2 mg, 17.2 mg, Oral, Nightly PRN    bisacodyl (Dulcolax) 10 MG rectal suppository 10 mg, 10 mg, Rectal, Daily PRN    fleet enema (Fleet) rectal enema 133 mL, 1 enema, Rectal, Once PRN    pantoprazole (Protonix) 40 mg  in sodium chloride 0.9% PF 10 mL IV push, 40 mg, Intravenous, Q12H

## 2025-04-15 NOTE — PAYOR COMM NOTE
--------------  DISCHARGE REVIEW    Payor: [facility]  Subscriber #:  [id number]  Authorization Number: [id number]    Admit date: 4/5/25  Admit time:   3:29 PM  Discharge Date: 4/14/2025  2:05 PM     Admitting Physician: Carolina Bates MD  Attending Physician:  No att. providers found  Primary Care Physician: Diana Black MD          Discharge Summary Notes        Discharge Summary signed by Steven Hou MD at 4/14/2025  2:30 PM       Author: Steven Hou MD Specialty: Internal Medicine Author Type: Physician    Filed: 4/14/2025  2:30 PM Date of Service: 4/14/2025 12:45 PM Status: Signed    : Steven Hou MD (Physician)         [facility]   INTERNAL MEDICINE DISCHARGE SUMMARY    Name:     Yue Malin  YOB: 1970  MRN:     [id number]    Admission Date:     4/5/2025 Discharge Date:     04/14/25      REASON FOR ADMISSION  Disseminated ovarian cancer, left (HCC)    Primary care physician: Diana Black MD   Discharging physician: Steven Hou MD     HOSPITAL COURSE  DISCHARGE DIAGNOSES  PROCEDURES   Ms. 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 diagnosed March 2024 sp TAHBSO sp multiple courses treatment currently on lynparza, associated recurrent malignant ascites sp multiple paracenteses, malignant R effusion sp thoracentesis 3/14/25 (1.3L removed), bl LE swelling and LLE DVT dx 3/21/25 on AC here with septic shock with MSOF- MEGAN, lactic acidosis LFT elevation, afib w RVR, acute resp failure with hypoxia/ hypercapnia on bipap, found to have klebsiella bacteremia, clinically improved with antibiotics, BP improved, weaning midodrine, s/p paracentesis and thoracentesis, resp status back to baseline, increasing activity     Septic shock,   Klebsiella pneumoniae    Immunocompromised state  - on admit severe w shock w MSOF (sever lactic acidosis on admit, MEGAN/ LFT, resp failure, afib w RVR)  - sources: bacteremia- both bottles  4/5 and 4/6 + initial K pneumo  - unasyn on admit for aspiration broadened to zosyn/ vanc MRSA neg > zosyn only  - 4/5 and 4/6 both bcx + Klebsiella pneumoniae  - 4/7 sp US guided thora - fu results-> neg   - 4/7 CT a/p multi poss sources noted  - 4/8 obtaining repeat bcx w persistent GNR bacteremia (4/5 + K pneumo)-> so far NGTD  - echo no vegetations (done for afib)   - has port,   - abx per ID-> zosyn, cefuroxime on DC      Acute resp failure w hypoxia/ hypercapnia  - on admit: fm pleural effusion/ anasarca, PNA/sepsis w severe acidosis on admit, S/p thora 4/8  - now on RA  - CT neg for PE, lower ext dopplers neg for DVT (repeated on 4/11)  - resp status improved, now on RA  [  ] OP onc f/u for malignant pleural effusions, PRN thora OP     R Pleural effusion  Malignant ascites   - sp tap 3/14 + malignant  - recurrent  - sp pulm done US guided 4/8 am d/w DR Story 1.3L cloudy yellow fluid   - f/u fluid studies-> positive for malignancy for both  - paracentesis on 4/9 -> positive for malignancy       Acute on chronic anemia   - last bsl 9.6 on 3/21/25 per onc from lynparza  - here down to 6.5 on 4/6/25 pm- with episode coffee ground emesis and on full AC  - held AC started IV PPI  - given pRBC 4/6 w holley response  - diet advanced  - hgb up and down, no signs of bleeding, no melena   - repeat H/H-> hgb 7.1, gave 1 unit on 4/11 -> hgb 9.2 -> 8.3  - monitor while on heparin drip, repeat later, and if stable then resume eliquis      Acute VTE  - + LLE DVT dx 3/21/25 (w/u bl LE swelling)  - repeat US here negative- initial AC held for hg drop concern for possible bleed  - CT neg for PE on 4/5  - IVC filter mentioned given poss GI bleeding  - discussed with Hematology (dr. Serrano) and pulmonary  - tolerated hep gtt, switched to eliquis      Afib w RVR  - reactive from septic shock but afib new dx  - improved but did have some RVR o/n switched from levophed to phenylephrine gtt  - seen by cards, echo done- ok  [  ] OP cards  f/u      Leukopenia  - improving      BRCA 1 + (somatic non genetic mutation) Ovarian adenocarcinoma w peritoneal carcinomatosis and malignant recurrent effusion/ ascites  - dx March 2024 seebeni Serrano onc  - sp TAHBSO Aug 2024 w BENI Choudhary at [facility]  - sp multi lines treatment most recently on lynparza 300 mg BID w avastin  - notified Dr Serrano on admit, updated on 4/11  - sp multi paracentesis some discussion outpatient regarding pleurx  - 4/8 repeat paracentesis done in house        MEGAN w hyponatremia, severe lactic acidosis  - on admit now all improved  - suspect from sepsis and N/V just prior to admit      Hypokalemia  - repletion      LFT elevation  - normalized       ACOSTA/ MDD  - home meds = venlafaxine and ativan PRN follows w psych outpatient  - fam proving support/ non pharmacologic measures at bedside     Chronic back pain  - hold home tramadol     ACP/ GOC  - Dr. Bates discussed with patient, spouse and sister at bedside. hcPOA - patient's daughter Ami - full code / full  measures at this time         CONSULTATIONS   IP CONSULT TO PULMONOLOGY  IP CONSULT TO INTERVENTIONAL RADIOLOGY  IP CONSULT PALLIATIVE CARE  IP CONSULT TO CARDIOLOGY  IP CONSULT TO PHARMACY  IP CONSULT TO INFECTIOUS DISEASE    IMPORTANT FOLLOW UP  Martin Manzano, APRN  [address]  [address]  [place] [id number] [phone]    Call  As needed    Luana Serrano MD  [address]  [address]  [facility] [id number] [phone]    Schedule an appointment as soon as possible for a visit in 3 day(s)  for hospital follow-up (please note, his address has changed)     MEDICATIONS AT DISCHARGE     Medication List        START taking these medications      cefuroxime 500 MG Tabs  Commonly known as: Ceftin  Take 1 tablet (500 mg total) by mouth 2 (two) times daily for 7 days.            CONTINUE taking these medications      acetaminophen 325 MG Tabs  Commonly known as: Tylenol     apixaban 5 MG Tabs  Commonly known as: Eliquis  Take 1 tablet (5 mg  total) by mouth 2 (two) times daily.     baclofen 10 MG Tabs  Commonly known as: Lioresal     docusate sodium 100 MG Caps  Commonly known as: COLACE  Take 100 mg by mouth 2 (two) times daily.     famotidine 20 MG Tabs  Commonly known as: Pepcid     LORazepam 1 MG Tabs  Commonly known as: Ativan     Lynparza 150 MG Tabs  Generic drug: Olaparib               Where to Get Your Medications        These medications were sent to [id number] - [place], [place] - [address] [phone], [phone] [address], [facility] [address]      Phone: [phone]   apixaban 5 MG Tabs  cefuroxime 500 MG Tabs     PHYSICAL EXAMINATION  Vitals: /68 (BP Location: Right arm)   Pulse 103   Temp 98.5 °F (36.9 °C) (Oral)   Resp 22   Ht 5' 7\" (1.702 m)   Wt 180 lb (81.6 kg)   LMP 12/22/2020   SpO2 92%   BMI 28.19 kg/m²   Gen: A&Ox3, thin  Eyes: PERRL, normal conjunctivae  HENT: Normocephalic, trachea midline, moist mucous membranes  CV: RRR, no peripheral edema, port in place  Resp: CTAB, non-labored respirations, symmetric expansion  GI: Soft, NT, ND, no guarding  MSK:  No C/C/E, normal active/passive ROM in C-spine  Skin: No rashes  Neuro: CN 2-12 grossly intact     DISCHARGE CONDITION  Stable    DISCHARGE LOCATION  Home    Total time coordinating care: 32 minutes. (Hx, exam, chart review, discussing plan w/ patient/family, nursing/consultants). 50% of the time F2F for counseling, patient education, answering questions & coordination of care.    Patient's prior to admission medications were reviewed, all current medications reviewed. Patient's current and discharge medications have been reconciled and reviewed verbally with the patient and or family members.      Steven Hou MD   Internal Medicine - Hospitalist  [facility]      Objective:    LABORATORY VALUES   Recent Labs   Lab 04/08/25  0538 04/09/25  0525 04/10/25  0559 04/11/25  0514 04/12/25  0619 04/13/25  0635 04/14/25  0655     141 139 138 137 137 137   K 3.6  3.6 3.4*  3.4* 3.8  3.8 3.6  3.6 3.6  3.6 3.4*  3.4* 3.5  3.5    106 106 105 105 106 106   CO2 28.0 26.0 25.0 24.0 23.0 23.0 23.0   BUN 22 13 9 10 9 7* 8*   CREATSERUM 0.59 0.57 0.56 0.54* 0.50* 0.54* 0.53*   ANIONGAP 5 9 8 9 9 8 8   * 110* 107* 113* 103* 119* 110*   CA 7.4* 7.6* 8.0* 7.7* 8.0* 7.8* 7.9*   PHOS  --   --   --   --   --   --  2.7   TP 5.1* 5.0* 5.4*  --   --  5.3*  --    ALB 2.8* 2.9* 3.2  --   --  3.0*  --    AST 56* 29 24  --   --  20  --    ALT 46 29 24  --   --  22  --    ALKPHO 87 88 99  --   --  95  --    BILT 0.4 0.5 0.6  --   --  0.4  --    EGFRCR 106 107 108 109 111 109 109    Recent Labs   Lab 04/10/25  1344 04/11/25  0514 04/11/25  1310 04/12/25  0619 04/13/25  0635 04/13/25  1227 04/14/25  0655   WBC 3.0* 2.0*  --  2.8* 2.6*  --  3.3*   HGB 8.5* 7.0* 7.1* 9.1* 8.3* 8.2* 8.2*   HCT 25.2* 21.6* 21.3* 26.9* 25.0* 25.6* 25.1*   .0* 109.0*  --  127.0* 163.0  --  212.0   MCV 85.4 87.1  --  86.5 87.1  --  91.6   MOPERCENT 6.6 10.6  --  11.6 17.6  --  16.8   EOPERCENT 1.0 2.0  --  1.5 0.8  --  0.6   BAPERCENT 1.0 0.5  --  1.1 0.8  --  1.5      IMAGING, REPORTS, AND DIAGNOSTICS  US VENOUS DOPPLER LEG BILAT - DIAG IMG (CPT=93970)  Result Date: 4/11/2025  CONCLUSION:   No evidence of right or left lower extremity deep venous thrombosis.   elm-remote  Dictated by (CST): Michael Rogers MD on 4/11/2025 at 10:55 AM     Finalized by (CST): Michael Rogers MD on 4/11/2025 at 10:56 AM          XR CHEST AP PORTABLE  (CPT=71045)  Result Date: 4/10/2025  CONCLUSION:   No significant change in the bilateral pleural effusions with associated bibasilar opacities.    Dictated by (CST): Benoit Serra MD on 4/10/2025 at 7:38 AM     Finalized by (CST): Benoit Serra MD on 4/10/2025 at 7:41 AM          US PARACENTESIS W IMAGING (CPT=49083)  Result Date: 4/9/2025  CONCLUSION: Paracentesis as described.    Dictated by (CST): Denise Gee APN on  4/09/2025 at 7:59 AM     Finalized by (CST): Denise Gee APN on 4/09/2025 at 7:59 AM          XR CHEST AP PORTABLE  (CPT=71045)  Result Date: 4/9/2025  CONCLUSION:   Stable small bilateral pleural effusions with patchy bibasilar right greater than left opacity.    Dictated by (CST): Marco Antonio Kumar MD on 4/09/2025 at 7:25 AM     Finalized by (CST): Marco Antonio Kumar MD on 4/09/2025 at 7:26 AM          XR CHEST AP PORTABLE  (CPT=71045)  Result Date: 4/8/2025  CONCLUSION:  1. Bilateral pleural effusions and associated basilar atelectasis, with or without superimposed pneumonia.  2. Lesser incidental findings as above.     Dictated by (CST): Suresh Wills MD on 4/08/2025 at 10:43 AM     Finalized by (CST): Suresh Wills MD on 4/08/2025 at 10:44 AM          US THORACENTESIS GUIDED RIGHT (CPT=32555)  Result Date: 4/8/2025   Sonography support was provided.  Images demonstrate large right pleural effusion. Please see separate report for additional details.    elm-remote     Dictated by (CST): Emanuel Sandoval MD on 4/08/2025 at 8:32 AM     Finalized by (CST): Emanuel Sandoval MD on 4/08/2025 at 8:39 AM          XR CHEST AP PORTABLE  (CPT=71045)  Result Date: 4/8/2025  CONCLUSION: No new abnormality.  Stable pulmonary edema, bilateral pleural effusions, and bibasilar opacities.    elm-remote     Dictated by (CST): Emanuel Sandoval MD on 4/08/2025 at 8:08 AM     Finalized by (CST): Emanuel Sandoval MD on 4/08/2025 at 8:09 AM          CT ABDOMEN+PELVIS(CPT=74176)  Result Date: 4/7/2025  CONCLUSION:  1. History of metastatic left ovarian carcinoma with peritoneal carcinomatosis.  Moderate to large volume intra-abdominal ascites is new since comparison abdominal CT imaging from August, 2024 and may be malignant in nature. Ascitic fluid demonstrates peripheral/peritoneal thickening; therefore, the possibility of coexistent spontaneous bacterial peritonitis is raised.  Mild stranding in the central small bowel mesentery is  nonspecific, but raises suspicion for peritoneal carcinomatosis. 2. Scattered mildly dilated and fluid-filled proximal small bowel loops, but with no discrete transition point.  Findings suggest either an infectious/inflammatory enteritis or a generalized adynamic ileus.  No definite high-grade small bowel obstruction.  Mild long segment proximal colonic wall thickening probably relates to a coexistent infectious/inflammatory colitis. 3. Stable postoperative changes of the distal colon with associated anastomosis. 4. Stable probable small left hepatic lobe cyst. 5. Mild nonspecific gallbladder luminal distention, but with no CT findings of acute cholecystitis. 6. Nonspecific fluid distention of the distal esophagus, please correlate for reflux or other etiologies of esophagitis. 7. Enlarged anterior mediastinal/prevascular space lymph nodes could be metastatic. 8. Loculated moderate bilateral pleural effusions, which may be malignant.  Associated dependent airspace disease in both lower lobes, which is favored to represent compressive atelectasis.  Small 6 mm right upper lobe lung nodules are unchanged since recent prior chest CT. 9. Anasarca. 10. Partially imaged bilateral breast implants. 11. Lesser incidental findings as above.     Dictated by (CST): Michael Rogers MD on 4/07/2025 at 5:58 PM     Finalized by (CST): Michael Rogers MD on 4/07/2025 at 6:13 PM          XR CHEST AP PORTABLE  (CPT=71045)  Result Date: 4/7/2025  CONCLUSION:  1. Borderline cardiomegaly.  Tortuous aorta. 2. Right central venous Port-A-Cath with the tip in the SVC.  No pneumothorax 3. Bilateral perihilar lower lobe mixed airspace opacification has progressed. 4. Small to moderate bilateral effusions, worse on the right redemonstrated with slight improvement on the right side and similar findings on the left.     Dictated by (CST): Malcolm Moore MD on 4/07/2025 at 10:05 AM     Finalized by (CST): Malcolm Moore MD on 4/07/2025  at 10:09 AM                  Electronically signed by Steven Hou MD on 4/14/2025  2:30 PM         4/13 ID    Subjective:  Patient seen/examined.  She is up in bed in NAD, comfortable.  No new overnight issues.  IV antibiotics ongoing.     Objective:  Blood pressure 108/78, pulse (!) 140, temperature 97.8 °F (36.6 °C), temperature source Oral, resp. rate 18, height 5' 7\" (1.702 m), weight 180 lb (81.6 kg), last menstrual period 12/22/2020, SpO2 95%, not currently breastfeeding.     Intake/Output:     Intake/Output Summary (Last 24 hours) at 4/13/2025 1100  Last data filed at 4/13/2025 1001      Gross per 24 hour   Intake 920 ml   Output 450 ml   Net 470 ml         Physical Exam:  General: Awake, alert, non-tox, NAD.  HEENT:  Oropharynx clear, trachea ML.  Heart: RRR S1S2 no murmurs.  Lungs: Essentially CTA b/l, no rhonchi, rales, wheezes.  Abdomen: Soft, NT/ND.  BS present.  No organomegaly.  Extremity: No edema.  Neurological: No focal deficits.  Derm:  Warm, dry, free from rashes.     Lab Data Review:        Lab Results   Component Value Date     WBC 2.6 04/13/2025     HGB 8.3 04/13/2025     HCT 25.0 04/13/2025     .0 04/13/2025     CREATSERUM 0.54 04/13/2025     BUN 7 04/13/2025      04/13/2025     K 3.4 04/13/2025     K 3.4 04/13/2025      04/13/2025     CO2 23.0 04/13/2025      04/13/2025     CA 7.8 04/13/2025     ALB 3.0 04/13/2025     ALKPHO 95 04/13/2025     BILT 0.4 04/13/2025     TP 5.3 04/13/2025     AST 20 04/13/2025     ALT 22 04/13/2025     PTT 55.6 04/13/2025     INR 1.06 04/13/2025     MG 1.6 04/13/2025        Cultures:  2/2 blood cultures + klebsiella, eggerthella 4/5/25  2/2 blood cultures + klebsiella 4/6/25     Blood cultures 4/8/25 negative     MRSA swab negative     Radiology:  CONCLUSION:   1. History of metastatic left ovarian carcinoma with peritoneal carcinomatosis.  Moderate to large volume intra-abdominal ascites is new since comparison abdominal CT imaging from  August, 2024 and may be malignant in nature. Ascitic fluid demonstrates   peripheral/peritoneal thickening; therefore, the possibility of coexistent spontaneous bacterial peritonitis is raised.  Mild stranding in the central small bowel mesentery is nonspecific, but raises suspicion for peritoneal carcinomatosis.   2. Scattered mildly dilated and fluid-filled proximal small bowel loops, but with no discrete transition point.  Findings suggest either an infectious/inflammatory enteritis or a generalized adynamic ileus.  No definite high-grade small bowel   obstruction.  Mild long segment proximal colonic wall thickening probably relates to a coexistent infectious/inflammatory colitis.   3. Stable postoperative changes of the distal colon with associated anastomosis.   4. Stable probable small left hepatic lobe cyst.   5. Mild nonspecific gallbladder luminal distention, but with no CT findings of acute cholecystitis.   6. Nonspecific fluid distention of the distal esophagus, please correlate for reflux or other etiologies of esophagitis.   7. Enlarged anterior mediastinal/prevascular space lymph nodes could be metastatic.   8. Loculated moderate bilateral pleural effusions, which may be malignant.  Associated dependent airspace disease in both lower lobes, which is favored to represent compressive atelectasis.  Small 6 mm right upper lobe lung nodules are unchanged since   recent prior chest CT.   9. Anasarca.   10. Partially imaged bilateral breast implants.   11. Lesser incidental findings as above.        Antibiotics Reviewed:  Zosyn     Assessment and Plan:     Acute gram negative sepsis in this immunocompromised patient presenting with SOB, N/V, and loss of appetite  - 2/2 blood cultures positive 4/5/25 and 4/6/25 for klebsiella spp, repeats pending   - Source related to occult intraabdominal focus vs. Primary/secondary infected port (no gross s/s of infection) vs. Pulmonary vs. Other  - s/p thoracentesis 4/8/25  with 1300cc removed, cultures negative  - CT reviewed with ascites and concern for possible peritonitis, s/p paracentesis without criteria for SBP, cultures negative thus far  - UA bland  - IV zosyn ongoing day #6/14 since last positive culture     2.  Metastatic ovarian cancer with malignant pleural effusion and peritoneal carcinomatosis  - Suspect gut translocation of bacteria as etiology of events without evidence of SBP     3.  Anemia, leukopenia due to the above     4.  Disposition - stable from ID.  Continue IV zosyn as Rx.   Although 4 sets of blood cultures are positive, no gross s/s mediport involvement.  Repeat blood cultures remain negative.  Trending temps and WBCs.  Plan for eventual discharge on cefuroxime 500mg p.o. BID to complete a 14 day course as above.  Supportive care ongoing.  Will follow.     Karen Thomason DO, [facility] Infectious Disease  [phone]     4/13/2025  11:00 AM

## (undated) DEVICE — GLOVE SUR 6.5 SENSICARE PI PIP CRM PWD F

## (undated) DEVICE — GLOVE SUR 7 SENSICARE PI PIP GRN PWD F

## (undated) DEVICE — APPLICATOR STD 6IN COT TIP WOOD HNDL ST

## (undated) DEVICE — ARTICULATING RELOAD WITH TRI-STAPLE TECHNOLOGY: Brand: ENDO GIA

## (undated) DEVICE — UNDYED BRAIDED (POLYGLACTIN 910), SYNTHETIC ABSORBABLE SUTURE: Brand: COATED VICRYL

## (undated) DEVICE — ECHELON CONTOUR W/ BLUE RELOAD: Brand: ECHELON

## (undated) DEVICE — SUT VCRL 2-0 36IN CT-1 ABSRB UD L36MM 1/2 CIR

## (undated) DEVICE — SLIM BODY SKIN STAPLER: Brand: APPOSE ULC

## (undated) DEVICE — SPONGE LAP 18X18IN WHT COT 4 PLY FLD STRUNG

## (undated) DEVICE — ABC HANDPIECE TRIPLE OPTION HANDPIECE: Brand: ABC

## (undated) DEVICE — GLOVE SUR 7.5 SENSICARE PI PIP CRM PWD F

## (undated) DEVICE — GOWN,SIRUS,FAB REINF,RAGLAN,XL,STERILE: Brand: MEDLINE

## (undated) DEVICE — SOLUTION IRRIG 1000ML 0.9% NACL USP BTL

## (undated) DEVICE — SUT COAT VCRL+ 0 27IN CT-1 ABSRB VLT ANTIBACT

## (undated) DEVICE — TRAP MCS 40ML 5IN PLS SCR CAP

## (undated) DEVICE — SIGMOIDOSCOPE LIGHTED BIOSEAL

## (undated) DEVICE — PROVIDES A STERILE INTERFACE BETWEEN THE OPERATING ROOM SURGICAL LAMPS (NON-STERILE) AND THE SURGEON OR NURSE (STERILE).: Brand: STERION®CLAMP COVER FABRIC

## (undated) DEVICE — ANTIBACTERIAL UNDYED BRAIDED (POLYGLACTIN 910), SYNTHETIC ABSORBABLE SUTURE: Brand: COATED VICRYL

## (undated) DEVICE — SUT PERMA- 3-0 18IN SH NABSRB BLK CR 26MM

## (undated) DEVICE — INTENDED TO BE USED TO OCCLUDE, RETRACT AND IDENTIFY ARTERIES, VEINS, TENDONS AND NERVES IN SURGICAL PROCEDURES: Brand: STERION®  VESSEL LOOP

## (undated) DEVICE — KIT HEMSTAT MTRX 8ML PORCINE GEL HUM THROM

## (undated) DEVICE — DRAIN SUR W10MMXL20CM SIL FULL PERF HUBLESS

## (undated) DEVICE — GLOVE SUR 8 SENSICARE PI PIP GRN PWD F

## (undated) DEVICE — SUT PROL 0 30IN CT-1 NABSRB BLU L36MM 1/2 CIR

## (undated) DEVICE — DRAPE,UNDRBUT,WHT GRAD PCH,CAPPORT,20/CS: Brand: MEDLINE

## (undated) DEVICE — DRAIN JACKSON PRATT 10FR 1/8END: Brand: CARDINAL HEALTH

## (undated) DEVICE — VIOLET BRAIDED (POLYGLACTIN 910), SYNTHETIC ABSORBABLE SUTURE: Brand: COATED VICRYL

## (undated) DEVICE — GLOVE SUR 7 SENSICARE PI PIP CRM PWD F

## (undated) DEVICE — SUT PERMA- 2-0 18IN NABSRB BLK TIE SILK

## (undated) DEVICE — DRESSING ALG 3.5X10IN TAN CARBOXYMETHYL CELOS

## (undated) DEVICE — SUT PLN GUT 2-0 27IN CT ABSRB TAN YELLOWISH 4

## (undated) DEVICE — UNIVERSAL STAPLER: Brand: ENDO GIA ULTRA

## (undated) DEVICE — CURVED, LARGE JAW, OPEN SEALER/DIVIDER NANO-COATED: Brand: LIGASURE IMPACT

## (undated) DEVICE — SUT COAT VCRL 2-0 27IN SH ABSRB UD 26MM 1/2

## (undated) DEVICE — OR TOWEL, 16" X 26" XRAY DETECTABLE STERILE, GREEN: Brand: PREMIERPRO

## (undated) DEVICE — 3M™ TEGADERM™ TRANSPARENT FILM DRESSING FRAME STYLE, 1626W, 4 IN X 4-3/4 IN (10 CM X 12 CM), 50/CT 4CT/CASE: Brand: 3M™ TEGADERM™

## (undated) DEVICE — SUT PERMA- 2-0 30IN SH NABSRB BLK L26MM 1/

## (undated) DEVICE — SYRINGE BULB 50/CS 48/PLT: Brand: MEDEGEN MEDICAL PRODUCTS, LLC

## (undated) DEVICE — SUT PDS II 1-0 96IN ABSRB VLT L70MM XLH

## (undated) DEVICE — SYRINGE,LUER LOCK,STERILE,60ML,PUMPC: Brand: MEDLINE

## (undated) DEVICE — A P RESECTION: Brand: MEDLINE INDUSTRIES, INC.

## (undated) DEVICE — HANDLE SUR BLU PLAS LT FLX SLIP ON ST DISP

## (undated) DEVICE — SUT MCRYL 4-0 18IN PS-2 ABSRB UD 19MM 3/8 CIR

## (undated) DEVICE — WOUND RETRACTOR AND PROTECTOR: Brand: ALEXIS O WOUND PROTECTOR-RETRACTOR

## (undated) DEVICE — ELECTRODE ES L16.5CM BLDE MPLR OPN APPRCH EZ

## (undated) DEVICE — CIRCULAR MECH XL SEAL 29MM

## (undated) DEVICE — EVACUATOR SUR 100CC SIL BLB WND

## (undated) DEVICE — Device

## (undated) DEVICE — ABSORBABLE HEMOSTAT (OXIDIZED REGENERATED CELLULOSE, U.S.P.): Brand: SURGICEL FIBRILLAR

## (undated) DEVICE — ZZ-DISC-SUB-495258-SUT VCRL 3-0 27IN FS-1 ABSRB UD L24MM 3/8 CIR

## (undated) DEVICE — DRAPE,ROBOTICS,STERILE: Brand: MEDLINE

## (undated) DEVICE — PURSE STRING DEVICE: Brand: PURSTRING

## (undated) DEVICE — BINDER ABD SM M W12IN CIRC 30-45IN 4 PNL E

## (undated) NOTE — LETTER
[place] [address]  Authorization for Invasive Procedures  Date: 04/08/2025           Time: 1145    I hereby authorize EMILE Gee/ SHARI Hay/ Dr Steward, my physician and his/her assistants (if applicable), which may include medical students, residents, and/or fellows, to perform the following surgical operation/ procedure Ultrasound Guided Paracentesis  and administer such anesthesia as may be determined necessary by my physician on Yue Trejozenia  2.   I recognize that during the surgical operation/procedure, unforeseen conditions may necessitate additional or different procedures than those listed above.  I, therefore, further authorize and request that the above-named surgeon, assistants, or designees perform such procedures as are, in their judgment, necessary and desirable.    3.   My surgeon/physician has discussed prior to my surgery the potential benefits, risks and side effects of this procedure; the likelihood of achieving goals; and potential problems that might occur during recuperation.  They also discussed reasonable alternatives to the procedure, including risks, benefits, and side effects related to the alternatives and risks related to not receiving this procedure.  I have had all my questions answered and I acknowledge that no guarantee has been made as to the result that may be obtained.    4.   Should the need arise during my operation/procedure, which includes change of level of care prior to discharge, I also consent to the administration of blood and/or blood products.  Further, I understand that despite careful testing and screening of blood or blood products by collecting agencies, I may still be subject to ill effects as a result of receiving a blood transfusion and/or blood products.  The following are some, but not all, of the potential risks that can occur: fever and allergic reactions, hemolytic reactions, transmission of diseases such as Hepatitis, AIDS  and Cytomegalovirus (CMV) and fluid overload.  In the event that I wish to have an autologous transfusion of my own blood, or a directed donor transfusion, I will discuss this with my physician.   Check only if Refusing Blood or Blood Products  I understand refusal of blood or blood products as deemed necessary by my physician may have serious consequences to my condition to include possible death. I hereby assume responsibility for my refusal and release the hospital, its personnel, and my physicians from any responsibility for the consequences of my refusal.         o  Refuse         5.   I authorize the use of any specimen, organs, tissues, body parts or foreign objects that may be removed from my body during the operation/procedure for diagnosis, research or teaching purposes and their subsequent disposal by hospital authorities.  I also authorize the release of specimen test results and/or written reports to my treating physician on the hospital medical staff or other referring or consulting physicians involved in my care, at the discretion of the Pathologist or my treating physician.    6.   I consent to the photographing or videotaping of the operations or procedures to be performed, including appropriate portions of my body for medical, scientific, or educational purposes, provided my identity is not revealed by the pictures or by descriptive texts accompanying them.  If the procedure has been photographed/videotaped, the surgeon will obtain the original picture, image, videotape or CD.  The hospital will not be responsible for storage, release or maintenance of the picture, image, tape or CD.    7.   I consent to the presence of a  or observers in the operating room as deemed necessary by my physician or their designees.    8.   I recognize that in the event my procedure results in extended X-Ray/fluoroscopy time, I may develop a skin reaction.    9. If I have a Do Not Attempt  Resuscitation (DNAR) order in place, that status will be suspended while in the operating room, procedural suite, and during the recovery period unless otherwise explicitly stated by me (or a person authorized to consent on my behalf). The surgeon or my attending physician will determine when the applicable recovery period ends for purposes of reinstating the DNAR order.  10. Patients having a sterilization procedure: I understand that if the procedure is successful the results will be permanent and it will therefore be impossible for me to inseminate, conceive, or bear children.  I also understand that the procedure is intended to result in sterility, although the result has not been guaranteed.   11. I acknowledge that my physician has explained sedation/analgesia administration to me including the risk and benefits I consent to the administration of sedation/analgesia as may be necessary or desirable in the judgment of my physician.    I CERTIFY THAT I HAVE READ AND FULLY UNDERSTAND THE ABOVE CONSENT TO OPERATION and/or OTHER PROCEDURE.        ____________________________________       _________________________________      ______________________________  Signature of Patient         Signature of Responsible Person        Printed Name of Responsible Person        ____________________________________      _________________________________      ______________________________       Signature of Witness          Relationship to Patient                       Date                                       Time  Patient Name: Yue Malin  : 1970    Reviewed: 2024   Printed: 2025  Medical Record #: [id number] Page 1 of 2             STATEMENT OF PHYSICIAN My signature below affirms that prior to the time of the procedure; I have explained to the patient and/or his/her legal representative, the risks and benefits involved in the proposed treatment and any reasonable alternative to the proposed  treatment. I have also explained the risks and benefits involved in refusal of the proposed treatment and alternatives to the proposed treatment and have answered the patient's questions. If I have a significant financial interest in a co-management agreement or a significant financial interest in any product or implant, or other significant relationship used in this procedure/surgery, I have disclosed this and had a discussion with my patient.     _______________________________________________________________ _____________________________  (Signature of Physician)                                                                                         (Date)                                   (Time)  Patient Name: Yue Malin  : 1970    Reviewed: 2024   Printed: 2025  Medical Record #: [id number] Page 2 of 2

## (undated) NOTE — LETTER
[place] [address]  Authorization for Invasive Procedures  Date: 04/07/2025           Time: 08:30    I hereby authorize Caleb Story MD , my physician and his/her assistants (if applicable), which may include medical students, residents, and/or fellows, to perform the following surgical operation/ procedure and administer such anesthesia as may be determined necessary by my physician: Ultrasound guided right thoracentesis on Yue Malin  2.   I recognize that during the surgical operation/procedure, unforeseen conditions may necessitate additional or different procedures than those listed above.  I, therefore, further authorize and request that the above-named surgeon, assistants, or designees perform such procedures as are, in their judgment, necessary and desirable.    3.   My surgeon/physician has discussed prior to my surgery the potential benefits, risks and side effects of this procedure; the likelihood of achieving goals; and potential problems that might occur during recuperation.  They also discussed reasonable alternatives to the procedure, including risks, benefits, and side effects related to the alternatives and risks related to not receiving this procedure.  I have had all my questions answered and I acknowledge that no guarantee has been made as to the result that may be obtained.    4.   Should the need arise during my operation/procedure, which includes change of level of care prior to discharge, I also consent to the administration of blood and/or blood products.  Further, I understand that despite careful testing and screening of blood or blood products by collecting agencies, I may still be subject to ill effects as a result of receiving a blood transfusion and/or blood products.  The following are some, but not all, of the potential risks that can occur: fever and allergic reactions, hemolytic reactions, transmission of diseases such as Hepatitis, AIDS and  Cytomegalovirus (CMV) and fluid overload.  In the event that I wish to have an autologous transfusion of my own blood, or a directed donor transfusion, I will discuss this with my physician.   Check only if Refusing Blood or Blood Products  I understand refusal of blood or blood products as deemed necessary by my physician may have serious consequences to my condition to include possible death. I hereby assume responsibility for my refusal and release the hospital, its personnel, and my physicians from any responsibility for the consequences of my refusal.         o  Refuse         5.   I authorize the use of any specimen, organs, tissues, body parts or foreign objects that may be removed from my body during the operation/procedure for diagnosis, research or teaching purposes and their subsequent disposal by hospital authorities.  I also authorize the release of specimen test results and/or written reports to my treating physician on the hospital medical staff or other referring or consulting physicians involved in my care, at the discretion of the Pathologist or my treating physician.    6.   I consent to the photographing or videotaping of the operations or procedures to be performed, including appropriate portions of my body for medical, scientific, or educational purposes, provided my identity is not revealed by the pictures or by descriptive texts accompanying them.  If the procedure has been photographed/videotaped, the surgeon will obtain the original picture, image, videotape or CD.  The hospital will not be responsible for storage, release or maintenance of the picture, image, tape or CD.    7.   I consent to the presence of a  or observers in the operating room as deemed necessary by my physician or their designees.    8.   I recognize that in the event my procedure results in extended X-Ray/fluoroscopy time, I may develop a skin reaction.    9. If I have a Do Not Attempt Resuscitation  (DNAR) order in place, that status will be suspended while in the operating room, procedural suite, and during the recovery period unless otherwise explicitly stated by me (or a person authorized to consent on my behalf). The surgeon or my attending physician will determine when the applicable recovery period ends for purposes of reinstating the DNAR order.  10. Patients having a sterilization procedure: I understand that if the procedure is successful the results will be permanent and it will therefore be impossible for me to inseminate, conceive, or bear children.  I also understand that the procedure is intended to result in sterility, although the result has not been guaranteed.   11. I acknowledge that my physician has explained sedation/analgesia administration to me including the risk and benefits I consent to the administration of sedation/analgesia as may be necessary or desirable in the judgment of my physician.    I CERTIFY THAT I HAVE READ AND FULLY UNDERSTAND THE ABOVE CONSENT TO OPERATION and/or OTHER PROCEDURE.        ____________________________________       _________________________________      ______________________________  Signature of Patient         Signature of Responsible Person        Printed Name of Responsible Person        ____________________________________      _________________________________      ______________________________       Signature of Witness          Relationship to Patient                       Date                                       Time  Patient Name: Yue Malin  : 1970    Reviewed: 2024   Printed: 2025  Medical Record #: [id number] Page 1 of 2             STATEMENT OF PHYSICIAN My signature below affirms that prior to the time of the procedure; I have explained to the patient and/or his/her legal representative, the risks and benefits involved in the proposed treatment and any reasonable alternative to the proposed treatment. I have  also explained the risks and benefits involved in refusal of the proposed treatment and alternatives to the proposed treatment and have answered the patient's questions. If I have a significant financial interest in a co-management agreement or a significant financial interest in any product or implant, or other significant relationship used in this procedure/surgery, I have disclosed this and had a discussion with my patient.     _______________________________________________________________ _____________________________  (Signature of Physician)                                                                                         (Date)                                   (Time)  Patient Name: Yue Malin  : 1970    Reviewed: 2024   Printed: 2025  Medical Record #: [id number] Page 2 of 2

## (undated) NOTE — LETTER
[facility] 4W/SW/SE  [address]  [facility] [address]  [phone]    Blood Transfusion Consent    In the course of your treatment, it may become necessary to administer a transfusion of blood or blood components. This form provides basic information concerning this procedure and, if signed by you, authorizes its administration. By signing this form, you agree that all of your questions about the administration of blood or blood products have been answered by the ordering medical professional or designee.    Description of Procedure  Blood is introduced into one of your veins, commonly in the arm, using a sterilized disposable needle. The amount of blood transfused, and whether the transfusion will be of blood or blood components is a judgement the physician will make based on your particular needs.    Risks  The transfusion is a common procedure of low risk.  MINOR AND TEMPORARY REACTIONS ARE NOT UNCOMMON, including a slight bruise, swelling or local reaction in the area where the needle pierces your skin, or a nonserious reaction to the transfused material itself, including headache, fever or mild skin reaction, such as rash.  Serious reactions are possible, though very unlikely, and include severe allergic reaction (shock) and destruction (hemolysis) of transfused blood cells.  Infectious diseases which are known to be transmitted by blood transfusion include certain types of viral Hepatitis(liver infection from a virus), Human Immunodeficiency Virus (HIV-1,2) infection, a viral infection known to cause Acquired Immunodeficiency Syndrome (AIDS), as well as certain other bacterial, viral, and parasitic diseases. While a minimal risk of acquiring an infectious disease from transfused blood exists, in accordance with the Federal and State law, all due care has been taken in donor selection and testing to avoid transmission of disease.    Alternatives  If loss of blood poses serious threats during your  treatment, THERE IS NO EFFECTIVE ALTERNATIVE TO BLOOD TRANSFUSION. However, if you have any further questions on this matter, your provider will fully explain the alternatives to you if it has not already been done.    I, ______________________________, have read/had read to me the above. I understand the matters bearing on the decision whether or not to authorize a transfusion of blood or blood components. I have no questions which have not been answered to my full satisfaction. I hereby consent to such transfusion as my physician may deem necessary or advisable in the course of my treatment.    ______________________________________________                    ___________________________  (Signature of Patient or Responsible party in case of minor,                 (Printed Name of Patient or incompetent, or unconscious patient)              Responsible Party)    ___________________________               _____________________  (Relationship to Patient if not self)                                    (Date and Time)    __________________________                                                           ______________________              (Signature of Witness)               (Printed Name of Witness)     Language line ()    Telephone/Verbal/Video Consent    __________________________                     ____________________  (Signature of 2nd Witness           (Printed Name of 2nd  Telephone/Verbal/Video Consent)           Witness)    Patient Name: Yue Malin     : 1970                 Printed: 2025     Medical Record #: [id number]      Rev: 2023

## (undated) NOTE — LETTER
[facility] 2W/SW  [address] [facility] [address]  [phone]    Blood Transfusion Consent    In the course of your treatment, it may become necessary to administer a transfusion of blood or blood components. This form provides basic information concerning this procedure and, if signed by you, authorizes its administration. By signing this form, you agree that all of your questions about the administration of blood or blood products have been answered by the ordering medical professional or designee.    Description of Procedure  Blood is introduced into one of your veins, commonly in the arm, using a sterilized disposable needle. The amount of blood transfused, and whether the transfusion will be of blood or blood components is a judgement the physician will make based on your particular needs.    Risks  The transfusion is a common procedure of low risk.  MINOR AND TEMPORARY REACTIONS ARE NOT UNCOMMON, including a slight bruise, swelling or local reaction in the area where the needle pierces your skin, or a nonserious reaction to the transfused material itself, including headache, fever or mild skin reaction, such as rash.  Serious reactions are possible, though very unlikely, and include severe allergic reaction (shock) and destruction (hemolysis) of transfused blood cells.  Infectious diseases which are known to be transmitted by blood transfusion include certain types of viral Hepatitis(liver infection from a virus), Human Immunodeficiency Virus (HIV-1,2) infection, a viral infection known to cause Acquired Immunodeficiency Syndrome (AIDS), as well as certain other bacterial, viral, and parasitic diseases. While a minimal risk of acquiring an infectious disease from transfused blood exists, in accordance with the Federal and State law, all due care has been taken in donor selection and testing to avoid transmission of disease.    Alternatives  If loss of blood poses serious threats during your  treatment, THERE IS NO EFFECTIVE ALTERNATIVE TO BLOOD TRANSFUSION. However, if you have any further questions on this matter, your provider will fully explain the alternatives to you if it has not already been done.    I, ______________________________, have read/had read to me the above. I understand the matters bearing on the decision whether or not to authorize a transfusion of blood or blood components. I have no questions which have not been answered to my full satisfaction. I hereby consent to such transfusion as my physician may deem necessary or advisable in the course of my treatment.    ______________________________________________                    ___________________________  (Signature of Patient or Responsible party in case of minor,                 (Printed Name of Patient or incompetent, or unconscious patient)              Responsible Party)    ___________________________               _____________________  (Relationship to Patient if not self)                                    (Date and Time)    __________________________                                                           ______________________              (Signature of Witness)               (Printed Name of Witness)     Language line ()    Telephone/Verbal/Video Consent    __________________________                     ____________________  (Signature of 2nd Witness           (Printed Name of 2nd  Telephone/Verbal/Video Consent)           Witness)    Patient Name: Yue Malin     : 1970                 Printed: 2025     Medical Record #: [id number]      Rev: 2023